# Patient Record
Sex: FEMALE | Race: WHITE | HISPANIC OR LATINO | Employment: OTHER | URBAN - METROPOLITAN AREA
[De-identification: names, ages, dates, MRNs, and addresses within clinical notes are randomized per-mention and may not be internally consistent; named-entity substitution may affect disease eponyms.]

---

## 2017-01-05 ENCOUNTER — GENERIC CONVERSION - ENCOUNTER (OUTPATIENT)
Dept: CARDIOLOGY CLINIC | Facility: CLINIC | Age: 77
End: 2017-01-05

## 2017-01-08 ENCOUNTER — GENERIC CONVERSION - ENCOUNTER (OUTPATIENT)
Dept: CARDIOLOGY CLINIC | Facility: CLINIC | Age: 77
End: 2017-01-08

## 2017-04-02 ENCOUNTER — GENERIC CONVERSION - ENCOUNTER (OUTPATIENT)
Dept: CARDIOLOGY CLINIC | Facility: CLINIC | Age: 77
End: 2017-04-02

## 2017-04-02 ENCOUNTER — INPATIENT (INPATIENT)
Facility: HOSPITAL | Age: 77
LOS: 11 days | Discharge: HOME HEALTH | End: 2017-04-14
Attending: INTERNAL MEDICINE | Admitting: INTERNAL MEDICINE

## 2017-04-06 ENCOUNTER — GENERIC CONVERSION - ENCOUNTER (OUTPATIENT)
Dept: OTHER | Facility: OTHER | Age: 77
End: 2017-04-06

## 2017-04-06 ENCOUNTER — GENERIC CONVERSION - ENCOUNTER (OUTPATIENT)
Dept: CARDIOLOGY CLINIC | Facility: CLINIC | Age: 77
End: 2017-04-06

## 2017-04-13 ENCOUNTER — GENERIC CONVERSION - ENCOUNTER (OUTPATIENT)
Dept: CARDIOLOGY CLINIC | Facility: CLINIC | Age: 77
End: 2017-04-13

## 2017-05-22 ENCOUNTER — GENERIC CONVERSION - ENCOUNTER (OUTPATIENT)
Dept: CARDIOLOGY CLINIC | Facility: CLINIC | Age: 77
End: 2017-05-22

## 2017-06-27 DIAGNOSIS — I48.91 UNSPECIFIED ATRIAL FIBRILLATION: ICD-10-CM

## 2017-06-27 DIAGNOSIS — Z99.81 DEPENDENCE ON SUPPLEMENTAL OXYGEN: ICD-10-CM

## 2017-06-27 DIAGNOSIS — R00.2 PALPITATIONS: ICD-10-CM

## 2017-06-27 DIAGNOSIS — Z86.711 PERSONAL HISTORY OF PULMONARY EMBOLISM: ICD-10-CM

## 2017-06-27 DIAGNOSIS — J44.1 CHRONIC OBSTRUCTIVE PULMONARY DISEASE WITH (ACUTE) EXACERBATION: ICD-10-CM

## 2017-06-27 DIAGNOSIS — M81.0 AGE-RELATED OSTEOPOROSIS WITHOUT CURRENT PATHOLOGICAL FRACTURE: ICD-10-CM

## 2017-06-27 DIAGNOSIS — E83.42 HYPOMAGNESEMIA: ICD-10-CM

## 2017-06-27 DIAGNOSIS — Z79.84 LONG TERM (CURRENT) USE OF ORAL HYPOGLYCEMIC DRUGS: ICD-10-CM

## 2017-06-27 DIAGNOSIS — Z86.718 PERSONAL HISTORY OF OTHER VENOUS THROMBOSIS AND EMBOLISM: ICD-10-CM

## 2017-06-27 DIAGNOSIS — J43.9 EMPHYSEMA, UNSPECIFIED: ICD-10-CM

## 2017-06-27 DIAGNOSIS — D64.9 ANEMIA, UNSPECIFIED: ICD-10-CM

## 2017-06-27 DIAGNOSIS — Z88.0 ALLERGY STATUS TO PENICILLIN: ICD-10-CM

## 2017-06-27 DIAGNOSIS — Z79.01 LONG TERM (CURRENT) USE OF ANTICOAGULANTS: ICD-10-CM

## 2017-06-27 DIAGNOSIS — E78.5 HYPERLIPIDEMIA, UNSPECIFIED: ICD-10-CM

## 2017-06-27 DIAGNOSIS — I10 ESSENTIAL (PRIMARY) HYPERTENSION: ICD-10-CM

## 2017-06-27 DIAGNOSIS — J96.21 ACUTE AND CHRONIC RESPIRATORY FAILURE WITH HYPOXIA: ICD-10-CM

## 2017-06-27 DIAGNOSIS — D72.829 ELEVATED WHITE BLOOD CELL COUNT, UNSPECIFIED: ICD-10-CM

## 2017-06-27 DIAGNOSIS — J44.0 CHRONIC OBSTRUCTIVE PULMONARY DISEASE WITH ACUTE LOWER RESPIRATORY INFECTION: ICD-10-CM

## 2017-06-27 DIAGNOSIS — E11.9 TYPE 2 DIABETES MELLITUS WITHOUT COMPLICATIONS: ICD-10-CM

## 2017-06-27 DIAGNOSIS — J40 BRONCHITIS, NOT SPECIFIED AS ACUTE OR CHRONIC: ICD-10-CM

## 2017-06-27 DIAGNOSIS — G47.33 OBSTRUCTIVE SLEEP APNEA (ADULT) (PEDIATRIC): ICD-10-CM

## 2017-06-27 DIAGNOSIS — Z87.891 PERSONAL HISTORY OF NICOTINE DEPENDENCE: ICD-10-CM

## 2017-06-27 DIAGNOSIS — T38.0X5A ADVERSE EFFECT OF GLUCOCORTICOIDS AND SYNTHETIC ANALOGUES, INITIAL ENCOUNTER: ICD-10-CM

## 2017-06-27 DIAGNOSIS — E03.9 HYPOTHYROIDISM, UNSPECIFIED: ICD-10-CM

## 2017-06-27 DIAGNOSIS — I47.1 SUPRAVENTRICULAR TACHYCARDIA: ICD-10-CM

## 2017-11-02 ENCOUNTER — ALLSCRIPTS OFFICE VISIT (OUTPATIENT)
Dept: OTHER | Facility: OTHER | Age: 77
End: 2017-11-02

## 2017-11-03 NOTE — PROGRESS NOTES
Assessment  1  Atrial fibrillation (427 31) (I48 91)   2  Hyperlipidemia (272 4) (E78 5)   3  Type 2 diabetes mellitus (250 00) (E11 9)   4  Pulmonary emphysema, unspecified emphysema type (492 8) (J43 9)   5  Pulmonary embolism (415 19) (I26 99)   6  Palpitations (785 1) (R00 2)    Plan   · EKG/ECG- POC; Status:Complete;   Done: 87PDF3146   Perform: In Office; [facility] [place]:[id number]; Last Updated By:Kristi Leon; 11/2/2017 2:51:30 PM;Ordered; For:Atrial fibrillation; Ordered By:Kye Delgado;   · Follow-up visit in 6 months Evaluation and Treatment  Follow-up  Status: Complete   Done: 91FKC5667   Ordered; For: Atrial fibrillation, Hyperlipidemia, Palpitations; Ordered By: Lynn Carlton Performed:  Due: 01WMQ1917; Last Updated By: Chris Noyola; 11/2/2017 3:38:16 PM    Discussion/Summary    51-year-old female with paroxysmal atrial fibrillation (by report)  She comes to establish care after moving to the area  Other medical comorbidities are listed  like to obtain prior records to confirm that she in fact has atrial fibrillation  She underwent an ablation  She is also on anticoagulation, but she has a history of DVT/PE tells me that she is always tachycardic  Sinus tachycardia with occasional PVCs today  would continue her current medications  She has atrial fibrillation and underwent an ablation for this, then anticoagulation would be long-term  If not, then we will need to determine whether anticoagulation for PE/DVT should be long-term or not  pressure is currently controlled on her current regimen  heart rate is elevated in sinus tachycardia  I suspect likely secondary to underlying obstructive lung disease and hypoxia  She is not currently wearing supplemental oxygen  She has no palpitations, and says this is a longstanding issue  Her diltiazem was previously increased in an effort to lower her heart rate   Given that sinus tachycardia will be driven by underlying causes, I would not titrate up nilda blocking agents further  In addition, given her obstructive lung disease would not put her on a beta-blocker  cardiac testing is indicated currently  She is signing a release of information form so we can obtain records of prior tests  does have an appointment scheduled with a vascular surgeon  I reviewed the diagnosis list with her, and she says she has not seen a vascular surgeon previously  Unclear with this referral with before, asked her to confirm  Chief Complaint  New pt is here for [facility] care visit  no complaints  EKG done today  History of Present Illness  HPI: Very pleasant 59-year-old female who recently relocated to the area  She previously was living in [place], and receives her care at [facility]   a cardiac standpoint, she has a history of palpitations  She says she complained of this for many years, and they would occur about once a month  It was quite debilitating  No etiology was identified  She said she saw cardiologist, EKGs and echocardiograms were done, and there were no abnormalities  However, she did have an episode which she had significant palpitations and she presented to the emergency room  There, she was found to have an arrhythmia  She recalls hearing atrial fibrillation  underwent an ablation earlier this year  She says since then, she has had no recurrence of her palpitations and overall has felt well   has obstructive lung disease  She does have portable oxygen that she uses and wears on an as-needed basis  She has COPD  She was seeing a pulmonologist, and has an appointment scheduled for next month to see a pulmonologist here   had a DVT about 5 or 6 months ago  Also had pulmonary embolism  She was started on anticoagulation with Xarelto  She says she is tolerating this  Length of therapy is somewhat unclear, however if she does have atrial fibrillation, she would need long-term anticoagulation regardless        Review of Systems      Cardiac: has heart murmur present, but-- no chest pain,-- no rhythm problems,-- no fainting/blackouts,-- no signs of swelling-- and-- no palpitations present  Skin: No complaints of nonhealing sores or skin rash  Genitourinary: frequent urination at night-- and-- loss of bladder control, but-- no recurrent urinary tract infections,-- no difficult urination,-- no blood in urine,-- no kidney stones,-- no kidney problems,-- no birth control or hormone replacement therapy,-- not post menopausal-- and-- not pregnant   Psychological: No complaints of feeling depressed, anxiety, panic attacks, or difficulty concentrating  General: trouble sleeping,-- changes in weight lbs-- and-- lack of energy/fatigue, but-- no appetite changes,-- no fever,-- no night sweats-- and-- no frequent infections  Respiratory: shortness of breath  HEENT: No complaints of serious problems, hearing problems, nose problems, throat problems, or snoring  Gastrointestinal: No complaints of liver problems, nausea, vomiting, heartburn, constipation, bloody stools, diarrhea, problems swallowing, adbominal pain, or rectal bleeding  Hematologic: anemia,-- blood clots-- and-- excessive bruising   Neurological: No complaints of numbness, tingling, dizziness, weakness, seizures, headaches, syncope or fainting, AM fatigue, daytime sleepiness, no witnessed apnea episodes  Musculoskeletal: No complaints of arthritis, back pain, or painfull swelling  ROS reviewed  Active Problems  1  Atrial fibrillation (427 31) (I48 91)   2  Cataract (366 9) (H26 9)   3  Deep vein thrombosis (453 40) (I82 409)   4  Hyperlipidemia (272 4) (E78 5)   5  Hypothyroidism (244 9) (E03 9)   6  Obstructive sleep apnea (327 23) (G47 33)   7  Palpitations (785 1) (R00 2)   8  Pulmonary embolism (415 19) (I26 99)   9  Pulmonary emphysema, unspecified emphysema type (492 8) (J43 9)   10   Type 2 diabetes mellitus (250 00) (E11 9)    Past Medical History   · History of chronic obstructive lung disease (V12 69) (Z87 09)   · History of Paroxysmal atrial fibrillation (427 31) (I48 0)    Family History  Mother    · No pertinent family history  Father    · No pertinent family history    Social History   · Former smoker (V15 82) (A89 828)   · 1 ppd   ·  (V61 07) (Z63 4)  The social history was reviewed and updated today  Current Meds   1  Atorvastatin Calcium 20 MG Oral Tablet; Take 1 tablet daily; Therapy: 82VOE4274 to Recorded   2  DilTIAZem HCl ER Coated Beads 300 MG Oral Capsule Extended Release 24 Hour;   TAKE 1 CAPSULE EVERY DAY; Therapy: 67APL7218 to (Evaluate:24Kyy6253) Recorded   3  Enalapril Maleate 20 MG Oral Tablet; Take 1 tablet daily; Therapy: 75ITV5910 to ([phone]) Recorded   4  Glimepiride 1 MG Oral Tablet; TAKE 1 TABLET DAILY; Therapy: 02MIH8680 to ([phone]) Recorded   5  MetFORMIN HCl - 1000 MG Oral Tablet; take 1 tablet by mouth twice daily; Therapy: 37SFX8071 to Recorded   6  Onglyza 5 MG Oral Tablet; TAKE 1 TABLET BY MOUTH ONCE DAILY; Therapy: 69GHZ6502 to Recorded   7  ProAir  (90 Base) MCG/ACT Inhalation Aerosol Solution; INHALE 1 TO 2 PUFFS   EVERY 4 TO 6 HOURS AS NEEDED; Therapy: 39YQL8392 to Recorded   8  Spiriva HandiHaler 18 MCG Inhalation Capsule; INHALE CONTENTS OF 1 CAPSULE   ONCE DAILY; Therapy: 25RXX1041 to ([phone]) Recorded   9  Symbicort 160-4 5 MCG/ACT Inhalation Aerosol; TAKE 2 PUFFS TWICE DAILY; Therapy: 77ORZ9069 to (Evaluate:30Mar2018) Recorded   10  Synthroid 25 MCG Oral Tablet; TAKE 1 TABLET DAILY; Therapy: 60SQR9681 to Recorded   11  Vitamin B-12 1000 MCG Oral Tablet; TAKING 1 TABLET OF MECOBALAMIN DAILY; Therapy: 88TXO7799 to (Evaluate:30Nov2017) Recorded   12  Xarelto 20 MG Oral Tablet; Take 1 tablet daily; Therapy: 38ITZ6751 to (Evaluate:43Cwr9621) Recorded    The medication list was reviewed and updated today  Allergies  1   Penicillins    Vitals  Signs   Systolic: 219, LUE, Sitting  Diastolic: 56, LUE, Sitting  BP Comments: pt felt lighheaded when getting up from EKG  Heart Rate: 108, R Radial  Systolic: 240, LUE, Sitting  Diastolic: 54, LUE, Sitting  Weight: 176 lb 4 oz  Height Unobtainable: Yes  O2 Saturation: 96    Physical Exam    Constitutional - General appearance: No acute distress, well appearing and well nourished  Eyes - Conjunctiva and Sclera examination: Conjunctiva pink, sclera anicteric  Neck - Normal, no JVD   Pulmonary - Respiratory effort: No signs of respiratory distress  -- Auscultation of lungs: Clear to auscultation  Cardiovascular - Auscultation of heart: Normal rate and rhythm, normal S1 and S2, no murmurs  -- Pedal pulses: Normal, 2+ bilaterally  -- Examination of extremities for edema and/or varicosities: Normal     Abdomen - Soft  Musculoskeletal - Gait and station: Normal gait  Skin - Skin: Normal without rashes  Skin is warm and well perfused  Neurologic - Speech normal  No focal deficits  Psychiatric - Orientation to person, place, and time: Normal -- Mood and affect: Normal       Results/Data  Sinus tachycardia with occasional PVCs        Future Appointments    Date/Time Provider Specialty Site   11/08/2017 01:15 PM Al Delgado MD Vascular Surgery [facility] [place]   12/19/2017 02:00 PM Gene Cancer, DO Pulmonary Medicine [facility], [address] [place] [address]     Signatures   Electronically signed by : RITESH Araiza ; Nov 2 2017  4:07PM EST                       (Author)

## 2017-12-19 ENCOUNTER — TRANSCRIBE ORDERS (OUTPATIENT)
Dept: ADMINISTRATIVE | Facility: HOSPITAL | Age: 77
End: 2017-12-19

## 2017-12-19 ENCOUNTER — ALLSCRIPTS OFFICE VISIT (OUTPATIENT)
Dept: OTHER | Facility: OTHER | Age: 77
End: 2017-12-19

## 2017-12-19 DIAGNOSIS — J44.9 OBSTRUCTIVE CHRONIC BRONCHITIS WITHOUT EXACERBATION (HCC): Primary | ICD-10-CM

## 2017-12-20 NOTE — CONSULTS
Assessment  1  Chronic obstructive pulmonary disease, unspecified COPD type (496) (J44 9)   2  Chronic respiratory failure with hypoxia and hypercapnia (518 83,799 02,786 09) (J96 11,J96 12)   3  Obstructive sleep apnea (327 23) (G47 33)   4  Pulmonary embolism (415 19) (I26 99)    Plan  Chronic obstructive pulmonary disease, unspecified COPD type    · Complete PFT; Status:Hold For - Scheduling,Retrospective By Protocol Authorization; Requested for:76Zaj5287 03:00PM;    Perform:[facility]; Order Comments:[facility]; [facility]:[id number]; Last Updated By:Latrice Kumar; 12/19/2017 2:32:30 PM;Ordered; For:Chronic obstructive pulmonary disease, unspecified COPD type; Ordered By:Kirsten Casanova;  Chronic respiratory failure with hypoxia and hypercapnia, FamHx: Family history ofasthma    · Six Minute Walk Test - POC; Status:Complete;   Done: 37PCT2530   Perform: In Office; [facility]:21HEJ0431;KDEUCUB; For:Chronic respiratory failure with hypoxia and hypercapnia, FamHx: Family history of asthma; Ordered By:Namrata Casanova;    Results/Data  PFT Results v2:     Spirometry:   Post Bronchodilator Spirometry:   Lung Volumes:   DLCO:   PFT Interpretation:  March 2015 shows severe airflow obstruction with decreased diffusion capacity  Data not available for interpretation         Discussion/Summary  Discussion Summary:   I reviewed documentation from the patient's prior pulmonologist  According to the records, she does have documented severe COPD with associated chronic hypoxic and hypercapnic respiratory failure  I would like to update PFTs  She will continue with Symbicort and Spiriva  She will also continue with Trilogy noninvasive ventilator during hours of sleep  Patient has completed a full year of anticoagulation for diagnosis of DVT/PE  This was her only event and I do not see any records that she has a documented hypercoagulable state  She is up-to-date on cancer screening   From a pulmonary perspective, anticoagulation could be discontinued  However, I will also discuss with Cardiology to ensure they would not have a reason for ongoing anticoagulation therapy, given her history of atrial fibrillation status post ablation  I will call her with results of testing and after my discussion with Cardiology  Otherwise, she will follow up with me in 4 months or sooner if needed  Goals and Barriers: The patient has the current Goals: Maintain health status  The patent has the current Barriers: None  Patient's Capacity to Self-Care: Patient is able to Self-Care  Active Problems   · Atrial fibrillation (427 31) (I48 91)   · Cataract (366 9) (H26 9)   · Deep vein thrombosis (453 40) (I82 409)   · Hyperlipidemia (272 4) (E78 5)   · Hypothyroidism (244 9) (E03 9)   · Obstructive sleep apnea (327 23) (G47 33)   · Palpitations (785 1) (R00 2)   · Pulmonary embolism (415 19) (I26 99)   · Pulmonary emphysema, unspecified emphysema type (492 8) (J43 9)   · Type 2 diabetes mellitus (250 00) (E11 9)    History of Present Illness  HPI: Ava Pham is here to establish care regarding COPD  She recently moved to the area from [facility]  She is a 79-year-old female who was hospitalized in December 2016 with respiratory failure requiring brief intubation  During that hospitalization, she was diagnosed with DVT and PE  She has been on Xarelto since that time  In January 2017, she developed worsening shortness of breath and was diagnosed with COPD exacerbation  She was treated with a prednisone taper  Since that time, she has been maintained on Symbicort and Spiriva  In April 2017, she developed tachycardia/rapid atrial fibrillation and underwent ablation therapy  While she does still have episodic palpitations, they have significantly improved  She does not have cough or sputum production on a regular basis she denies significant wheezing  She does have dyspnea most notable on humid days and with exertion   She has supplemental oxygen which she uses at 2 liters/minute with exertion and also with trilogy noninvasive ventilator during hours of sleep  She wears trilogy at least 8 hours per night  She is maintained on Symbicort Spiriva  She rarely uses albuterol  She was recently diagnosed with anemia and is following with her primary care for further evaluation  Patient has a 40 pack-year smoking history and quit 12 years ago  Her father had asthma  Review of Systems  Complete-Female - Pulm:  Constitutional: No fever, no chills, feels well, no tiredness, no recent weight gain or weight loss  Eyes: no complaints of vision problems  ENT: no rhinitis, no PND, no epistaxis  Cardiovascular: as noted in HPI  Respiratory: as noted in HPI  Gastrointestinal: no complaints of esophageal reflux, no abdominal pain  Genitourinary: no dysuria  Musculoskeletal: no arthralgias, no joint swelling, no myalgias  Integumentary: no rash, no lesions  Neurological: no headache, no fainting, no weakness  Endocrine: Negative  Hematologic/Lymphatic: â no complaints of swollen glands  Past Medical History  1  History of Paroxysmal atrial fibrillation (427 31) (I48 0)    Family History  Mother    1  No pertinent family history  Family History Reviewed: The family history was reviewed and updated today  Social History   · Former smoker (V15 82) (A88 852)   · 1 ppd x 40 years, quit 2005   ·  (V61 07) (Z63 4)  Social History Reviewed: The social history was reviewed and updated today  Current Meds   1  Atorvastatin Calcium 20 MG Oral Tablet; Take 1 tablet daily; Therapy: 28USS8421 to Recorded   2  DilTIAZem HCl ER Coated Beads 300 MG Oral Capsule Extended Release 24 Hour; TAKE 1 CAPSULE EVERY DAY; Therapy: 87SWU4523 to (Evaluate:43Omg2221) Recorded   3  Enalapril Maleate 20 MG Oral Tablet; Take 1 tablet daily; Therapy: 48OID5914 to [phone]) Recorded   4  Glimepiride 1 MG Oral Tablet; TAKE 1 TABLET DAILY;  Therapy: 77NBN9848 to ([phone]) Recorded   5  MetFORMIN HCl - 1000 MG Oral Tablet; take 1 tablet by mouth twice daily; Therapy: 50XMU7963 to Recorded   6  Onglyza 5 MG Oral Tablet; TAKE 1 TABLET BY MOUTH ONCE DAILY; Therapy: 72TJR3796 to Recorded   7  ProAir  (90 Base) MCG/ACT Inhalation Aerosol Solution; INHALE 1 TO 2 PUFFS EVERY 4 TO 6 HOURS AS NEEDED; Therapy: 10DCT5180 to Recorded   8  Spiriva HandiHaler 18 MCG Inhalation Capsule; INHALE CONTENTS OF 1 CAPSULE ONCE DAILY; Therapy: 22VUH5492 to [phone]) Recorded   9  Symbicort 160-4 5 MCG/ACT Inhalation Aerosol; TAKE 2 PUFFS TWICE DAILY; Therapy: 67HLR0729 to (Evaluate:30Mar2018) Recorded   10  Synthroid 25 MCG Oral Tablet; TAKE 1 TABLET DAILY; Therapy: 34JBY4126 to Recorded   11  Vitamin B-12 1000 MCG Oral Tablet; TAKING 1 TABLET OF MECOBALAMIN DAILY; Therapy: 05BXM2825 to (Evaluate:30Nov2017) Recorded   12  Xarelto 20 MG Oral Tablet; Take 1 tablet daily; Therapy: 47CKJ6373 to (Adrianroxanna Ritika) Recorded  Medication List Reviewed: The medication list was reviewed and updated today  Allergies  1  Penicillins    Vitals  Vital Signs    Recorded: 82RPG5935 01:26PM   Temperature 97 9 F   Heart Rate 100   Respiration 18   Systolic 076   Diastolic 60   Height 5 ft 1 in   Weight 172 lb    BMI Calculated 32 5   BSA Calculated 1 77   O2 Saturation 95, RA     Physical Exam   Constitutional  General appearance: No acute distress, well appearing and well nourished  Ears, Nose, Mouth, and Throat  Oropharynx: Normal with no erythema, edema, exudate or lesions  Neck  Neck: Supple, symmetric, trachea midline, no masses  Jugular veins: Normal    Pulmonary  Respiratory effort: No increased work of breathing or signs of respiratory distress  Auscultation of lungs: Clear to auscultation, no rales, no crackles, no wheezing  Cardiovascular  Auscultation of heart: Normal rate and rhythm, normal S1 and S2, no murmurs     Examination of extremities for edema and/or varicosities: Normal    Abdomen  Abdomen: Soft, non-tender  Lymphatic  Palpation of lymph nodes in neck: No lymphadenopathy  Musculoskeletal  Gait and station: Normal    Neurologic  Mental Status: Normal  Not confused, no evidence of dementia, good comprehension, good concentration  Skin  Skin and subcutaneous tissue: Limited exam shows no rash  Psychiatric  Orientation to person, place and time: Normal    Mood and affect: Normal        Procedure  6 minutes walk test performed in the office today  Baseline saturations are 94% on room air  After walking only 84 meters/2 minutes, saturations dropped to 87% on room air  She was then placed on supplemental oxygen at 2 liters/minute and walks for an additional 4 minutes  Saturations remained greater than equal to 92% on 2 L via nasal cannula        Signatures   Electronically signed by : Juan Carlos Mendoza DO; Dec 19 2017  2:36PM EST                       (Author)

## 2017-12-26 ENCOUNTER — GENERIC CONVERSION - ENCOUNTER (OUTPATIENT)
Dept: OTHER | Facility: OTHER | Age: 77
End: 2017-12-26

## 2017-12-26 DIAGNOSIS — I26.99 OTHER PULMONARY EMBOLISM WITHOUT ACUTE COR PULMONALE (HCC): ICD-10-CM

## 2018-01-02 ENCOUNTER — HOSPITAL ENCOUNTER (OUTPATIENT)
Dept: PULMONOLOGY | Facility: HOSPITAL | Age: 78
Discharge: HOME | End: 2018-01-05
Attending: INTERNAL MEDICINE
Payer: MEDICARE

## 2018-01-02 ENCOUNTER — GENERIC CONVERSION - ENCOUNTER (OUTPATIENT)
Dept: PULMONOLOGY | Facility: HOSPITAL | Age: 78
End: 2018-01-02

## 2018-01-02 DIAGNOSIS — J44.9 OBSTRUCTIVE CHRONIC BRONCHITIS WITHOUT EXACERBATION (HCC): ICD-10-CM

## 2018-01-02 PROCEDURE — 94726 PLETHYSMOGRAPHY LUNG VOLUMES: CPT

## 2018-01-02 PROCEDURE — 94760 N-INVAS EAR/PLS OXIMETRY 1: CPT

## 2018-01-02 PROCEDURE — 94729 DIFFUSING CAPACITY: CPT

## 2018-01-02 PROCEDURE — 94060 EVALUATION OF WHEEZING: CPT

## 2018-01-02 RX ORDER — ALBUTEROL SULFATE 2.5 MG/3ML
2.5 SOLUTION RESPIRATORY (INHALATION) ONCE
Status: COMPLETED | OUTPATIENT
Start: 2018-01-02 | End: 2018-01-02

## 2018-01-02 RX ADMIN — ALBUTEROL SULFATE 2.5 MG: 2.5 SOLUTION RESPIRATORY (INHALATION) at 15:03

## 2018-01-08 ENCOUNTER — APPOINTMENT (OUTPATIENT)
Dept: LAB | Facility: CLINIC | Age: 78
End: 2018-01-08
Payer: MEDICARE

## 2018-01-08 DIAGNOSIS — I26.99 OTHER PULMONARY EMBOLISM WITHOUT ACUTE COR PULMONALE (HCC): ICD-10-CM

## 2018-01-08 LAB — DEPRECATED D DIMER PPP: 511 NG/ML (FEU) (ref 0–424)

## 2018-01-08 PROCEDURE — 86147 CARDIOLIPIN ANTIBODY EA IG: CPT

## 2018-01-08 PROCEDURE — 85303 CLOT INHIBIT PROT C ACTIVITY: CPT

## 2018-01-08 PROCEDURE — 85379 FIBRIN DEGRADATION QUANT: CPT

## 2018-01-08 PROCEDURE — 81240 F2 GENE: CPT

## 2018-01-08 PROCEDURE — 36415 COLL VENOUS BLD VENIPUNCTURE: CPT

## 2018-01-08 PROCEDURE — 85306 CLOT INHIBIT PROT S FREE: CPT

## 2018-01-08 PROCEDURE — 81241 F5 GENE: CPT

## 2018-01-09 LAB
CARDIOLIPIN IGA SER IA-ACNC: 40 APL U/ML (ref 0–11)
CARDIOLIPIN IGG SER IA-ACNC: <9 GPL U/ML (ref 0–14)
CARDIOLIPIN IGM SER IA-ACNC: 18 MPL U/ML (ref 0–12)
PROT S ACT/NOR PPP: 96 % (ref 63–140)

## 2018-01-11 LAB — PROT C AG ACT/NOR PPP IA: >150 % OF NORMAL (ref 60–150)

## 2018-01-12 LAB
COMMENT: NORMAL
F2 GENE MUT ANL BLD/T: NORMAL
F5 GENE MUT ANL BLD/T: NORMAL
Lab: NORMAL

## 2018-01-14 VITALS
WEIGHT: 176.25 LBS | HEART RATE: 108 BPM | SYSTOLIC BLOOD PRESSURE: 132 MMHG | OXYGEN SATURATION: 96 % | DIASTOLIC BLOOD PRESSURE: 56 MMHG

## 2018-01-23 VITALS
OXYGEN SATURATION: 95 % | TEMPERATURE: 97.9 F | DIASTOLIC BLOOD PRESSURE: 60 MMHG | HEIGHT: 61 IN | WEIGHT: 172 LBS | BODY MASS INDEX: 32.47 KG/M2 | RESPIRATION RATE: 18 BRPM | HEART RATE: 100 BPM | SYSTOLIC BLOOD PRESSURE: 138 MMHG

## 2018-01-23 NOTE — MISCELLANEOUS
Message   Date: 26 Dec 2017 1:54 PM EST, Recorded By: Felice Miller For: Leon Newby Sheila, Self   Phone: [phone] (Home), [phone] (Home)   Reason: General Medical Question   PT calling asking if Dr Leo Clark had an oppourtunity to speak w/ Dr Sherif Rodriguez of cards in regards to her stopping her Puruntie 12   Electronically signed by : Eric Ramirez, ; Dec 26 2017  1:56PM EST                       (Author)

## 2018-01-23 NOTE — MISCELLANEOUS
Message  Spoke with Dr Samy Marks  No indication for ongoing anticoagulation from his perspective  I instructed the patient to stop her Xarelto  Plan to check labs to include D-dimer and hypercoagulable panel in 2 weeks  Assuming this is all unremarkable, she may remain off Xarelto  I will call her with results  Plan  Pulmonary embolism    · (1) ANTICARDIOLIPIN ANTIBODY; Status:Active; Requested for:65Vzl0202;    · (1) D-DIMER; Status:Active; Requested for:16Fwg3610;    · (1) FACTOR V LEIDEN MUTATION DNA ANALYSIS; Status:Active; Requested  for:00Fyz3319;    · (1) PROTEIN C ACTIVITY; Status:Active; Requested for:85Rdx4064;    · (1) PROTEIN S ACTIVITY; Status:Active; Requested for:62Vnx4275;    · (1) PROTHROMBIN 97952WE MUTATION ANALYSIS; Status:Active;  Requested  for:39Xcg5148;     Signatures   Electronically signed by : Deysi Timmons DO; Dec 26 2017  2:50PM EST                       (Author)

## 2018-02-26 ENCOUNTER — OFFICE VISIT (OUTPATIENT)
Dept: HEMATOLOGY ONCOLOGY | Facility: CLINIC | Age: 78
End: 2018-02-26
Payer: MEDICARE

## 2018-02-26 VITALS
BODY MASS INDEX: 34.47 KG/M2 | HEART RATE: 109 BPM | DIASTOLIC BLOOD PRESSURE: 72 MMHG | HEIGHT: 59 IN | OXYGEN SATURATION: 91 % | RESPIRATION RATE: 16 BRPM | WEIGHT: 171 LBS | SYSTOLIC BLOOD PRESSURE: 140 MMHG | TEMPERATURE: 98.4 F

## 2018-02-26 DIAGNOSIS — Z86.711 HISTORY OF PULMONARY EMBOLISM: ICD-10-CM

## 2018-02-26 DIAGNOSIS — R76.0 ANTIPHOSPHOLIPID ANTIBODY POSITIVE: Primary | ICD-10-CM

## 2018-02-26 PROCEDURE — 99205 OFFICE O/P NEW HI 60 MIN: CPT | Performed by: INTERNAL MEDICINE

## 2018-02-26 RX ORDER — BUDESONIDE AND FORMOTEROL FUMARATE DIHYDRATE 160; 4.5 UG/1; UG/1
2 AEROSOL RESPIRATORY (INHALATION) 2 TIMES DAILY
COMMUNITY
Start: 2018-01-30 | End: 2020-11-06

## 2018-02-26 RX ORDER — RIVAROXABAN 20 MG/1
20 TABLET, FILM COATED ORAL
COMMUNITY
Start: 2017-12-06 | End: 2018-08-21 | Stop reason: ALTCHOICE

## 2018-02-26 RX ORDER — ALBUTEROL SULFATE 90 UG/1
2 AEROSOL, METERED RESPIRATORY (INHALATION)
Status: ON HOLD | COMMUNITY
Start: 2017-10-31 | End: 2018-08-30 | Stop reason: ALTCHOICE

## 2018-02-26 RX ORDER — LANOLIN ALCOHOL/MO/W.PET/CERES
CREAM (GRAM) TOPICAL
COMMUNITY
Start: 2017-10-31 | End: 2018-06-21 | Stop reason: ALTCHOICE

## 2018-02-26 RX ORDER — ATORVASTATIN CALCIUM 20 MG/1
20 TABLET, FILM COATED ORAL DAILY
COMMUNITY
Start: 2018-02-16 | End: 2020-04-27

## 2018-02-26 RX ORDER — DILTIAZEM HYDROCHLORIDE 300 MG/1
300 CAPSULE, COATED, EXTENDED RELEASE ORAL DAILY
COMMUNITY
Start: 2017-12-22 | End: 2019-03-20 | Stop reason: SDUPTHER

## 2018-02-26 RX ORDER — GLIMEPIRIDE 1 MG/1
1 TABLET ORAL
COMMUNITY
Start: 2018-01-04 | End: 2020-01-09 | Stop reason: SDUPTHER

## 2018-02-26 RX ORDER — ENALAPRIL MALEATE 20 MG/1
20 TABLET ORAL DAILY
COMMUNITY
Start: 2018-01-04 | End: 2020-09-03

## 2018-02-26 RX ORDER — LEVOTHYROXINE SODIUM 25 MCG
25 TABLET ORAL DAILY
COMMUNITY
Start: 2018-01-30 | End: 2020-09-15

## 2018-02-26 NOTE — LETTER
February 26, 2018     Arch Phalen, DO  [address] [address]    Patient: Immanuel Espinoza   YOB: 1940   Date of Visit: 2/26/2018       Dear Dr Cantu Moment: Thank you for referring Immanuel Espinoza to me for evaluation  Below are my notes for this consultation  If you have questions, please do not hesitate to call me  I look forward to following your patient along with you  Sincerely,        Ulysses Melbourne, MD        CC: Merleen Just, MD Galvin Knoll, MD Ulysses Melbourne, MD  2/26/2018 10:17 AM  Sign at close encounter  Oncology Consult Note  Immanuel Espinoza 68 y o  female MRN: [id number]  Unit/Bed#:  Encounter: [id number]      Presenting Complaint: history of PE, atrial fibrillation, currently on Xarelto    History of Presenting Illness: she is 80-year-old female from [place], moved from [place] to [facility]  The patient had a history of COPD, atrial fibrillation status post ablation, had been on Xarelto because of history of DVT/ PE in February 2017 in [place], I do not have the full report, she underwent ablation successfully in November 2017, she was evaluated by Pulmonary service for COPD, and there was question about continuation or discontinuation of Xarelto 20 mg p o   Daily  As workup she had limited thrombophilia profile showed mildly elevated D-dimer at 511 ( 0-424), no evidence of factor 5 Leiden mutation or prothrombin gene mutation, anticardiolipin IgA is elevated at 41 ( less than 12), anticardiolipin IgG less than 9, anticardiolipin IgM 18 ( less than 13), protein C activity more than 150%, protein S activity of 96%   She had a history of 1 miscarriage in the past   No family history of thrombosis  Ex-smoker quit many years ago, she does not drink alcohol  Denies any headache blurred vision nausea vomiting diarrhea constipation dysuria hematuria melena hematochezia, she is up-to-date with her screening tests    Review of Systems - As stated in the HPI otherwise the fourteen point review of systems was negative  History reviewed  No pertinent past medical history  Social History     Social History    Marital status:      Spouse name: N/A    Number of children: N/A    Years of education: N/A     Social History Main Topics    Smoking status: Former Smoker    Smokeless tobacco: Never Used    Alcohol use None    Drug use: Unknown    Sexual activity: Not Asked     Other Topics Concern    None     Social History Narrative    None       History reviewed  No pertinent family history  Allergies   Allergen Reactions    Penicillins          Current Outpatient Prescriptions:     albuterol (PROAIR HFA) 90 mcg/act inhaler, Inhale 2 puffs, Disp: , Rfl:     cyanocobalamin (VITAMIN B-12) 1,000 mcg tablet, Take by mouth, Disp: , Rfl:     saxagliptin (ONGLYZA) 5 MG tablet, Take 1 tablet by mouth daily, Disp: , Rfl:     atorvastatin (LIPITOR) 20 mg tablet, , Disp: , Rfl:     diltiazem (CARDIZEM CD) 300 mg 24 hr capsule, , Disp: , Rfl:     enalapril (VASOTEC) 20 mg tablet, , Disp: , Rfl:     glimepiride (AMARYL) 1 mg tablet, , Disp: , Rfl:     metFORMIN (GLUCOPHAGE) 1000 MG tablet, , Disp: , Rfl:     SYMBICORT 160-4 5 MCG/ACT inhaler, , Disp: , Rfl:     SYNTHROID 25 MCG tablet, , Disp: , Rfl:     XARELTO 20 MG tablet, , Disp: , Rfl:       /72   Pulse (!) 109   Temp 98 4 °F (36 9 °C) (Tympanic)   Resp 16   Ht 4' 11" (1 499 m)   Wt 77 6 kg (171 lb)   SpO2 91%   BMI 34 54 kg/m²        General Appearance:    Alert, oriented        Eyes:    PERRL   Ears:    Normal external ear canals, both ears   Nose:   Nares normal, septum midline   Throat:   Mucosa moist  Pharynx without injection      Neck:   Supple       Lungs:      Distant breath sounds bilaterally   Chest Wall:    No tenderness or deformity    Heart:    Regular rate and rhythm       Abdomen:     Soft, non-tender, bowel sounds +, no organomegaly Extremities:   Extremities no cyanosis or edema       Skin:   no rash or icterus  Lymph nodes:   Cervical, supraclavicular, and axillary nodes normal   Neurologic:   CNII-XII intact, normal strength, sensation and reflexes     Throughout               No results found for this or any previous visit (from the past 48 hour(s))  No results found  Assessment and plan:  1  History of DVT and pulmonary embolism, the patient does not know what if there was any initiating factors, she does not know what  Was affected extremity with DVT, she was initiated on rivaroxaban 20 mg p o  Daily since February 2017, she also had a history of atrial fibrillation, the patient underwent successful ablation in November 2017, evaluated by Pulmonary service, limited thrombophilia profile showed abnormal cardiolipin antibodies IgA and IgM, mildly persistent elevation of D-dimer at 511, she is up-to-date with appropriate screening test for her age  3  At this time I will continue rivaroxaban 20 mg p o  Daily and repeat cardiolipin antibodies, lupus anticoagulant antibodies, D-dimer, in 4 months   3  Microcytic anemia with elevation of RBC, rule out iron deficiency anemia versus hemoglobinopathy, patient to have CBC, iron studies, hemoglobin electrophoresis  4   I will keep you updated follow-up in 4 months    Plan:

## 2018-02-26 NOTE — PROGRESS NOTES
Oncology Consult Note  Porter Alejandro 68 y o  female MRN: [id number]  Unit/Bed#:  Encounter: [id number]      Presenting Complaint: history of PE, atrial fibrillation, currently on Xarelto    History of Presenting Illness: she is 59-year-old female from [place], moved from [place] to [facility]  The patient had a history of COPD, atrial fibrillation status post ablation, had been on Xarelto because of history of DVT/ PE in February 2017 in [place], I do not have the full report, she underwent ablation successfully in November 2017, she was evaluated by Pulmonary service for COPD, and there was question about continuation or discontinuation of Xarelto 20 mg p o  Daily  As workup she had limited thrombophilia profile showed mildly elevated D-dimer at 511 ( 0-424), no evidence of factor 5 Leiden mutation or prothrombin gene mutation, anticardiolipin IgA is elevated at 41 ( less than 12), anticardiolipin IgG less than 9, anticardiolipin IgM 18 ( less than 13), protein C activity more than 150%, protein S activity of 96%   She had a history of 1 miscarriage in the past   No family history of thrombosis  Ex-smoker quit many years ago, she does not drink alcohol  Denies any headache blurred vision nausea vomiting diarrhea constipation dysuria hematuria melena hematochezia, she is up-to-date with her screening tests    Review of Systems - As stated in the HPI otherwise the fourteen point review of systems was negative  History reviewed  No pertinent past medical history  Social History     Social History    Marital status:      Spouse name: N/A    Number of children: N/A    Years of education: N/A     Social History Main Topics    Smoking status: Former Smoker    Smokeless tobacco: Never Used    Alcohol use None    Drug use: Unknown    Sexual activity: Not Asked     Other Topics Concern    None     Social History Narrative    None       History reviewed   No pertinent family history  Allergies   Allergen Reactions    Penicillins          Current Outpatient Prescriptions:     albuterol (PROAIR HFA) 90 mcg/act inhaler, Inhale 2 puffs, Disp: , Rfl:     cyanocobalamin (VITAMIN B-12) 1,000 mcg tablet, Take by mouth, Disp: , Rfl:     saxagliptin (ONGLYZA) 5 MG tablet, Take 1 tablet by mouth daily, Disp: , Rfl:     atorvastatin (LIPITOR) 20 mg tablet, , Disp: , Rfl:     diltiazem (CARDIZEM CD) 300 mg 24 hr capsule, , Disp: , Rfl:     enalapril (VASOTEC) 20 mg tablet, , Disp: , Rfl:     glimepiride (AMARYL) 1 mg tablet, , Disp: , Rfl:     metFORMIN (GLUCOPHAGE) 1000 MG tablet, , Disp: , Rfl:     SYMBICORT 160-4 5 MCG/ACT inhaler, , Disp: , Rfl:     SYNTHROID 25 MCG tablet, , Disp: , Rfl:     XARELTO 20 MG tablet, , Disp: , Rfl:       /72   Pulse (!) 109   Temp 98 4 °F (36 9 °C) (Tympanic)   Resp 16   Ht 4' 11" (1 499 m)   Wt 77 6 kg (171 lb)   SpO2 91%   BMI 34 54 kg/m²       General Appearance:    Alert, oriented        Eyes:    PERRL   Ears:    Normal external ear canals, both ears   Nose:   Nares normal, septum midline   Throat:   Mucosa moist  Pharynx without injection  Neck:   Supple       Lungs:      Distant breath sounds bilaterally   Chest Wall:    No tenderness or deformity    Heart:    Regular rate and rhythm       Abdomen:     Soft, non-tender, bowel sounds +, no organomegaly           Extremities:   Extremities no cyanosis or edema       Skin:   no rash or icterus  Lymph nodes:   Cervical, supraclavicular, and axillary nodes normal   Neurologic:   CNII-XII intact, normal strength, sensation and reflexes     Throughout               No results found for this or any previous visit (from the past 48 hour(s))  No results found  Assessment and plan:  1   History of DVT and pulmonary embolism, the patient does not know what if there was any initiating factors, she does not know what  Was affected extremity with DVT, she was initiated on rivaroxaban 20 mg p o  Daily since February 2017, she also had a history of atrial fibrillation, the patient underwent successful ablation in November 2017, evaluated by Pulmonary service, limited thrombophilia profile showed abnormal cardiolipin antibodies IgA and IgM, mildly persistent elevation of D-dimer at 511, she is up-to-date with appropriate screening test for her age  3  At this time I will continue rivaroxaban 20 mg p o  Daily and repeat cardiolipin antibodies, lupus anticoagulant antibodies, D-dimer, in 4 months   3  Microcytic anemia with elevation of RBC, rule out iron deficiency anemia versus hemoglobinopathy, patient to have CBC, iron studies, hemoglobin electrophoresis  4   I will keep you updated follow-up in 4 months    Plan:

## 2018-02-27 ENCOUNTER — TELEPHONE (OUTPATIENT)
Dept: PULMONOLOGY | Facility: CLINIC | Age: 78
End: 2018-02-27

## 2018-02-27 NOTE — TELEPHONE ENCOUNTER
Patient of Dr Blank called, stating R Nbail Badillo will need an order for her oxygen, faxed to [phone]   Thank you

## 2018-03-01 DIAGNOSIS — R09.02 HYPOXIA: Primary | ICD-10-CM

## 2018-03-06 ENCOUNTER — DOCUMENTATION (OUTPATIENT)
Dept: PULMONOLOGY | Facility: CLINIC | Age: 78
End: 2018-03-06

## 2018-03-19 ENCOUNTER — TELEPHONE (OUTPATIENT)
Dept: PULMONOLOGY | Facility: CLINIC | Age: 78
End: 2018-03-19

## 2018-03-19 NOTE — TELEPHONE ENCOUNTER
Moni Phelps from [facility] called regarding patient's Clinical notes   Please fax to [phone]

## 2018-04-12 ENCOUNTER — OFFICE VISIT (OUTPATIENT)
Dept: PULMONOLOGY | Facility: CLINIC | Age: 78
End: 2018-04-12
Payer: MEDICARE

## 2018-04-12 VITALS
WEIGHT: 175 LBS | HEART RATE: 111 BPM | DIASTOLIC BLOOD PRESSURE: 74 MMHG | OXYGEN SATURATION: 94 % | SYSTOLIC BLOOD PRESSURE: 130 MMHG | BODY MASS INDEX: 33.04 KG/M2 | TEMPERATURE: 97.9 F | HEIGHT: 61 IN

## 2018-04-12 DIAGNOSIS — J96.11 CHRONIC RESPIRATORY FAILURE WITH HYPOXIA AND HYPERCAPNIA (HCC): ICD-10-CM

## 2018-04-12 DIAGNOSIS — J96.12 CHRONIC RESPIRATORY FAILURE WITH HYPOXIA AND HYPERCAPNIA (HCC): ICD-10-CM

## 2018-04-12 DIAGNOSIS — J44.9 CHRONIC OBSTRUCTIVE PULMONARY DISEASE, UNSPECIFIED COPD TYPE (HCC): Primary | ICD-10-CM

## 2018-04-12 DIAGNOSIS — I26.99 OTHER PULMONARY EMBOLISM WITHOUT ACUTE COR PULMONALE, UNSPECIFIED CHRONICITY (HCC): ICD-10-CM

## 2018-04-12 DIAGNOSIS — J44.9 COPD, SEVERE (HCC): ICD-10-CM

## 2018-04-12 PROBLEM — E78.5 HYPERLIPIDEMIA: Status: ACTIVE | Noted: 2017-10-31

## 2018-04-12 PROBLEM — E11.9 TYPE 2 DIABETES MELLITUS (HCC): Status: ACTIVE | Noted: 2017-10-31

## 2018-04-12 PROBLEM — G47.33 OBSTRUCTIVE SLEEP APNEA: Status: ACTIVE | Noted: 2017-10-31

## 2018-04-12 PROBLEM — I82.409 DEEP VEIN THROMBOSIS (HCC): Status: ACTIVE | Noted: 2017-10-31

## 2018-04-12 PROBLEM — E03.9 HYPOTHYROIDISM: Status: ACTIVE | Noted: 2017-10-31

## 2018-04-12 PROCEDURE — 99214 OFFICE O/P EST MOD 30 MIN: CPT | Performed by: INTERNAL MEDICINE

## 2018-04-12 NOTE — ASSESSMENT & PLAN NOTE
She was evaluated by Hematology and was instructed to continue with Xarelto  I would defer duration of treatment to them

## 2018-04-12 NOTE — PROGRESS NOTES
Pulmonary Follow Up Note   Fausto Patino 68 y o  female MRN: [id number]  4/12/2018      Assessment/Plan:    COPD, severe [facility])  She will continue with Symbicort twice daily  I gave her Spiriva Respimat 2 5 mcg to use 2 puffs once daily  I asked her to take around noon time to see if that helps with some of the afternoon symptoms that she is experiencing  We also discussed the possibility of pulmonary rehabilitation  Unfortunately, she does not have reliable transportation that would be able to get her to rehab twice weekly  She will let me know if this changes in the future  Chronic respiratory failure with hypoxia and hypercapnia (HCC)  She will continue with supplemental oxygen during the day and trilogy NIV during hours of sleep  Pulmonary embolism ([facility] )  She was evaluated by Hematology and was instructed to continue with Xarelto  I would defer duration of treatment to them  Visit orders:    Diagnoses and all orders for this visit:    Chronic obstructive pulmonary disease, unspecified COPD type ([facility] )  -     Tiotropium Bromide Monohydrate (SPIRIVA RESPIMAT) 2 5 MCG/ACT AERS; Inhale 2 Act (5 mcg total) daily    COPD, severe (HCC)    Chronic respiratory failure with hypoxia and hypercapnia ([facility] )    Other pulmonary embolism without acute cor pulmonale, unspecified chronicity (HCC)    Other orders  -     Discontinue: tiotropium (SPIRIVA HANDIHALER) 18 mcg inhalation capsule; Place 1 capsule into inhaler and inhale daily        Return in about 4 months (around 8/12/2018)  History of Present Illness   HPI:  Fausto Patino is a 68 y o  female who is here today for follow-up regarding severe COPD and chronic hypercapnic and hypoxic respiratory failure  Her pulmonary status has been stable  She has no significant cough, wheeze or sputum production  She does have dyspnea with even minimal exertion which has been stable    She also finds that her shortness of breath is worse around 4:00 p m  on a daily basis   She does not find the rescue inhaler helpful  She is compliant with Symbicort twice daily and Spiriva once daily in the morning  She is using oxygen at 3 liters/minute continuously and trilogy noninvasive ventilator during hours of sleep  She has not been hospitalized or needed antibiotics or steroids since last visit  Review of Systems   Constitutional: Negative for chills, fever and unexpected weight change  HENT: Negative for postnasal drip and sore throat  Eyes: Negative for visual disturbance  Respiratory:        As noted in HPI   Cardiovascular: Negative for chest pain  Gastrointestinal: Negative for abdominal pain, diarrhea and vomiting  Genitourinary: Negative for difficulty urinating  Skin: Negative for rash  Neurological: Negative for headaches  Hematological: Negative for adenopathy  Psychiatric/Behavioral: Negative  All other systems reviewed and are negative  Historical Information   Past Medical History:   Diagnosis Date    Chronic respiratory failure with hypoxia and hypercapnia (HCC)     Deep vein thrombosis (HCC)     Diabetes mellitus (HCC)     [facility] )     Hypertension     Hypothyroid     Pulmonary embolism (HCC)     Sleep apnea, obstructive      History reviewed  No pertinent surgical history    Family History   Problem Relation Age of Onset    No Known Problems Mother     Asthma Father        History   Smoking Status    Former Smoker    Packs/day: 1 00    Years: 40 00    Quit date: 2005   Smokeless Tobacco    Never Used         Meds/Allergies     Current Outpatient Prescriptions:     albuterol (PROAIR HFA) 90 mcg/act inhaler, Inhale 2 puffs, Disp: , Rfl:     atorvastatin (LIPITOR) 20 mg tablet, , Disp: , Rfl:     cyanocobalamin (VITAMIN B-12) 1,000 mcg tablet, Take by mouth, Disp: , Rfl:     diltiazem (CARDIZEM CD) 300 mg 24 hr capsule, , Disp: , Rfl:     enalapril (VASOTEC) 20 mg tablet, , Disp: , Rfl:     glimepiride (AMARYL) 1 mg tablet, , Disp: , Rfl:     metFORMIN (GLUCOPHAGE) 1000 MG tablet, , Disp: , Rfl:     saxagliptin (ONGLYZA) 5 MG tablet, Take 1 tablet by mouth daily, Disp: , Rfl:     SYMBICORT 160-4 5 MCG/ACT inhaler, , Disp: , Rfl:     SYNTHROID 25 MCG tablet, , Disp: , Rfl:     XARELTO 20 MG tablet, , Disp: , Rfl:     Tiotropium Bromide Monohydrate (SPIRIVA RESPIMAT) 2 5 MCG/ACT AERS, Inhale 2 Act (5 mcg total) daily, Disp: 60 Act, Rfl: 11  Allergies   Allergen Reactions    Penicillins        Vitals: Blood pressure 130/74, pulse (!) 111, temperature 97 9 °F (36 6 °C), temperature source Tympanic, height 5' 1" (1 549 m), weight 79 4 kg (175 lb), SpO2 94 %  Body mass index is 33 07 kg/m²  Oxygen Therapy  SpO2: 94 %  Oxygen Therapy: Supplemental oxygen  O2 Delivery Method: Nasal cannula  O2 Flow Rate (L/min): 2 L/min    Physical Exam   Physical Exam   Constitutional: She is oriented to person, place, and time  No distress  HENT:   Head: Normocephalic  Mouth/Throat: No oropharyngeal exudate  Eyes: Pupils are equal, round, and reactive to light  No scleral icterus  Neck: Neck supple  No JVD present  Cardiovascular: Normal rate and regular rhythm  Pulmonary/Chest:   Decreased breath sounds bilaterally, clear   Abdominal: Soft  There is no tenderness  Musculoskeletal: She exhibits no edema  Lymphadenopathy:     She has no cervical adenopathy  Neurological: She is alert and oriented to person, place, and time  Skin: Skin is warm and dry  Psychiatric: She has a normal mood and affect  Pulmonary function testing:  Performed January 2018   FEV1/FVC ratio 65%   FEV1 48% predicted  FVC 55% predicted  No response to bronchodilators   % predicted   % predicted  DLCO corrected for hemoglobin 13 % predicted  This study shows severe obstruction with reduced vital capacity due to air trapping  Diffusion capacity is severely reduced

## 2018-04-12 NOTE — ASSESSMENT & PLAN NOTE
She will continue with Symbicort twice daily  I gave her Spiriva Respimat 2 5 mcg to use 2 puffs once daily  I asked her to take around noon time to see if that helps with some of the afternoon symptoms that she is experiencing  We also discussed the possibility of pulmonary rehabilitation  Unfortunately, she does not have reliable transportation that would be able to get her to rehab twice weekly  She will let me know if this changes in the future

## 2018-04-16 ENCOUNTER — TELEPHONE (OUTPATIENT)
Dept: PULMONOLOGY | Facility: CLINIC | Age: 78
End: 2018-04-16

## 2018-04-16 ENCOUNTER — DOCUMENTATION (OUTPATIENT)
Dept: PULMONOLOGY | Facility: CLINIC | Age: 78
End: 2018-04-16

## 2018-04-16 ENCOUNTER — OFFICE VISIT (OUTPATIENT)
Dept: HEMATOLOGY ONCOLOGY | Facility: CLINIC | Age: 78
End: 2018-04-16
Payer: MEDICARE

## 2018-04-16 VITALS
RESPIRATION RATE: 18 BRPM | HEIGHT: 59 IN | DIASTOLIC BLOOD PRESSURE: 84 MMHG | SYSTOLIC BLOOD PRESSURE: 152 MMHG | BODY MASS INDEX: 35.38 KG/M2 | OXYGEN SATURATION: 89 % | HEART RATE: 114 BPM | WEIGHT: 175.5 LBS | TEMPERATURE: 98.3 F

## 2018-04-16 DIAGNOSIS — I26.99 OTHER PULMONARY EMBOLISM WITHOUT ACUTE COR PULMONALE (HCC): ICD-10-CM

## 2018-04-16 DIAGNOSIS — D51.9 VITAMIN B12 DEFICIENCY ANEMIA: ICD-10-CM

## 2018-04-16 DIAGNOSIS — I82.409 DEEP VEIN THROMBOSIS (HCC): ICD-10-CM

## 2018-04-16 DIAGNOSIS — D50.9 IRON DEFICIENCY ANEMIA, UNSPECIFIED: Primary | ICD-10-CM

## 2018-04-16 PROCEDURE — 99214 OFFICE O/P EST MOD 30 MIN: CPT | Performed by: PHYSICIAN ASSISTANT

## 2018-04-16 NOTE — PROGRESS NOTES
Oncology Consult Note  Desi Shell 68 y o  female MRN: [id number]  Unit/Bed#:  Encounter: [id number]    Assessment and plan:  1  History of DVT and pulmonary embolism diagnosed in [place] and has been on rivaroxaban 20 mg p o  daily since  Limited thrombophilia profile showed abnormal cardiolipin antibodies IgA and IgM, mildly persistent elevation of D-dimer at 511, she is up-to-date with appropriate screening test for her age  Hemoglobin electrophoresis, D-dimer, lupus anticoagulant, cardiolipin antibody, homocystine were requested but not drawn  Re-requested  Continue with anticoagulation at this time  - Iron Saturation %; Future  - Ferritin; Future  - Homocysteine, serum; Future  - Hemoglobin Electrophoresis; Future  - D-dimer, quantitative; Future  - Lupus Anticoagulant Panel; Future  - Cardiolipin antibody; Future  - CBC and differential; Future  - Comprehensive metabolic panel; Future  - Homocysteine, serum; Future      2  History of atrial fibrillation, the patient underwent successful ablation in November 2017, evaluated by pulmonary service  3  Microcytic anemia with elevation of RBC, iron deficiency anemia identified  3/19/2018 [facility]: ferritin of 10, iron saturation 6%  Hemoglobin 11 2 with MCV of 74 8, RDW 18 1, platelet count 901, white blood cell count 10 6 with 77% neutrophils, 16% lymphocytes, 6% monocytes  We discussed IV iron replacement  Potential side effects could include but may not be limited to:   change in taste, diarrhea, muscle cramps, nausea or vomiting, pain in the arms or legs, pain or burning sensation in the injection site, allergic reaction  The patient verbalized understanding and wishes to proceed  Venofer 300mg x 3 doses will be arranged  4   COPD on chronic oxygen therapy            Presenting Complaint: history of PE, atrial fibrillation, currently on Xarelto    History of Presenting Illness: Desi Shell is a71-year-old female from [place], moved from [place] to [facility], seen for initial consultation 2/26/2018  The patient had a history of COPD, atrial fibrillation status post ablation, had been on Xarelto because of history of DVT/ PE in February 2017 in [place]  Full report unavailable  The patient does not remember if there was any initiating factors, she does not recall which extremity was affected with DVT  She underwent ablation successfully in November 2017  She was evaluated by Pulmonary service for COPD, and there was question about continuation or discontinuation of Xarelto 20 mg p o  Daily  As workup she had limited thrombophilia profile showed mildly elevated D-dimer at 511 (0-424), no evidence of factor 5 Leiden mutation or prothrombin gene mutation, anticardiolipin IgA is elevated at 41 ( less than 12), anticardiolipin IgG less than 9, anticardiolipin IgM 18 ( less than 13), protein C activity more than 150%, protein S activity of 96%   She had a history of 1 miscarriage in the past   No family history of thrombosis  Ex-smoker quit many years ago, she does not drink alcohol    Denies any headache blurred vision nausea vomiting diarrhea constipation dysuria hematuria melena hematochezia, she is up-to-date with her screening tests  Interval History:  3/19/2018 [facility]: ferritin of 10, iron saturation 6%  Hemoglobin 11 2 with MCV of 74 8, RDW 18 1, platelet count 690, white blood cell count 10 6 with 77% neutrophils, 16% lymphocytes, 6% monocytes  She reports she had a normal colonoscopy in [place] 5 years ago  She denies any melena or hematochezia  She does not take aspirin or NSAIDs  She denies any other known source of bleeding  Over the past few years, she has not noticed any changes in her breathing  She is on chronic oxygen for the past year  Denies any dizziness or lightheadedness       She has been taking oral iron for the past few months  Review of Systems - As stated in the HPI otherwise the fourteen point review of systems was negative  Past Medical History:   Diagnosis Date    Chronic respiratory failure with hypoxia and hypercapnia (HCC)     Deep vein thrombosis (HCC)     Diabetes mellitus (HCC)     [facility]     Hypertension     Hypothyroid     Pulmonary embolism (HCC)     Sleep apnea, obstructive        Social History     Social History    Marital status:       Spouse name: N/A    Number of children: N/A    Years of education: N/A     Social History Main Topics    Smoking status: Former Smoker     Packs/day: 1 00     Years: 40 00     Quit date: 2005    Smokeless tobacco: Never Used    Alcohol use Not on file    Drug use: Unknown    Sexual activity: Not on file     Other Topics Concern    Not on file     Social History Narrative    No narrative on file       Family History   Problem Relation Age of Onset    No Known Problems Mother     Asthma Father        Allergies   Allergen Reactions    Penicillins          Current Outpatient Prescriptions:     albuterol (PROAIR HFA) 90 mcg/act inhaler, Inhale 2 puffs, Disp: , Rfl:     atorvastatin (LIPITOR) 20 mg tablet, , Disp: , Rfl:     cyanocobalamin (VITAMIN B-12) 1,000 mcg tablet, Take by mouth, Disp: , Rfl:     diltiazem (CARDIZEM CD) 300 mg 24 hr capsule, , Disp: , Rfl:     enalapril (VASOTEC) 20 mg tablet, , Disp: , Rfl:     glimepiride (AMARYL) 1 mg tablet, , Disp: , Rfl:     metFORMIN (GLUCOPHAGE) 1000 MG tablet, , Disp: , Rfl:     saxagliptin (ONGLYZA) 5 MG tablet, Take 1 tablet by mouth daily, Disp: , Rfl:     SYMBICORT 160-4 5 MCG/ACT inhaler, , Disp: , Rfl:     SYNTHROID 25 MCG tablet, , Disp: , Rfl:     Tiotropium Bromide Monohydrate (SPIRIVA RESPIMAT) 2 5 MCG/ACT AERS, Inhale 2 Act (5 mcg total) daily, Disp: 60 Act, Rfl: 11    XARELTO 20 MG tablet, , Disp: , Rfl:       /84   Pulse (!) 114   Temp 98 3 °F (36 8 °C)   Resp 18   Ht 4' 11" (1 499 m)   Wt 79 6 kg (175 lb 8 oz)   SpO2 (!) 89%   BMI 35 45 kg/m²     General Appearance:    Alert, oriented        Eyes:    PERRL   Ears:    Normal external ear canals, both ears   Nose:   Nares normal, septum midline   Throat:   Mucosa moist  Pharynx without injection  Neck:   Supple       Lungs:      Distant breath sounds bilaterally  On 02  Chest Wall:    No tenderness or deformity    Heart:    Regular rate and rhythm       Abdomen:     Soft, non-tender, bowel sounds +, no organomegaly           Extremities:   Extremities no cyanosis or edema       Skin:   no rash or icterus      Lymph nodes:   Cervical, supraclavicular, and axillary nodes normal   Neurologic:   CNII-XII intact, normal strength, sensation and reflexes     Throughout

## 2018-04-16 NOTE — TELEPHONE ENCOUNTER
Patient called stating [facility] does not provide service for oxygen in her area  Please forward script to [facility]ain for patient's oxygen concentrator   Please advise

## 2018-04-16 NOTE — PROGRESS NOTES
Hansel Lu from [facility] stating that she need all of the patient orders and I explained that I need consent from the patient

## 2018-04-17 ENCOUNTER — TELEPHONE (OUTPATIENT)
Dept: PULMONOLOGY | Facility: CLINIC | Age: 78
End: 2018-04-17

## 2018-04-17 NOTE — TELEPHONE ENCOUNTER
Pt is requesting a portable concentrator order fax over to [facility]  She will no longer use [facility]  Fax [phone] and their tel [phone]

## 2018-04-17 NOTE — TELEPHONE ENCOUNTER
Lizzy Mcallister from [facility] Left a VM requesting office notes and testing to be faxed over for approval of patients oxygen please fax to [phone]   Any questions please call Lizzy Mcallister at [phone]

## 2018-04-23 ENCOUNTER — TELEPHONE (OUTPATIENT)
Dept: PULMONOLOGY | Facility: CLINIC | Age: 78
End: 2018-04-23

## 2018-04-23 NOTE — TELEPHONE ENCOUNTER
Pt uses spiriva handihaler capsules but at her apt she was given a prescription for inhaler  She would like capsules instead since she does not know how to use inhaler  Please advise  Please use local pharmacy on file

## 2018-04-24 DIAGNOSIS — J44.9 CHRONIC OBSTRUCTIVE PULMONARY DISEASE, UNSPECIFIED COPD TYPE (HCC): Primary | ICD-10-CM

## 2018-04-26 RX ORDER — SODIUM CHLORIDE 9 MG/ML
20 INJECTION, SOLUTION INTRAVENOUS CONTINUOUS
Status: DISCONTINUED | OUTPATIENT
Start: 2018-04-27 | End: 2018-04-30 | Stop reason: HOSPADM

## 2018-04-27 ENCOUNTER — HOSPITAL ENCOUNTER (OUTPATIENT)
Dept: INFUSION CENTER | Facility: CLINIC | Age: 78
Discharge: HOME | End: 2018-04-27
Payer: MEDICARE

## 2018-04-27 VITALS
SYSTOLIC BLOOD PRESSURE: 145 MMHG | OXYGEN SATURATION: 98 % | HEART RATE: 97 BPM | RESPIRATION RATE: 20 BRPM | DIASTOLIC BLOOD PRESSURE: 75 MMHG | TEMPERATURE: 97.9 F

## 2018-04-27 PROCEDURE — 96365 THER/PROPH/DIAG IV INF INIT: CPT

## 2018-04-27 PROCEDURE — 96366 THER/PROPH/DIAG IV INF ADDON: CPT

## 2018-04-27 RX ORDER — SODIUM CHLORIDE 9 MG/ML
20 INJECTION, SOLUTION INTRAVENOUS CONTINUOUS
Status: DISCONTINUED | OUTPATIENT
Start: 2018-04-30 | End: 2018-05-03 | Stop reason: HOSPADM

## 2018-04-27 RX ADMIN — IRON SUCROSE 300 MG: 20 INJECTION, SOLUTION INTRAVENOUS at 13:26

## 2018-04-27 RX ADMIN — SODIUM CHLORIDE 20 ML/HR: 0.9 INJECTION, SOLUTION INTRAVENOUS at 13:22

## 2018-04-30 ENCOUNTER — HOSPITAL ENCOUNTER (OUTPATIENT)
Dept: INFUSION CENTER | Facility: CLINIC | Age: 78
Discharge: HOME | End: 2018-04-30
Payer: MEDICARE

## 2018-04-30 VITALS
TEMPERATURE: 98 F | DIASTOLIC BLOOD PRESSURE: 62 MMHG | SYSTOLIC BLOOD PRESSURE: 128 MMHG | RESPIRATION RATE: 20 BRPM | HEART RATE: 100 BPM

## 2018-04-30 PROCEDURE — 96365 THER/PROPH/DIAG IV INF INIT: CPT

## 2018-04-30 PROCEDURE — 96366 THER/PROPH/DIAG IV INF ADDON: CPT

## 2018-04-30 RX ADMIN — SODIUM CHLORIDE 20 ML/HR: 0.9 INJECTION, SOLUTION INTRAVENOUS at 13:30

## 2018-04-30 RX ADMIN — IRON SUCROSE 300 MG: 20 INJECTION, SOLUTION INTRAVENOUS at 13:34

## 2018-04-30 NOTE — PROGRESS NOTES
Patient tolerated her venofer without any adverse reactions   Next appointment confirmed and she declined avs

## 2018-05-04 RX ORDER — SODIUM CHLORIDE 9 MG/ML
20 INJECTION, SOLUTION INTRAVENOUS CONTINUOUS
Status: DISCONTINUED | OUTPATIENT
Start: 2018-05-07 | End: 2018-05-10 | Stop reason: HOSPADM

## 2018-05-07 ENCOUNTER — HOSPITAL ENCOUNTER (OUTPATIENT)
Dept: INFUSION CENTER | Facility: CLINIC | Age: 78
Discharge: HOME | End: 2018-05-07
Payer: MEDICARE

## 2018-05-07 PROCEDURE — 96366 THER/PROPH/DIAG IV INF ADDON: CPT

## 2018-05-07 PROCEDURE — 96365 THER/PROPH/DIAG IV INF INIT: CPT

## 2018-05-07 RX ADMIN — SODIUM CHLORIDE 20 ML/HR: 0.9 INJECTION, SOLUTION INTRAVENOUS at 13:20

## 2018-05-07 RX ADMIN — IRON SUCROSE 300 MG: 20 INJECTION, SOLUTION INTRAVENOUS at 13:20

## 2018-06-18 ENCOUNTER — TELEPHONE (OUTPATIENT)
Dept: HEMATOLOGY ONCOLOGY | Facility: CLINIC | Age: 78
End: 2018-06-18

## 2018-06-19 ENCOUNTER — APPOINTMENT (OUTPATIENT)
Dept: LAB | Facility: CLINIC | Age: 78
End: 2018-06-19
Payer: MEDICARE

## 2018-06-19 DIAGNOSIS — D51.9 VITAMIN B12 DEFICIENCY ANEMIA: ICD-10-CM

## 2018-06-19 DIAGNOSIS — D50.9 IRON DEFICIENCY ANEMIA, UNSPECIFIED: ICD-10-CM

## 2018-06-19 DIAGNOSIS — I26.99 OTHER PULMONARY EMBOLISM WITHOUT ACUTE COR PULMONALE (HCC): ICD-10-CM

## 2018-06-19 DIAGNOSIS — Z86.711 HISTORY OF PULMONARY EMBOLISM: ICD-10-CM

## 2018-06-19 DIAGNOSIS — R76.0 ANTIPHOSPHOLIPID ANTIBODY POSITIVE: ICD-10-CM

## 2018-06-19 LAB
ALBUMIN SERPL BCP-MCNC: 3.6 G/DL (ref 3.5–5)
ALP SERPL-CCNC: 67 U/L (ref 46–116)
ALT SERPL W P-5'-P-CCNC: 21 U/L (ref 12–78)
ANION GAP SERPL CALCULATED.3IONS-SCNC: 7 MMOL/L (ref 4–13)
AST SERPL W P-5'-P-CCNC: 8 U/L (ref 5–45)
BASOPHILS # BLD AUTO: 0.05 THOUSANDS/ΜL (ref 0–0.1)
BASOPHILS NFR BLD AUTO: 0 % (ref 0–1)
BILIRUB SERPL-MCNC: 0.52 MG/DL (ref 0.2–1)
BUN SERPL-MCNC: 11 MG/DL (ref 5–25)
CALCIUM SERPL-MCNC: 8.6 MG/DL (ref 8.3–10.1)
CHLORIDE SERPL-SCNC: 104 MMOL/L (ref 100–108)
CO2 SERPL-SCNC: 27 MMOL/L (ref 21–32)
CREAT SERPL-MCNC: 0.45 MG/DL (ref 0.6–1.3)
DEPRECATED D DIMER PPP: 368 NG/ML (FEU) (ref 0–424)
EOSINOPHIL # BLD AUTO: 0.1 THOUSAND/ΜL (ref 0–0.61)
EOSINOPHIL NFR BLD AUTO: 1 % (ref 0–6)
ERYTHROCYTE [DISTWIDTH] IN BLOOD BY AUTOMATED COUNT: 19.4 % (ref 11.6–15.1)
FERRITIN SERPL-MCNC: 96 NG/ML (ref 8–388)
GFR SERPL CREATININE-BSD FRML MDRD: 96 ML/MIN/1.73SQ M
GLUCOSE SERPL-MCNC: 132 MG/DL (ref 65–140)
HCT VFR BLD AUTO: 41.5 % (ref 34.8–46.1)
HCYS SERPL-SCNC: 5.5 UMOL/L (ref 3.7–11.2)
HGB BLD-MCNC: 12.8 G/DL (ref 11.5–15.4)
IMM GRANULOCYTES # BLD AUTO: 0.07 THOUSAND/UL (ref 0–0.2)
IMM GRANULOCYTES NFR BLD AUTO: 1 % (ref 0–2)
IRON SATN MFR SERPL: 15 %
IRON SERPL-MCNC: 44 UG/DL (ref 50–170)
LYMPHOCYTES # BLD AUTO: 2.82 THOUSANDS/ΜL (ref 0.6–4.47)
LYMPHOCYTES NFR BLD AUTO: 24 % (ref 14–44)
MCH RBC QN AUTO: 25.7 PG (ref 26.8–34.3)
MCHC RBC AUTO-ENTMCNC: 30.8 G/DL (ref 31.4–37.4)
MCV RBC AUTO: 83 FL (ref 82–98)
MONOCYTES # BLD AUTO: 0.6 THOUSAND/ΜL (ref 0.17–1.22)
MONOCYTES NFR BLD AUTO: 5 % (ref 4–12)
NEUTROPHILS # BLD AUTO: 8.15 THOUSANDS/ΜL (ref 1.85–7.62)
NEUTS SEG NFR BLD AUTO: 69 % (ref 43–75)
NRBC BLD AUTO-RTO: 0 /100 WBCS
PLATELET # BLD AUTO: 303 THOUSANDS/UL (ref 149–390)
PMV BLD AUTO: 11.6 FL (ref 8.9–12.7)
POTASSIUM SERPL-SCNC: 4.1 MMOL/L (ref 3.5–5.3)
PROT SERPL-MCNC: 7.3 G/DL (ref 6.4–8.2)
RBC # BLD AUTO: 4.98 MILLION/UL (ref 3.81–5.12)
SODIUM SERPL-SCNC: 138 MMOL/L (ref 136–145)
TIBC SERPL-MCNC: 285 UG/DL (ref 250–450)
WBC # BLD AUTO: 11.79 THOUSAND/UL (ref 4.31–10.16)

## 2018-06-19 PROCEDURE — 85705 THROMBOPLASTIN INHIBITION: CPT | Performed by: INTERNAL MEDICINE

## 2018-06-19 PROCEDURE — 85025 COMPLETE CBC W/AUTO DIFF WBC: CPT

## 2018-06-19 PROCEDURE — 83020 HEMOGLOBIN ELECTROPHORESIS: CPT

## 2018-06-19 PROCEDURE — 80053 COMPREHEN METABOLIC PANEL: CPT

## 2018-06-19 PROCEDURE — 82728 ASSAY OF FERRITIN: CPT

## 2018-06-19 PROCEDURE — 85379 FIBRIN DEGRADATION QUANT: CPT

## 2018-06-19 PROCEDURE — 85613 RUSSELL VIPER VENOM DILUTED: CPT | Performed by: INTERNAL MEDICINE

## 2018-06-19 PROCEDURE — 86147 CARDIOLIPIN ANTIBODY EA IG: CPT

## 2018-06-19 PROCEDURE — 85732 THROMBOPLASTIN TIME PARTIAL: CPT | Performed by: INTERNAL MEDICINE

## 2018-06-19 PROCEDURE — 83090 ASSAY OF HOMOCYSTEINE: CPT

## 2018-06-19 PROCEDURE — 83540 ASSAY OF IRON: CPT

## 2018-06-19 PROCEDURE — 85670 THROMBIN TIME PLASMA: CPT | Performed by: INTERNAL MEDICINE

## 2018-06-19 PROCEDURE — 83550 IRON BINDING TEST: CPT

## 2018-06-20 ENCOUNTER — OFFICE VISIT (OUTPATIENT)
Dept: GASTROENTEROLOGY | Facility: AMBULARY SURGERY CENTER | Age: 78
End: 2018-06-20
Payer: MEDICARE

## 2018-06-20 VITALS
TEMPERATURE: 98.3 F | HEART RATE: 98 BPM | WEIGHT: 175.2 LBS | HEIGHT: 59 IN | DIASTOLIC BLOOD PRESSURE: 72 MMHG | SYSTOLIC BLOOD PRESSURE: 130 MMHG | BODY MASS INDEX: 35.32 KG/M2

## 2018-06-20 DIAGNOSIS — D50.9 IRON DEFICIENCY ANEMIA, UNSPECIFIED: ICD-10-CM

## 2018-06-20 DIAGNOSIS — Z79.01 ANTICOAGULANT LONG-TERM USE: Primary | ICD-10-CM

## 2018-06-20 PROBLEM — I47.10 PSVT (PAROXYSMAL SUPRAVENTRICULAR TACHYCARDIA): Status: ACTIVE | Noted: 2018-06-20

## 2018-06-20 PROBLEM — I47.1 PSVT (PAROXYSMAL SUPRAVENTRICULAR TACHYCARDIA) (HCC): Status: ACTIVE | Noted: 2018-06-20

## 2018-06-20 LAB
CARDIOLIPIN IGA SER IA-ACNC: 28 APL U/ML (ref 0–11)
CARDIOLIPIN IGG SER IA-ACNC: <9 GPL U/ML (ref 0–14)
CARDIOLIPIN IGM SER IA-ACNC: 15 MPL U/ML (ref 0–12)
LABORATORY COMMENT REPORT: NORMAL
LABORATORY COMMENT REPORT: NORMAL

## 2018-06-20 PROCEDURE — 99204 OFFICE O/P NEW MOD 45 MIN: CPT | Performed by: INTERNAL MEDICINE

## 2018-06-20 NOTE — ASSESSMENT & PLAN NOTE
Rule out occult blood loss  Rule out colorectal lesions including polyps or malignancy  Rule out peptic ulcer disease or gastric erosions     -Schedule for both EGD and colonoscopy  -High-fiber diet     -Patient was given instructions about the colonoscopy prep     -Patient was explained about  the risks and benefits of the procedure  Risks including but not limited to bleeding, infection, perforation were explained in detail  Also explained about less than 100% sensitivity with the exam and other alternatives

## 2018-06-20 NOTE — PROGRESS NOTES
Consultation - [address] [facility]  Nikki Sharma 1940 female         Chief Complaint:  Iron deficiency anemia    HPI:  77-year-old female with history of DVT, P on Xarelto was noted to be anemic with hemoglobin 11 2 with iron saturations of 6 percent in March  She received IV iron infusions and her hemoglobin came up to 12 8  She reports having regular bowel movements usually 3-4 soft brown colored  Good appetite, no recent weight loss  Denies any abdominal pain, nausea or vomiting  No heartburn acid reflux  She had a colonoscopy about 5 years ago which was normal  She has history of atrial fibrillation status post ablation in November 2017  REVIEW OF SYSTEMS: Review of Systems   Constitutional: Negative for activity change, appetite change, chills, diaphoresis, fatigue, fever and unexpected weight change  HENT: Negative for ear discharge, ear pain, facial swelling, hearing loss, nosebleeds, sore throat, tinnitus and voice change  Eyes: Negative for pain, discharge, redness, itching and visual disturbance  Respiratory: Negative for apnea, cough, chest tightness, shortness of breath and wheezing  Cardiovascular: Negative for chest pain and palpitations  Gastrointestinal:        As noted in HPI   Endocrine: Negative for cold intolerance, heat intolerance and polyuria  Genitourinary: Negative for difficulty urinating, dysuria, flank pain, hematuria and urgency  Musculoskeletal: Negative for arthralgias, back pain, gait problem, joint swelling and myalgias  Skin: Negative for rash and wound  Neurological: Negative for dizziness, tremors, seizures, speech difficulty, light-headedness, numbness and headaches  Hematological: Negative for adenopathy  Does not bruise/bleed easily  Psychiatric/Behavioral: Negative for agitation, behavioral problems and confusion  The patient is not nervous/anxious           Past Medical History:   Diagnosis Date    Chronic respiratory failure with hypoxia and hypercapnia (HCC)     Deep vein thrombosis (HCC)     Diabetes mellitus ([facility] )     Em[facility]sema of lung ([facility] )     Hypertension     Hypothyroid     Pulmonary embolism ([facility] )     Sleep apnea, obstructive       History reviewed  No pertinent surgical history  Social History     Social History    Marital status:      Spouse name: N/A    Number of children: N/A    Years of education: N/A     Occupational History    Not on file  Social History Main Topics    Smoking status: Former Smoker     Packs/day: 1 00     Years: 40 00     Quit date: 2005    Smokeless tobacco: Never Used    Alcohol use No    Drug use: No    Sexual activity: Not on file     Other Topics Concern    Not on file     Social History Narrative    No narrative on file     Family History   Problem Relation Age of Onset    No Known Problems Mother     Asthma Father      Penicillins  Current Outpatient Prescriptions   Medication Sig Dispense Refill    albuterol (PROAIR HFA) 90 mcg/act inhaler Inhale 2 puffs      atorvastatin (LIPITOR) 20 mg tablet       cyanocobalamin (VITAMIN B-12) 1,000 mcg tablet Take by mouth      diltiazem (CARDIZEM CD) 300 mg 24 hr capsule       enalapril (VASOTEC) 20 mg tablet       glimepiride (AMARYL) 1 mg tablet       metFORMIN (GLUCOPHAGE) 1000 MG tablet       saxagliptin (ONGLYZA) 5 MG tablet Take 1 tablet by mouth daily      SYMBICORT 160-4 5 MCG/ACT inhaler       SYNTHROID 25 MCG tablet       tiotropium (SPIRIVA) 18 mcg inhalation capsule Place 1 capsule (18 mcg total) into inhaler and inhale daily 6 each 11    XARELTO 20 MG tablet       Na Sulfate-K Sulfate-Mg Sulf (SUPREP BOWEL PREP KIT) 17 5-3 13-1 6 GM/180ML SOLN Take 2 Bottles by mouth see administration instructions Please follow the instructions from the office 2 Bottle 0     No current facility-administered medications for this visit          Blood pressure 130/72, pulse 98, temperature 98 3 °F (36 8 °C), temperature source Tympanic, height 4' 11" (1 499 m), weight 79 5 kg (175 lb 3 2 oz)  PHYSICAL EXAM: Physical Exam   Constitutional: She is oriented to person, place, and time  She appears well-developed and well-nourished  HENT:   Head: Normocephalic and atraumatic  Nose: Nose normal    Mouth/Throat: Oropharynx is clear and moist    Eyes: Conjunctivae are normal  Right eye exhibits no discharge  Left eye exhibits no discharge  No scleral icterus  Neck: Neck supple  No JVD present  No tracheal deviation present  No thyromegaly present  Cardiovascular: Normal rate, regular rhythm and normal heart sounds  Exam reveals no gallop and no friction rub  No murmur heard  Pulmonary/Chest: Effort normal and breath sounds normal  No respiratory distress  She has no wheezes  She has no rales  She exhibits no tenderness  Abdominal: Soft  Bowel sounds are normal  She exhibits no distension and no mass  There is no tenderness  There is no rebound and no guarding  No hernia  Musculoskeletal: She exhibits no edema, tenderness or deformity  Lymphadenopathy:     She has no cervical adenopathy  Neurological: She is alert and oriented to person, place, and time  Skin: Skin is warm and dry  No rash noted  No erythema  Psychiatric: She has a normal mood and affect  Her behavior is normal  Thought content normal         Lab Results   Component Value Date    WBC 11 79 (H) 06/19/2018    HGB 12 8 06/19/2018    HCT 41 5 06/19/2018    MCV 83 06/19/2018     06/19/2018     Lab Results   Component Value Date    GLUCOSE 132 06/19/2018    CALCIUM 8 6 06/19/2018     06/19/2018    K 4 1 06/19/2018    CO2 27 06/19/2018     06/19/2018    BUN 11 06/19/2018    CREATININE 0 45 (L) 06/19/2018     Lab Results   Component Value Date    ALT 21 06/19/2018    AST 8 06/19/2018    ALKPHOS 67 06/19/2018    BILITOT 0 52 06/19/2018     No results found for: INR, PROTIME    No results found      ASSESSMENT & PLAN:    Iron deficiency anemia, unspecified  Rule out occult blood loss  Rule out colorectal lesions including polyps or malignancy  Rule out peptic ulcer disease or gastric erosions     -Schedule for both EGD and colonoscopy  -High-fiber diet     -Patient was given instructions about the colonoscopy prep     -Patient was explained about  the risks and benefits of the procedure  Risks including but not limited to bleeding, infection, perforation were explained in detail  Also explained about less than 100% sensitivity with the exam and other alternatives  Anticoagulant long-term use  Patient is at increased risk because of anticoagulation therapy on Xarelto     -advised her to check with her cardiologist about holding Xarelto prior to the procedures

## 2018-06-20 NOTE — ASSESSMENT & PLAN NOTE
Patient is at increased risk because of anticoagulation therapy on Xarelto     -advised her to check with her cardiologist about holding Xarelto prior to the procedures

## 2018-06-21 ENCOUNTER — PREP FOR PROCEDURE (OUTPATIENT)
Dept: GASTROENTEROLOGY | Facility: AMBULARY SURGERY CENTER | Age: 78
End: 2018-06-21

## 2018-06-21 ENCOUNTER — OFFICE VISIT (OUTPATIENT)
Dept: CARDIOLOGY CLINIC | Facility: CLINIC | Age: 78
End: 2018-06-21
Payer: MEDICARE

## 2018-06-21 VITALS
HEIGHT: 59 IN | WEIGHT: 175 LBS | HEART RATE: 100 BPM | SYSTOLIC BLOOD PRESSURE: 124 MMHG | DIASTOLIC BLOOD PRESSURE: 68 MMHG | BODY MASS INDEX: 35.28 KG/M2 | OXYGEN SATURATION: 95 %

## 2018-06-21 DIAGNOSIS — I47.1 PSVT (PAROXYSMAL SUPRAVENTRICULAR TACHYCARDIA) (HCC): Primary | ICD-10-CM

## 2018-06-21 DIAGNOSIS — I26.99 OTHER PULMONARY EMBOLISM WITHOUT ACUTE COR PULMONALE, UNSPECIFIED CHRONICITY (HCC): ICD-10-CM

## 2018-06-21 DIAGNOSIS — K64.9 HEMORRHOIDS, UNSPECIFIED HEMORRHOID TYPE: Primary | ICD-10-CM

## 2018-06-21 DIAGNOSIS — J44.9 COPD, SEVERE (HCC): ICD-10-CM

## 2018-06-21 DIAGNOSIS — K63.5 POLYP OF COLON, UNSPECIFIED PART OF COLON, UNSPECIFIED TYPE: ICD-10-CM

## 2018-06-21 LAB
APTT SCREEN TO CONFIRM RATIO: 1.11 RATIO (ref 0–1.4)
CONFIRM APTT/NORMAL: 34 SEC (ref 0–55)
DRVVT IMM 1:2 NP PPP: 46.4 SEC (ref 0–47)
LA PPP-IMP: ABNORMAL
SCREEN APTT: 39.3 SEC (ref 0–51.9)
SCREEN DRVVT: 49.9 SEC (ref 0–47)
THROMBIN TIME: 14.6 SEC (ref 0–23)

## 2018-06-21 PROCEDURE — 99214 OFFICE O/P EST MOD 30 MIN: CPT | Performed by: INTERNAL MEDICINE

## 2018-06-21 NOTE — LETTER
June 21, 2018     Cindy Garcia MD  [address] [facility]    Patient: Patria Linares   YOB: 1940   Date of Visit: 6/21/2018       Dear Dr Demond Cates:    Thank you for referring Patria Linares to me for evaluation  Below are my notes for this consultation  If you have questions, please do not hesitate to call me  I look forward to following your patient along with you           Sincerely,        Eliane Sandra MD        CC: No Recipients

## 2018-06-21 NOTE — PROGRESS NOTES
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  As such, no indication from a cardiac standpoint to be on anticoagulation, however she sees a hematologist who has recommended long term anticoagulation for other reasons with prior PE  Additionally, ablation for SVT usually quite successful so hopeful her symptoms of palpitations will not recur  No changes to BP medications, can continue cardizem  Sinus tachycardia secondary to underlying COPD and other conditions  History of Present Illness:     Pleasant 75-year-old female with a history of palpitations  She moved to [place] in 2017  Previously, was living in [place], receives her care at [facility]   At 1 point, she was told she had atrial fibrillation  However, since her last visit I was able to review records from her prior cardiologist   She had an ablation for AVNRT  There was no atrial fibrillation found  Her symptoms of palpitations have improved  She has always been somewhat tachycardic  She has significant COPD and uses usually 2 L of oxygen  She has been maintained on Cardizem for blood pressure control  Comes for regular follow-up today  No complaints  No palpitations  Breathing is relatively stable      Problem List     PSVT (paroxysmal supraventricular tachycardia) ([facility])    COPD, 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[facility] )    Deep vein thrombosis (HCC)    Hyperlipidemia    Chronic respiratory failure with hypoxia and hypercapnia (HCC)    Hypothyroidism    Obstructive sleep apnea    Pulmonary embolism ([facility] )    Overview Signed 4/12/2018  1:49 PM by Valeria Beavers DO     Description: Dec 2016         Type 2 diabetes mellitus ([facility] )    No results found for: HGBA1C    No results for input(s): POCGLU in the last 72 hours  Blood Sugar Average: Last 72 hrs:          Iron deficiency anemia, unspecified    Anticoagulant long-term use    Hemorrhoids    Overview Signed 6/21/2018 10:40 AM by Patricia Zaragoza MA     Added automatically from request for surgery [id number]         Polyp of colon    Overview Signed 6/21/2018 10:40 AM by Patricia Zaragoza MA     Added automatically from request for surgery [id number]             Past Medical History:   Diagnosis Date    Chronic respiratory failure with hypoxia and hypercapnia (HCC)     Deep vein thrombosis (HCC)     Diabetes mellitus ([place] [address] )     Emphysema of lung ([place] [address] )     Hypertension     Hypothyroid     Pulmonary embolism ([place] [address] )     Sleep apnea, obstructive      Social History     Social History    Marital status:      Spouse name: N/A    Number of children: N/A    Years of education: N/A     Occupational History    Not on file  Social History Main Topics    Smoking status: Former Smoker     Packs/day: 1 00     Years: 40 00     Quit date: 2005    Smokeless tobacco: Never Used    Alcohol use No    Drug use: No    Sexual activity: Not on file     Other Topics Concern    Not on file     Social History Narrative    No narrative on file      Family History   Problem Relation Age of Onset    Hypertension Mother     Hyperlipidemia Mother     Asthma Father     Heart failure Father     Hypertension Sister     Hyperlipidemia Sister     Heart attack Neg Hx     Aneurysm Neg Hx     Stroke Neg Hx     Clotting disorder Neg Hx      History reviewed  No pertinent surgical history      Current Outpatient Prescriptions:     albuterol (PROAIR HFA) 90 mcg/act inhaler, Inhale 2 puffs, Disp: , Rfl:     atorvastatin (LIPITOR) 20 mg tablet, 20 mg daily  , Disp: , Rfl:     diltiazem (CARDIZEM CD) 300 mg 24 hr capsule, 300 mg daily  , Disp: , Rfl:     enalapril (VASOTEC) 20 mg tablet, Take 20 mg by mouth daily  , Disp: , Rfl:     glimepiride (AMARYL) 1 mg tablet, Take 1 mg by mouth daily with breakfast  , Disp: , Rfl:     metFORMIN (GLUCOPHAGE) 1000 MG tablet, Take 1,000 mg by mouth 2 (two) times a day with meals  , Disp: , Rfl:     Na Sulfate-K Sulfate-Mg Sulf (SUPREP BOWEL PREP KIT) 17 5-3 13-1 6 GM/180ML SOLN, Take 2 Bottles by mouth see administration instructions Please follow the instructions from the office, Disp: 2 Bottle, Rfl: 0    saxagliptin (ONGLYZA) 5 MG tablet, Take 1 tablet by mouth daily, Disp: , Rfl:     SYMBICORT 160-4 5 MCG/ACT inhaler, , Disp: , Rfl:     SYNTHROID 25 MCG tablet, Take 25 mcg by mouth daily  , Disp: , Rfl:     tiotropium (SPIRIVA) 18 mcg inhalation capsule, Place 1 capsule (18 mcg total) into inhaler and inhale daily, Disp: 6 each, Rfl: 11    XARELTO 20 MG tablet, Take 20 mg by mouth daily with breakfast  , Disp: , Rfl:   Allergies   Allergen Reactions    Penicillins Rash     After dental procedure        Vitals:    06/21/18 1034   BP: 124/68   BP Location: Left arm   Patient Position: Sitting   Cuff Size: Adult   Pulse: 100   SpO2: 95%   Weight: 79 4 kg (175 lb)   Height: 4' 11" (1 499 m)     Vitals:    06/21/18 1034   Weight: 79 4 kg (175 lb)      Height: 4' 11" (149 9 cm)   Body mass index is 35 35 kg/m²  Physical Exam:  GENERAL: Alert, well appearing, and in no distress  HEENT:  PERRL, EOMI, no scleral icterus, no conjunctival pallor  NECK:  Supple, No elevated JVP, no thyromegaly, no carotid bruits  HEART:  Regular rate and rhythm, normal S1/S2, no S3/S4, no murmur or rub  LUNGS:  Decreased air entry bilaterally    ABDOMEN:  Soft, non-tender, positive bowel sounds, no rebound or guarding  EXTREMITIES:  No edema  VASCULAR:  Normal pedal pulses   NEURO: No focal deficits,  SKIN: Normal without suspicious lesions on exposed skin    ROS:  Except as noted in HPI, is otherwise reviewed in detail and a 12 point review of systems is negative      Labs:  Lab Results   Component Value Date     06/19/2018    K 4 1 06/19/2018     06/19/2018    CREATININE 0 45 (L) 06/19/2018    BUN 11 06/19/2018    CO2 27 06/19/2018    ALT 21 06/19/2018    AST 8 06/19/2018    WBC 11 79 (H) 06/19/2018    HGB 12 8 06/19/2018    HCT 41 5 06/19/2018     06/19/2018     No results found for: CHOL  No results found for: LDLCALC  No results found for: HDL  No results found for: TRIG

## 2018-06-22 LAB
DEPRECATED HGB OTHER BLD-IMP: 0 %
HGB A MFR BLD: 2.1 % (ref 1.8–3.2)
HGB A MFR BLD: 97.9 % (ref 96.4–98.8)
HGB C MFR BLD: 0 %
HGB F MFR BLD: 0 % (ref 0–2)
HGB FRACT BLD-IMP: NORMAL
HGB S BLD QL SOLY: NEGATIVE
HGB S MFR BLD: 0 %
LABORATORY COMMENT REPORT: NORMAL

## 2018-06-25 ENCOUNTER — OFFICE VISIT (OUTPATIENT)
Dept: HEMATOLOGY ONCOLOGY | Facility: CLINIC | Age: 78
End: 2018-06-25
Payer: MEDICARE

## 2018-06-25 VITALS
OXYGEN SATURATION: 90 % | HEIGHT: 59 IN | HEART RATE: 102 BPM | WEIGHT: 175 LBS | TEMPERATURE: 98.3 F | BODY MASS INDEX: 35.28 KG/M2 | RESPIRATION RATE: 14 BRPM

## 2018-06-25 DIAGNOSIS — D50.0 IRON DEFICIENCY ANEMIA DUE TO CHRONIC BLOOD LOSS: Primary | ICD-10-CM

## 2018-06-25 DIAGNOSIS — I26.99 OTHER PULMONARY EMBOLISM WITHOUT ACUTE COR PULMONALE, UNSPECIFIED CHRONICITY (HCC): ICD-10-CM

## 2018-06-25 PROCEDURE — 99214 OFFICE O/P EST MOD 30 MIN: CPT | Performed by: INTERNAL MEDICINE

## 2018-06-25 NOTE — PROGRESS NOTES
Hematology Outpatient Follow - Up Note  César Macdonald 66 y o  female MRN: @ Encounter: [id number]        Date:  6/25/2018        Assessment/ Plan:   1  History of DVT and pulmonary embolism diagnosed in [place] in January 2017 had been on rivaroxaban 20 mg p o  Daily since, limited thrombophilia profile showed no evidence of factor 5 Leiden mutation, prothrombin gene mutation, mildly elevated cardiolipin antibodies of IgA and IgM, D-dimer was initially persistently elevated however now it is normal, she has normal homocystine, hemoglobin electrophoresis was reported normal   The patient was not on aspirin when she was diagnosed with DVT/PE   I feel strongly about discontinuation of rivaroxaban because of persistent hemorrhoid bleeding and the need of IV iron and I believe give the patient a chance to be on aspirin 81 mg p o  Daily indefinitely however in the future if she developed DVT or/ PE she will be on rivaroxaban indefinitely  2  The patient agreed on the plan she is paying $300 every month for rivaroxaban           HPI:   César Macdonald is a 80-year-old female from [place], moved from [place] to [facility], seen for initial consultation 2/26/2018      The patient had a history of COPD, atrial fibrillation status post ablation, had been on Xarelto because of history of DVT/ PE in February 2017 in [place]  Full report unavailable        The patient does not remember if there was any initiating factors, she does not recall which extremity was affected with DVT      She underwent ablation successfully in November 2017  She was evaluated by Pulmonary service for COPD, and there was question about continuation or discontinuation of Xarelto 20 mg p o   Daily         As workup she had limited thrombophilia profile showed mildly elevated D-dimer at 511 (0-424), no evidence of factor 5 Leiden mutation or prothrombin gene mutation, anticardiolipin IgA is elevated at 41 ( less than 12), anticardiolipin IgG less than 9, anticardiolipin IgM 18 ( less than 13), protein C activity more than 150%, protein S activity of 96%       She had a history of 1 miscarriage in the past     Interval History:        Previous Treatment:         Test Results:    Imaging: No results found  Labs:   Lab Results   Component Value Date    WBC 11 79 (H) 06/19/2018    HGB 12 8 06/19/2018    HCT 41 5 06/19/2018    MCV 83 06/19/2018     06/19/2018     Lab Results   Component Value Date     06/19/2018    K 4 1 06/19/2018     06/19/2018    CO2 27 06/19/2018    ANIONGAP 7 06/19/2018    BUN 11 06/19/2018    CREATININE 0 45 (L) 06/19/2018    GLUCOSE 132 06/19/2018    CALCIUM 8 6 06/19/2018    AST 8 06/19/2018    ALT 21 06/19/2018    ALKPHOS 67 06/19/2018    PROT 7 3 06/19/2018    BILITOT 0 52 06/19/2018    EGFR 96 06/19/2018       Lab Results   Component Value Date    IRON 44 (L) 06/19/2018    TIBC 285 06/19/2018    FERRITIN 96 06/19/2018       Hemoglobin electrophoresis showed no evidence of abnormal hemoglobinopathy  D-dimer 368 ( 0-424) on 06/19/2018  Cardiolipin antibodies are pending at the time of the dictation   Homocystine level is normal    ROS:   Review of Systems   Constitutional: Positive for fatigue  Negative for activity change, appetite change, diaphoresis, fever and unexpected weight change  HENT: Negative for facial swelling, hearing loss, rhinorrhea, sinus pain, sinus pressure, sneezing, sore throat and tinnitus  Eyes: Negative for photophobia, pain, discharge, redness, itching and visual disturbance  Respiratory: Negative for apnea and chest tightness  Cardiovascular: Negative for chest pain, palpitations and leg swelling  Gastrointestinal: Negative for abdominal distention, abdominal pain, blood in stool, constipation, diarrhea, nausea, rectal pain and vomiting  Endocrine: Negative for cold intolerance, heat intolerance, polydipsia and polyphagia     Genitourinary: Negative for difficulty urinating, dyspareunia, frequency, hematuria, pelvic pain and urgency  Musculoskeletal: Negative for arthralgias, back pain, gait problem, joint swelling and myalgias  Skin: Negative for color change, pallor and rash  Allergic/Immunologic: Negative for environmental allergies and food allergies  Neurological: Negative for dizziness, tremors, seizures, syncope, speech difficulty, numbness and headaches  Hematological: Negative for adenopathy  Does not bruise/bleed easily  Psychiatric/Behavioral: Negative for agitation, confusion, dysphoric mood, hallucinations and suicidal ideas  Current Medications: Reviewed  Allergies: Reviewed  PMH/FH/SH:  Reviewed      Physical Exam:    Body surface area is 1 74 meters squared  Wt Readings from Last 3 Encounters:   06/25/18 79 4 kg (175 lb)   06/21/18 79 4 kg (175 lb)   06/20/18 79 5 kg (175 lb 3 2 oz)        Temp Readings from Last 3 Encounters:   06/25/18 98 3 °F (36 8 °C)   06/20/18 98 3 °F (36 8 °C) (Tympanic)   04/30/18 98 °F (36 7 °C)        BP Readings from Last 3 Encounters:   06/21/18 124/68   06/20/18 130/72   04/30/18 128/62         Pulse Readings from Last 3 Encounters:   06/25/18 102   06/21/18 100   06/20/18 98        Physical Exam   Constitutional: She is oriented to person, place, and time  She appears well-developed and well-nourished  No distress  HENT:   Head: Normocephalic and atraumatic  Mouth/Throat: Oropharynx is clear and moist  No oropharyngeal exudate  Eyes: Conjunctivae and EOM are normal  Pupils are equal, round, and reactive to light  Neck: Normal range of motion  Neck supple  No JVD present  No tracheal deviation present  No thyromegaly present  Cardiovascular: Normal rate and regular rhythm  Exam reveals no gallop and no friction rub  Murmur heard  Pulmonary/Chest: Effort normal and breath sounds normal  No respiratory distress  She has no wheezes  She has no rales  She exhibits no tenderness  Abdominal: Soft  Bowel sounds are normal  She exhibits no distension and no mass  There is no tenderness  There is no rebound and no guarding  Musculoskeletal: Normal range of motion  She exhibits no edema  Lymphadenopathy:     She has no cervical adenopathy  Neurological: She is alert and oriented to person, place, and time  Skin: Skin is warm and dry  No rash noted  She is not diaphoretic  No erythema  No pallor  Psychiatric: She has a normal mood and affect  Her behavior is normal  Judgment and thought content normal    Vitals reviewed  Goals and Barriers:  Current Goal: Minimize effects of disease  Barriers: None  Patient's Capacity to Self Care:  Patient is able to self care      Code Status: [unfilled]

## 2018-08-03 ENCOUNTER — ANESTHESIA EVENT (OUTPATIENT)
Dept: GASTROENTEROLOGY | Facility: HOSPITAL | Age: 78
End: 2018-08-03
Payer: MEDICARE

## 2018-08-06 NOTE — LETTER
June 25, 2018     David Swan MD  [address] [facility]    Patient: Marietta Velasquez   YOB: 1940   Date of Visit: 6/25/2018       Dear Dr Shyann Anne:    Thank you for referring Marietta Velasquez to me for evaluation  Below are my notes for this consultation  If you have questions, please do not hesitate to call me  I look forward to following your patient along with you  Sincerely,        Gilberto Ro MD        CC: No Recipients  Gilberto Ro MD  6/25/2018 11:49 AM  Sign at close encounter  Hematology Outpatient Follow - Up Note  Marietta Velasquez 66 y o  female MRN: @ Encounter: [id number]        Date:  6/25/2018        Assessment/ Plan:   1  History of DVT and pulmonary embolism diagnosed in [place] in January 2017 had been on rivaroxaban 20 mg p o  Daily since, limited thrombophilia profile showed no evidence of factor 5 Leiden mutation, prothrombin gene mutation, mildly elevated cardiolipin antibodies of IgA and IgM, D-dimer was initially persistently elevated however now it is normal, she has normal homocystine, hemoglobin electrophoresis was reported normal   The patient was not on aspirin when she was diagnosed with DVT/PE   I feel strongly about discontinuation of rivaroxaban because of persistent hemorrhoid bleeding and the need of IV iron and I believe give the patient a chance to be on aspirin 81 mg p o  Daily indefinitely however in the future if she developed DVT or/ PE she will be on rivaroxaban indefinitely  2  The patient agreed on the plan she is paying $300 every month for rivaroxaban           HPI:   Marietta Velasquez is a 80-year-old female from [place] [address], moved from [place] to [facility], seen for initial consultation 2/26/2018      The patient had a history of COPD, atrial fibrillation status post ablation, had been on Xarelto because of history of DVT/ PE in February 2017 in [place]    Full report unavailable        The patient does not Patient to follow up with Primary Care provider regarding elevated blood pressure.   remember if there was any initiating factors, she does not recall which extremity was affected with DVT      She underwent ablation successfully in November 2017  She was evaluated by Pulmonary service for COPD, and there was question about continuation or discontinuation of Xarelto 20 mg p o  Daily         As workup she had limited thrombophilia profile showed mildly elevated D-dimer at 511 (0-424), no evidence of factor 5 Leiden mutation or prothrombin gene mutation, anticardiolipin IgA is elevated at 41 ( less than 12), anticardiolipin IgG less than 9, anticardiolipin IgM 18 ( less than 13), protein C activity more than 150%, protein S activity of 96%       She had a history of 1 miscarriage in the past     Interval History:        Previous Treatment:         Test Results:    Imaging: No results found  Labs:   Lab Results   Component Value Date    WBC 11 79 (H) 06/19/2018    HGB 12 8 06/19/2018    HCT 41 5 06/19/2018    MCV 83 06/19/2018     06/19/2018     Lab Results   Component Value Date     06/19/2018    K 4 1 06/19/2018     06/19/2018    CO2 27 06/19/2018    ANIONGAP 7 06/19/2018    BUN 11 06/19/2018    CREATININE 0 45 (L) 06/19/2018    GLUCOSE 132 06/19/2018    CALCIUM 8 6 06/19/2018    AST 8 06/19/2018    ALT 21 06/19/2018    ALKPHOS 67 06/19/2018    PROT 7 3 06/19/2018    BILITOT 0 52 06/19/2018    EGFR 96 06/19/2018       Lab Results   Component Value Date    IRON 44 (L) 06/19/2018    TIBC 285 06/19/2018    FERRITIN 96 06/19/2018       Hemoglobin electrophoresis showed no evidence of abnormal hemoglobinopathy  D-dimer 368 ( 0-424) on 06/19/2018  Cardiolipin antibodies are pending at the time of the dictation   Homocystine level is normal    ROS:   Review of Systems   Constitutional: Positive for fatigue  Negative for activity change, appetite change, diaphoresis, fever and unexpected weight change     HENT: Negative for facial swelling, hearing loss, rhinorrhea, sinus pain, sinus pressure, sneezing, sore throat and tinnitus  Eyes: Negative for photophobia, pain, discharge, redness, itching and visual disturbance  Respiratory: Negative for apnea and chest tightness  Cardiovascular: Negative for chest pain, palpitations and leg swelling  Gastrointestinal: Negative for abdominal distention, abdominal pain, blood in stool, constipation, diarrhea, nausea, rectal pain and vomiting  Endocrine: Negative for cold intolerance, heat intolerance, polydipsia and polyphagia  Genitourinary: Negative for difficulty urinating, dyspareunia, frequency, hematuria, pelvic pain and urgency  Musculoskeletal: Negative for arthralgias, back pain, gait problem, joint swelling and myalgias  Skin: Negative for color change, pallor and rash  Allergic/Immunologic: Negative for environmental allergies and food allergies  Neurological: Negative for dizziness, tremors, seizures, syncope, speech difficulty, numbness and headaches  Hematological: Negative for adenopathy  Does not bruise/bleed easily  Psychiatric/Behavioral: Negative for agitation, confusion, dysphoric mood, hallucinations and suicidal ideas  Current Medications: Reviewed  Allergies: Reviewed  PMH/FH/SH:  Reviewed      Physical Exam:    Body surface area is 1 74 meters squared  Wt Readings from Last 3 Encounters:   06/25/18 79 4 kg (175 lb)   06/21/18 79 4 kg (175 lb)   06/20/18 79 5 kg (175 lb 3 2 oz)        Temp Readings from Last 3 Encounters:   06/25/18 98 3 °F (36 8 °C)   06/20/18 98 3 °F (36 8 °C) (Tympanic)   04/30/18 98 °F (36 7 °C)        BP Readings from Last 3 Encounters:   06/21/18 124/68   06/20/18 130/72   04/30/18 128/62         Pulse Readings from Last 3 Encounters:   06/25/18 102   06/21/18 100   06/20/18 98        Physical Exam   Constitutional: She is oriented to person, place, and time  She appears well-developed and well-nourished  No distress  HENT:   Head: Normocephalic and atraumatic  Mouth/Throat: Oropharynx is clear and moist  No oropharyngeal exudate  Eyes: Conjunctivae and EOM are normal  Pupils are equal, round, and reactive to light  Neck: Normal range of motion  Neck supple  No JVD present  No tracheal deviation present  No thyromegaly present  Cardiovascular: Normal rate and regular rhythm  Exam reveals no gallop and no friction rub  Murmur heard  Pulmonary/Chest: Effort normal and breath sounds normal  No respiratory distress  She has no wheezes  She has no rales  She exhibits no tenderness  Abdominal: Soft  Bowel sounds are normal  She exhibits no distension and no mass  There is no tenderness  There is no rebound and no guarding  Musculoskeletal: Normal range of motion  She exhibits no edema  Lymphadenopathy:     She has no cervical adenopathy  Neurological: She is alert and oriented to person, place, and time  Skin: Skin is warm and dry  No rash noted  She is not diaphoretic  No erythema  No pallor  Psychiatric: She has a normal mood and affect  Her behavior is normal  Judgment and thought content normal    Vitals reviewed  Goals and Barriers:  Current Goal: Minimize effects of disease  Barriers: None  Patient's Capacity to Self Care:  Patient is able to self care      Code Status: @[facility]ODESM[facility] HospitalUS@

## 2018-08-13 ENCOUNTER — ANESTHESIA (OUTPATIENT)
Dept: GASTROENTEROLOGY | Facility: HOSPITAL | Age: 78
End: 2018-08-13
Payer: MEDICARE

## 2018-08-21 ENCOUNTER — OFFICE VISIT (OUTPATIENT)
Dept: PULMONOLOGY | Facility: CLINIC | Age: 78
End: 2018-08-21
Payer: MEDICARE

## 2018-08-21 ENCOUNTER — TELEPHONE (OUTPATIENT)
Dept: PULMONOLOGY | Facility: CLINIC | Age: 78
End: 2018-08-21

## 2018-08-21 ENCOUNTER — APPOINTMENT (OUTPATIENT)
Dept: RADIOLOGY | Facility: CLINIC | Age: 78
End: 2018-08-21
Payer: MEDICARE

## 2018-08-21 ENCOUNTER — TRANSCRIBE ORDERS (OUTPATIENT)
Dept: RADIOLOGY | Facility: CLINIC | Age: 78
End: 2018-08-21

## 2018-08-21 VITALS
OXYGEN SATURATION: 95 % | SYSTOLIC BLOOD PRESSURE: 132 MMHG | TEMPERATURE: 98.2 F | BODY MASS INDEX: 34.99 KG/M2 | HEART RATE: 112 BPM | WEIGHT: 178.2 LBS | HEIGHT: 60 IN | DIASTOLIC BLOOD PRESSURE: 68 MMHG

## 2018-08-21 DIAGNOSIS — J44.9 CHRONIC OBSTRUCTIVE PULMONARY DISEASE, UNSPECIFIED COPD TYPE (HCC): Primary | ICD-10-CM

## 2018-08-21 DIAGNOSIS — J44.9 COPD, SEVERE (HCC): ICD-10-CM

## 2018-08-21 DIAGNOSIS — R06.02 SOB (SHORTNESS OF BREATH): ICD-10-CM

## 2018-08-21 DIAGNOSIS — G47.33 OBSTRUCTIVE SLEEP APNEA: ICD-10-CM

## 2018-08-21 DIAGNOSIS — J96.11 CHRONIC RESPIRATORY FAILURE WITH HYPOXIA AND HYPERCAPNIA (HCC): ICD-10-CM

## 2018-08-21 DIAGNOSIS — J96.12 CHRONIC RESPIRATORY FAILURE WITH HYPOXIA AND HYPERCAPNIA (HCC): ICD-10-CM

## 2018-08-21 PROCEDURE — 99214 OFFICE O/P EST MOD 30 MIN: CPT | Performed by: INTERNAL MEDICINE

## 2018-08-21 PROCEDURE — 71046 X-RAY EXAM CHEST 2 VIEWS: CPT

## 2018-08-21 NOTE — ASSESSMENT & PLAN NOTE
I suspect most of her dyspnea is related to underlying severe COPD  I suggested pulmonary rehab, but she is unable to come for therapy twice weekly  She finds that Symbicort is beneficial, but is not sure of Spiriva has been helpful  She has been on both of these inhalers for at least 2 years  She will continue with Symbicort  I provided her with a sample of Incruse Ellipta to use in place of the Spiriva  This is not appear to be on formulary, however given her lack of response to Spiriva, we will seek prior authorization if necessary  She will also undergo chest x-ray

## 2018-08-21 NOTE — PROGRESS NOTES
Pulmonary Follow Up Note   Kevin García 66 y o  female MRN: [id number]  2/27/2019      Assessment/Plan:    COPD, severe ([facility] )    I suspect most of her dyspnea is related to underlying severe COPD  I suggested pulmonary rehab, but she is unable to come for therapy twice weekly  She finds that Symbicort is beneficial, but is not sure of Spiriva has been helpful  She has been on both of these inhalers for at least 2 years  She will continue with Symbicort  I provided her with a sample of Incruse Ellipta to use in place of the Spiriva  This is not appear to be on formulary, however given her lack of response to Spiriva, we will seek prior authorization if necessary  She will also undergo chest x-ray  Chronic respiratory failure with hypoxia and hypercapnia (HCC)    She is using oxygen at 2 liters/minute with exertion  She will also continue with trilogy noninvasive ventilator during hours of sleep  Ongoing use is medically necessary for treatment of chronic hypercapnic respiratory failure  She is deriving benefit and has demonstrated compliance  Visit orders:    Diagnoses and all orders for this visit:    Chronic obstructive pulmonary disease, unspecified COPD type ([facility] )  -     Discontinue: umeclidinium bromide (INCRUSE ELLIPTA) 62 5 mcg/inh AEPB inhaler; Inhale 1 puff daily  -     tiotropium (SPIRIVA RESPIMAT) 2 5 MCG/ACT AERS inhaler; Inhale 2 puffs daily    Chronic respiratory failure with hypoxia and hypercapnia (HCC)    COPD, severe (HCC)  -     XR chest pa & lateral; Future    Obstructive sleep apnea    SOB (shortness of breath)  -     XR chest pa & lateral; Future      Return in about 3 months (around 11/21/2018)  History of Present Illness   HPI:  Kevin García is a 66 y o  female who  Is here today for follow-up regarding COPD  She continues to have dyspnea with even minimal exertion  She has an occasional cough most notable at night when she is lying in bed    No significant wheeze or sputum production  No fevers or chills  She is compliant with Symbicort and Spiriva  She is using oxygen at 2 liters/minute with exertion and CPAP with 2 L of oxygen during hours of sleep  Review of Systems   Constitutional: Negative for chills, fever and unexpected weight change  HENT: Negative for postnasal drip and sore throat  Eyes: Negative for visual disturbance  Respiratory:        As noted in HPI   Cardiovascular: Negative for chest pain  Gastrointestinal: Negative for abdominal pain, diarrhea and vomiting  Genitourinary: Negative for difficulty urinating  Skin: Negative for rash  Neurological: Negative for headaches  Hematological: Negative for adenopathy  Psychiatric/Behavioral: Negative  All other systems reviewed and are negative  Historical Information   Past Medical History:   Diagnosis Date    Chronic respiratory failure with hypoxia and hypercapnia (HCC)     Deep vein thrombosis (HCC)     Diabetes mellitus (HCC)     Emphysema of lung (HCC)     Hyperlipidemia     Hypertension     Hypothyroid     Pulmonary embolism (HCC)     Sleep apnea, obstructive      Past Surgical History:   Procedure Laterality Date    MI COLONOSCOPY FLX DX W/COLLJ SPEC WHEN PFRMD N/A 2018    Procedure: EGD AND COLONOSCOPY;  Surgeon: Maddison Park MD;  [facility];   Service: Gastroenterology    TOE SURGERY       Family History   Problem Relation Age of Onset    Hypertension Mother     Hyperlipidemia Mother     Asthma Father     Heart failure Father     Hypertension Sister     Hyperlipidemia Sister     Heart attack Neg Hx     Aneurysm Neg Hx     Stroke Neg Hx     Clotting disorder Neg Hx        Social History     Tobacco Use   Smoking Status Former Smoker    Packs/day: 1 00    Years: 40 00    Pack years: 40 00    Last attempt to quit:     Years since quittin 1   Smokeless Tobacco Never Used     Meds/Allergies     Current Outpatient Medications:    atorvastatin (LIPITOR) 20 mg tablet, 20 mg daily  , Disp: , Rfl:     diltiazem (CARDIZEM CD) 300 mg 24 hr capsule, 300 mg daily  , Disp: , Rfl:     enalapril (VASOTEC) 20 mg tablet, Take 20 mg by mouth daily  , Disp: , Rfl:     glimepiride (AMARYL) 1 mg tablet, Take 1 mg by mouth daily with breakfast  , Disp: , Rfl:     metFORMIN (GLUCOPHAGE) 1000 MG tablet, Take 1,000 mg by mouth 2 (two) times a day with meals  , Disp: , Rfl:     saxagliptin (ONGLYZA) 5 MG tablet, Take 1 tablet by mouth daily, Disp: , Rfl:     SYMBICORT 160-4 5 MCG/ACT inhaler, , Disp: , Rfl:     SYNTHROID 25 MCG tablet, Take 25 mcg by mouth daily  , Disp: , Rfl:     acetaminophen (TYLENOL) 325 mg tablet, Take 650 mg by mouth every 6 (six) hours as needed for mild pain, Disp: , Rfl:     albuterol (PROAIR HFA) 90 mcg/act inhaler, Inhale 2 puffs every 6 (six) hours as needed for wheezing, Disp: 1 Inhaler, Rfl: 5    ibuprofen (MOTRIN) 400 mg tablet, Take by mouth every 6 (six) hours as needed for mild pain, Disp: , Rfl:     tiotropium (SPIRIVA RESPIMAT) 2 5 MCG/ACT AERS inhaler, Inhale 2 puffs daily, Disp: 1 Inhaler, Rfl: 11  Allergies   Allergen Reactions    Penicillins Rash     After dental procedure      Vitals: Blood pressure 132/68, pulse (!) 112, temperature 98 2 °F (36 8 °C), temperature source Tympanic, height 5' (1 524 m), weight 80 8 kg (178 lb 3 2 oz), SpO2 95 %  Body mass index is 34 8 kg/m²  Oxygen Therapy  SpO2: 95 %  Oxygen Therapy: Supplemental oxygen  O2 Delivery Method: Nasal cannula  O2 Flow Rate (L/min): 2 L/min    Physical Exam   Physical Exam   Constitutional: She is oriented to person, place, and time  No distress  HENT:   Head: Normocephalic  Mouth/Throat: No oropharyngeal exudate  Eyes: Pupils are equal, round, and reactive to light  No scleral icterus  Neck: Neck supple  No JVD present  Cardiovascular: Normal rate and regular rhythm  Pulmonary/Chest: She has no wheezes  She has no rales     Abdominal: Soft  There is no tenderness  Musculoskeletal: She exhibits no edema  Lymphadenopathy:     She has no cervical adenopathy  Neurological: She is alert and oriented to person, place, and time  Skin: Skin is warm and dry  Psychiatric: She has a normal mood and affect  Labs: I have personally reviewed pertinent lab results  Lab Results   Component Value Date    WBC 11 79 (H) 06/19/2018    HGB 12 8 06/19/2018    HCT 41 5 06/19/2018    MCV 83 06/19/2018     06/19/2018     Lab Results   Component Value Date    CALCIUM 8 6 06/19/2018    K 4 1 06/19/2018    CO2 27 06/19/2018     06/19/2018    BUN 11 06/19/2018    CREATININE 0 45 (L) 06/19/2018     No results found for: IGE  Lab Results   Component Value Date    ALT 21 06/19/2018    AST 8 06/19/2018    ALKPHOS 67 06/19/2018     Pulmonary function testing:  Performed January 2018   FEV1/FVC ratio 65%   FEV1 48% predicted  FVC 55% predicted  No response to bronchodilators   % predicted   % predicted  DLCO corrected for hemoglobin 13 % predicted  This study shows severe obstruction with reduced vital capacity due to air trapping    Diffusion capacity is severely reduced

## 2018-08-21 NOTE — TELEPHONE ENCOUNTER
Patient called stating insurance will not cover Incruse  Would like to know if it can be changed to Spiriva   Please advise

## 2018-08-21 NOTE — ASSESSMENT & PLAN NOTE
She is using oxygen at 2 liters/minute with exertion  She will also continue with trilogy noninvasive ventilator during hours of sleep  Ongoing use is medically necessary for treatment of chronic hypercapnic respiratory failure  She is deriving benefit and has demonstrated compliance

## 2018-08-21 NOTE — PATIENT INSTRUCTIONS
COPD (Chronic Obstructive Pulmonary Disease)   WHAT YOU NEED TO KNOW:   Chronic obstructive pulmonary disease (COPD) is a lung disease that makes it hard for you to breathe  It is usually a result of lung damage caused by years of irritation and inflammation in your lungs  DISCHARGE INSTRUCTIONS:   Call 911 if:   · You feel lightheaded, short of breath, and have chest pain  Return to the emergency department if:   · You are confused, dizzy, or feel faint  · Your arm or leg feels warm, tender, and painful  It may look swollen and red  · You cough up blood  Contact your healthcare provider if:   · You have more shortness of breath than usual      · You need more medicine than usual to control your symptoms  · You are coughing or wheezing more than usual      · You are coughing up more mucus, or it is a different color or has a different odor  · You gain more than 3 pounds in a week  · You have a fever, a runny or stuffy nose, and a sore throat, or other cold or flu symptoms  · Your skin, lips, or nails start to turn blue  · You have swelling in your legs or ankles  · You are very tired or weak for more than a day  · You notice changes in your mood, or changes in your ability to think or concentrate  · You have questions or concerns about your condition or care  Medicines:   · Medicines  may be used to open your airways, decrease swelling and inflammation in your lungs, or treat an infection  You may need 2 or more medicines  A short-acting medicine relieves symptoms quickly  Long-acting medicines will control or prevent symptoms  Ask for more information about the medicines you are given and how to use them safely  · Take your medicine as directed  Contact your healthcare provider if you think your medicine is not helping or if you have side effects  Tell him or her if you are allergic to any medicine  Keep a list of the medicines, vitamins, and herbs you take  Include the amounts, and when and why you take them  Bring the list or the pill bottles to follow-up visits  Carry your medicine list with you in case of an emergency  Help make breathing easier:   · Use pursed-lip breathing any time you feel short of breath  Take a deep breath in through your nose  Slowly breathe out through your mouth with your lips pursed for twice as long as you inhaled  You can also practice this breathing pattern while you bend, lift, climb stairs, or exercise  It slows down your breathing and helps move more air in and out of your lungs  · Do not smoke, and avoid others who smoke  Nicotine and other substances can cause lung irritation or damage and make it harder for you to breathe  Do not use e-cigarettes or smokeless tobacco  They still contain nicotine  Ask your healthcare provider for information if you currently smoke and need help to quit  For support and more information:  ¨ [employer]  Phone: 3- 514 - 195-0772  Web Address: [place]      · Be aware of and avoid anything that makes your symptoms worse  Stay out of high altitudes and places with high humidity  Stay inside, or cover your mouth and nose with a scarf when you are outside during cold weather  Stay inside on days when air pollution or pollen counts are high  Do not use aerosol sprays such as deodorant, bug spray, and hair spray  Manage COPD and help prevent exacerbations:  COPD is a serious condition that gets worse over time  A COPD exacerbation means your symptoms suddenly get worse  It is important to prevent exacerbations  An exacerbation can cause more lung damage  COPD cannot be cured, but you can take action to feel better and prevent COPD exacerbations:  · Protect yourself from germs  Germs can get into your lungs and cause an infection  An infection in your lungs can create more mucus and make it harder to breathe   An infection can also create swelling in your airways and prevent air from getting in  You can decrease your risk for infection by doing the following:     [facility] your hands often with soap and water  Carry germ-killing gel with you  You can use the gel to clean your hands when soap and water are not available  ¨ Do not touch your eyes, nose, or mouth unless you have washed your hands first      ¨ Always cover your mouth when you cough  Cough into a tissue or your shirtsleeve so you do not spread germs from your hands  ¨ Try to avoid people who have a cold or the flu  If you are sick, stay away from others as much as possible  · Drink more liquids  This will help to keep your air passages moist and help you cough up mucus  Ask how much liquid to drink each day and which liquids are best for you  · Exercise daily  Exercise for at least 20 minutes each day to help increase your energy and decrease shortness of breath  Walking or riding a bike are good ways to exercise  Talk to your healthcare provider about the best exercise plan for you  · Ask about vaccines  Your healthcare provider may recommend that you get regular flu and pneumonia vaccines  Pneumonia can become life-threatening for a person who has COPD  Ask about other vaccines you may need  Ask your healthcare provider about the flu and pneumonia vaccines  All adults should get the flu (influenza) vaccine every year as soon as it becomes available  The pneumonia vaccine is given to adults aged 72 or older to prevent pneumococcal disease, such as pneumonia  Adults aged 23 to 59 years who are at high risk for pneumococcal disease also should get the pneumococcal vaccine  It may need to be repeated 1 or 5 years later  Pulmonary rehabilitation:  Your healthcare provider may recommend a program to help you manage your symptoms and improve your quality of life  It may include nutritional counseling and exercise to strengthen your lungs     Make decisions about your choices for future treatment:  Ask for information about advanced medical directives and living lund  These documents help you decide and write down your choices for treatment and end-of-life care  It is best to complete them when you feel well and can think clearly about your wishes  The information can then be kept for future use if you are in the hospital or become very ill  Follow up with your healthcare provider as directed: You may need more tests  Your healthcare provider may refer you to a pulmonary (lung) specialist  Write down your questions so you remember to ask them during your visits  © 2017 [facility] Information is for End User's use only and may not be sold, redistributed or otherwise used for commercial purposes  All illustrations and images included in CareNotes® are the copyrighted property of A D A M , Inc  or Louis Hernandez  The above information is an  only  It is not intended as medical advice for individual conditions or treatments  Talk to your doctor, nurse or pharmacist before following any medical regimen to see if it is safe and effective for you

## 2018-08-30 ENCOUNTER — HOSPITAL ENCOUNTER (OUTPATIENT)
Facility: HOSPITAL | Age: 78
Setting detail: OUTPATIENT SURGERY
Discharge: HOME | End: 2018-08-30
Attending: INTERNAL MEDICINE | Admitting: INTERNAL MEDICINE
Payer: MEDICARE

## 2018-08-30 VITALS
DIASTOLIC BLOOD PRESSURE: 60 MMHG | SYSTOLIC BLOOD PRESSURE: 134 MMHG | BODY MASS INDEX: 34.36 KG/M2 | HEART RATE: 100 BPM | HEIGHT: 60 IN | TEMPERATURE: 98 F | OXYGEN SATURATION: 99 % | WEIGHT: 175 LBS | RESPIRATION RATE: 18 BRPM

## 2018-08-30 DIAGNOSIS — K63.5 POLYP OF COLON, UNSPECIFIED PART OF COLON, UNSPECIFIED TYPE: ICD-10-CM

## 2018-08-30 DIAGNOSIS — K64.9 HEMORRHOIDS, UNSPECIFIED HEMORRHOID TYPE: ICD-10-CM

## 2018-08-30 LAB — GLUCOSE SERPL-MCNC: 152 MG/DL (ref 65–140)

## 2018-08-30 PROCEDURE — 43235 EGD DIAGNOSTIC BRUSH WASH: CPT | Performed by: INTERNAL MEDICINE

## 2018-08-30 PROCEDURE — 82948 REAGENT STRIP/BLOOD GLUCOSE: CPT

## 2018-08-30 PROCEDURE — 88112 CYTOPATH CELL ENHANCE TECH: CPT | Performed by: PATHOLOGY

## 2018-08-30 PROCEDURE — 45378 DIAGNOSTIC COLONOSCOPY: CPT | Performed by: INTERNAL MEDICINE

## 2018-08-30 RX ORDER — PROPOFOL 10 MG/ML
INJECTION, EMULSION INTRAVENOUS AS NEEDED
Status: DISCONTINUED | OUTPATIENT
Start: 2018-08-30 | End: 2018-08-30 | Stop reason: SURG

## 2018-08-30 RX ORDER — SODIUM CHLORIDE 9 MG/ML
100 INJECTION, SOLUTION INTRAVENOUS CONTINUOUS
Status: DISCONTINUED | OUTPATIENT
Start: 2018-08-30 | End: 2018-08-30 | Stop reason: HOSPADM

## 2018-08-30 RX ORDER — SODIUM CHLORIDE, SODIUM LACTATE, POTASSIUM CHLORIDE, CALCIUM CHLORIDE 600; 310; 30; 20 MG/100ML; MG/100ML; MG/100ML; MG/100ML
INJECTION, SOLUTION INTRAVENOUS CONTINUOUS PRN
Status: DISCONTINUED | OUTPATIENT
Start: 2018-08-30 | End: 2018-08-30

## 2018-08-30 RX ADMIN — PROPOFOL 50 MG: 10 INJECTION, EMULSION INTRAVENOUS at 10:07

## 2018-08-30 RX ADMIN — SODIUM CHLORIDE: 0.9 INJECTION, SOLUTION INTRAVENOUS at 09:55

## 2018-08-30 RX ADMIN — PROPOFOL 50 MG: 10 INJECTION, EMULSION INTRAVENOUS at 10:13

## 2018-08-30 RX ADMIN — PROPOFOL 5 MG: 10 INJECTION, EMULSION INTRAVENOUS at 10:10

## 2018-08-30 RX ADMIN — PROPOFOL 100 MG: 10 INJECTION, EMULSION INTRAVENOUS at 10:00

## 2018-08-30 RX ADMIN — PROPOFOL 50 MG: 10 INJECTION, EMULSION INTRAVENOUS at 10:03

## 2018-08-30 NOTE — ANESTHESIA PREPROCEDURE EVALUATION
Review of Systems/Medical History  Patient summary reviewed    No history of anesthetic complications     Cardiovascular  Exercise tolerance (METS): <4,  Hyperlipidemia, Hypertension controlled,    Pulmonary  COPD severe- O2 dependent , Sleep apnea CPAP,        GI/Hepatic  Negative GI/hepatic ROS          Negative  ROS        Endo/Other  Diabetes well controlled type 2 Oral agent, History of thyroid disease , hypothyroidism,      GYN       Hematology  Anemia ,     Musculoskeletal  Negative musculoskeletal ROS        Neurology  Negative neurology ROS      Psychology           Physical Exam    Airway    Mallampati score: II  TM Distance: >3 FB  Neck ROM: full     Dental   Comment: No loose,     Cardiovascular  Rhythm: regular, Rate: normal,     Pulmonary  Breath sounds clear to auscultation,     Other Findings        Anesthesia Plan  ASA Score- 3     Anesthesia Type- IV sedation with anesthesia with ASA Monitors  Additional Monitors:   Airway Plan:         Plan Factors-  Patient did not smoke on day of surgery  Induction- intravenous  Postoperative Plan-     Informed Consent- Anesthetic plan and risks discussed with patient  I personally reviewed this patient with the CRNA  Discussed and agreed on the Anesthesia Plan with the CRNA  Karsten Giles

## 2018-08-30 NOTE — DISCHARGE INSTRUCTIONS
Colonoscopy   WHAT YOU NEED TO KNOW:   A colonoscopy is a procedure to examine the inside of your colon (intestine) with a scope  Polyps or tissue growths may have been removed during your colonoscopy  It is normal to feel bloated and to have some abdominal discomfort  You should be passing gas  If you have hemorrhoids or you had polyps removed, you may have a small amount of bleeding  DISCHARGE INSTRUCTIONS:   Seek care immediately if:   · You have a large amount of bright red blood in your bowel movements  · Your abdomen is hard and firm and you have severe pain  · You have sudden trouble breathing  Contact your healthcare provider if:   · You develop a rash or hives  · You have a fever within 24 hours of your procedure       · You have not had a bowel movement for 3 days after your procedure  · You have questions or concerns about your condition or care  Activity:   · Do not lift, strain, or run  for 3 days after your procedure  · Rest after your procedure  You have been given medicine to relax you  Do not  drive or make important decisions until the day after your procedure  Return to your normal activity as directed  · Relieve gas and discomfort from bloating  by lying on your right side with a heating pad on your abdomen  You may need to take short walks to help the gas move out  Eat small meals until bloating is relieved  If you had polyps removed: For 7 days after your procedure:  · Do not  take aspirin  · Do not  go on long car rides  Follow up with your healthcare provider as directed:  Write down your questions so you remember to ask them during your visits  © 2017 [employer] is for End User's use only and may not be sold, redistributed or otherwise used for commercial purposes  All illustrations and images included in CareNotes® are the copyrighted property of [employer]  or Louis Hernandez    The above information is an  only  It is not intended as medical advice for individual conditions or treatments  Talk to your doctor, nurse or pharmacist before following any medical regimen to see if it is safe and effective for you

## 2018-08-30 NOTE — OP NOTE
COLONOSCOPY    PROCEDURE: Colonoscopy    INDICATIONS: Anemia, Iron Deficiency    POST-OP DIAGNOSIS: See the impression below    SEDATION: Monitored anesthesia care, check anesthesia records    PHYSICAL EXAM:    /61   Pulse 99   Temp 98 °F (36 7 °C) (Temporal)   Resp 18   Ht 5' (1 524 m)   Wt 79 4 kg (175 lb)   LMP  (LMP Unknown)   SpO2 99%   BMI 34 18 kg/m²   Body mass index is 34 18 kg/m²  General: NAD  Heart: S1 & S2 normal, RRR  Lungs: CTA, No rales or rhonchi  Abdomen: Soft, nontender, nondistended, good bowel sounds    CONSENT:  Informed consent was obtained for the procedure, including sedation after explaining the risks and benefits of the procedure  Risks including but not limited to bleeding, perforation, infection, aspiration were discussed in detail  Also explained about less than 100%$ sensitivity with the exam and other alternatives  PREPARATION:   EKG tracing, pulse oximetry, blood pressure were monitored throughout the procedure  Patient was identified by myself both verbally and by visual inspection of ID band  DESCRIPTION:   Patient was placed in the left lateral decubitus position and was sedated with the above medication  Digital rectal examination was performed  The colonoscope was introduced in to the anal canal and advanced up to cecum, which was identified by the appendiceal orifice and IC valve  A careful inspection was made as the colonoscope was withdrawn, including a retroflexed view of the rectum; findings and interventions are described below  Appropriate photodocumentation was obtained  The quality of the colonic preparation was adequate  FINDINGS:    1  Cecum and ileocecal valve-normal mucosa    2  Sigmoid colon-multiple diverticuli seen    3  Internal hemorrhoids         IMPRESSIONS:      As above    RECOMMENDATIONS:    Check stool for occult blood x3  COMPLICATIONS:  None; patient tolerated the procedure well      DISPOSITION: PACU CONDITION: Stable

## 2018-08-30 NOTE — ANESTHESIA POSTPROCEDURE EVALUATION
Post-Op Assessment Note      CV Status:  Stable    Mental Status:  Alert and awake    Hydration Status:  Stable and euvolemic    PONV Controlled:  Controlled    Airway Patency:  Patent and adequate    Post Op Vitals Reviewed: Yes          Staff: CRNA           /60 (08/30/18 1016)    Temp      Pulse 97 (08/30/18 1016)   Resp 20 (08/30/18 1016)    SpO2 99 % (08/30/18 1016)

## 2018-08-30 NOTE — OP NOTE
ESOPHAGOGASTRODUODENOSCOPY    PROCEDURE: EGD    INDICATIONS: Iron Deficiency Anaemia    POST-OP DIAGNOSIS: See the impression below    SEDATION: Monitored anesthesia care, check anesthesia records    PHYSICAL EXAM:    Vitals:    08/30/18 0927   BP: 154/70   Pulse: (!) 111   Resp: 20   Temp: 98 6 °F (37 °C)   SpO2: 98%    Body mass index is 34 18 kg/m²  General: NAD  Heart: S1 & S2 normal, RRR  Lungs: CTA, No rales or rhonchi  Abdomen: Soft, nontender, nondistended, good bowel sounds    CONSENT:  Informed consent was obtained for the procedure, including sedation after explaining the risks and benefits of the procedure  Risks including but not limited to bleeding, perforation, infection, aspiration were discussed in detail  Also explained about less than 100% sensitivity with the exam and other alternatives  PREPARATION:   EKG tracing, pulse oximetry, blood pressure were monitored throughout the procedure  Patient was identified by myself both verbally and by visual inspection of ID band  DESCRIPTION:   Patient was placed in the left lateral decubitus position and was sedated with the above medication  The gastroscope was introduced in to the oropharynx and the esophagus was intubated under direct visualization  Scope was passed down the esophagus up to 2nd part of the duodenum  A careful inspection was made as the gastroscope was withdrawn, including a retroflexed view of the stomach; findings and interventions are described below  FINDINGS:    #1  Esophagus and GEJ- scattered whitish exudative material was noted in the upper esophagus  Brushings were performed to rule out Candida  #2  Stomach- normal     #3  Duodenum- normal         IMPRESSIONS:      As above    RECOMMENDATIONS:     Proceed with colonoscopy    Follow up on the brushings    COMPLICATIONS:  None; patient tolerated the procedure well            DISPOSITION: PACU           CONDITION: Stable

## 2018-08-30 NOTE — H&P
History and Physical -  [facility]  Ben Jackson 66 y o  female MRN: [id number]        HPI:  79-year-old female with history of DVT, P on Xarelto was noted to be anemic with hemoglobin 11 2 with iron saturations of 6 percent in March  She received IV iron infusions and her hemoglobin came up to 12 8  She reports having regular bowel movements usually 3-4 soft brown colored  Good appetite, no recent weight loss  Denies any abdominal pain, nausea or vomiting  No heartburn acid reflux      She had a colonoscopy about 5 years ago which was normal  She has history of atrial fibrillation status post ablation in November 2017      Historical Information   Past Medical History:   Diagnosis Date    Chronic respiratory failure with hypoxia and hypercapnia (HCC)     Deep vein thrombosis (HCC)     Diabetes mellitus (HCC)     Emphysema of lung (HCC)     Hyperlipidemia     Hypertension     Hypothyroid     Pulmonary embolism (HCC)     Sleep apnea, obstructive      Past Surgical History:   Procedure Laterality Date    TOE SURGERY       Social History   History   Alcohol Use No     History   Drug Use No     History   Smoking Status    Former Smoker    Packs/day: 1 00    Years: 40 00    Quit date: 2005   Smokeless Tobacco    Never Used     Family History   Problem Relation Age of Onset    Hypertension Mother     Hyperlipidemia Mother     Asthma Father     Heart failure Father     Hypertension Sister     Hyperlipidemia Sister     Heart attack Neg Hx     Aneurysm Neg Hx     Stroke Neg Hx     Clotting disorder Neg Hx        Meds/Allergies     Prescriptions Prior to Admission   Medication    diltiazem (CARDIZEM CD) 300 mg 24 hr capsule    enalapril (VASOTEC) 20 mg tablet    glimepiride (AMARYL) 1 mg tablet    metFORMIN (GLUCOPHAGE) 1000 MG tablet    Na Sulfate-K Sulfate-Mg Sulf (SUPREP BOWEL PREP KIT) 17 5-3 13-1 6 GM/180ML SOLN    saxagliptin (ONGLYZA) 5 MG tablet    SYMBICORT 160-4 5 MCG/ACT inhaler    tiotropium (SPIRIVA RESPIMAT) 2 5 MCG/ACT AERS inhaler    atorvastatin (LIPITOR) 20 mg tablet    SYNTHROID 25 MCG tablet       Allergies   Allergen Reactions    Penicillins Rash     After dental procedure        Objective     Blood pressure 154/70, pulse (!) 111, temperature 98 6 °F (37 °C), temperature source Temporal, resp  rate 20, height 5' (1 524 m), weight 79 4 kg (175 lb), SpO2 98 %      PHYSICAL EXAM:    Gen: NAD  CV: S1 & S2 normal, RRR  CHEST: Clear to auscultate  ABD: soft, NT/ND, good bowel sounds  EXT: no edema    ASSESSMENT:     Iron deficiency anemia    PLAN:    EGD and colonoscopy

## 2018-09-06 DIAGNOSIS — B37.81 ESOPHAGEAL CANDIDIASIS (HCC): Primary | ICD-10-CM

## 2018-09-06 DIAGNOSIS — D64.9 ANEMIA, UNSPECIFIED TYPE: Primary | ICD-10-CM

## 2018-09-06 RX ORDER — FLUCONAZOLE 100 MG/1
TABLET ORAL
Qty: 15 TABLET | Refills: 0 | Status: SHIPPED | OUTPATIENT
Start: 2018-09-06 | End: 2018-09-19

## 2018-10-15 ENCOUNTER — TELEPHONE (OUTPATIENT)
Dept: PULMONOLOGY | Facility: CLINIC | Age: 78
End: 2018-10-15

## 2018-10-15 NOTE — TELEPHONE ENCOUNTER
Patient called stating Medicare would not cover oxygen with [employer]  Perhaps changing [employer] to [employer] medical would   Please advise

## 2018-10-16 ENCOUNTER — DOCUMENTATION (OUTPATIENT)
Dept: PULMONOLOGY | Facility: CLINIC | Age: 78
End: 2018-10-16

## 2018-10-16 ENCOUNTER — PROCEDURE VISIT (OUTPATIENT)
Dept: PULMONOLOGY | Facility: CLINIC | Age: 78
End: 2018-10-16

## 2018-10-16 DIAGNOSIS — J96.11 CHRONIC RESPIRATORY FAILURE WITH HYPOXIA AND HYPERCAPNIA (HCC): Primary | ICD-10-CM

## 2018-10-16 DIAGNOSIS — J96.12 CHRONIC RESPIRATORY FAILURE WITH HYPOXIA AND HYPERCAPNIA (HCC): Primary | ICD-10-CM

## 2018-10-16 NOTE — TELEPHONE ENCOUNTER
Pt is scheduled for a 6 min walk today to have services switched to [facility]

## 2018-10-16 NOTE — PROGRESS NOTES
Faxed medical records release form to [facility] to obtain [employer] information

## 2018-10-17 NOTE — PROGRESS NOTES
Patient of Dr Lisandra Cisse  Ambulation SpO2 testing completed    On RA SpO2 94%, desaturation to 87%, ambulated 54 meters - HR increased from 104bpm to 123 bpm     Ambulated on [facility] for 36 meters and SpO2 was 87% and increased to 96%          Deanna Esparza DO

## 2018-11-02 LAB
CREAT ?TM UR-SCNC: 36 UMOL/L
HBA1C MFR BLD HPLC: 6.5 %
MICROALBUMIN UR-MCNC: 10.8 MG/L (ref 0–20)
MICROALBUMIN/CREAT UR: 30 MG/G{CREAT}

## 2018-11-05 ENCOUNTER — OFFICE VISIT (OUTPATIENT)
Dept: ENDOCRINOLOGY | Facility: CLINIC | Age: 78
End: 2018-11-05
Payer: MEDICARE

## 2018-11-05 VITALS
HEART RATE: 82 BPM | BODY MASS INDEX: 35.02 KG/M2 | SYSTOLIC BLOOD PRESSURE: 158 MMHG | WEIGHT: 178.4 LBS | HEIGHT: 60 IN | DIASTOLIC BLOOD PRESSURE: 76 MMHG

## 2018-11-05 DIAGNOSIS — E04.2 MULTINODULAR GOITER: ICD-10-CM

## 2018-11-05 DIAGNOSIS — E03.9 HYPOTHYROIDISM, UNSPECIFIED TYPE: ICD-10-CM

## 2018-11-05 DIAGNOSIS — I10 ESSENTIAL HYPERTENSION: ICD-10-CM

## 2018-11-05 DIAGNOSIS — E11.9 TYPE 2 DIABETES MELLITUS WITHOUT COMPLICATION, UNSPECIFIED WHETHER LONG TERM INSULIN USE (HCC): Primary | ICD-10-CM

## 2018-11-05 DIAGNOSIS — E78.5 HYPERLIPIDEMIA, UNSPECIFIED HYPERLIPIDEMIA TYPE: ICD-10-CM

## 2018-11-05 PROCEDURE — 99204 OFFICE O/P NEW MOD 45 MIN: CPT | Performed by: INTERNAL MEDICINE

## 2018-11-05 RX ORDER — ALBUTEROL SULFATE 90 UG/1
2 AEROSOL, METERED RESPIRATORY (INHALATION) EVERY 6 HOURS PRN
COMMUNITY
End: 2018-12-26 | Stop reason: SDUPTHER

## 2018-11-05 NOTE — PATIENT INSTRUCTIONS
Hypoglycemia instructions   Dewayne Magaña  11/5/2018  [id number]    Low Blood Sugar    Steps to treat low blood sugar  1  Test blood sugar if you have symptoms of low blood sugar:   Low Blood Sugar Symptoms:  o Sweaty  o Dizzy  o Rapid heartbeat  o Shaky    o Bad mood  o Hungry      2  Treat blood sugar less than 70 with 15 grams of fast-acting carbohydrate:   Examples of 15 grams Fast-Acting Carbohydrate:  o 4 oz juice  o 4 oz regular soda  o 3-4 glucose tablets (chew)  o 3-4 hard candies (chew)              3    Wait 15 minutes and test your blood sugar again           4   If blood sugar is less than 100, repeat steps 2-3       5  When your blood sugar is 100 or more, eat a snack if it will be longer than one hour until your next meal  The snack should be 15 grams of carbohydrate and a protein:   Examples of snacks:  o ½ sandwich  o 6 crackers with cheese  o Piece of fruit with cheese or peanut butter  o 6 crackers with peanut butter

## 2018-11-05 NOTE — PROGRESS NOTES
New Patient Progress Note      Referring Provider  Armani Pressley Md  [address] [facility]  [address] [facility], [address] [place] [address],[address]     History of Present Illness:     History was obtained from patient as well as review of medical records  Patient was followed by Dr Rubio at [facility]    66 yr old woman with MNG, Hypothyroidism, type 2 diabetes, HTN and hyperlipidemia is here for evaluation and management of same   She is taking Onglyza 5 mg po daily, metformin 1000 mg twice a day   Admits compliance , denies side effects  She checks blood sugar once daily, fasting blood sugar usually in the range of   Her most recent A1c is 6 5% from November 2, 2018  She denies any complications of diabetes  She denies history of heart disease or stroke  She has history of COPD and shortness of breath secondary to COPD  PT has multinodular goiter-had ultrasound done before  She is due for a thyroid ultrasound now, will try to get thyroid ultrasound report from previous endocrinology  She is also taking levothyroxine 25 mcg daily for hypothyroidism  Most recent TSH is normal    No History of external radiation to head/neck/chest   No family history of thyroid cancer  No recent Iodine loading in form of medication, erbs or kelp supplements or radiological diagnostic studies      She has history of hyperlipidemia for which she is taking Lipitor 20 mg daily  For hypertension she is taking Vasotec 20 mg daily and Cardizem 300 mg daily      Reviewed blood work results, microalbumin creatinine ratio is 30 0, hemoglobin 13 0, hematocrit 40 0, alkaline phosphatase 53, B12 832, TSH 2 7, vitamin-D 32 1, HDL 46, A1c 6 5%, sodium 139, potassium 4 0, chloride 102, bicarb 29, BUN 14, creatinine 0 47 GFR 95 AST 12 ALT 14 albumin 4 0 triglyceride 110 cholesterol 135 LDL 67 TSH 2 78    Patient Active Problem List   Diagnosis    COPD, severe ([address] )    Deep vein thrombosis ([facility] [address] )    Hyperlipidemia    Chronic respiratory failure with hypoxia and hypercapnia (HCC)    Hypothyroidism    Obstructive sleep apnea    Pulmonary embolism (HCC)    Type 2 diabetes mellitus (HCC)    Iron deficiency anemia, unspecified    PSVT (paroxysmal supraventricular tachycardia) (HCC)    Anticoagulant long-term use    Hemorrhoids    Polyp of colon     Past Medical History:   Diagnosis Date    Chronic respiratory failure with hypoxia and hypercapnia (HCC)     Deep vein thrombosis (HCC)     Diabetes mellitus (HCC)     Emphysema of lung (HCC)     Hyperlipidemia     Hypertension     Hypothyroid     Pulmonary embolism (HCC)     Sleep apnea, obstructive       Past Surgical History:   Procedure Laterality Date    VT COLONOSCOPY FLX DX W/COLLJ SPEC WHEN PFRMD N/A 8/30/2018    Procedure: EGD AND COLONOSCOPY;  Surgeon: Tri Breaux MD;  [facility]; Service: Gastroenterology    TOE SURGERY        Family History   Problem Relation Age of Onset    Hypertension Mother     Hyperlipidemia Mother     Asthma Father     Heart failure Father     Hypertension Sister     Hyperlipidemia Sister     Heart attack Neg Hx     Aneurysm Neg Hx     Stroke Neg Hx     Clotting disorder Neg Hx      Social History   Substance Use Topics    Smoking status: Former Smoker     Packs/day: 1 00     Years: 40 00     Quit date: 2005    Smokeless tobacco: Never Used    Alcohol use No     Allergies   Allergen Reactions    Penicillins Rash     After dental procedure      [unfilled]    Review of Systems   Constitutional: Negative for activity change, diaphoresis, fatigue, fever and unexpected weight change  HENT: Negative  Eyes: Negative for visual disturbance  Respiratory: Positive for shortness of breath  Negative for cough and chest tightness  Cardiovascular: Negative for chest pain, palpitations and leg swelling  Gastrointestinal: Negative for abdominal pain, blood in stool, constipation, diarrhea, nausea and vomiting     Endocrine: Negative for cold intolerance, heat intolerance, polydipsia, polyphagia and polyuria  Genitourinary: Negative for dysuria, enuresis, frequency and urgency  Musculoskeletal: Negative for arthralgias and myalgias  Skin: Negative for pallor, rash and wound  Allergic/Immunologic: Negative  Neurological: Negative for dizziness, tremors, weakness and numbness  Hematological: Negative  Psychiatric/Behavioral: Negative  Physical Exam:  Body mass index is 34 84 kg/m²  /76   Pulse 82   Ht 5' (1 524 m)   Wt 80 9 kg (178 lb 6 4 oz)   LMP  (LMP Unknown)   BMI 34 84 kg/m²    [unfilled]     Physical Exam   Constitutional: She is oriented to person, place, and time  She appears well-developed and well-nourished  No distress  HENT:   Head: Normocephalic and atraumatic  Eyes: Conjunctivae and EOM are normal  Right eye exhibits no discharge  Left eye exhibits no discharge  Neck: Normal range of motion  Neck supple  No thyromegaly present  Cardiovascular: Normal rate, regular rhythm and normal heart sounds  No murmur heard  Pulmonary/Chest: Effort normal and breath sounds normal  No respiratory distress  She has no wheezes  Abdominal: Soft  Bowel sounds are normal  She exhibits no distension  There is no tenderness  No abnormal stretch marks   Musculoskeletal: Normal range of motion  She exhibits no edema, tenderness or deformity  Neurological: She is alert and oriented to person, place, and time  She has normal reflexes  No cranial nerve deficit  Skin: Skin is warm and dry  She is not diaphoretic  No erythema  Psychiatric: She has a normal mood and affect  Her behavior is normal    Vitals reviewed      Diabetic Foot Exam    Labs:   No components found for: HA1C  No components found for: GLU    Lab Results   Component Value Date    CREATININE 0 45 (L) 06/19/2018    BUN 11 06/19/2018    K 4 1 06/19/2018     06/19/2018    CO2 27 06/19/2018     eGFR   Date Value Ref Range Status   06/19/2018 80 ml/min/1 73sq m Final     No components found for: MALBCRER    No results found for: CHOL, HDL, TRIG, CHOLHDL    Lab Results   Component Value Date    ALT 21 06/19/2018    AST 8 06/19/2018    ALKPHOS 67 06/19/2018       No results found for: TSH, FREET4, TSI    Impression:  1  Type 2 diabetes mellitus without complication, unspecified whether long term insulin use ([facility] [address] )    2  Hypothyroidism, unspecified type    3  Essential hypertension    4  Hyperlipidemia, unspecified hyperlipidemia type    5  Multinodular goiter         Plan:    Diagnoses and all orders for this visit:    Type 2 diabetes mellitus without complication, unspecified whether long term insulin use ([facility])  A1c is at goal 6 5%  Discussed to continue checking blood sugar once daily, discussed goal for blood sugar is 80 to 160 mg/dL  Continue Onglyza 5 mg daily, metformin 1000 mg twice a day and Amaryl 1 mg daily  Gave referral to see Podiatry and Ophthalmology  Will follow-up in 4 months  Reinforce importance of diet and exercise, on blood sugars as well as on blood pressure and lipid profile  -     Hemoglobin A1C; Future  -     T4, free; Future  -     Basic metabolic panel Lab Collect; Future  -     Ambulatory referral to Podiatry; Future  -     Ambulatory Referral to Ophthalmology; Future    Hypothyroidism, unspecified type  Last TSH is normal  Continue levothyroxine 25  mcg daily 1 hour before breakfast  -     TSH, 3rd generation; Future  -     T4, free;  Future    Essential hypertension  BP Readings from Last 3 Encounters:   11/05/18 158/76   08/30/18 134/60   08/21/18 132/68    Blood pressure is slightly elevated  Continue current management  Stay away from salty food  Follow up with primary care physician for hypertension  Discussed goal for blood pressure is less than 130/80    Hyperlipidemia, unspecified hyperlipidemia type  Continue statin  She has low HDL, educated about exercise as well as weight loss to improve HDL  Continue statins · Increase healthy fats such as Un-saturated fat which is found in food  such as soybean, canola, olive, corn, and safflower oils  It is also found in soft tub margarine that is made with liquid vegetable oil  · Increase Omega-3 fat  is found in certain fish, such as salmon, tuna, and trout, and in walnuts and flaxseed  Multinodular goiter  Will order thyroid ultrasound now to follow up, based on the results she will need follow-up ultrasound in future  -     US thyroid; Future      Discussed with the patient and all questioned fully answered  She will call me if any problems arise      Counseled patient on diagnostic results, prognosis, risk and benefit of treatment options, instruction for management, importance of treatment compliance, Risk  factor reduction and impressions      Alva Andre MD

## 2018-11-08 ENCOUNTER — TELEPHONE (OUTPATIENT)
Dept: ENDOCRINOLOGY | Facility: CLINIC | Age: 78
End: 2018-11-08

## 2018-11-08 NOTE — TELEPHONE ENCOUNTER
----- Message from Marin Shin PA-C sent at 11/8/2018 11:35 AM EST -----  Microalbumin/creatinine urine test normal

## 2018-12-10 ENCOUNTER — TELEPHONE (OUTPATIENT)
Dept: ENDOCRINOLOGY | Facility: CLINIC | Age: 78
End: 2018-12-10

## 2018-12-10 NOTE — TELEPHONE ENCOUNTER
Reviewed thyroid ultrasound from August 2016  Right lobe is 2 cm x 1 6 cm by 2 8 cm  Left lobe is 1 0 8 cm x 2 3 cm x 4 cm  Nodule on right lobe is 7 x 5 x 5 mm, 50% cystic no vascularity no microcalcifications intact margin, isoechoic  Nodule in isthmus  is 7 by 4 x 4 mm, 50% cystic no vascularity no microcalcification, Ivan Blackmon MD

## 2018-12-26 DIAGNOSIS — J44.9 CHRONIC OBSTRUCTIVE PULMONARY DISEASE, UNSPECIFIED COPD TYPE (HCC): Primary | ICD-10-CM

## 2018-12-26 RX ORDER — ALBUTEROL SULFATE 90 UG/1
2 AEROSOL, METERED RESPIRATORY (INHALATION) EVERY 6 HOURS PRN
Qty: 1 INHALER | Refills: 5 | Status: SHIPPED | OUTPATIENT
Start: 2018-12-26 | End: 2021-11-03

## 2019-01-21 ENCOUNTER — NURSE TRIAGE (OUTPATIENT)
Dept: PHYSICAL THERAPY | Facility: OTHER | Age: 79
End: 2019-01-21

## 2019-01-21 DIAGNOSIS — M54.50 ACUTE LEFT-SIDED LOW BACK PAIN WITHOUT SCIATICA: ICD-10-CM

## 2019-01-21 DIAGNOSIS — M54.50 ACUTE RIGHT-SIDED LOW BACK PAIN WITHOUT SCIATICA: Primary | ICD-10-CM

## 2019-01-21 DIAGNOSIS — G89.29 CHRONIC LOW BACK PAIN, UNSPECIFIED BACK PAIN LATERALITY, WITH SCIATICA PRESENCE UNSPECIFIED: ICD-10-CM

## 2019-01-21 DIAGNOSIS — M54.5 CHRONIC LOW BACK PAIN, UNSPECIFIED BACK PAIN LATERALITY, WITH SCIATICA PRESENCE UNSPECIFIED: ICD-10-CM

## 2019-01-21 NOTE — TELEPHONE ENCOUNTER
Reason for Disposition   Is this a chronic condition? Background - Initial Assessment  Clinical complaint: Low back pain, L sided pain in December, R sided pain started 3 days ago  Date of onset: 2 years ago with acute flare ups of low back pain  Mechanism of injury: Patient reports vertebral fracture 2 years of unknown onset of injury; patient reports this flare up three days ago was caused by cleaning  Previous Treatment - Previous Treatment  Previous evaluation: PCP ruddy in December  Current provider: Dr Joseline aHre: Imaging 2 years ago in [place] where patient used to live  Previous treatment: Rest, OTC tylenol and Ibuprofen as necessary  Protocols used: [facility] COMPREHENSIVE SPINE PROGRAM PROTOCOL    RN provided patient an overview of the Comprehensive Spine Program including: triage assessment, physical therapy, and additional specialist referral options based on symptoms and chronicity  RN explained that Comprehensive Spine program is physical therapy based with the goal of getting the patient scheduled in 24 - 48 hrs  Further explained that we schedule patients for a consultation with one of our advanced spine physical therapists for evaluation which may include treatment  These therapists have their doctorate in PTx  If needed, a telemed visit could occur at the same time of this consultation if muscle relaxers or a higher dose NSAID is needed  The goal is to get patients treated as quickly as possible so they can return to their normal activities  Research has shown great results when starting physical therapy sooner than later which helps reduce imaging and costs to patients  Patient reports she has COPD  Patient reports she had a vertebral fracture 2 years ago with unknown onset of injury  Patient is unsure of what level she fractured  Patient reports urinary frequency, denies retention  Bending and sitting bothers patient       RN discussed options for both physical therapy and physiatry with patient  Discussed physical therapy for relief of immediate symptoms and rehabilitation, as well as for strengthening back and prevention of further episodes of pain  RN explained that physiatry involves treating a wide variety of medical conditions affecting the brain, spinal cord, nerves, bones, joints, ligaments, muscles, and tendons  The PA-c would conduct a full exam and if needed, order additional tests / place referrals  Patient would like to start with physical therapy only first, then be referred additionally by PT if necessary

## 2019-01-22 ENCOUNTER — EVALUATION (OUTPATIENT)
Dept: PHYSICAL THERAPY | Facility: CLINIC | Age: 79
End: 2019-01-22
Payer: MEDICARE

## 2019-01-22 VITALS — SYSTOLIC BLOOD PRESSURE: 138 MMHG | TEMPERATURE: 98.2 F | DIASTOLIC BLOOD PRESSURE: 80 MMHG | HEART RATE: 67 BPM

## 2019-01-22 DIAGNOSIS — M54.50 ACUTE LEFT-SIDED LOW BACK PAIN WITHOUT SCIATICA: ICD-10-CM

## 2019-01-22 DIAGNOSIS — M54.6 THORACIC SPINE PAIN: Primary | ICD-10-CM

## 2019-01-22 DIAGNOSIS — M54.50 ACUTE RIGHT-SIDED LOW BACK PAIN WITHOUT SCIATICA: ICD-10-CM

## 2019-01-22 PROCEDURE — 97162 PT EVAL MOD COMPLEX 30 MIN: CPT | Performed by: PHYSICAL THERAPIST

## 2019-01-22 NOTE — LETTER
2019    Ajay Fabian MD  [address]  [address] [place]     Patient: Teja Aly   YOB: 1940   Date of Visit: 2019     Encounter Diagnosis     ICD-10-CM    1  Thoracic spine pain M54 6 Ambulatory referral to Pediatric Psychiatry   2  Acute right-sided low back pain without sciatica M54 5 Ambulatory referral to PT spine     Ambulatory referral to Pediatric Psychiatry     CANCELED: Ambulatory referral to Psychiatry   3  Acute left-sided low back pain without sciatica M54 5 Ambulatory referral to PT spine   4  Chronic low back pain, unspecified back pain laterality, with sciatica presence unspecified M54 5 Ambulatory referral to PT spine    G89 29        Dear Dr Nino Amaya:    Please review the attached Plan of Care from [facility] recent visit  Please verify that you agree therapy should continue by signing the attached document and sending it back to our office  If you have any questions or concerns, please don't hesitate to call  Sincerely,    Loan Hagen, PT      Referring Provider:      I certify that I have read the below Plan of Care and certify the need for these services furnished under this plan of treatment while under my care  Ajay Fabian MD  [address]  [address] [facility] [address]  [place]          PT Evaluation     Today's date: 2019  Patient name: Teja Aly  : 1940  MRN: [id number]  Referring provider: Cathie Morales MD  Dx:   Encounter Diagnosis     ICD-10-CM    1  Acute right-sided low back pain without sciatica M54 5 Ambulatory referral to PT spine   2  Acute left-sided low back pain without sciatica M54 5 Ambulatory referral to PT spine   3   Chronic low back pain, unspecified back pain laterality, with sciatica presence unspecified M54 5 Ambulatory referral to PT spine    G89 29                   Assessment  Assessment details: Patient is a 66 y o  female who presents to [facility] with the above listed impairments  Patients would benefit from from further consultation, including imaging to r/o a possible fx  A referral has been place to  Physiatry for this  Pt was informed and instructed to call me if she did not hear anything within 24 hours  I was unable to complete a full evaluation today secondary to the pt's pain levels  Impairments: abnormal or restricted ROM, activity intolerance, impaired physical strength, pain with function, poor posture  and poor body mechanics  Understanding of Dx/Px/POC: good   Prognosis: good    Goals  No goals will be set at this time until pt is further evaluated  Plan  Plan details: If imaging is negative we will see this pt 2x a week for 4 weeks  Patient would benefit from: skilled physical therapy  Planned modality interventions: cryotherapy, thermotherapy: hydrocollator packs and TENS  Planned therapy interventions: home exercise program, graded exercise, functional ROM exercises, flexibility, body mechanics training, postural training, patient education, therapeutic activities, therapeutic exercise, manual therapy, joint mobilization and neuromuscular re-education  Frequency: 2x week  Duration in weeks: 4  Treatment plan discussed with: patient and PCP        Subjective Evaluation    History of Present Illness  Mechanism of injury: Patient reports an onset of LBP ~2 years ago  She states at that time she had a fx of her vertebrae  She states in early November 2018 she started to have pain again at the same location at the L/S  She states her pain flaired up more ~3 days ago  She has not had an imaging to date  She notes her pain is most pronounced when bending forward and with transferring from standing to sitting  She is reporting that she did have a compression fx 2 years ago around the same location and that she also has a hx of osteoporosis  She denies a TAISHA    Occupation: Retired  PLOF: Independent   Pain  Current pain ratin  At best pain ratin  At worst pain rating: 10  Location: lower t/s    Patient Goals  Patient goals for therapy: decreased pain, increased motion, independence with ADLs/IADLs and return to sport/leisure activities          Objective     Special Questions  Negative for bladder dysfunction, bowel dysfunction and saddle (S4) numbness    Additional Special Questions  Unexplained Weight Loss: NO  Fever: NO  Urine Retention: NO  Acute Drop Foot or Paralysis: NO  Major Trauma (fall from height, high speed collision, direct blow to spine): NO              If yes; does patient have nausea, lightheadedness, or loss of  Consciousness: NA        Tenderness     Additional Tenderness Details  Pt is significantly tender upon palpation at the paraspinals form T9 to L2  Neurological Testing     Sensation     Lumbar   Left   Intact: light touch    Right   Intact: light touch    Reflexes   Left   Patellar (L4): normal (2+)  Achilles (S1): normal (2+)    Right   Patellar (L4): normal (2+)  Achilles (S1): normal (2+)    Additional Neurological Details        Active Range of Motion     Lumbar   Flexion: 50 degrees with pain  Extension: 100 degrees   Left lateral flexion: 100 degrees   Right lateral flexion: 75 degrees with pain  Left rotation: 75 degrees with pain  Right rotation: 100 degrees     Additional Active Range of Motion Details  -    Strength/Myotome Testing     Left Knee   Flexion: 5  Extension: 5    Right Knee   Flexion: 5  Extension: 5    Left Ankle/Foot   Dorsiflexion: 5  Plantar flexion: 5  Great toe flexion: 5  Great toe extension: 5    Right Ankle/Foot   Dorsiflexion: 5  Plantar flexion: 5  Great toe flexion: 5  Great toe extension: 5    Tests     Lumbar   Positive repeated flexion  Negative repeated extension       Additional Tests Details  Supine to sit test: NT    Spring test: NT  Hip IR ROM: NT    Gowers Sign: negative  Claudia's Sign: NT    Pt's hamstring length is 50 degrees from 0 when placed in 90/90 position  Piriformis muscle length is decreased  Daily Treatment Diary   Not yet established until further evaluated

## 2019-01-22 NOTE — PROGRESS NOTES
PT Evaluation     Today's date: 2019  Patient name: Janelle Sharma  : 1940  MRN: [id number]  Referring provider: Madelin Barrios MD  Dx:   Encounter Diagnosis     ICD-10-CM    1  Thoracic spine pain M54 6 Ambulatory referral to Physical Medicine Rehab     CANCELED: Ambulatory referral to Pediatric Psychiatry     CANCELED: Ambulatory referral to Psychiatry   2  Acute right-sided low back pain without sciatica M54 5 Ambulatory referral to PT spine     CANCELED: Ambulatory referral to Psychiatry     CANCELED: Ambulatory referral to Pediatric Psychiatry   3  Acute left-sided low back pain without sciatica M54 5 Ambulatory referral to PT spine       Start Time: 1355  Stop Time: 1450  Total time in clinic (min): 55 minutes    Assessment  Assessment details: Patient is a 66 y o  female who presents to [place] [address] program with the above listed impairments  Patients would benefit from from further consultation, including imaging to r/o a possible fx  A referral has been place to  Physiatry for this  Pt was informed and instructed to call me if she did not hear anything within 24 hours  I was unable to complete a full evaluation today secondary to the pt's pain levels  Impairments: abnormal or restricted ROM, activity intolerance, impaired physical strength, pain with function, poor posture  and poor body mechanics  Understanding of Dx/Px/POC: good   Prognosis: good    Goals  No goals will be set at this time until pt is further evaluated  Plan  Plan details: If imaging is negative we will see this pt 2x a week for 4 weeks    Patient would benefit from: skilled physical therapy  Planned modality interventions: cryotherapy, thermotherapy: hydrocollator packs and TENS  Planned therapy interventions: home exercise program, graded exercise, functional ROM exercises, flexibility, body mechanics training, postural training, patient education, therapeutic activities, therapeutic exercise, manual therapy, joint mobilization and neuromuscular re-education  Frequency: 2x week  Duration in weeks: 4  Treatment plan discussed with: patient and PCP        Subjective Evaluation    History of Present Illness  Mechanism of injury: Patient reports an onset of LBP ~2 years ago  She states at that time she had a fx of her vertebrae  She states in early 2018 she started to have pain again at the same location at the L/S  She states her pain flaired up more ~3 days ago  She has not had an imaging to date  She notes her pain is most pronounced when bending forward and with transferring from standing to sitting  She is reporting that she did have a compression fx 2 years ago around the same location and that she also has a hx of osteoporosis  She denies a TAISHA  Occupation: Retired  PLOF: Independent   Pain  Current pain ratin  At best pain ratin  At worst pain rating: 10  Location: lower t/s    Patient Goals  Patient goals for therapy: decreased pain, increased motion, independence with ADLs/IADLs and return to sport/leisure activities          Objective     Special Questions  Negative for bladder dysfunction, bowel dysfunction and saddle (S4) numbness    Additional Special Questions  Unexplained Weight Loss: NO  Fever: NO  Urine Retention: NO  Acute Drop Foot or Paralysis: NO  Major Trauma (fall from height, high speed collision, direct blow to spine): NO              If yes; does patient have nausea, lightheadedness, or loss of  Consciousness: NA        Tenderness     Additional Tenderness Details  Pt is significantly tender upon palpation at the paraspinals form T9 to L2      Neurological Testing     Sensation     Lumbar   Left   Intact: light touch    Right   Intact: light touch    Reflexes   Left   Patellar (L4): normal (2+)  Achilles (S1): normal (2+)    Right   Patellar (L4): normal (2+)  Achilles (S1): normal (2+)    Additional Neurological Details        Active Range of Motion     Lumbar Flexion: 50 degrees with pain  Extension: 100 degrees   Left lateral flexion: 100 degrees   Right lateral flexion: 75 degrees with pain  Left rotation: 75 degrees with pain  Right rotation: 100 degrees     Additional Active Range of Motion Details  -    Strength/Myotome Testing     Left Knee   Flexion: 5  Extension: 5    Right Knee   Flexion: 5  Extension: 5    Left Ankle/Foot   Dorsiflexion: 5  Plantar flexion: 5  Great toe flexion: 5  Great toe extension: 5    Right Ankle/Foot   Dorsiflexion: 5  Plantar flexion: 5  Great toe flexion: 5  Great toe extension: 5    Tests     Lumbar   Positive repeated flexion  Negative repeated extension  Additional Tests Details  Supine to sit test: NT    Spring test: NT  Hip IR ROM: NT    Gowers Sign: negative  Claudia's Sign: NT    Pt's hamstring length is 50 degrees from 0 when placed in 90/90 position  Piriformis muscle length is decreased  Flowsheet Rows      Most Recent Value   PT/OT G-Codes   Current Score  29   Projected Score  59   FOTO information reviewed  Yes              Daily Treatment Diary   Not yet established until further evaluated  PT Discharge  Pt was referred to ortho due to compression fx  D/C from PT at this time

## 2019-01-22 NOTE — LETTER
2019    Max Mendes MD  [address] [address]    Patient: Porter Alejandro  YOB: 1940   Date of Visit: 2019      Dear Dr Megha Zamora:    One of your patients, Porter Alejandro, was referred to me by the [facility]   Please review the attached evaluation summary from [facility] recent visit  Please verify that you agree with the plan of care by signing the attached document and sending it back to our office  If you have any questions or concerns, please don't hesitate to call  Sincerely,    Carey Casarez, PT      Primary Care Provider:      I certify that I have read the below Plan of Care and certify the need for these services furnished under this plan of treatment while under my care  Max Mendes MD  [address] [address]  [address] [place] [address] Avenue: [phone]           PT Evaluation     Today's date: 2019  Patient name: Porter Alejandro  : 1940  MRN: [id number]  Referring provider: Lorena Boudreaux MD  Dx:   Encounter Diagnosis     ICD-10-CM    1  Acute right-sided low back pain without sciatica M54 5 Ambulatory referral to PT spine   2  Acute left-sided low back pain without sciatica M54 5 Ambulatory referral to PT spine   3  Chronic low back pain, unspecified back pain laterality, with sciatica presence unspecified M54 5 Ambulatory referral to PT spine    G89 29                   Assessment  Assessment details: Patient is a 66 y o  female who presents to [place] [address] program with the above listed impairments  Patients would benefit from from further consultation, including imaging to r/o a possible fx  A referral has been place to  Physiatry for this  Pt was informed and instructed to call me if she did not hear anything within 24 hours  I was unable to complete a full evaluation today secondary to the pt's pain levels  Impairments: abnormal or restricted ROM, activity intolerance, impaired physical strength, pain with function, poor posture  and poor body mechanics  Understanding of Dx/Px/POC: good   Prognosis: good    Goals  No goals will be set at this time until pt is further evaluated  Plan  Plan details: If imaging is negative we will see this pt 2x a week for 4 weeks  Patient would benefit from: skilled physical therapy  Planned modality interventions: cryotherapy, thermotherapy: hydrocollator packs and TENS  Planned therapy interventions: home exercise program, graded exercise, functional ROM exercises, flexibility, body mechanics training, postural training, patient education, therapeutic activities, therapeutic exercise, manual therapy, joint mobilization and neuromuscular re-education  Frequency: 2x week  Duration in weeks: 4  Treatment plan discussed with: patient and PCP        Subjective Evaluation    History of Present Illness  Mechanism of injury: Patient reports an onset of LBP ~2 years ago  She states at that time she had a fx of her vertebrae  She states in early 2018 she started to have pain again at the same location at the L/S  She states her pain flaired up more ~3 days ago  She has not had an imaging to date  She notes her pain is most pronounced when bending forward and with transferring from standing to sitting  She is reporting that she did have a compression fx 2 years ago around the same location and that she also has a hx of osteoporosis  She denies a TAISHA    Occupation: Retired  PLOF: Independent   Pain  Current pain ratin  At best pain ratin  At worst pain rating: 10  Location: lower t/s    Patient Goals  Patient goals for therapy: decreased pain, increased motion, independence with ADLs/IADLs and return to sport/leisure activities          Objective     Special Questions  Negative for bladder dysfunction, bowel dysfunction and saddle (S4) numbness    Additional Special Questions  Unexplained Weight Loss: NO  Fever: NO  Urine Retention: NO  Acute Drop Foot or Paralysis: NO  Major Trauma (fall from height, high speed collision, direct blow to spine): NO              If yes; does patient have nausea, lightheadedness, or loss of  Consciousness: NA        Tenderness     Additional Tenderness Details  Pt is significantly tender upon palpation at the paraspinals form T9 to L2  Neurological Testing     Sensation     Lumbar   Left   Intact: light touch    Right   Intact: light touch    Reflexes   Left   Patellar (L4): normal (2+)  Achilles (S1): normal (2+)    Right   Patellar (L4): normal (2+)  Achilles (S1): normal (2+)    Additional Neurological Details        Active Range of Motion     Lumbar   Flexion: 50 degrees with pain  Extension: 100 degrees   Left lateral flexion: 100 degrees   Right lateral flexion: 75 degrees with pain  Left rotation: 75 degrees with pain  Right rotation: 100 degrees     Additional Active Range of Motion Details  -    Strength/Myotome Testing     Left Knee   Flexion: 5  Extension: 5    Right Knee   Flexion: 5  Extension: 5    Left Ankle/Foot   Dorsiflexion: 5  Plantar flexion: 5  Great toe flexion: 5  Great toe extension: 5    Right Ankle/Foot   Dorsiflexion: 5  Plantar flexion: 5  Great toe flexion: 5  Great toe extension: 5    Tests     Lumbar   Positive repeated flexion  Negative repeated extension  Additional Tests Details  Supine to sit test: NT    Spring test: NT  Hip IR ROM: NT    Gowers Sign: negative  Claudia's Sign: NT    Pt's hamstring length is 50 degrees from 0 when placed in 90/90 position  Piriformis muscle length is decreased  Daily Treatment Diary   Not yet established until further evaluated

## 2019-01-23 NOTE — PROGRESS NOTES
Assessment/Plan:      Diagnoses and all orders for this visit:    Thoracolumbar back pain  -     CT spine thoracic & lumbar w wo contrast; Future  -     CT spine thoracic & lumbar wo contrast; Future    Thoracic spine pain  -     Ambulatory referral to Physical Medicine Rehab    Osteoporosis with current pathological fracture, unspecified osteoporosis type, initial encounter    Osteoporosis, unspecified osteoporosis type, unspecified pathological fracture presence  -     CT spine thoracic & lumbar w wo contrast; Future  -     CT spine thoracic & lumbar wo contrast; Future    Other orders  -     acetaminophen (TYLENOL) 325 mg tablet; Take 650 mg by mouth every 6 (six) hours as needed for mild pain  -     ibuprofen (MOTRIN) 400 mg tablet; Take by mouth every 6 (six) hours as needed for mild pain      Discussion: 67 yo right hand dominant female presenting for consultation for suspected fracture  She has known osteoporosis for which she is not on any supplementation or treatment  Additionally notes previous hx of unspecified pathologic lumbar fracture  Will order CT of thoracolumbar spine wo contrast for evaluation  She was advised to hold off on physical therapy pending imaging results  I will call patient with results of imaging once completed as she does have transportation issues  Discussed rest, heat/ice, and continued Tylenol and NSAIDs with food as tolerated for pain relief  She declines additional pain relievers at this time  To additionally consider referral to orthopedic surgery in the future  Was advised to follow up with PCP regarding osteoporosis as risks of additional pathologic fracture increase if left untreated  She states she will follow up with PCP to discuss  Vital signs reviewed, patient states has not taken BP medication yet today  Subjective:     Patient ID: Tom Sears is a 66 y o  female  HPI Referral from [facility]\"" - [facility] Physical therapy for suspected compression fracture   Was unable to complete PTx evaluation secondary to pain  Chronic LBP with intermittent flare ups typically left sided  Hx of lumbar compression fracture 2 years ago without trauma  Known hx of osteoporosis  No treatments for previous fracture  Rest x 4 weeks with resolution of pain  Most recent exacerbation of pain 4 days ago after cleaning  No recent imaging  Previous imaging after fracture in [facility]  Slight left sided LBP in December x 1 month without workup  She states this pain resolved a few weeks later with rest     4 days ago pain started in right side after cleaning melanieer  Was reaching up to clean light on step stool and states no immediate pain, but two days later gradually progressed  Describes as a constant aching pain R>L  Pain is a 9/10 on pain scale  States pain at times is worse than childbirth  Denies any N/T, weakness, B/B incontinence or retention  Denies saddle anesthesia  Pain exacerbated     No hx of cancer  Known osteoporosis without current supplementation  Tylenol and ibuprofen with relief  Does not want to take any 'prescription medications'  PAST MEDICAL HISTORY  Past Medical History:   Diagnosis Date    Chronic respiratory failure with hypoxia and hypercapnia (HCC)     Deep vein thrombosis (HCC)     Diabetes mellitus (HCC)     Emphysema of lung (HCC)     Hyperlipidemia     Hypertension     Hypothyroid     Pulmonary embolism (HCC)     Sleep apnea, obstructive      PAST SURGICAL HISTORY  Past Surgical History:   Procedure Laterality Date    AL COLONOSCOPY FLX DX W/COLLJ SPEC WHEN PFRMD N/A 8/30/2018    Procedure: EGD AND COLONOSCOPY;  Surgeon: Ambrocio Xiao MD;  [facility];   Service: Gastroenterology    TOE SURGERY         FAMILY HISTORY  Family History   Problem Relation Age of Onset    Hypertension Mother     Hyperlipidemia Mother     Asthma Father     Heart failure Father     Hypertension Sister     Hyperlipidemia Sister     Heart attack Neg Hx  Aneurysm Neg Hx     Stroke Neg Hx     Clotting disorder Neg Hx      HOME MEDICATIONS  Current Outpatient Prescriptions   Medication Sig Dispense Refill    acetaminophen (TYLENOL) 325 mg tablet Take 650 mg by mouth every 6 (six) hours as needed for mild pain      albuterol (PROAIR HFA) 90 mcg/act inhaler Inhale 2 puffs every 6 (six) hours as needed for wheezing 1 Inhaler 5    atorvastatin (LIPITOR) 20 mg tablet 20 mg daily        diltiazem (CARDIZEM CD) 300 mg 24 hr capsule 300 mg daily        enalapril (VASOTEC) 20 mg tablet Take 20 mg by mouth daily        glimepiride (AMARYL) 1 mg tablet Take 1 mg by mouth daily with breakfast        ibuprofen (MOTRIN) 400 mg tablet Take by mouth every 6 (six) hours as needed for mild pain      metFORMIN (GLUCOPHAGE) 1000 MG tablet Take 1,000 mg by mouth 2 (two) times a day with meals        saxagliptin (ONGLYZA) 5 MG tablet Take 1 tablet by mouth daily      SYMBICORT 160-4 5 MCG/ACT inhaler       SYNTHROID 25 MCG tablet Take 25 mcg by mouth daily        tiotropium (SPIRIVA RESPIMAT) 2 5 MCG/ACT AERS inhaler Inhale 2 puffs daily 1 Inhaler 11     No current facility-administered medications for this visit  ALLERGIES  Penicillins      SOCIAL HISTORY  History   Alcohol Use No     History   Drug Use No     History   Smoking Status    Former Smoker    Packs/day: 1 00    Years: 40 00    Quit date: 2005   Smokeless Tobacco    Never Used     Review of Systems   Constitutional: Negative for chills, diaphoresis, fatigue, fever and unexpected weight change  HENT: Negative for trouble swallowing  Eyes: Negative for visual disturbance  Respiratory: Positive for shortness of breath (home O2 2L)  Negative for wheezing  Cardiovascular: Negative for chest pain and palpitations  Gastrointestinal: Negative for blood in stool, constipation, diarrhea and nausea  Genitourinary: Negative for decreased urine volume and difficulty urinating     Musculoskeletal: Positive for back pain  Skin: Negative for rash  Neurological: Negative for weakness, numbness and headaches  Psychiatric/Behavioral: Positive for sleep disturbance  Objective:  Vitals:    01/24/19 0925   BP: 164/74   Pulse: 96        Physical Exam   Constitutional: She is oriented to person, place, and time  She appears well-developed and well-nourished  No distress  HENT:   Head: Normocephalic and atraumatic  Pulmonary/Chest: No accessory muscle usage  No tachypnea and no bradypnea  No respiratory distress  She has no wheezes  Noted shortness of breath  Patient without portable O2  Musculoskeletal:        Lumbar back: She exhibits decreased range of motion, tenderness and bony tenderness  Back:    TTP spinous process T10-L2    Visible kyphosis     Limited flexion and extension  Neurological: She is alert and oriented to person, place, and time  She displays no atrophy  No sensory deficit  She exhibits normal muscle tone  Coordination and gait normal    Reflex Scores:       Tricep reflexes are 2+ on the right side and 2+ on the left side  Bicep reflexes are 2+ on the right side and 2+ on the left side  Brachioradialis reflexes are 2+ on the right side and 2+ on the left side  Patellar reflexes are 2+ on the right side and 2+ on the left side  Achilles reflexes are 2+ on the right side and 2+ on the left side   - SLR bilaterally   - Verma's bilaterally     Motor intact   No root tension signs    Skin: Skin is warm and dry  No rash noted  She is not diaphoretic  No cyanosis  No pallor  Nails show no clubbing  Psychiatric: She has a normal mood and affect  Her behavior is normal  Judgment and thought content normal    Vitals reviewed

## 2019-01-24 ENCOUNTER — OFFICE VISIT (OUTPATIENT)
Dept: PAIN MEDICINE | Facility: CLINIC | Age: 79
End: 2019-01-24
Payer: MEDICARE

## 2019-01-24 VITALS
WEIGHT: 176 LBS | HEIGHT: 60 IN | DIASTOLIC BLOOD PRESSURE: 74 MMHG | BODY MASS INDEX: 34.55 KG/M2 | HEART RATE: 96 BPM | SYSTOLIC BLOOD PRESSURE: 164 MMHG

## 2019-01-24 DIAGNOSIS — M54.6 THORACOLUMBAR BACK PAIN: Primary | ICD-10-CM

## 2019-01-24 DIAGNOSIS — M54.6 THORACIC SPINE PAIN: ICD-10-CM

## 2019-01-24 DIAGNOSIS — M54.50 THORACOLUMBAR BACK PAIN: Primary | ICD-10-CM

## 2019-01-24 DIAGNOSIS — M80.00XA OSTEOPOROSIS WITH CURRENT PATHOLOGICAL FRACTURE, UNSPECIFIED OSTEOPOROSIS TYPE, INITIAL ENCOUNTER: ICD-10-CM

## 2019-01-24 DIAGNOSIS — M81.0 OSTEOPOROSIS, UNSPECIFIED OSTEOPOROSIS TYPE, UNSPECIFIED PATHOLOGICAL FRACTURE PRESENCE: ICD-10-CM

## 2019-01-24 PROCEDURE — 99204 OFFICE O/P NEW MOD 45 MIN: CPT | Performed by: PHYSICIAN ASSISTANT

## 2019-01-24 RX ORDER — IBUPROFEN 400 MG/1
TABLET ORAL EVERY 6 HOURS PRN
COMMUNITY
End: 2019-07-30 | Stop reason: ALTCHOICE

## 2019-01-24 RX ORDER — ACETAMINOPHEN 325 MG/1
650 TABLET ORAL EVERY 6 HOURS PRN
COMMUNITY
End: 2019-07-30 | Stop reason: ALTCHOICE

## 2019-01-24 NOTE — PATIENT INSTRUCTIONS
1  CT thoracolumbar spine, will call with results  2  Stop physical therapy pending imaging results  Vertebral Compression Fracture   WHAT YOU NEED TO KNOW:   A vertebral compression fracture (VCF) is a break in a part of the vertebra  Vertebrae are the round, strong bones that form your spine  VCFs most often occur in the thoracic (middle) and lumbar (lower) areas of your spine  Fractures may be mild to severe  DISCHARGE INSTRUCTIONS:   Medicines: You may need any of the following:  · NSAIDs , such as ibuprofen, help decrease swelling, pain, and fever  This medicine is available with or without a doctor's order  NSAIDs can cause stomach bleeding or kidney problems in certain people  If you take blood thinner medicine, always ask if NSAIDs are safe for you  Always read the medicine label and follow directions  Do not give these medicines to children under 10months of age without direction from your child's healthcare provider  · Acetaminophen  decreases pain and fever  It is available without a doctor's order  Ask how much to take and how often to take it  Follow directions  Acetaminophen can cause liver damage if not taken correctly  · Prescription pain medicine  may be given  Ask your healthcare provider how to take this medicine safely  · Bisphosphonates and calcitonin  may be recommended to help your bones get stronger  They can decrease the pain of a VCF caused by osteoporosis, and decrease your risk for another fracture  · Take your medicine as directed  Contact your healthcare provider if you think your medicine is not helping or if you have side effects  Tell him or her if you are allergic to any medicine  Keep a list of the medicines, vitamins, and herbs you take  Include the amounts, and when and why you take them  Bring the list or the pill bottles to follow-up visits  Carry your medicine list with you in case of an emergency  Follow up with your healthcare provider as directed:   You may need to return for x-rays or other tests  Write down your questions so you remember to ask them during your visits  Heat and ice:   · Apply ice  on your back for 15 to 20 minutes every hour or as directed  Use an ice pack, or put crushed ice in a plastic bag  Cover it with a towel  Ice helps prevent tissue damage and decreases swelling and pain  · Apply heat  on your back for 20 to 30 minutes every 2 hours for as many days as directed  Heat helps decrease pain and muscle spasms  Activity:   · Avoid activities that may make the pain worse, such as picking up heavy objects  When the pain decreases, begin normal, slow movements as directed by your healthcare provider  Your healthcare provider may have you do weight-bearing exercises such as walking  You may also do non-weight-bearing exercises such as swimming and bicycling  · You may need to use a walker or cane  Ask your healthcare provider for more information about how to use a cane or a walker  · When you  objects, bend at the hips and knees  Never bend from the waist only  Use bent knees and your leg muscles as you lift the object  While you lift the object, keep it close to your chest  Try not to twist or lift anything above your waist   Physical and occupational therapy:  Your healthcare provider may recommend physical and occupational therapy  A physical therapist teaches you exercises to help improve movement and strength, and to decrease pain  An occupational therapist teaches you skills to help with your daily activities  Manage pain during sleep:   · Do not sleep on a waterbed  Waterbeds do not provide good back support  · Sleep on a firm mattress  You may also put a ½ to 1-inch piece of plywood between the mattress and box spring  · Sleep on your back with a pillow under your knees  This will decrease pressure on your back  You may also sleep on your side with 1 or both of your knees bent and a pillow between them   It may also be helpful to sleep on your stomach with a pillow under you at waist level  Contact your healthcare provider if:   · You are not hungry, and you are losing weight  · You cannot sleep or rest because of back pain  · You have pain or swelling in your back that is getting worse, or does not go away  · You have questions or concerns about your condition or care  Seek care immediately or call 911 if:   · You feel lightheaded, short of breath, and have chest pain  · You cough up blood  · Your arm or leg feels warm, tender, and painful  It may look swollen and red  · You have new problems urinating or having bowel movements  · You have severe pain in your back after falling, bending forward, sneezing, or coughing strongly  · You suddenly cannot feel your legs  · You suddenly have trouble moving your arms or legs  © 2017 [employer] Information is for End User's use only and may not be sold, redistributed or otherwise used for commercial purposes  All illustrations and images included in CareNotes® are the copyrighted property of [employer]  or Louis Hernandez  The above information is an  only  It is not intended as medical advice for individual conditions or treatments  Talk to your doctor, nurse or pharmacist before following any medical regimen to see if it is safe and effective for you

## 2019-01-28 ENCOUNTER — TRANSCRIBE ORDERS (OUTPATIENT)
Dept: LAB | Facility: HOSPITAL | Age: 79
End: 2019-01-28

## 2019-01-28 ENCOUNTER — TELEPHONE (OUTPATIENT)
Dept: PAIN MEDICINE | Facility: CLINIC | Age: 79
End: 2019-01-28

## 2019-01-28 NOTE — TELEPHONE ENCOUNTER
Caller: [facility]  Callback# [phone]  Muriel Stephens is requesting lab and MRI order for patient per patient she was told to have them done  Please advise thanks

## 2019-01-28 NOTE — TELEPHONE ENCOUNTER
I did not order any lab or or MRI for patient  From this office all that was ordered was CT L/s w/o contrast to r/o new fracture

## 2019-02-01 ENCOUNTER — HOSPITAL ENCOUNTER (OUTPATIENT)
Dept: CT IMAGING | Facility: HOSPITAL | Age: 79
Discharge: HOME | End: 2019-02-01
Payer: MEDICARE

## 2019-02-01 DIAGNOSIS — M54.6 THORACOLUMBAR BACK PAIN: ICD-10-CM

## 2019-02-01 DIAGNOSIS — M81.0 OSTEOPOROSIS, UNSPECIFIED OSTEOPOROSIS TYPE, UNSPECIFIED PATHOLOGICAL FRACTURE PRESENCE: ICD-10-CM

## 2019-02-01 DIAGNOSIS — M54.50 THORACOLUMBAR BACK PAIN: ICD-10-CM

## 2019-02-01 PROCEDURE — 72128 CT CHEST SPINE W/O DYE: CPT

## 2019-02-01 PROCEDURE — 72131 CT LUMBAR SPINE W/O DYE: CPT

## 2019-02-04 ENCOUNTER — TELEPHONE (OUTPATIENT)
Dept: PAIN MEDICINE | Facility: CLINIC | Age: 79
End: 2019-02-04

## 2019-02-04 DIAGNOSIS — S22.000D CLOSED COMPRESSION FRACTURE OF THORACIC VERTEBRA WITH ROUTINE HEALING, SUBSEQUENT ENCOUNTER: Primary | ICD-10-CM

## 2019-02-04 DIAGNOSIS — M80.00XD AGE-RELATED OSTEOPOROSIS WITH CURRENT PATHOLOGICAL FRACTURE WITH ROUTINE HEALING, SUBSEQUENT ENCOUNTER: ICD-10-CM

## 2019-02-04 NOTE — TELEPHONE ENCOUNTER
Patient is calling stating that she received a call from the office regarding her CT results  I did not see an open task/message  I Advised her there was a referral for ortho however she wants a call back first before she schedules anything

## 2019-02-04 NOTE — TELEPHONE ENCOUNTER
[facility] sent me a message in the CT note  I gave the pt the info in that note and I gave her the number for ortho

## 2019-03-06 ENCOUNTER — OFFICE VISIT (OUTPATIENT)
Dept: OBGYN CLINIC | Facility: CLINIC | Age: 79
End: 2019-03-06
Payer: MEDICARE

## 2019-03-06 VITALS
SYSTOLIC BLOOD PRESSURE: 155 MMHG | HEART RATE: 125 BPM | DIASTOLIC BLOOD PRESSURE: 80 MMHG | HEIGHT: 61 IN | BODY MASS INDEX: 33.23 KG/M2 | WEIGHT: 176 LBS

## 2019-03-06 DIAGNOSIS — M54.42 CHRONIC BILATERAL LOW BACK PAIN WITH BILATERAL SCIATICA: ICD-10-CM

## 2019-03-06 DIAGNOSIS — M54.41 CHRONIC BILATERAL LOW BACK PAIN WITH BILATERAL SCIATICA: ICD-10-CM

## 2019-03-06 DIAGNOSIS — G89.29 CHRONIC BILATERAL LOW BACK PAIN WITH BILATERAL SCIATICA: ICD-10-CM

## 2019-03-06 DIAGNOSIS — M54.6 PAIN IN THORACIC SPINE: Primary | ICD-10-CM

## 2019-03-06 PROCEDURE — 99203 OFFICE O/P NEW LOW 30 MIN: CPT | Performed by: ORTHOPAEDIC SURGERY

## 2019-03-06 NOTE — ASSESSMENT & PLAN NOTE
Patient presents for evaluation of chronic lower back pain which is intermittent in nature  Fortunately she reports minimal to no pain in the last several days and weeks  She does report remote history of thoracolumbar compression fractures treated conservatively  She denies any significant distal leg pain involvement currently  Does not report incontinence of bowel or bladder  On physical exam she appears stated age in well-developed in no acute distress  Her gait is normal   She is nontender to palpation over the thoracolumbar spine  Negative modified straight leg raise bilaterally  5/5 strength L2-S1 bilaterally  Sensation is grossly intact to light touch L2-S1 bilaterally  Reflexes are hypoactive at L4 and S1 symmetrically  Imaging demonstrates chronic compression deformities at T6 and L1 as well as multilevel lumbar DDD    Assessment and plan patient is currently asymptomatic  Reports intermittent back pain only  Compression deformities appear chronic in nature  I do recommend physical therapy  Follow-up p r n

## 2019-03-07 ENCOUNTER — APPOINTMENT (OUTPATIENT)
Dept: PHYSICAL THERAPY | Facility: CLINIC | Age: 79
End: 2019-03-07
Payer: MEDICARE

## 2019-03-13 ENCOUNTER — EVALUATION (OUTPATIENT)
Dept: PHYSICAL THERAPY | Facility: CLINIC | Age: 79
End: 2019-03-13
Payer: MEDICARE

## 2019-03-13 DIAGNOSIS — M54.6 PAIN IN THORACIC SPINE: ICD-10-CM

## 2019-03-13 PROCEDURE — 97110 THERAPEUTIC EXERCISES: CPT | Performed by: PHYSICAL THERAPIST

## 2019-03-13 PROCEDURE — 97162 PT EVAL MOD COMPLEX 30 MIN: CPT | Performed by: PHYSICAL THERAPIST

## 2019-03-13 NOTE — PROGRESS NOTES
PT Evaluation     Today's date: 3/13/2019  Patient name: Hema Car  : 1940  MRN: [id number]  Referring provider: Bernard Clayton MD  Dx:   Encounter Diagnosis     ICD-10-CM    1  Pain in thoracic spine M54 6 Ambulatory referral to Physical Therapy                  Assessment  Assessment details: Patient is a 66 y o  female who presents to physical therapy with the above listed impairments  Patients fits into a stabilization treatment category due to probable facet dysfunction and would benefit from skilled PT services to address these impairments and to maximize function  Thank you for the referral    Impairments: abnormal muscle firing, abnormal or restricted ROM, impaired physical strength, lacks appropriate home exercise program, poor posture  and poor body mechanics  Understanding of Dx/Px/POC: good   Prognosis: good    Goals  Impairment Goals  - Decrease pain by 50% in 3 weeks  - Increase bilateral flexibility by 50% in 6 weeks  - Will be able to activate TrA and multifidus B in 6 weeks  Functional Goals  - Return to Prior Level of Function in 6 weeks  - Patient will be independent with HEP in 6 weeks      Plan  Patient would benefit from: skilled physical therapy  Planned modality interventions: cryotherapy, thermotherapy: hydrocollator packs and TENS  Planned therapy interventions: home exercise program, graded exercise, functional ROM exercises, flexibility, body mechanics training, postural training, patient education, therapeutic activities, therapeutic exercise, manual therapy, joint mobilization and neuromuscular re-education  Frequency: 2x week  Duration in weeks: 6  Treatment plan discussed with: patient, referring physician and PTA        Subjective Evaluation    History of Present Illness  Mechanism of injury: Patient reports back to PT after being evaluated on 19 for t/s pain  At that time I was highly suspicous of a compression fx    She was sent for imaging and referred to ortho  A CT did reveal a chronic compression fxs at T6 and L1  Fortuntly she no longer has c/o compression fxs  She does note she is fearful of causing another compression fxs  She has been referred back to PT for core stabilization and to address her impairments  Occupation: Retired  PLOF: Independent     Pain  Current pain ratin  At best pain ratin  At worst pain ratin  Location: t/s and LBP    Patient Goals  Patient goal: Avoid another compression fx        Objective     Concurrent Complaints  Negative for bladder dysfunction, bowel dysfunction and saddle (S4) numbness    Additional Special Questions  Unexplained Weight Loss: NO  Fever: NO  Urine Retention: NO  Acute Drop Foot or Paralysis: NO  Major Trauma (fall from height, high speed collision, direct blow to spine): NO              If yes; does patient have nausea, lightheadedness, or loss of                            Consciousness: NA        Tenderness     Additional Tenderness Details  No tenderness noted upon palpation  Neurological Testing     Sensation     Lumbar   Left   Intact: light touch    Right   Intact: light touch    Reflexes   Left   Patellar (L4): normal (2+)  Achilles (S1): normal (2+)    Right   Patellar (L4): normal (2+)  Achilles (S1): normal (2+)    Additional Neurological Details  LQS was negative  Active Range of Motion     Lumbar   Flexion:  WFL  Extension:  WFL  Left lateral flexion:  WFL  Right lateral flexion:  WFL  Left rotation:  WFL and with pain  Right rotation:  WFL and with pain    Additional Active Range of Motion Details  -    Strength/Myotome Testing     Additional Strength Details  4+/5 strength noted grossly BLE (hips and ER), 5/5 noted throughout the rest of the BLE  Muscle Activation   Patient able to activate left transverse abdominals, left multifidus, right transverse abdominals and right multifidus  Tests     Lumbar   Negative prone instability        Left   Negative crossed SLR and passive SLR  Right   Negative crossed SLR and passive SLR  Additional Tests Details  Supine to sit test: negative    Spring test: NT  Hip IR ROM: NT    Gowers Sign: negative  Claudia's Sign: NT    Pt's hamstring length is 40 degrees from 0 when placed in 90/90 position  Piriformis muscle length is decreased            Daily Treatment Diary   Diagnosis: Hx of T6 and L1 compression fx - poor activation of TrA and multifidus   Precautions: Hx of compression fx, DM 2   Manuals 3/13                                       Exercise Diary        TM        Multifidus NMR with amplifiers x10       Multifidus NMR        Multifidus press        SL ER        SL abd B        Rows        lats        Multifidus NMR withmarch        Multifidus NMR add hooklying        HS S :20x5                                                                                       Modalities             CP PRN

## 2019-03-19 ENCOUNTER — OFFICE VISIT (OUTPATIENT)
Dept: PHYSICAL THERAPY | Facility: CLINIC | Age: 79
End: 2019-03-19
Payer: MEDICARE

## 2019-03-19 DIAGNOSIS — M54.6 PAIN IN THORACIC SPINE: Primary | ICD-10-CM

## 2019-03-19 PROCEDURE — 97110 THERAPEUTIC EXERCISES: CPT

## 2019-03-19 PROCEDURE — 97112 NEUROMUSCULAR REEDUCATION: CPT

## 2019-03-19 NOTE — PROGRESS NOTES
Daily Note     Today's date: 3/19/2019  Patient name: Tejinder Leroy  : 1940  MRN: [id number]  Referring provider: Roc Barajas MD  Dx:   Encounter Diagnosis     ICD-10-CM    1  Pain in thoracic spine M54 6                   Subjective: Patient states she has no recent complaints  She states she has been getting in and out of her chair with improved technique to prevent further injury  Objective: See treatment diary below  Diagnosis: Hx of T6 and L1 compression fx - poor activation of TrA and multifidus   Precautions: Hx of compression fx, DM 2, COPD, HOB elevated    Manuals 3/13 3/19                                                                 Exercise Diary                          Multifidus NMR with amplifiers x10 :05x10          Multifidus NMR             Multifidus press             SL ER   2x10 each          SL abd B             Rows   YTB 2x10          lats   YTB 2x10         Multifidus NMR with march   10x each          Multifidus NMR add hooklying   :03x20          HS S :20x5 :20x5          Multifidus NMR hooklying abd   YTB 20x                                                                                Pt education   5 min         Modalities             CP PRN                                              Assessment: Initated outlined program  Patient unable to perform cardio based exercise due to COPD limitations  She requires cueing for proper breathing while performing noted exercises  Moderate muscle fatigue with multifidi strengthening  No exacerbating pain symptoms  Updated HEP  Plan: Progress treatment as tolerated

## 2019-03-20 DIAGNOSIS — I10 ESSENTIAL HYPERTENSION: Primary | ICD-10-CM

## 2019-03-20 RX ORDER — DILTIAZEM HYDROCHLORIDE 300 MG/1
300 CAPSULE, COATED, EXTENDED RELEASE ORAL DAILY
Qty: 90 CAPSULE | Refills: 1 | Status: SHIPPED | OUTPATIENT
Start: 2019-03-20 | End: 2020-09-10

## 2019-03-21 ENCOUNTER — APPOINTMENT (OUTPATIENT)
Dept: PHYSICAL THERAPY | Facility: CLINIC | Age: 79
End: 2019-03-21
Payer: MEDICARE

## 2019-03-22 ENCOUNTER — TRANSCRIBE ORDERS (OUTPATIENT)
Dept: LAB | Facility: CLINIC | Age: 79
End: 2019-03-22

## 2019-03-22 ENCOUNTER — LAB (OUTPATIENT)
Dept: LAB | Facility: CLINIC | Age: 79
End: 2019-03-22
Payer: MEDICARE

## 2019-03-22 DIAGNOSIS — E03.9 HYPOTHYROIDISM, UNSPECIFIED TYPE: ICD-10-CM

## 2019-03-22 DIAGNOSIS — E11.9 TYPE 2 DIABETES MELLITUS WITHOUT COMPLICATION, UNSPECIFIED WHETHER LONG TERM INSULIN USE (HCC): ICD-10-CM

## 2019-03-22 LAB
ANION GAP SERPL CALCULATED.3IONS-SCNC: 5 MMOL/L (ref 4–13)
BUN SERPL-MCNC: 16 MG/DL (ref 5–25)
CALCIUM SERPL-MCNC: 9.5 MG/DL (ref 8.3–10.1)
CHLORIDE SERPL-SCNC: 103 MMOL/L (ref 100–108)
CO2 SERPL-SCNC: 29 MMOL/L (ref 21–32)
CREAT SERPL-MCNC: 0.61 MG/DL (ref 0.6–1.3)
EST. AVERAGE GLUCOSE BLD GHB EST-MCNC: 148 MG/DL
GFR SERPL CREATININE-BSD FRML MDRD: 87 ML/MIN/1.73SQ M
GLUCOSE P FAST SERPL-MCNC: 160 MG/DL (ref 65–99)
HBA1C MFR BLD: 6.8 % (ref 4.2–6.3)
POTASSIUM SERPL-SCNC: 4.4 MMOL/L (ref 3.5–5.3)
SODIUM SERPL-SCNC: 137 MMOL/L (ref 136–145)
T4 FREE SERPL-MCNC: 1.27 NG/DL (ref 0.76–1.46)
TSH SERPL DL<=0.05 MIU/L-ACNC: 2.35 UIU/ML (ref 0.36–3.74)

## 2019-03-22 PROCEDURE — 80048 BASIC METABOLIC PNL TOTAL CA: CPT

## 2019-03-22 PROCEDURE — 84443 ASSAY THYROID STIM HORMONE: CPT

## 2019-03-22 PROCEDURE — 83036 HEMOGLOBIN GLYCOSYLATED A1C: CPT

## 2019-03-22 PROCEDURE — 36415 COLL VENOUS BLD VENIPUNCTURE: CPT

## 2019-03-22 PROCEDURE — 84439 ASSAY OF FREE THYROXINE: CPT

## 2019-03-25 ENCOUNTER — OFFICE VISIT (OUTPATIENT)
Dept: ENDOCRINOLOGY | Facility: CLINIC | Age: 79
End: 2019-03-25
Payer: MEDICARE

## 2019-03-25 VITALS
DIASTOLIC BLOOD PRESSURE: 72 MMHG | SYSTOLIC BLOOD PRESSURE: 138 MMHG | HEIGHT: 61 IN | BODY MASS INDEX: 32.47 KG/M2 | WEIGHT: 172 LBS

## 2019-03-25 DIAGNOSIS — E03.9 ACQUIRED HYPOTHYROIDISM: ICD-10-CM

## 2019-03-25 DIAGNOSIS — M81.0 OSTEOPOROSIS, UNSPECIFIED OSTEOPOROSIS TYPE, UNSPECIFIED PATHOLOGICAL FRACTURE PRESENCE: ICD-10-CM

## 2019-03-25 DIAGNOSIS — E11.65 TYPE 2 DIABETES MELLITUS WITH HYPERGLYCEMIA, WITHOUT LONG-TERM CURRENT USE OF INSULIN (HCC): Primary | ICD-10-CM

## 2019-03-25 DIAGNOSIS — E78.2 MIXED HYPERLIPIDEMIA: ICD-10-CM

## 2019-03-25 DIAGNOSIS — E55.9 VITAMIN D DEFICIENCY: ICD-10-CM

## 2019-03-25 PROCEDURE — 99214 OFFICE O/P EST MOD 30 MIN: CPT | Performed by: PHYSICIAN ASSISTANT

## 2019-03-25 RX ORDER — MULTIVIT-MIN/IRON/FOLIC ACID/K 18-600-40
1 CAPSULE ORAL DAILY
Start: 2019-03-25 | End: 2019-09-04

## 2019-03-25 NOTE — PROGRESS NOTES
Patient Progress Note      CC: DM2, hypothyroidism, thyroid nodule   Referring Provider  Severiano Ortiz Md  [address] [facility] [address] [facility],[address]     History of Present Illness:   Basilia Davis is a 66 y o  female with a history of type 2 diabetes without long term use of insulin  Diabetes course has been stable  Complications of DM: none reported  Denies recent illness or hospitalizations  Denies recent severe hypoglycemic or severe hyperglycemic episodes  Denies any issues with her current regimen  Home glucose monitoring: are performed regularly, every morning    Patient does not have blood glucose log or glucometer today  This morning she was 130 mg/dl  She reports average range between 120 mg/dl and 130 mg/dl  Current regimen: metformin 1000 mg BID, glimepiride 1 mg daily, Onglyza 5 mg daily  compliant most of the time, denies any side effects from medications  Hypoglycemic episodes: No, never  H/o of hypoglycemia causing hospitalization or Intervention such as glucagon injection  or ambulance call :  No  Hypoglycemia symptoms: never experienced  Treatment of hypoglycemia: discussed treatment    Medic alert tag: recommended: Yes    Diabetes education: No   Diet: 3 meals per day, 1 snack per day  Timing of meals is predictable  Diabetic diet compliance:  noncompliant some of the time  Activity: Daily activity is predictable: Yes  The patient engages in little, if any physical activity  Further diabetic ROS: no polyuria or polydipsia, no chest pain, dyspnea or TIAs, no numbness, tingling or pain in extremities        Ophthamology: sees routinely; no retinopathy per patient  Podiatry: does not see podiatry    Has hypertension: on diltiazem, enalapril, compliant most of the time  Has hyperlipidemia: on atorvastatin- tolerating well, no myalgias  compliant most of the time, denies any side effects from medications  Thyroid disorders:  Hypothyroidism and thyroid nodules    Patient is taking Synthroid 25 mcg daily on an empty stomach 1 hour before breakfast and at least 4 hours apart from supplements  Patient is tolerating medication well  Most recent thyroid function tests within normal limits, TSH 2 350 and free T4 1 27  History of pancreatitis: No    Osteoporosis: has a history of osteoporosis, but patient reports she has never been treatment  Has history of compression fractures in the back for which she is seeing orthopedics  She is taking a calcium supplement daily  Previous vitamin D normal at 32 2  Her mother had osteoporosis  Patient Active Problem List   Diagnosis    COPD, severe ([address] )    Deep vein thrombosis (HCC)    Hyperlipidemia    Chronic respiratory failure with hypoxia and hypercapnia (HCC)    Hypothyroidism    Obstructive sleep apnea    Pulmonary embolism (HCC)    Type 2 diabetes mellitus (HCC)    Iron deficiency anemia, unspecified    PSVT (paroxysmal supraventricular tachycardia) (HCC)    Anticoagulant long-term use    Hemorrhoids    Polyp of colon    Chronic bilateral low back pain with bilateral sciatica      Past Medical History:   Diagnosis Date    Chronic respiratory failure with hypoxia and hypercapnia (HCC)     Deep vein thrombosis (HCC)     Diabetes mellitus ([facility] )     Emphysema of lung ([facility] )     Hyperlipidemia     Hypertension     Hypothyroid     Pulmonary embolism (HCC)     Sleep apnea, obstructive       Past Surgical History:   Procedure Laterality Date    CA COLONOSCOPY FLX DX W/COLLJ SPEC WHEN PFRMD N/A 8/30/2018    Procedure: EGD AND COLONOSCOPY;  Surgeon: Ashley Vidal MD;  [facility];   Service: Gastroenterology    TOE SURGERY        Family History   Problem Relation Age of Onset    Hypertension Mother     Hyperlipidemia Mother     Asthma Father     Heart failure Father     Hypertension Sister     Hyperlipidemia Sister     Heart attack Neg Hx     Aneurysm Neg Hx     Stroke Neg Hx     Clotting disorder Neg Hx      Social History     Tobacco Use    Smoking status: Former Smoker     Packs/day: 1 00     Years: 40 00     Pack years: 40 00     Last attempt to quit: 2005     Years since quittin 2    Smokeless tobacco: Never Used   Substance Use Topics    Alcohol use: No     Allergies   Allergen Reactions    Penicillins Rash     After dental procedure          Current Outpatient Medications:     acetaminophen (TYLENOL) 325 mg tablet, Take 650 mg by mouth every 6 (six) hours as needed for mild pain, Disp: , Rfl:     albuterol (PROAIR HFA) 90 mcg/act inhaler, Inhale 2 puffs every 6 (six) hours as needed for wheezing, Disp: 1 Inhaler, Rfl: 5    atorvastatin (LIPITOR) 20 mg tablet, 20 mg daily  , Disp: , Rfl:     diltiazem (CARDIZEM CD) 300 mg 24 hr capsule, Take 1 capsule (300 mg total) by mouth daily, Disp: 90 capsule, Rfl: 1    enalapril (VASOTEC) 20 mg tablet, Take 20 mg by mouth daily  , Disp: , Rfl:     glimepiride (AMARYL) 1 mg tablet, Take 1 mg by mouth daily with breakfast  , Disp: , Rfl:     ibuprofen (MOTRIN) 400 mg tablet, Take by mouth every 6 (six) hours as needed for mild pain, Disp: , Rfl:     metFORMIN (GLUCOPHAGE) 1000 MG tablet, Take 1,000 mg by mouth 2 (two) times a day with meals  , Disp: , Rfl:     saxagliptin (ONGLYZA) 5 MG tablet, Take 1 tablet by mouth daily, Disp: , Rfl:     SYMBICORT 160-4 5 MCG/ACT inhaler, , Disp: , Rfl:     SYNTHROID 25 MCG tablet, Take 25 mcg by mouth daily  , Disp: , Rfl:     tiotropium (SPIRIVA RESPIMAT) 2 5 MCG/ACT AERS inhaler, Inhale 2 puffs daily, Disp: 1 Inhaler, Rfl: 11    Cholecalciferol (VITAMIN D) 2000 units CAPS, Take 1 capsule (2,000 Units total) by mouth daily, Disp: , Rfl:   Review of Systems   Constitutional: Negative for activity change, appetite change, fatigue and unexpected weight change  HENT: Negative for trouble swallowing  Eyes: Negative for visual disturbance  Respiratory: Negative for shortness of breath      Cardiovascular: Negative for chest pain and palpitations  Gastrointestinal: Negative for constipation and diarrhea  Endocrine: Negative for polydipsia and polyuria  Musculoskeletal: Positive for back pain  Skin: Negative for wound  Neurological: Negative for numbness  Psychiatric/Behavioral: Negative  Physical Exam:  Body mass index is 32 5 kg/m²  /72   Ht 5' 1" (1 549 m)   Wt 78 kg (172 lb)   LMP  (LMP Unknown)   BMI 32 50 kg/m²    Wt Readings from Last 3 Encounters:   03/25/19 78 kg (172 lb)   03/06/19 79 8 kg (176 lb)   01/24/19 79 8 kg (176 lb)       Physical Exam   Constitutional: She appears well-developed and well-nourished  HENT:   Head: Normocephalic  Eyes: Pupils are equal, round, and reactive to light  EOM are normal  No scleral icterus  Neck: Neck supple  No thyromegaly present  Cardiovascular: Normal rate and regular rhythm  Pulses are no weak pulses  No murmur heard  Pulses:       Radial pulses are 2+ on the right side, and 2+ on the left side  Dorsalis pedis pulses are 2+ on the right side, and 2+ on the left side  Pulmonary/Chest: Effort normal and breath sounds normal  No respiratory distress  She has no wheezes  Feet:   Right Foot:   Skin Integrity: Negative for ulcer, skin breakdown, erythema, warmth, callus or dry skin  Left Foot:   Skin Integrity: Negative for ulcer, skin breakdown, erythema, warmth, callus or dry skin  Neurological: She is alert  She has normal reflexes  Skin: Skin is warm and dry  Psychiatric: She has a normal mood and affect  Nursing note and vitals reviewed  Patient's shoes and socks removed  Right Foot/Ankle   Right Foot Inspection  Skin Exam: skin normal and skin intact no dry skin, no warmth, no callus, no erythema, no maceration, no abnormal color, no pre-ulcer, no ulcer and no callus                            Sensory   Vibration: intact    Monofilament testing: intact  Vascular    The right DP pulse is 2+       Left Foot/Ankle  Left Foot Inspection  Skin Exam: skin normal and skin intactno dry skin, no warmth, no erythema, no maceration, normal color, no pre-ulcer, no ulcer and no callus                                         Sensory   Vibration: intact    Monofilament: intact  Vascular    The left DP pulse is 2+  Assign Risk Category:  No deformity present; No loss of protective sensation; No weak pulses       Risk: 0      Labs:    Ref  Range 3/22/2019 08:35   Sodium Latest Ref Range: 136 - 145 mmol/L 137   Potassium Latest Ref Range: 3 5 - 5 3 mmol/L 4 4   Chloride Latest Ref Range: 100 - 108 mmol/L 103   CO2 Latest Ref Range: 21 - 32 mmol/L 29   Anion Gap Latest Ref Range: 4 - 13 mmol/L 5   BUN Latest Ref Range: 5 - 25 mg/dL 16   Creatinine Latest Ref Range: 0 60 - 1 30 mg/dL 0 61   GLUCOSE FASTING Latest Ref Range: 65 - 99 mg/dL 160 (H)   Calcium Latest Ref Range: 8 3 - 10 1 mg/dL 9 5   eGFR Latest Units: ml/min/1 73sq m 87      Ref  Range 3/22/2019 08:35   Hemoglobin A1C Latest Ref Range: 4 2 - 6 3 % 6 8 (H)   EAG Latest Units: mg/dl 148   TSH 3RD GENERATON Latest Ref Range: 0 358 - 3 740 uIU/mL 2 350   Free T4 Latest Ref Range: 0 76 - 1 46 ng/dL 1 27     LDL 67    Plan:    Diagnoses and all orders for this visit:    Type 2 diabetes mellitus with hyperglycemia, without long-term current use of insulin (HCC)  HGA1C 6 8%  Worsened  Treatment regimen:  Continue current treatment  Discussed risks/complications associated with uncontrolled diabetes  Advised to adhere to diabetic diet  Keep carbohydrates consistent to limit blood glucose fluctuations  Advised to call if blood sugars less than 70 mg/dl or over 300 mg/dl  Check blood glucose 1-2 times a day  Discussed symptoms and treatment of hypoglycemia  Recommended routine follow-up with podiatry and ophthalmology  Send log in 1-2 weeks  Ordered blood work to complete prior to next visit  -     Hemoglobin A1C; Future  -     Basic metabolic panel;  Future    Acquired hypothyroidism  Thyroid function tests within normal range, TSH 2 350 and free T4 1 27  Patient clinically and biochemically euthyroid  Continue current dose Synthroid  Repeat blood work prior to next visit  -     TSH, 3rd generation; Future  -     T4, free; Future    Thyroid nodule  There are 2 areas of decreased but not absent echogenicity in the left thyroid lobe that could be a follicular cyst rather than a solid adenoma    Mixed hyperlipidemia  LDL previously 67, at goal  Continue statin therapy    Osteoporosis  Denies prior treatment of osteoporosis  Recently diagnosed with compression fractures of thoracic spine  Continue calcium supplementation and start vitamin D3 supplementation  Check DEXA scan  Discussed treatment options such as Fosamax and Prolia injections  Patient is hesitant to start treatment at this time but will consider it  Take precautions to avoid falls  -     DXA bone density spine hip and pelvis; Future  -     Cholecalciferol (VITAMIN D) 2000 units CAPS; Take 1 capsule (2,000 Units total) by mouth daily  -     Vitamin D 25 hydroxy; Future    Vitamin D deficiency  Take vitamin D3 2000 International Units daily  -     Vitamin D 25 hydroxy; Future       Discussed with the patient diagnosis and treatment and all questions fully answered  She will call me if any problems arise  Counseled patient on diagnostic results, prognosis, risk and benefit of treatment options, instruction for management, importance of treatment compliance, risk factor reduction and impressions        Michelle Jalloh PA-C

## 2019-03-25 NOTE — PATIENT INSTRUCTIONS
Hypoglycemia instructions   Gurmeet Lopez  3/25/2019  [id number]    Low Blood Sugar    Steps to treat low blood sugar  1  Test blood sugar if you have symptoms of low blood sugar:   Low Blood Sugar Symptoms:  o Sweaty  o Dizzy  o Rapid heartbeat  o Shaky  o Bad mood  o Hungry      2  Treat blood sugar less than 70 with 15 grams of fast-acting carbohydrate:   Examples of 15 grams Fast-Acting Carbohydrate:  o 4 oz juice  o 4 oz regular soda  o 3-4 glucose tablets (chew)  o 3-4 hard candies (chew)          3  Wait 15 minutes and test your blood sugar again     4   If blood sugar is less than 100, repeat steps 2-3     5  When your blood sugar is 100 or more, eat a snack if it will be longer than one hour until your next meal  The snack should be 15 grams of carbohydrate and a protein:   Examples of snacks:  o ½ sandwich  o 6 crackers with cheese  o Piece of fruit with cheese or peanut butter  o 6 crackers with peanut butter

## 2019-03-26 ENCOUNTER — OFFICE VISIT (OUTPATIENT)
Dept: PHYSICAL THERAPY | Facility: CLINIC | Age: 79
End: 2019-03-26
Payer: MEDICARE

## 2019-03-26 DIAGNOSIS — M54.6 PAIN IN THORACIC SPINE: Primary | ICD-10-CM

## 2019-03-26 PROCEDURE — 97110 THERAPEUTIC EXERCISES: CPT

## 2019-03-26 PROCEDURE — 97112 NEUROMUSCULAR REEDUCATION: CPT

## 2019-03-26 NOTE — PROGRESS NOTES
Daily Note     Today's date: 3/26/2019  Patient name: Domitila Flowers  : 1940  MRN: [id number]  Referring provider: Nawaf Handy MD  Dx:   Encounter Diagnosis     ICD-10-CM    1  Pain in thoracic spine M54 6                   Subjective: Patient has no new complaints  She reports being compliant with HEP  Objective: See treatment diary below  Diagnosis: Hx of T6 and L1 compression fx - poor activation of TrA and multifidus   Precautions: Hx of compression fx, DM 2, COPD, HOB elevated    Manuals 3/13 3/19 3/26                                                               Exercise Diary                           Multifidus NMR with amplifiers x10 :05x10  :05x10       Multifidus NMR             Multifidus press             SL ER   2x10 each  2x10       SL abd B     2x10       Rows   YTB 2x10  YTB 2x10       lats   YTB 2x10 YTB 2x10       Multifidus NMR with march   10x each  10x each        Multifidus NMR add hooklying   :03x20  :03x20       HS S :20x5 :20x5  :20x5       Multifidus NMR hooklying abd   YTB 20x  YTB 20x       Bridges             Side stepping                                                       Pt education   5 min         Modalities             CP PRN                                             Assessment: Continued with outlined program  Patient requires rest breaks between exercises due to SOB  She was able to recover quickly and resume exercises  Updated HEP with resisted shoulder rows and extension  Plan: Progress treatment as tolerated

## 2019-03-28 ENCOUNTER — OFFICE VISIT (OUTPATIENT)
Dept: PHYSICAL THERAPY | Facility: CLINIC | Age: 79
End: 2019-03-28
Payer: MEDICARE

## 2019-03-28 DIAGNOSIS — M54.6 PAIN IN THORACIC SPINE: Primary | ICD-10-CM

## 2019-03-28 PROCEDURE — 97110 THERAPEUTIC EXERCISES: CPT

## 2019-03-28 PROCEDURE — 97112 NEUROMUSCULAR REEDUCATION: CPT

## 2019-03-28 NOTE — PROGRESS NOTES
Daily Note     Today's date: 3/28/2019  Patient name: Richard Juárez  : 1940  MRN: [id number]  Referring provider: Lauren Moreland MD  Dx:   Encounter Diagnosis     ICD-10-CM    1  Pain in thoracic spine M54 6                   Subjective: Patient has no new complaints of pain or discomfort  She reports being compliant with HEP  She reports her biggest limitation is breathing  Objective: See treatment diary below  Diagnosis: Hx of T6 and L1 compression fx - poor activation of TrA and multifidus   Precautions: Hx of compression fx, DM 2, COPD, HOB elevated    Manuals 3/13 3/19 3/26 3/28                                                             Exercise Diary                           Multifidus NMR with amplifiers x10 :05x10  :05x10 :05x10      Multifidus NMR             Multifidus press             SL abd B zo     2x10 2x10     Rows   YTB 2x10  YTB 2x10 YTB 2x10     lats   YTB 2x10 YTB 2x10 YTB 2x10     Multifidus NMR with march   10x each  10x each  20x each      Multifidus NMR add hooklying   :03x20  :03x20 :03x20     HS S :20x5 :20x5  :20x5 :20x5     Multifidus NMR hooklying abd   YTB 20x  YTB 20x RTB 20x      Bridges       10x     Side stepping                                                        Pt education   5 min         Modalities             CP PRN                                              Assessment: Continued with outlined program  Patient requires rest breaks due to endurance deficits  She is able to perform light strengthening exercises noted  Plan: Progress treatment as tolerated

## 2019-04-02 ENCOUNTER — OFFICE VISIT (OUTPATIENT)
Dept: PHYSICAL THERAPY | Facility: CLINIC | Age: 79
End: 2019-04-02
Payer: MEDICARE

## 2019-04-02 DIAGNOSIS — M54.6 PAIN IN THORACIC SPINE: Primary | ICD-10-CM

## 2019-04-02 PROCEDURE — 97110 THERAPEUTIC EXERCISES: CPT

## 2019-04-02 PROCEDURE — 97112 NEUROMUSCULAR REEDUCATION: CPT

## 2019-04-04 ENCOUNTER — OFFICE VISIT (OUTPATIENT)
Dept: PHYSICAL THERAPY | Facility: CLINIC | Age: 79
End: 2019-04-04
Payer: MEDICARE

## 2019-04-04 DIAGNOSIS — M54.6 PAIN IN THORACIC SPINE: Primary | ICD-10-CM

## 2019-04-04 PROCEDURE — 97110 THERAPEUTIC EXERCISES: CPT

## 2019-04-04 PROCEDURE — 97112 NEUROMUSCULAR REEDUCATION: CPT

## 2019-04-09 ENCOUNTER — OFFICE VISIT (OUTPATIENT)
Dept: PHYSICAL THERAPY | Facility: CLINIC | Age: 79
End: 2019-04-09
Payer: MEDICARE

## 2019-04-09 DIAGNOSIS — M54.6 PAIN IN THORACIC SPINE: Primary | ICD-10-CM

## 2019-04-09 PROCEDURE — 97112 NEUROMUSCULAR REEDUCATION: CPT

## 2019-04-09 PROCEDURE — 97110 THERAPEUTIC EXERCISES: CPT

## 2019-04-11 ENCOUNTER — EVALUATION (OUTPATIENT)
Dept: PHYSICAL THERAPY | Facility: CLINIC | Age: 79
End: 2019-04-11
Payer: MEDICARE

## 2019-04-11 DIAGNOSIS — M54.6 PAIN IN THORACIC SPINE: Primary | ICD-10-CM

## 2019-04-11 PROCEDURE — 97140 MANUAL THERAPY 1/> REGIONS: CPT | Performed by: PHYSICAL THERAPIST

## 2019-05-06 DIAGNOSIS — Z79.4 TYPE 2 DIABETES MELLITUS WITHOUT COMPLICATION, WITH LONG-TERM CURRENT USE OF INSULIN (HCC): Primary | ICD-10-CM

## 2019-05-06 DIAGNOSIS — E11.9 TYPE 2 DIABETES MELLITUS WITHOUT COMPLICATION, WITH LONG-TERM CURRENT USE OF INSULIN (HCC): Primary | ICD-10-CM

## 2019-05-09 ENCOUNTER — OFFICE VISIT (OUTPATIENT)
Dept: PULMONOLOGY | Facility: CLINIC | Age: 79
End: 2019-05-09
Payer: MEDICARE

## 2019-05-09 VITALS
WEIGHT: 176.6 LBS | TEMPERATURE: 97.3 F | BODY MASS INDEX: 34.67 KG/M2 | OXYGEN SATURATION: 97 % | HEIGHT: 60 IN | SYSTOLIC BLOOD PRESSURE: 150 MMHG | DIASTOLIC BLOOD PRESSURE: 76 MMHG | HEART RATE: 105 BPM

## 2019-05-09 DIAGNOSIS — J96.12 CHRONIC RESPIRATORY FAILURE WITH HYPOXIA AND HYPERCAPNIA (HCC): Primary | ICD-10-CM

## 2019-05-09 DIAGNOSIS — J44.9 COPD, SEVERE (HCC): ICD-10-CM

## 2019-05-09 DIAGNOSIS — Z86.711 HISTORY OF PULMONARY EMBOLISM: ICD-10-CM

## 2019-05-09 DIAGNOSIS — J30.1 NON-SEASONAL ALLERGIC RHINITIS DUE TO POLLEN: ICD-10-CM

## 2019-05-09 DIAGNOSIS — J96.11 CHRONIC RESPIRATORY FAILURE WITH HYPOXIA AND HYPERCAPNIA (HCC): Primary | ICD-10-CM

## 2019-05-09 PROCEDURE — 99214 OFFICE O/P EST MOD 30 MIN: CPT | Performed by: INTERNAL MEDICINE

## 2019-05-09 RX ORDER — FLUTICASONE PROPIONATE 50 MCG
2 SPRAY, SUSPENSION (ML) NASAL DAILY
Qty: 1 BOTTLE | Refills: 5 | Status: SHIPPED | OUTPATIENT
Start: 2019-05-09

## 2019-06-14 ENCOUNTER — OFFICE VISIT (OUTPATIENT)
Dept: CARDIOLOGY CLINIC | Facility: CLINIC | Age: 79
End: 2019-06-14
Payer: MEDICARE

## 2019-06-14 VITALS
DIASTOLIC BLOOD PRESSURE: 62 MMHG | HEART RATE: 109 BPM | SYSTOLIC BLOOD PRESSURE: 138 MMHG | WEIGHT: 175.1 LBS | BODY MASS INDEX: 34.38 KG/M2 | HEIGHT: 60 IN

## 2019-06-14 DIAGNOSIS — I47.1 PSVT (PAROXYSMAL SUPRAVENTRICULAR TACHYCARDIA) (HCC): Primary | ICD-10-CM

## 2019-06-14 DIAGNOSIS — E78.2 MIXED HYPERLIPIDEMIA: ICD-10-CM

## 2019-06-14 DIAGNOSIS — Z86.711 HISTORY OF PULMONARY EMBOLISM: ICD-10-CM

## 2019-06-14 PROCEDURE — 99214 OFFICE O/P EST MOD 30 MIN: CPT | Performed by: INTERNAL MEDICINE

## 2019-06-14 PROCEDURE — 93000 ELECTROCARDIOGRAM COMPLETE: CPT | Performed by: INTERNAL MEDICINE

## 2019-07-11 ENCOUNTER — TELEPHONE (OUTPATIENT)
Dept: PULMONOLOGY | Facility: CLINIC | Age: 79
End: 2019-07-11

## 2019-07-11 NOTE — TELEPHONE ENCOUNTER
Patient calling saying [facility] told her she needs to be re-qualified for oxygen in her home  Please advise, thank you

## 2019-07-12 NOTE — TELEPHONE ENCOUNTER
I called [facility] Cabrera, Due to certain changes with medicare, she will need a new 6 min walk and a face to face with provider so that notes and orders can be faxed over to patient  Dr Chetna Tang is there anyway you can squeeze her in your schedule? I do not see any availabilities for anyone at [facility]  Please advise    Thank you

## 2019-07-17 ENCOUNTER — TRANSCRIBE ORDERS (OUTPATIENT)
Dept: RADIOLOGY | Facility: CLINIC | Age: 79
End: 2019-07-17

## 2019-07-23 ENCOUNTER — TELEPHONE (OUTPATIENT)
Dept: PULMONOLOGY | Facility: CLINIC | Age: 79
End: 2019-07-23

## 2019-07-23 NOTE — TELEPHONE ENCOUNTER
Pt called complaining stating that she called [facility] because her POC is making a beeping sound and that they stated the cannot send someone to come service it without an order    I called [facility] and spoke with Cindy Raya and he stated that an order is not needed and he will call pt now and have someone come out and look it at

## 2019-07-25 ENCOUNTER — HOSPITAL ENCOUNTER (OUTPATIENT)
Dept: RADIOLOGY | Age: 79
Discharge: HOME | End: 2019-07-25
Payer: MEDICARE

## 2019-07-25 DIAGNOSIS — M81.0 OSTEOPOROSIS, UNSPECIFIED OSTEOPOROSIS TYPE, UNSPECIFIED PATHOLOGICAL FRACTURE PRESENCE: ICD-10-CM

## 2019-07-25 PROCEDURE — 77080 DXA BONE DENSITY AXIAL: CPT

## 2019-07-29 ENCOUNTER — TELEPHONE (OUTPATIENT)
Dept: ENDOCRINOLOGY | Facility: CLINIC | Age: 79
End: 2019-07-29

## 2019-07-29 DIAGNOSIS — E03.9 ACQUIRED HYPOTHYROIDISM: Primary | ICD-10-CM

## 2019-07-29 DIAGNOSIS — M81.0 OSTEOPOROSIS, UNSPECIFIED OSTEOPOROSIS TYPE, UNSPECIFIED PATHOLOGICAL FRACTURE PRESENCE: ICD-10-CM

## 2019-07-29 DIAGNOSIS — E11.65 TYPE 2 DIABETES MELLITUS WITH HYPERGLYCEMIA, WITHOUT LONG-TERM CURRENT USE OF INSULIN (HCC): ICD-10-CM

## 2019-07-29 NOTE — TELEPHONE ENCOUNTER
----- Message from Kiana Appiah PA-C sent at 7/29/2019  9:21 AM EDT -----  Please call the patient regarding her abnormal result  DEXA scan is consistent with osteoporosis in her forearm  Please order PTH, SPEP, UPEP to r/o other causes of osteoporosis  Advise patient to complete labs with other labs that were ordered at prior visit  Labs should be done prior to next appointment so that treatment options can be discussed

## 2019-07-30 ENCOUNTER — OFFICE VISIT (OUTPATIENT)
Dept: PULMONOLOGY | Facility: CLINIC | Age: 79
End: 2019-07-30
Payer: MEDICARE

## 2019-07-30 ENCOUNTER — DOCUMENTATION (OUTPATIENT)
Dept: PULMONOLOGY | Facility: CLINIC | Age: 79
End: 2019-07-30

## 2019-07-30 VITALS
OXYGEN SATURATION: 96 % | TEMPERATURE: 98 F | WEIGHT: 176 LBS | HEART RATE: 100 BPM | DIASTOLIC BLOOD PRESSURE: 74 MMHG | HEIGHT: 60 IN | SYSTOLIC BLOOD PRESSURE: 156 MMHG | BODY MASS INDEX: 34.55 KG/M2

## 2019-07-30 DIAGNOSIS — J96.11 CHRONIC RESPIRATORY FAILURE WITH HYPOXIA AND HYPERCAPNIA (HCC): ICD-10-CM

## 2019-07-30 DIAGNOSIS — J44.9 COPD, SEVERE (HCC): ICD-10-CM

## 2019-07-30 DIAGNOSIS — J96.12 CHRONIC RESPIRATORY FAILURE WITH HYPOXIA AND HYPERCAPNIA (HCC): ICD-10-CM

## 2019-07-30 DIAGNOSIS — J44.9 CHRONIC OBSTRUCTIVE PULMONARY DISEASE, UNSPECIFIED COPD TYPE (HCC): Primary | ICD-10-CM

## 2019-07-30 PROCEDURE — 94618 PULMONARY STRESS TESTING: CPT | Performed by: INTERNAL MEDICINE

## 2019-07-30 PROCEDURE — 99213 OFFICE O/P EST LOW 20 MIN: CPT | Performed by: INTERNAL MEDICINE

## 2019-07-30 RX ORDER — ASPIRIN 81 MG/1
81 TABLET ORAL DAILY
COMMUNITY

## 2019-07-30 NOTE — ASSESSMENT & PLAN NOTE
Despite having severe disease, she is fairly well controlled on Symbicort and Spiriva  We will continue this regimen for now

## 2019-07-30 NOTE — PROGRESS NOTES
I spoke with Mateo at [facility] and faxed over office note,  6 minute walk and o2 with portability order patient needing a new stationary machine and O2 to requalify for her oxygen

## 2019-07-30 NOTE — PROGRESS NOTES
Pulmonary Follow Up Note   Leighton Mcfadden 78 y o  female MRN: [id number]  7/30/2019      Assessment/Plan:     COPD, severe ([facility] )    Despite having severe disease, she is fairly well controlled on Symbicort and Spiriva  We will continue this regimen for now  Chronic respiratory failure with hypoxia and hypercapnia (HCC)    She will continue with oxygen at 2 liters/minute with exertion and with Trilogy  Noninvasive ventilator during hours of sleep  She is deriving benefit from the equipment and ongoing uses medically necessary  We will ask her home care company to come back after the house to service her home unit  Visit orders:    Diagnoses and all orders for this visit:    Chronic obstructive pulmonary disease, unspecified COPD type ([facility] )  -     Cancel: POCT spirometry    Chronic respiratory failure with hypoxia and hypercapnia (HCC)  -     POCT 6 minute walk    COPD, severe ([facility] )    Other orders  -     aspirin (ECOTRIN LOW STRENGTH) 81 mg EC tablet; Take 81 mg by mouth daily        Return in about 4 months (around 11/30/2019)  History of Present Illness   HPI:  Leighton Mcfadden is a 78 y o  female who  Is here today for follow-up regarding severe COPD, chronic hypoxic and hypercapnic respiratory failure and history of DVT/PE  Her pulmonary status has been stable  She is stable on Symbicort and Spiriva  She is not needing her rescue inhaler or nebulizer on any regular basis  She has oxygen which she uses at 2 liters/minute with exertion and during hours of sleep with her noninvasive ventilator  She is using the ventilator 12 hours per night and wakes up feeling refreshed  Recently, home oxygen tank broke and she is waiting for the home care company to fix it, once and documentation from this office  Review of Systems   Constitutional: Negative for chills, fever and unexpected weight change  HENT: Negative for postnasal drip and sore throat  Eyes: Negative for visual disturbance     Respiratory: As noted in HPI   Cardiovascular: Negative for chest pain  Gastrointestinal: Negative for abdominal pain, diarrhea and vomiting  Genitourinary: Negative for difficulty urinating  Skin: Negative for rash  Neurological: Negative for headaches  Hematological: Negative for adenopathy  Psychiatric/Behavioral: Negative  All other systems reviewed and are negative  Historical Information   Past Medical History:   Diagnosis Date    Chronic respiratory failure with hypoxia and hypercapnia (HCC)     Deep vein thrombosis (HCC)     Diabetes mellitus (HCC)     Emphysema of lung (HCC)     Hyperlipidemia     Hypertension     Hypothyroid     Pulmonary embolism (HCC)     Sleep apnea, obstructive      Past Surgical History:   Procedure Laterality Date    [place] COLONOSCOPY FLX DX W/COLLJ SPEC WHEN PFRMD N/A 2018    Procedure: EGD AND COLONOSCOPY;  Surgeon: Katrina Pressley MD;  [facility];   Service: Gastroenterology    TOE SURGERY       Family History   Problem Relation Age of Onset    Hypertension Mother     Hyperlipidemia Mother     Asthma Father     Heart failure Father     Hypertension Sister     Hyperlipidemia Sister     Heart attack Neg Hx     Aneurysm Neg Hx     Stroke Neg Hx     Clotting disorder Neg Hx        Social History     Tobacco Use   Smoking Status Former Smoker    Packs/day: 1 00    Years: 40 00    Pack years: 40 00    Types: Cigarettes    Last attempt to quit:     Years since quittin 5   Smokeless Tobacco Never Used     Meds/Allergies     Current Outpatient Medications:     albuterol (PROAIR HFA) 90 mcg/act inhaler, Inhale 2 puffs every 6 (six) hours as needed for wheezing, Disp: 1 Inhaler, Rfl: 5    aspirin (ECOTRIN LOW STRENGTH) 81 mg EC tablet, Take 81 mg by mouth daily, Disp: , Rfl:     atorvastatin (LIPITOR) 20 mg tablet, 20 mg daily  , Disp: , Rfl:     Cholecalciferol (VITAMIN D) 2000 units CAPS, Take 1 capsule (2,000 Units total) by mouth daily, Disp: , Rfl:     diltiazem (CARDIZEM CD) 300 mg 24 hr capsule, Take 1 capsule (300 mg total) by mouth daily, Disp: 90 capsule, Rfl: 1    enalapril (VASOTEC) 20 mg tablet, Take 20 mg by mouth daily  , Disp: , Rfl:     fluticasone (FLONASE) 50 mcg/act nasal spray, 2 sprays into each nostril daily, Disp: 1 Bottle, Rfl: 5    glimepiride (AMARYL) 1 mg tablet, Take 1 mg by mouth daily with breakfast  , Disp: , Rfl:     metFORMIN (GLUCOPHAGE) 500 mg tablet, Take 500 mg by mouth 2 (two) times a day with meals, Disp: , Rfl:     saxagliptin (ONGLYZA) 5 MG tablet, Take 1 tablet (5 mg total) by mouth daily, Disp: 90 tablet, Rfl: 1    SYMBICORT 160-4 5 MCG/ACT inhaler, , Disp: , Rfl:     SYNTHROID 25 MCG tablet, Take 25 mcg by mouth daily  , Disp: , Rfl:     tiotropium (SPIRIVA RESPIMAT) 2 5 MCG/ACT AERS inhaler, Inhale 2 puffs daily, Disp: 1 Inhaler, Rfl: 11  Allergies   Allergen Reactions    Penicillins Rash     After dental procedure        Vitals: Blood pressure 156/74, pulse 100, temperature 98 °F (36 7 °C), temperature source Tympanic, height 5' (1 524 m), weight 79 8 kg (176 lb), SpO2 96 %  Body mass index is 34 37 kg/m²  Oxygen Therapy  SpO2: 96 %  Oxygen Therapy: None (Room air)    Physical Exam   Physical Exam   Constitutional: She is oriented to person, place, and time  No distress  HENT:   Head: Normocephalic  Mouth/Throat: No oropharyngeal exudate  Eyes: Pupils are equal, round, and reactive to light  No scleral icterus  Neck: Neck supple  No JVD present  Cardiovascular: Normal rate and regular rhythm  Pulmonary/Chest: She has no wheezes  She has no rales  Abdominal: Soft  There is no tenderness  Musculoskeletal: She exhibits no edema  Lymphadenopathy:     She has no cervical adenopathy  Neurological: She is alert and oriented to person, place, and time  Skin: Skin is warm and dry  Psychiatric: She has a normal mood and affect  Labs:  I have personally reviewed pertinent lab results  Lab Results   Component Value Date    WBC 11 79 (H) 06/19/2018    HGB 12 8 06/19/2018    HCT 41 5 06/19/2018    MCV 83 06/19/2018     06/19/2018     Lab Results   Component Value Date    CALCIUM 9 5 03/22/2019    K 4 4 03/22/2019    CO2 29 03/22/2019     03/22/2019    BUN 16 03/22/2019    CREATININE 0 61 03/22/2019     No results found for: IGE  Lab Results   Component Value Date    ALT 21 06/19/2018    AST 8 06/19/2018    ALKPHOS 67 06/19/2018     Imaging and other studies: I have personally reviewed pertinent reports  and I have personally reviewed pertinent films in PACS CXR 8/21/18 - lungs clear    Pulmonary function testing:  Performed 1/2/18   FEV1/FVC ratio 65%   FEV1 48% predicted  FVC 55% predicted  No response to bronchodilators   % predicted   % predicted  DLCO corrected for hemoglobin 13 % predicted  6MWT   Performed in the office today  Saturations at rest are 95% on room air  Patient ambulated for 6 minutes and saturations reached a low of 88% with a maximal heart rate of 127 beats per minute  Patient exhibited moderate distress at conclusion of the 6 minutes walk  She was then placed on supplemental oxygen at 2 liters/minute and walked an additional minute  Saturations remained 93% or greater

## 2019-07-30 NOTE — ASSESSMENT & PLAN NOTE
She will continue with oxygen at 2 liters/minute with exertion and with Trilogy  Noninvasive ventilator during hours of sleep  She is deriving benefit from the equipment and ongoing uses medically necessary  We will ask her home care company to come back after the house to service her home unit

## 2019-08-15 ENCOUNTER — TELEPHONE (OUTPATIENT)
Dept: PULMONOLOGY | Facility: CLINIC | Age: 79
End: 2019-08-15

## 2019-08-15 NOTE — TELEPHONE ENCOUNTER
Mae from [place] calling to see if we received CMN that she faxed over on August 2nd   If you have any questions please call [phone]

## 2019-08-29 ENCOUNTER — LAB (OUTPATIENT)
Dept: LAB | Facility: CLINIC | Age: 79
End: 2019-08-29
Payer: MEDICARE

## 2019-08-29 DIAGNOSIS — E55.9 VITAMIN D DEFICIENCY: ICD-10-CM

## 2019-08-29 DIAGNOSIS — M81.0 OSTEOPOROSIS, UNSPECIFIED OSTEOPOROSIS TYPE, UNSPECIFIED PATHOLOGICAL FRACTURE PRESENCE: ICD-10-CM

## 2019-08-29 DIAGNOSIS — E11.65 TYPE 2 DIABETES MELLITUS WITH HYPERGLYCEMIA, WITHOUT LONG-TERM CURRENT USE OF INSULIN (HCC): ICD-10-CM

## 2019-08-29 DIAGNOSIS — E03.9 ACQUIRED HYPOTHYROIDISM: ICD-10-CM

## 2019-08-29 LAB
25(OH)D3 SERPL-MCNC: 27 NG/ML (ref 30–100)
ANION GAP SERPL CALCULATED.3IONS-SCNC: 8 MMOL/L (ref 4–13)
BUN SERPL-MCNC: 17 MG/DL (ref 5–25)
CALCIUM SERPL-MCNC: 9.2 MG/DL (ref 8.3–10.1)
CHLORIDE SERPL-SCNC: 104 MMOL/L (ref 100–108)
CO2 SERPL-SCNC: 30 MMOL/L (ref 21–32)
CREAT SERPL-MCNC: 0.51 MG/DL (ref 0.6–1.3)
EST. AVERAGE GLUCOSE BLD GHB EST-MCNC: 146 MG/DL
GFR SERPL CREATININE-BSD FRML MDRD: 92 ML/MIN/1.73SQ M
GLUCOSE SERPL-MCNC: 157 MG/DL (ref 65–140)
HBA1C MFR BLD: 6.7 % (ref 4.2–6.3)
POTASSIUM SERPL-SCNC: 4.3 MMOL/L (ref 3.5–5.3)
PTH-INTACT SERPL-MCNC: 56 PG/ML (ref 18.4–80.1)
SODIUM SERPL-SCNC: 142 MMOL/L (ref 136–145)
T4 FREE SERPL-MCNC: 1.27 NG/DL (ref 0.76–1.46)
TSH SERPL DL<=0.05 MIU/L-ACNC: 2.27 UIU/ML (ref 0.36–3.74)

## 2019-08-29 PROCEDURE — 84166 PROTEIN E-PHORESIS/URINE/CSF: CPT

## 2019-08-29 PROCEDURE — 84443 ASSAY THYROID STIM HORMONE: CPT

## 2019-08-29 PROCEDURE — 84165 PROTEIN E-PHORESIS SERUM: CPT | Performed by: PATHOLOGY

## 2019-08-29 PROCEDURE — 86334 IMMUNOFIX E-PHORESIS SERUM: CPT

## 2019-08-29 PROCEDURE — 80048 BASIC METABOLIC PNL TOTAL CA: CPT

## 2019-08-29 PROCEDURE — 84165 PROTEIN E-PHORESIS SERUM: CPT

## 2019-08-29 PROCEDURE — 83970 ASSAY OF PARATHORMONE: CPT

## 2019-08-29 PROCEDURE — 82306 VITAMIN D 25 HYDROXY: CPT

## 2019-08-29 PROCEDURE — 84166 PROTEIN E-PHORESIS/URINE/CSF: CPT | Performed by: PATHOLOGY

## 2019-08-29 PROCEDURE — 83036 HEMOGLOBIN GLYCOSYLATED A1C: CPT

## 2019-08-29 PROCEDURE — 36415 COLL VENOUS BLD VENIPUNCTURE: CPT

## 2019-08-29 PROCEDURE — 84439 ASSAY OF FREE THYROXINE: CPT

## 2019-08-31 LAB
ALBUMIN UR ELPH-MCNC: 100 %
ALPHA1 GLOB MFR UR ELPH: 0 %
ALPHA2 GLOB MFR UR ELPH: 0 %
B-GLOBULIN MFR UR ELPH: 0 %
GAMMA GLOB MFR UR ELPH: 0 %
PROT PATTERN UR ELPH-IMP: NORMAL
PROT UR-MCNC: 17 MG/DL

## 2019-09-03 LAB — INTERPRETATION UR IFE-IMP: NORMAL

## 2019-09-04 ENCOUNTER — OFFICE VISIT (OUTPATIENT)
Dept: ENDOCRINOLOGY | Facility: CLINIC | Age: 79
End: 2019-09-04
Payer: MEDICARE

## 2019-09-04 VITALS
SYSTOLIC BLOOD PRESSURE: 150 MMHG | WEIGHT: 174 LBS | DIASTOLIC BLOOD PRESSURE: 78 MMHG | HEIGHT: 60 IN | BODY MASS INDEX: 34.16 KG/M2

## 2019-09-04 DIAGNOSIS — E78.2 MIXED HYPERLIPIDEMIA: ICD-10-CM

## 2019-09-04 DIAGNOSIS — E11.65 TYPE 2 DIABETES MELLITUS WITH HYPERGLYCEMIA, WITHOUT LONG-TERM CURRENT USE OF INSULIN (HCC): Primary | ICD-10-CM

## 2019-09-04 DIAGNOSIS — M81.0 OSTEOPOROSIS, UNSPECIFIED OSTEOPOROSIS TYPE, UNSPECIFIED PATHOLOGICAL FRACTURE PRESENCE: ICD-10-CM

## 2019-09-04 DIAGNOSIS — E55.9 VITAMIN D DEFICIENCY: ICD-10-CM

## 2019-09-04 DIAGNOSIS — E03.9 ACQUIRED HYPOTHYROIDISM: ICD-10-CM

## 2019-09-04 LAB
ALBUMIN SERPL ELPH-MCNC: 3.72 G/DL (ref 3.5–5)
ALBUMIN SERPL ELPH-MCNC: 56.4 % (ref 52–65)
ALPHA1 GLOB SERPL ELPH-MCNC: 0.35 G/DL (ref 0.1–0.4)
ALPHA1 GLOB SERPL ELPH-MCNC: 5.3 % (ref 2.5–5)
ALPHA2 GLOB SERPL ELPH-MCNC: 0.94 G/DL (ref 0.4–1.2)
ALPHA2 GLOB SERPL ELPH-MCNC: 14.3 % (ref 7–13)
BETA GLOB ABNORMAL SERPL ELPH-MCNC: 0.47 G/DL (ref 0.4–0.8)
BETA1 GLOB SERPL ELPH-MCNC: 7.1 % (ref 5–13)
BETA2 GLOB SERPL ELPH-MCNC: 5.8 % (ref 2–8)
BETA2+GAMMA GLOB SERPL ELPH-MCNC: 0.38 G/DL (ref 0.2–0.5)
GAMMA GLOB ABNORMAL SERPL ELPH-MCNC: 0.73 G/DL (ref 0.5–1.6)
GAMMA GLOB SERPL ELPH-MCNC: 11.1 % (ref 12–22)
IGG/ALB SER: 1.29 {RATIO} (ref 1.1–1.8)
PROT PATTERN SERPL ELPH-IMP: ABNORMAL
PROT SERPL-MCNC: 6.6 G/DL (ref 6.4–8.2)

## 2019-09-04 PROCEDURE — 99214 OFFICE O/P EST MOD 30 MIN: CPT | Performed by: PHYSICIAN ASSISTANT

## 2019-09-04 RX ORDER — ALENDRONATE SODIUM 70 MG/1
70 TABLET ORAL
Qty: 12 TABLET | Refills: 1 | Status: SHIPPED | OUTPATIENT
Start: 2019-09-04 | End: 2020-01-09 | Stop reason: SDUPTHER

## 2019-09-04 RX ORDER — MULTIVIT-MIN/IRON/FOLIC ACID/K 18-600-40
2 CAPSULE ORAL DAILY
Start: 2019-09-04

## 2019-09-04 NOTE — PROGRESS NOTES
Patient Progress Note      CC: DM2      Referring Provider  Kristin Zacarias Md  [address], [facility]     History of Present Illness:   Ethel Fabian is a 78 y o  female with a history of type 2 diabetes without long term use of insulin  Diabetes course has been stable  Complications of DM: none reported  Denies recent illness or hospitalizations  Denies recent severe hypoglycemic or severe hyperglycemic episodes  Denies any issues with her current regimen  Home glucose monitoring: every other day    Does not have glucometer or log with her today  Current regimen:  Onglyza 5 mg daily, glimepiride 1 mg daily with breakfast, metformin 1000 mg twice a day  compliant most of the time, denies any side effects from medications  Hypoglycemic episodes: No, never  H/o of hypoglycemia causing hospitalization or Intervention such as glucagon injection  or ambulance call :  No  Hypoglycemia symptoms: never experienced hypoglycemia  Treatment of hypoglycemia: juice    Diet: 3 meals per day, 2 snacks per day  Timing of meals is predictable  Diabetic diet compliance:  noncompliant some of the time  Activity: Daily activity is predictable: Yes  The patient engages in little, if any physical activity  Further diabetic ROS: no polyuria or polydipsia, no chest pain, no numbness, tingling or pain in extremities        Ophthamology: last visit about 4 months ago; goes every 6 months  Sees Dr Elizabeth Jean-Baptiste  Podiatry: foot exam up-to-date; sees routinely    Has hypertension: on ACE inhibitor/ARB, compliant most of the time  Has hyperlipidemia: on statin - tolerating well, no myalgias  compliant most of the time, denies any side effects from medications  Thyroid disorders:  Hypothyroidism  Patient is taking Synthroid 25 mcg daily on an empty stomach 1 hour before breakfast and at least 4 hours apart from supplements  Patient is tolerating medication well   Thyroid US in the past showed 2 areas of decreased but not absent echogenicity in the left thyroid lobe that could be a follicular cyst rather than a solid adenoma  History of pancreatitis: No     Osteoporosis/Vitamin-D deficiency:  Patient taking vitamin D3 2000 International Units daily  Has history of compression fractures of thoracic spine  In the past treatment options such as Fosamax vs  Prolia injections were discussed with patient but she was hesitant to start at that time  Patient Active Problem List   Diagnosis    COPD, severe ([address] )    Deep vein thrombosis (HCC)    Hyperlipidemia    Chronic respiratory failure with hypoxia and hypercapnia (HCC)    Hypothyroidism    Obstructive sleep apnea    History of pulmonary embolism    Type 2 diabetes mellitus (HCC)    Iron deficiency anemia, unspecified    PSVT (paroxysmal supraventricular tachycardia) (HCC)    Anticoagulant long-term use    Hemorrhoids    Polyp of colon    Chronic bilateral low back pain with bilateral sciatica    Non-seasonal allergic rhinitis due to pollen      Past Medical History:   Diagnosis Date    Chronic respiratory failure with hypoxia and hypercapnia (HCC)     Deep vein thrombosis (HCC)     Diabetes mellitus ([address] )     Em[facility] of lung ([facility] )     Hyperlipidemia     Hypertension     Hypothyroid     Pulmonary embolism (HCC)     Sleep apnea, obstructive       Past Surgical History:   Procedure Laterality Date    [place] COLONOSCOPY FLX DX W/COLLJ SPEC WHEN PFRMD N/A 8/30/2018    Procedure: EGD AND COLONOSCOPY;  Surgeon: Jay Conner MD;  [facility];   Service: Gastroenterology    TOE SURGERY        Family History   Problem Relation Age of Onset    Hypertension Mother     Hyperlipidemia Mother     Asthma Father     Heart failure Father     Hypertension Sister     Hyperlipidemia Sister     Heart attack Neg Hx     Aneurysm Neg Hx     Stroke Neg Hx     Clotting disorder Neg Hx      Social History     Tobacco Use    Smoking status: Former Smoker     Packs/day: 1 00     Years: 40 00     Pack years: 40 00     Types: Cigarettes     Last attempt to quit:      Years since quittin 6    Smokeless tobacco: Never Used   Substance Use Topics    Alcohol use: No     Allergies   Allergen Reactions    Penicillins Rash     After dental procedure          Current Outpatient Medications:     albuterol (PROAIR HFA) 90 mcg/act inhaler, Inhale 2 puffs every 6 (six) hours as needed for wheezing, Disp: 1 Inhaler, Rfl: 5    aspirin (ECOTRIN LOW STRENGTH) 81 mg EC tablet, Take 81 mg by mouth daily, Disp: , Rfl:     atorvastatin (LIPITOR) 20 mg tablet, 20 mg daily  , Disp: , Rfl:     Cholecalciferol (VITAMIN D) 2000 units CAPS, Take 2 capsules (4,000 Units total) by mouth daily, Disp: , Rfl:     diltiazem (CARDIZEM CD) 300 mg 24 hr capsule, Take 1 capsule (300 mg total) by mouth daily, Disp: 90 capsule, Rfl: 1    enalapril (VASOTEC) 20 mg tablet, Take 20 mg by mouth daily  , Disp: , Rfl:     fluticasone (FLONASE) 50 mcg/act nasal spray, 2 sprays into each nostril daily, Disp: 1 Bottle, Rfl: 5    glimepiride (AMARYL) 1 mg tablet, Take 1 mg by mouth daily with breakfast  , Disp: , Rfl:     metFORMIN (GLUCOPHAGE) 500 mg tablet, Take 1,000 mg by mouth 2 (two) times a day with meals , Disp: , Rfl:     saxagliptin (ONGLYZA) 5 MG tablet, Take 1 tablet (5 mg total) by mouth daily, Disp: 90 tablet, Rfl: 1    SYMBICORT 160-4 5 MCG/ACT inhaler, , Disp: , Rfl:     SYNTHROID 25 MCG tablet, Take 25 mcg by mouth daily  , Disp: , Rfl:     tiotropium (SPIRIVA RESPIMAT) 2 5 MCG/ACT AERS inhaler, Inhale 2 puffs daily, Disp: 1 Inhaler, Rfl: 11    alendronate (FOSAMAX) 70 mg tablet, Take 1 tablet (70 mg total) by mouth every 7 days for 90 days, Disp: 12 tablet, Rfl: 1  Review of Systems   Constitutional: Negative for activity change, appetite change, fatigue and unexpected weight change  HENT: Negative for trouble swallowing  Eyes: Negative for visual disturbance  Respiratory: Negative for shortness of breath (stable on O2)  Cardiovascular: Negative for chest pain and palpitations  Gastrointestinal: Negative for constipation and diarrhea  Endocrine: Negative for polydipsia and polyuria  Musculoskeletal: Negative  Skin: Negative for wound  Neurological: Negative for numbness  Psychiatric/Behavioral: Negative  Physical Exam:  Body mass index is 33 98 kg/m²  /78   Ht 5' (1 524 m)   Wt 78 9 kg (174 lb)   LMP  (LMP Unknown)   BMI 33 98 kg/m²    Wt Readings from Last 3 Encounters:   09/04/19 78 9 kg (174 lb)   07/30/19 79 8 kg (176 lb)   06/14/19 79 4 kg (175 lb 1 6 oz)       Physical Exam   Constitutional: She appears well-developed and well-nourished  HENT:   Head: Normocephalic  Eyes: Pupils are equal, round, and reactive to light  EOM are normal  No scleral icterus  Neck: Neck supple  No thyromegaly present  Cardiovascular: Normal rate and regular rhythm  No murmur heard  Pulses:       Radial pulses are 2+ on the right side, and 2+ on the left side  Pulmonary/Chest: Effort normal and breath sounds normal  No respiratory distress  She has no wheezes  Neurological: She is alert  Skin: Skin is warm and dry  Psychiatric: She has a normal mood and affect  Nursing note and vitals reviewed  Patient's shoes and socks were not removed        Labs:   Component      Latest Ref Rng & Units 3/22/2019 8/29/2019   Sodium      136 - 145 mmol/L 137 142   Potassium      3 5 - 5 3 mmol/L 4 4 4 3   Chloride      100 - 108 mmol/L 103 104   CO2      21 - 32 mmol/L 29 30   Anion Gap      4 - 13 mmol/L 5 8   BUN      5 - 25 mg/dL 16 17   Creatinine      0 60 - 1 30 mg/dL 0 61 0 51 (L)   GLUCOSE FASTING      65 - 99 mg/dL 160 (H)    Calcium      8 3 - 10 1 mg/dL 9 5 9 2   eGFR      ml/min/1 73sq m 87 92   Glucose, Random      65 - 140 mg/dL  157 (H)   Hemoglobin A1C      4 2 - 6 3 % 6 8 (H) 6 7 (H)   EAG      mg/dl 148 146   Free T4      0 76 - 1 46 ng/dL 1 27 1 27   TSH 3RD GENERATON      0 358 - 3 740 uIU/mL 2 350 2 270   Vit D, 25-Hydroxy      30 0 - 100 0 ng/mL  27 0 (L)   PARATHYROID HORMONE      18 4 - 80 1 pg/mL  56 0       Plan:    Diagnoses and all orders for this visit:    Type 2 diabetes mellitus with hyperglycemia, without long-term current use of insulin ([facility])  HGA1C 6 7%  Improved  Treatment regimen: continue current treatment  Discussed risks/complications associated with uncontrolled diabetes  Advised to adhere to diabetic diet, and recommended staying active/exercising routinely  Keep carbohydrates consistent to limit blood glucose fluctuations  Advised to call if blood sugars less than 70 mg/dl or over 300 mg/dl  Check blood glucose 1 time a day  Discussed symptoms and treatment of hypoglycemia  Recommended routine follow-up with podiatry and ophthalmology  Ordered blood work to complete prior to next visit  -     Hemoglobin A1C; Future  -     Basic metabolic panel; Future    Acquired hypothyroidism  Thyroid function tests within normal range, TSH 2 270 and free T4 1 27  Continue current dose of levothyroxine  Repeat blood work prior to next visit  -     T4, free; Future  -     TSH, 3rd generation; Future      Osteoporosis  Discussed treatment options for osteoporosis (Prolia versus Fosamax)  Will start Fosamax 70 mg weekly  GFR is normal   No ongoing dental problems  No GI problems  Discussed side effects of Fosamax such as GI upset, jaw necrosis, atypical femur fractures  Advised to notify dentist that she is on Fosamax  Continue to monitor bone density every 2 years  Take precautions to avoid falls  -     Vitamin D 25 hydroxy; Future  -     alendronate (FOSAMAX) 70 mg tablet; Take 1 tablet (70 mg total) by mouth every 7 days for 90 days  -     Cholecalciferol (VITAMIN D) 2000 units CAPS;  Take 2 capsules (4,000 Units total) by mouth daily    Vitamin D deficiency  Vitamin-D remains low at 27  Increase vitamin D3 to 4000 International Units daily  -     Vitamin D 25 hydroxy; Future         Discussed with the patient diagnosis and treatment and all questions fully answered  She will call me if any problems arise  Counseled patient on diagnostic results, prognosis, risk and benefit of treatment options, instruction for management, importance of treatment compliance, risk factor reduction and impressions        Michelle Jalloh PA-C

## 2019-09-04 NOTE — PATIENT INSTRUCTIONS
Hypoglycemia instructions   Abraham Michael  9/4/2019  [id number]    Low Blood Sugar    Steps to treat low blood sugar  1  Test blood sugar if you have symptoms of low blood sugar:   Low Blood Sugar Symptoms:  o Sweaty  o Dizzy  o Rapid heartbeat  o Shaky  o Bad mood  o Hungry      2  Treat blood sugar less than 70 with 15 grams of fast-acting carbohydrate:   Examples of 15 grams Fast-Acting Carbohydrate:  o 4 oz juice  o 4 oz regular soda  o 3-4 glucose tablets (chew)  o 3-4 hard candies (chew)          3  Wait 15 minutes and test your blood sugar again     4   If blood sugar is less than 100, repeat steps 2-3     5  When your blood sugar is 100 or more, eat a snack if it will be longer than one hour until your next meal  The snack should be 15 grams of carbohydrate and a protein:   Examples of snacks:  o ½ sandwich  o 6 crackers with cheese  o Piece of fruit with cheese or peanut butter  o 6 crackers with peanut butter

## 2019-10-07 DIAGNOSIS — J44.9 CHRONIC OBSTRUCTIVE PULMONARY DISEASE, UNSPECIFIED COPD TYPE (HCC): Primary | ICD-10-CM

## 2019-10-07 RX ORDER — ALBUTEROL SULFATE 2.5 MG/3ML
2.5 SOLUTION RESPIRATORY (INHALATION) EVERY 6 HOURS PRN
Qty: 120 VIAL | Refills: 3 | Status: SHIPPED | OUTPATIENT
Start: 2019-10-07 | End: 2022-01-20

## 2019-10-14 ENCOUNTER — TELEPHONE (OUTPATIENT)
Dept: ENDOCRINOLOGY | Facility: CLINIC | Age: 79
End: 2019-10-14

## 2019-11-04 ENCOUNTER — OFFICE VISIT (OUTPATIENT)
Dept: PULMONOLOGY | Facility: CLINIC | Age: 79
End: 2019-11-04
Payer: MEDICARE

## 2019-11-04 VITALS
SYSTOLIC BLOOD PRESSURE: 142 MMHG | RESPIRATION RATE: 16 BRPM | HEIGHT: 60 IN | HEART RATE: 106 BPM | WEIGHT: 175 LBS | OXYGEN SATURATION: 99 % | TEMPERATURE: 98.6 F | DIASTOLIC BLOOD PRESSURE: 84 MMHG | BODY MASS INDEX: 34.36 KG/M2

## 2019-11-04 DIAGNOSIS — J96.12 CHRONIC RESPIRATORY FAILURE WITH HYPOXIA AND HYPERCAPNIA (HCC): ICD-10-CM

## 2019-11-04 DIAGNOSIS — J44.9 COPD, SEVERE (HCC): Primary | ICD-10-CM

## 2019-11-04 DIAGNOSIS — J96.11 CHRONIC RESPIRATORY FAILURE WITH HYPOXIA AND HYPERCAPNIA (HCC): ICD-10-CM

## 2019-11-04 PROCEDURE — 99213 OFFICE O/P EST LOW 20 MIN: CPT | Performed by: INTERNAL MEDICINE

## 2019-11-04 NOTE — ASSESSMENT & PLAN NOTE
She will continue with Spiriva and Symbicort  She is up-to-date on immunizations  Plan to repeat spirometry at next visit

## 2019-11-04 NOTE — PROGRESS NOTES
Pulmonary Follow Up Note   Eloise Moore 78 y o  female MRN: [id number]  11/4/2019      Assessment/Plan:     Chronic respiratory failure with hypoxia and hypercapnia (HCC)    Oxygenation is stable on 2 liters/minute  She will also continue trilogy NIV  During hours of sleep  She is deriving benefit and ongoing uses medically necessary  She is demonstrating compliance as well  COPD, severe ([facility] [address] )    She will continue with Spiriva and Symbicort  She is up-to-date on immunizations  Visit orders:    Diagnoses and all orders for this visit:    COPD, severe ([address] )    Chronic respiratory failure with hypoxia and hypercapnia ([facility] [address] )      Return in about 4 months (around 3/4/2020)  History of Present Illness   HPI:  Eloise Moore is a 78 y o  female who  Is here today for follow-up regarding severe COPD and chronic hypoxemic and hypercapnic respiratory failure  She is fairly stable from pulmonary perspective  She has days where she is more breathless, overall stable  She is frustrated that she cannot perform more activity  She denies significant cough, wheeze or sputum production  She is compliant with Spiriva and Symbicort  She needs her rescue inhaler once or twice per day  She has been using oxygen at 2 liters/minute during the day and Trilogy NIV  during hours of sleep  Review of Systems   Constitutional: Negative for chills, fever and unexpected weight change  HENT: Negative for postnasal drip and sore throat  Eyes: Negative for visual disturbance  Respiratory:        As noted in HPI   Cardiovascular: Negative for chest pain and leg swelling  Gastrointestinal: Negative for abdominal pain, diarrhea and vomiting  Genitourinary: Negative for difficulty urinating  Skin: Negative for rash  Neurological: Negative for headaches  Hematological: Negative for adenopathy  Psychiatric/Behavioral: Negative  All other systems reviewed and are negative          Historical Information   Past Medical History:   Diagnosis Date    Chronic respiratory failure with hypoxia and hypercapnia (HCC)     Deep vein thrombosis (HCC)     Diabetes mellitus (HCC)     Emphysema of lung (HCC)     Hyperlipidemia     Hypertension     Hypothyroid     Pulmonary embolism (HCC)     Sleep apnea, obstructive      Past Surgical History:   Procedure Laterality Date    NM COLONOSCOPY FLX DX W/COLLJ SPEC WHEN PFRMD N/A 2018    Procedure: EGD AND COLONOSCOPY;  Surgeon: Bart Glez MD;  [facility];   Service: Gastroenterology    TOE SURGERY       Family History   Problem Relation Age of Onset    Hypertension Mother     Hyperlipidemia Mother     Asthma Father     Heart failure Father     Hypertension Sister     Hyperlipidemia Sister     Heart attack Neg Hx     Aneurysm Neg Hx     Stroke Neg Hx     Clotting disorder Neg Hx        Social History     Tobacco Use   Smoking Status Former Smoker    Packs/day: 1 00    Years: 40 00    Pack years: 40 00    Types: Cigarettes    Last attempt to quit:     Years since quittin 8   Smokeless Tobacco Never Used         Meds/Allergies     Current Outpatient Medications:     albuterol (2 5 mg/3 mL) 0 083 % nebulizer solution, Take 1 vial (2 5 mg total) by nebulization every 6 (six) hours as needed for wheezing or shortness of breath, Disp: 120 vial, Rfl: 3    albuterol (PROAIR HFA) 90 mcg/act inhaler, Inhale 2 puffs every 6 (six) hours as needed for wheezing, Disp: 1 Inhaler, Rfl: 5    aspirin (ECOTRIN LOW STRENGTH) 81 mg EC tablet, Take 81 mg by mouth daily, Disp: , Rfl:     atorvastatin (LIPITOR) 20 mg tablet, 20 mg daily  , Disp: , Rfl:     Cholecalciferol (VITAMIN D) 2000 units CAPS, Take 2 capsules (4,000 Units total) by mouth daily, Disp: , Rfl:     diltiazem (CARDIZEM CD) 300 mg 24 hr capsule, Take 1 capsule (300 mg total) by mouth daily, Disp: 90 capsule, Rfl: 1    enalapril (VASOTEC) 20 mg tablet, Take 20 mg by mouth daily  , Disp: , Rfl:     glimepiride (AMARYL) 1 mg tablet, Take 1 mg by mouth daily with breakfast  , Disp: , Rfl:     metFORMIN (GLUCOPHAGE) 500 mg tablet, Take 1,000 mg by mouth 2 (two) times a day with meals , Disp: , Rfl:     saxagliptin (ONGLYZA) 5 MG tablet, Take 1 tablet (5 mg total) by mouth daily, Disp: 90 tablet, Rfl: 1    SYMBICORT 160-4 5 MCG/ACT inhaler, , Disp: , Rfl:     SYNTHROID 25 MCG tablet, Take 25 mcg by mouth daily  , Disp: , Rfl:     alendronate (FOSAMAX) 70 mg tablet, Take 1 tablet (70 mg total) by mouth every 7 days for 90 days (Patient not taking: Reported on 11/4/2019), Disp: 12 tablet, Rfl: 1    fluticasone (FLONASE) 50 mcg/act nasal spray, 2 sprays into each nostril daily (Patient not taking: Reported on 11/4/2019), Disp: 1 Bottle, Rfl: 5    tiotropium (SPIRIVA RESPIMAT) 2 5 MCG/ACT AERS inhaler, Inhale 2 puffs daily, Disp: 1 Inhaler, Rfl: 11  Allergies   Allergen Reactions    Penicillins Rash     After dental procedure        Vitals: Blood pressure 142/84, pulse (!) 106, temperature 98 6 °F (37 °C), temperature source Tympanic, resp  rate 16, height 5' (1 524 m), weight 79 4 kg (175 lb), SpO2 99 %  Body mass index is 34 18 kg/m²  Oxygen Therapy  SpO2: 99 %  Oxygen Therapy: Supplemental oxygen  O2 Delivery Method: Nasal cannula  O2 Flow Rate (L/min): 2 L/min    Physical Exam   Constitutional: She is oriented to person, place, and time  No distress  HENT:   Head: Normocephalic  Mouth/Throat: No oropharyngeal exudate  Eyes: Pupils are equal, round, and reactive to light  No scleral icterus  Neck: Neck supple  No JVD present  Cardiovascular: Normal rate and regular rhythm  Pulmonary/Chest: She has no wheezes  She has no rales  Abdominal: Soft  There is no tenderness  Musculoskeletal: She exhibits no edema  Lymphadenopathy:     She has no cervical adenopathy  Neurological: She is alert and oriented to person, place, and time  Skin: Skin is warm and dry     Psychiatric: She has a normal mood and affect  Labs: I have personally reviewed pertinent lab results  Lab Results   Component Value Date    WBC 11 79 (H) 06/19/2018    HGB 12 8 06/19/2018    HCT 41 5 06/19/2018    MCV 83 06/19/2018     06/19/2018     Lab Results   Component Value Date    CALCIUM 9 2 08/29/2019    K 4 3 08/29/2019    CO2 30 08/29/2019     08/29/2019    BUN 17 08/29/2019    CREATININE 0 51 (L) 08/29/2019     No results found for: IGE  Lab Results   Component Value Date    ALT 21 06/19/2018    AST 8 06/19/2018    ALKPHOS 67 06/19/2018     Pulmonary function testing:  Performed 1/2/18   FEV1/FVC ratio 65%   FEV1 48% predicted  FVC 55% predicted  No response to bronchodilators   % predicted   % predicted  DLCO corrected for hemoglobin 13 % predicted

## 2019-11-04 NOTE — ASSESSMENT & PLAN NOTE
Oxygenation is stable on 2 liters/minute  She will also continue trilogy NIV  During hours of sleep  She is deriving benefit and ongoing uses medically necessary  She is demonstrating compliance as well

## 2019-11-14 ENCOUNTER — TELEPHONE (OUTPATIENT)
Dept: ENDOCRINOLOGY | Facility: CLINIC | Age: 79
End: 2019-11-14

## 2019-11-14 NOTE — TELEPHONE ENCOUNTER
Per patient PCP (Stacie Sep) changed glimepiride to jardiance 10mg daily  Wants to know if this is ok  Would like labs faxed to PCP/faxed    [phone] Fax: [phone]

## 2019-11-25 DIAGNOSIS — E11.65 TYPE 2 DIABETES MELLITUS WITH HYPERGLYCEMIA, WITHOUT LONG-TERM CURRENT USE OF INSULIN (HCC): Primary | ICD-10-CM

## 2019-11-25 NOTE — TELEPHONE ENCOUNTER
Pt requested return call    Pt states since taking Jardiance and stopping Glimeperide her BS are going up in am    Meds-  Metformin 1000mg BID  Jardiance 10mg QAM  Onglyza 5mg QAM    BS  11/25 161  11/24 145  11/23 159  11/22 155  11/21 149  11/20 145

## 2019-11-25 NOTE — TELEPHONE ENCOUNTER
Jennifer Alvarez is not listed in her med list for some reason and the glimepiride is still int here even though I believe it was discontinued by PCP per patients last telephone note  If she is taking Jardiance 10 mg daily, we can increase dose to 25 mg daily       Michelle Jalloh PA-C

## 2019-11-26 NOTE — TELEPHONE ENCOUNTER
Patient informed to increase Jardiance to 25mg daily  Medication list updated & new script generated for approval

## 2019-12-12 ENCOUNTER — TELEPHONE (OUTPATIENT)
Dept: ENDOCRINOLOGY | Facility: CLINIC | Age: 79
End: 2019-12-12

## 2019-12-12 NOTE — TELEPHONE ENCOUNTER
Please inform pt they look desirable, she should also check blood sugars at dinner time and if they are elevated > 200 mg/dl , call to inform   Otherwise continue current management  Also inform to avoid snack at bedtime,    Linda Rivera MD

## 2019-12-12 NOTE — TELEPHONE ENCOUNTER
Pt called re: bs    Said BS is running high         Metformin 1000mg bid  onglyza 5mg daily  jardiance 25mg daily

## 2019-12-12 NOTE — TELEPHONE ENCOUNTER
Spoke to pt who understood no changes   Pt understood to check bs at dinner and to call if BS is >200

## 2019-12-30 ENCOUNTER — TELEPHONE (OUTPATIENT)
Dept: ENDOCRINOLOGY | Facility: CLINIC | Age: 79
End: 2019-12-30

## 2019-12-30 ENCOUNTER — OFFICE VISIT (OUTPATIENT)
Dept: URGENT CARE | Facility: CLINIC | Age: 79
End: 2019-12-30
Payer: MEDICARE

## 2019-12-30 VITALS
HEIGHT: 60 IN | TEMPERATURE: 97.8 F | BODY MASS INDEX: 32.59 KG/M2 | DIASTOLIC BLOOD PRESSURE: 75 MMHG | SYSTOLIC BLOOD PRESSURE: 168 MMHG | OXYGEN SATURATION: 90 % | RESPIRATION RATE: 24 BRPM | HEART RATE: 125 BPM | WEIGHT: 166 LBS

## 2019-12-30 DIAGNOSIS — B37.3 VAGINAL YEAST INFECTION: Primary | ICD-10-CM

## 2019-12-30 LAB
SL AMB  POCT GLUCOSE, UA: 2000
SL AMB LEUKOCYTE ESTERASE,UA: NEGATIVE
SL AMB POCT BILIRUBIN,UA: NEGATIVE
SL AMB POCT BLOOD,UA: NEGATIVE
SL AMB POCT CLARITY,UA: CLEAR
SL AMB POCT COLOR,UA: YELLOW
SL AMB POCT GLUCOSE BLD: 166
SL AMB POCT KETONES,UA: ABNORMAL
SL AMB POCT NITRITE,UA: NEGATIVE
SL AMB POCT PH,UA: 6
SL AMB POCT SPECIFIC GRAVITY,UA: 1.01
SL AMB POCT URINE PROTEIN: NEGATIVE
SL AMB POCT UROBILINOGEN: 0.2

## 2019-12-30 PROCEDURE — G0463 HOSPITAL OUTPT CLINIC VISIT: HCPCS | Performed by: NURSE PRACTITIONER

## 2019-12-30 PROCEDURE — 99213 OFFICE O/P EST LOW 20 MIN: CPT | Performed by: NURSE PRACTITIONER

## 2019-12-30 PROCEDURE — 81002 URINALYSIS NONAUTO W/O SCOPE: CPT | Performed by: NURSE PRACTITIONER

## 2019-12-30 PROCEDURE — 82948 REAGENT STRIP/BLOOD GLUCOSE: CPT | Performed by: NURSE PRACTITIONER

## 2019-12-30 RX ORDER — FLUCONAZOLE 150 MG/1
150 TABLET ORAL ONCE
Qty: 1 TABLET | Refills: 0 | Status: SHIPPED | OUTPATIENT
Start: 2019-12-30 | End: 2019-12-30

## 2019-12-30 NOTE — PATIENT INSTRUCTIONS
Diflucan 1 tablet today   You can apply monistat externally for itching  Keep area clean and dry   Follow up with your PCP for worsening or concerning symptoms     Vulvovaginal Candidiasis   WHAT YOU NEED TO KNOW:   Vulvovaginal candidiasis, or yeast infection, is a common vaginal infection  Vulvovaginal candidiasis is caused by a fungus, or yeast-like germ  Fungi are normally found in your vagina  When there are too many fungi, it can cause an infection  DISCHARGE INSTRUCTIONS:   Return to the emergency department if:   · You have fever and chills  · You are bleeding from your vagina and it is not your monthly period  · You develop abdominal or pelvic pain  Contact your healthcare provider if:   · Your signs and symptoms get worse, even after treatment  · You have questions or concerns about your condition or care  Medicines:   · Medicines  help treat the fungal infection and decrease inflammation  The medicine may be a pill, topical cream, or vaginal suppository  · Take your medicine as directed  Contact your healthcare provider if you think your medicine is not helping or if you have side effects  Tell him of her if you are allergic to any medicine  Keep a list of the medicines, vitamins, and herbs you take  Include the amounts, and when and why you take them  Bring the list or the pill bottles to follow-up visits  Carry your medicine list with you in case of an emergency  Manage your symptoms:   · Wear cotton underwear  · Keep the vaginal area clean and dry  · Do not have sex until your symptoms are gone  · Do not douche  · Do not use feminine hygiene sprays, powders, or bubble bath  Prevent another infection:   · Take showers instead of baths  · Eat yogurt  · Limit the amount of alcohol you drink'    · Control your blood sugar if you are diabetic  · Limit your time in hot tubs    Follow up with your healthcare provider as directed:  Write down your questions so you remember to ask them during your visits  © 2017 [employer] Information is for End User's use only and may not be sold, redistributed or otherwise used for commercial purposes  All illustrations and images included in CareNotes® are the copyrighted property of [employer]  or Louis Hernandez  The above information is an  only  It is not intended as medical advice for individual conditions or treatments  Talk to your doctor, nurse or pharmacist before following any medical regimen to see if it is safe and effective for you

## 2019-12-30 NOTE — PROGRESS NOTES
[facility]        NAME: Magui Jones is a 78 y o  female  : 1940    MRN: [id number]  DATE: 2019  TIME: 12:50 PM    Assessment and Plan   Vaginal yeast infection [B37 3]  1  Vaginal yeast infection  fluconazole (DIFLUCAN) 150 mg tablet    POCT urine dip    POCT blood glucose         Patient Instructions   Diflucan 1 tablet today   You can apply monistat externally for itching  Keep area clean and dry   Follow up with your PCP for worsening or concerning symptoms       Follow up with PCP in 3-5 days  Proceed to  ER if symptoms worsen  Chief Complaint     Chief Complaint   Patient presents with    Vaginitis     Pt is c/o vaginal itching d/t recent diabetic med changes  History of Present Illness       Patient is a 59-year-old female with significant past medical history of diabetes presents with vaginal itching and discomfort for the past 1 week  Associated with vaginal discharge  She does have dysuria at the end of urination  Denies fever or chills  She recently changed new diabetic medication, she is now on charting aunt's  She called her endocrinologist this morning and said that she is high risk for yeast infection  She has been applying Aquaphor to the external vaginal area with some relief  Review of Systems   Review of Systems   Constitutional: Negative for activity change, chills and fever  Respiratory: Negative for cough  Gastrointestinal: Negative for abdominal pain, diarrhea, nausea and vomiting  Genitourinary: Positive for dysuria, vaginal discharge and vaginal pain  Negative for decreased urine volume and urgency  Neurological: Negative for headaches           Current Medications       Current Outpatient Medications:     albuterol (2 5 mg/3 mL) 0 083 % nebulizer solution, Take 1 vial (2 5 mg total) by nebulization every 6 (six) hours as needed for wheezing or shortness of breath, Disp: 120 vial, Rfl: 3    albuterol (PROAIR HFA) 90 mcg/act inhaler, Inhale 2 puffs every 6 (six) hours as needed for wheezing, Disp: 1 Inhaler, Rfl: 5    aspirin (ECOTRIN LOW STRENGTH) 81 mg EC tablet, Take 81 mg by mouth daily, Disp: , Rfl:     atorvastatin (LIPITOR) 20 mg tablet, 20 mg daily  , Disp: , Rfl:     Cholecalciferol (VITAMIN D) 2000 units CAPS, Take 2 capsules (4,000 Units total) by mouth daily, Disp: , Rfl:     diltiazem (CARDIZEM CD) 300 mg 24 hr capsule, Take 1 capsule (300 mg total) by mouth daily, Disp: 90 capsule, Rfl: 1    Empagliflozin (JARDIANCE) 25 MG TABS, Take 1 tablet (25 mg total) by mouth every morning, Disp: 30 tablet, Rfl: 2    enalapril (VASOTEC) 20 mg tablet, Take 20 mg by mouth daily  , Disp: , Rfl:     fluticasone (FLONASE) 50 mcg/act nasal spray, 2 sprays into each nostril daily, Disp: 1 Bottle, Rfl: 5    glimepiride (AMARYL) 1 mg tablet, Take 1 mg by mouth daily with breakfast  , Disp: , Rfl:     metFORMIN (GLUCOPHAGE) 500 mg tablet, Take 1,000 mg by mouth 2 (two) times a day with meals , Disp: , Rfl:     saxagliptin (ONGLYZA) 5 MG tablet, Take 1 tablet (5 mg total) by mouth daily, Disp: 90 tablet, Rfl: 1    SYMBICORT 160-4 5 MCG/ACT inhaler, , Disp: , Rfl:     SYNTHROID 25 MCG tablet, Take 25 mcg by mouth daily  , Disp: , Rfl:     alendronate (FOSAMAX) 70 mg tablet, Take 1 tablet (70 mg total) by mouth every 7 days for 90 days (Patient not taking: Reported on 11/4/2019), Disp: 12 tablet, Rfl: 1    fluconazole (DIFLUCAN) 150 mg tablet, Take 1 tablet (150 mg total) by mouth once for 1 dose, Disp: 1 tablet, Rfl: 0    tiotropium (SPIRIVA RESPIMAT) 2 5 MCG/ACT AERS inhaler, Inhale 2 puffs daily, Disp: 1 Inhaler, Rfl: 11    Current Allergies     Allergies as of 12/30/2019 - Reviewed 12/30/2019   Allergen Reaction Noted    Penicillins Rash 10/31/2017            The following portions of the patient's history were reviewed and updated as appropriate: allergies, current medications, past family history, past medical history, past social history, past surgical history and problem list      Past Medical History:   Diagnosis Date    Chronic respiratory failure with hypoxia and hypercapnia (HCC)     Deep vein thrombosis (HCC)     Diabetes mellitus (HCC)     Emphysema of lung ([address] )     Hyperlipidemia     Hypertension     Hypothyroid     Pulmonary embolism (HCC)     Sleep apnea, obstructive        Past Surgical History:   Procedure Laterality Date    MO COLONOSCOPY FLX DX W/COLLJ SPEC WHEN PFRMD N/A 8/30/2018    Procedure: EGD AND COLONOSCOPY;  Surgeon: Segundo Herrera MD;  Location: [facility]; Service: Gastroenterology    TOE SURGERY         Family History   Problem Relation Age of Onset    Hypertension Mother     Hyperlipidemia Mother     Asthma Father     Heart failure Father     Hypertension Sister     Hyperlipidemia Sister     Heart attack Neg Hx     Aneurysm Neg Hx     Stroke Neg Hx     Clotting disorder Neg Hx          Medications have been verified  Objective   /75   Pulse (!) 125   Temp 97 8 °F (36 6 °C)   Resp (!) 24   Ht 5' (1 524 m)   Wt 75 3 kg (166 lb)   LMP  (LMP Unknown)   SpO2 90%   BMI 32 42 kg/m²      Blood glucose: 166 mg/dL  Urine dip: Glucose 2000, trace ketones  Physical Exam     Physical Exam   Constitutional: She is oriented to person, place, and time  Vital signs are normal  She appears well-developed and well-nourished  She is active  No distress  HENT:   Head: Normocephalic  Right Ear: Hearing normal    Left Ear: Hearing normal    Nose: Nose normal    Cardiovascular: Regular rhythm, S1 normal, S2 normal and normal heart sounds  Tachycardia present  Pulmonary/Chest: Effort normal and breath sounds normal  No respiratory distress  She has no decreased breath sounds  Abdominal: Normal appearance and bowel sounds are normal  There is no tenderness  Neurological: She is alert and oriented to person, place, and time  She is not disoriented  Skin: Skin is warm and dry

## 2019-12-30 NOTE — TELEPHONE ENCOUNTER
If this is her first occurrence of a yeast infection, she may still be able to continue medication  If yeast infections persists and/or reoccurs, we could consider stopping Jardiance  I do suggest she hydrate well on Jardiance  I suggest she call her PCP again and see if they can call something in for her to treat her yeast infection as they do not have appointments available and as this does not seem to be an emergent issue that requires evaluation at the ER  She may also call her GYN       Michelle Jalloh PA-C

## 2019-12-30 NOTE — TELEPHONE ENCOUNTER
Spoke with patient who will call PCP again to see if they will call her something in for her, also patient said if they wont she will get something OTC  Pt does not have a gynecologist  Patient agreed to hydrate well  Pt will call back if she has any other questions or concerns

## 2019-12-30 NOTE — TELEPHONE ENCOUNTER
Patient called said PCP changed her medication  Took her off of glimepiride and put her on jardiance  She now has a possible yeast infection  Her PCP said they don't have any appts for her and she should go to ER  Can we call something in for her or should she go to ER  Also what should she do about jardiance

## 2019-12-31 LAB — GLUCOSE SERPL-MCNC: 166 MG/DL (ref 65–140)

## 2020-01-07 ENCOUNTER — LAB (OUTPATIENT)
Dept: LAB | Facility: CLINIC | Age: 80
End: 2020-01-07
Payer: MEDICARE

## 2020-01-07 DIAGNOSIS — E11.65 TYPE 2 DIABETES MELLITUS WITH HYPERGLYCEMIA, WITHOUT LONG-TERM CURRENT USE OF INSULIN (HCC): ICD-10-CM

## 2020-01-07 DIAGNOSIS — M81.0 OSTEOPOROSIS, UNSPECIFIED OSTEOPOROSIS TYPE, UNSPECIFIED PATHOLOGICAL FRACTURE PRESENCE: ICD-10-CM

## 2020-01-07 DIAGNOSIS — E55.9 VITAMIN D DEFICIENCY: ICD-10-CM

## 2020-01-07 DIAGNOSIS — E03.9 ACQUIRED HYPOTHYROIDISM: ICD-10-CM

## 2020-01-07 LAB
25(OH)D3 SERPL-MCNC: 32.4 NG/ML (ref 30–100)
ANION GAP SERPL CALCULATED.3IONS-SCNC: 4 MMOL/L (ref 4–13)
BUN SERPL-MCNC: 19 MG/DL (ref 5–25)
CALCIUM SERPL-MCNC: 9.4 MG/DL (ref 8.3–10.1)
CHLORIDE SERPL-SCNC: 103 MMOL/L (ref 100–108)
CO2 SERPL-SCNC: 32 MMOL/L (ref 21–32)
CREAT SERPL-MCNC: 0.6 MG/DL (ref 0.6–1.3)
EST. AVERAGE GLUCOSE BLD GHB EST-MCNC: 169 MG/DL
GFR SERPL CREATININE-BSD FRML MDRD: 87 ML/MIN/1.73SQ M
GLUCOSE P FAST SERPL-MCNC: 191 MG/DL (ref 65–99)
HBA1C MFR BLD: 7.5 % (ref 4.2–6.3)
POTASSIUM SERPL-SCNC: 4.3 MMOL/L (ref 3.5–5.3)
SODIUM SERPL-SCNC: 139 MMOL/L (ref 136–145)
T4 FREE SERPL-MCNC: 1.3 NG/DL (ref 0.76–1.46)
TSH SERPL DL<=0.05 MIU/L-ACNC: 2.7 UIU/ML (ref 0.36–3.74)

## 2020-01-07 PROCEDURE — 84443 ASSAY THYROID STIM HORMONE: CPT

## 2020-01-07 PROCEDURE — 83036 HEMOGLOBIN GLYCOSYLATED A1C: CPT

## 2020-01-07 PROCEDURE — 82306 VITAMIN D 25 HYDROXY: CPT

## 2020-01-07 PROCEDURE — 80048 BASIC METABOLIC PNL TOTAL CA: CPT

## 2020-01-07 PROCEDURE — 36415 COLL VENOUS BLD VENIPUNCTURE: CPT

## 2020-01-07 PROCEDURE — 84439 ASSAY OF FREE THYROXINE: CPT

## 2020-01-09 ENCOUNTER — OFFICE VISIT (OUTPATIENT)
Dept: ENDOCRINOLOGY | Facility: CLINIC | Age: 80
End: 2020-01-09
Payer: MEDICARE

## 2020-01-09 VITALS
SYSTOLIC BLOOD PRESSURE: 140 MMHG | WEIGHT: 166 LBS | DIASTOLIC BLOOD PRESSURE: 78 MMHG | BODY MASS INDEX: 32.59 KG/M2 | HEIGHT: 60 IN

## 2020-01-09 DIAGNOSIS — E55.9 VITAMIN D DEFICIENCY: ICD-10-CM

## 2020-01-09 DIAGNOSIS — E11.65 TYPE 2 DIABETES MELLITUS WITH HYPERGLYCEMIA, WITHOUT LONG-TERM CURRENT USE OF INSULIN (HCC): Primary | ICD-10-CM

## 2020-01-09 DIAGNOSIS — E03.9 ACQUIRED HYPOTHYROIDISM: ICD-10-CM

## 2020-01-09 DIAGNOSIS — I10 ESSENTIAL HYPERTENSION: ICD-10-CM

## 2020-01-09 DIAGNOSIS — E78.2 MIXED HYPERLIPIDEMIA: ICD-10-CM

## 2020-01-09 DIAGNOSIS — M81.0 AGE-RELATED OSTEOPOROSIS WITHOUT CURRENT PATHOLOGICAL FRACTURE: ICD-10-CM

## 2020-01-09 DIAGNOSIS — E04.1 THYROID NODULE: ICD-10-CM

## 2020-01-09 PROCEDURE — 99214 OFFICE O/P EST MOD 30 MIN: CPT | Performed by: PHYSICIAN ASSISTANT

## 2020-01-09 RX ORDER — GLIMEPIRIDE 1 MG/1
1 TABLET ORAL
Qty: 90 TABLET | Refills: 1 | Status: SHIPPED | OUTPATIENT
Start: 2020-01-09 | End: 2020-06-29

## 2020-01-09 RX ORDER — UBIDECARENONE 75 MG
CAPSULE ORAL DAILY
COMMUNITY

## 2020-01-09 RX ORDER — ALENDRONATE SODIUM 70 MG/1
70 TABLET ORAL
Qty: 12 TABLET | Refills: 1 | Status: SHIPPED | OUTPATIENT
Start: 2020-01-09 | End: 2020-05-29

## 2020-01-09 RX ORDER — DIPHENOXYLATE HYDROCHLORIDE AND ATROPINE SULFATE 2.5; .025 MG/1; MG/1
1 TABLET ORAL DAILY
COMMUNITY

## 2020-01-09 NOTE — PROGRESS NOTES
Patient Progress Note      CC: DM2, osteoporosis      Referring Provider  Anabelle Jackson Md  [address]  [address] [facility], [address] [facility]     History of Present Illness:   Natalie Foerman is a 78 y o  female with a history of type 2 diabetes with long term use of insulin  Diabetes course has been stable  Complications of DM: none reported  Denies recent illness or hospitalizations  Denies recent severe hypoglycemic or severe hyperglycemic episodes  Denies any issues with her current regimen  Home glucose monitoring: are performed regularly    Patient reports yeast infections after her PCP switched her to [place]  She also reports increased  BG levels after stopping glimepiride and starting Jardiance  Home blood glucose readings:   Before breakfast: 156-190 mg/dl  Bedtime: 135-226 mg/dl    Current regimen: Jardiance 25 mg daily, Onglyza 5 mg daily, metformin 1000 mg BID  compliant most of the time, denies any side effects from medications  Hypoglycemic episodes: No, rare  H/o of hypoglycemia causing hospitalization or Intervention such as glucagon injection  or ambulance call :  No  Hypoglycemia symptoms: never experienced hypoglycemia  Treatment of hypoglycemia: juice    Diet: 3 meals per day, 1-1 snacks per day  Timing of meals is predictable  Diabetic diet compliance:  noncompliant some of the time  Activity: Daily activity is predictable: Yes  No routine exercise due to emphysema  Further diabetic ROS: no polyuria or polydipsia, no chest pain, no numbness, tingling or pain in extremities        Ophthamology: sees Dr Sneha Thomas  Podiatry: Foot exam up-to-date, March 2019  Sees podiatry every 3 months    Has hypertension: on ACE inhibitor/ARB, compliant most of the time  Has hyperlipidemia: on statin - tolerating well, no myalgias  compliant most of the time, denies any side effects from medications  Thyroid disorders:  Hypothyroidism    Patient is taking Synthroid 25 mcg daily on an empty stomach 1 hour before breakfast   Patient is tolerating medication well  Thyroid US in the past showed 2 areas of decreased but not absent echogenicity in the left thyroid lobe that could be a follicular cyst rather than a solid adenoma  She had a repeat ultrasound ordered which she has not yet completed  History of pancreatitis: No    Osteoporosis/vitamin-D deficiency:  Patient is taking vitamin D3 4000 International Units daily  Last DEXA scan done in July 2019 showed osteoporosis of the left forearm  She does have a history compression fractures of thoracic spine  At the last visit she was started on Fosamax 70 mg weekly, but she has not been taking it because she prefers not to take it  Denies recent fractures or falls  Does not do weight bearing exercises       Patient Active Problem List   Diagnosis    COPD, severe ([address] )    Deep vein thrombosis (HCC)    Hyperlipidemia    Chronic respiratory failure with hypoxia and hypercapnia (HCC)    Hypothyroidism    Obstructive sleep apnea    History of pulmonary embolism    Type 2 diabetes mellitus (HCC)    Iron deficiency anemia, unspecified    PSVT (paroxysmal supraventricular tachycardia) (HCC)    Anticoagulant long-term use    Hemorrhoids    Polyp of colon    Chronic bilateral low back pain with bilateral sciatica    Non-seasonal allergic rhinitis due to pollen    Age-related osteoporosis without current pathological fracture    Essential hypertension    Vitamin D deficiency    Thyroid nodule      Past Medical History:   Diagnosis Date    Chronic respiratory failure with hypoxia and hypercapnia (HCC)     Deep vein thrombosis (HCC)     Diabetes mellitus ([address] )     Emphysema of lung ([address] )     Hyperlipidemia     Hypertension     Hypothyroid     Pulmonary embolism (HCC)     Sleep apnea, obstructive       Past Surgical History:   Procedure Laterality Date    MN COLONOSCOPY FLX DX W/COLLJ SPEC WHEN PFRMD N/A 8/30/2018    Procedure: EGD AND COLONOSCOPY;  Surgeon: Mat Jason MD;  [facility];   Service: Gastroenterology    TOE SURGERY        Family History   Problem Relation Age of Onset    Hypertension Mother     Hyperlipidemia Mother     Asthma Father     Heart failure Father     Hypertension Sister     Hyperlipidemia Sister     Heart attack Neg Hx     Aneurysm Neg Hx     Stroke Neg Hx     Clotting disorder Neg Hx      Social History     Tobacco Use    Smoking status: Former Smoker     Packs/day: 1 00     Years: 40 00     Pack years: 40 00     Types: Cigarettes     Last attempt to quit: 2005     Years since quitting: 15 0    Smokeless tobacco: Never Used   Substance Use Topics    Alcohol use: No     Allergies   Allergen Reactions    Penicillins Rash     After dental procedure          Current Outpatient Medications:     albuterol (2 5 mg/3 mL) 0 083 % nebulizer solution, Take 1 vial (2 5 mg total) by nebulization every 6 (six) hours as needed for wheezing or shortness of breath, Disp: 120 vial, Rfl: 3    albuterol (PROAIR HFA) 90 mcg/act inhaler, Inhale 2 puffs every 6 (six) hours as needed for wheezing, Disp: 1 Inhaler, Rfl: 5    aspirin (ECOTRIN LOW STRENGTH) 81 mg EC tablet, Take 81 mg by mouth daily, Disp: , Rfl:     atorvastatin (LIPITOR) 20 mg tablet, 20 mg daily  , Disp: , Rfl:     Calcium-Magnesium-Vitamin D (CALCIUM 500 PO), Take by mouth daily, Disp: , Rfl:     Cholecalciferol (VITAMIN D) 2000 units CAPS, Take 2 capsules (4,000 Units total) by mouth daily, Disp: , Rfl:     cyanocobalamin (VITAMIN B-12) 100 mcg tablet, Take by mouth daily, Disp: , Rfl:     diltiazem (CARDIZEM CD) 300 mg 24 hr capsule, Take 1 capsule (300 mg total) by mouth daily, Disp: 90 capsule, Rfl: 1    enalapril (VASOTEC) 20 mg tablet, Take 20 mg by mouth daily  , Disp: , Rfl:     fluticasone (FLONASE) 50 mcg/act nasal spray, 2 sprays into each nostril daily, Disp: 1 Bottle, Rfl: 5    metFORMIN (GLUCOPHAGE) 500 mg tablet, Take 2 tablets (1,000 mg total) by mouth 2 (two) times a day with meals, Disp: 360 tablet, Rfl: 1    multivitamin (THERAGRAN) TABS, Take 1 tablet by mouth daily, Disp: , Rfl:     saxagliptin (ONGLYZA) 5 MG tablet, Take 1 tablet (5 mg total) by mouth daily, Disp: 90 tablet, Rfl: 1    SYMBICORT 160-4 5 MCG/ACT inhaler, , Disp: , Rfl:     SYNTHROID 25 MCG tablet, Take 25 mcg by mouth daily  , Disp: , Rfl:     tiotropium (SPIRIVA RESPIMAT) 2 5 MCG/ACT AERS inhaler, Inhale 2 puffs daily, Disp: 1 Inhaler, Rfl: 11    alendronate (FOSAMAX) 70 mg tablet, Take 1 tablet (70 mg total) by mouth every 7 days, Disp: 12 tablet, Rfl: 1    glimepiride (AMARYL) 1 mg tablet, Take 1 tablet (1 mg total) by mouth daily with breakfast, Disp: 90 tablet, Rfl: 1  Review of Systems   Constitutional: Negative for activity change, appetite change, fatigue and unexpected weight change  HENT: Negative for trouble swallowing  Eyes: Negative for visual disturbance  Respiratory: Positive for shortness of breath (with exertion; stable)  Cardiovascular: Negative for chest pain and palpitations  Gastrointestinal: Negative for constipation and diarrhea  Endocrine: Negative for polydipsia and polyuria  Musculoskeletal: Negative  Skin: Negative for wound  Neurological: Negative for numbness  Psychiatric/Behavioral: Negative  Physical Exam:  Body mass index is 32 42 kg/m²  /78   Ht 5' (1 524 m)   Wt 75 3 kg (166 lb)   LMP  (LMP Unknown)   BMI 32 42 kg/m²    Wt Readings from Last 3 Encounters:   01/09/20 75 3 kg (166 lb)   12/30/19 75 3 kg (166 lb)   11/04/19 79 4 kg (175 lb)       Physical Exam   Constitutional: She appears well-developed and well-nourished  HENT:   Head: Normocephalic  Eyes: Pupils are equal, round, and reactive to light  EOM are normal  No scleral icterus  Neck: Neck supple  No thyromegaly present  Cardiovascular: Normal rate and regular rhythm  No murmur heard    Pulses:       Radial pulses are 2+ on the right side, and 2+ on the left side  Pulmonary/Chest: Effort normal and breath sounds normal  No respiratory distress  She has no wheezes  Neurological: She is alert  She has normal reflexes  Skin: Skin is warm and dry  Psychiatric: She has a normal mood and affect  Nursing note and vitals reviewed  Patient's shoes and socks were not removed  Labs:   Component      Latest Ref Rng & Units 3/22/2019 8/29/2019 1/7/2020   Sodium      136 - 145 mmol/L 137 142 139   Potassium      3 5 - 5 3 mmol/L 4 4 4 3 4 3   Chloride      100 - 108 mmol/L 103 104 103   CO2      21 - 32 mmol/L 29 30 32   Anion Gap      4 - 13 mmol/L 5 8 4   BUN      5 - 25 mg/dL 16 17 19   Creatinine      0 60 - 1 30 mg/dL 0 61 0 51 (L) 0 60   GLUCOSE FASTING      65 - 99 mg/dL 160 (H)  191 (H)   Calcium      8 3 - 10 1 mg/dL 9 5 9 2 9 4   eGFR      ml/min/1 73sq m 87 92 87   Glucose, Random      65 - 140 mg/dL  157 (H)    Hemoglobin A1C      4 2 - 6 3 % 6 8 (H) 6 7 (H) 7 5 (H)   EAG      mg/dl 148 146 169   Free T4      0 76 - 1 46 ng/dL 1 27 1 27 1 30   TSH 3RD GENERATON      0 358 - 3 740 uIU/mL 2 350 2 270 2 700   Vit D, 25-Hydroxy      30 0 - 100 0 ng/mL  27 0 (L) 32 4   PARATHYROID HORMONE      18 4 - 80 1 pg/mL  56 0      Plan:    Diagnoses and all orders for this visit:    Type 2 diabetes mellitus with hyperglycemia, without long-term current use of insulin ([facility])  HGA1C 7 5%  Worsened  Treatment regimen: stop Jardiance as patient does not tolerate it  Restart glimepiride 1 mg with breakfast; must take with food  Discussed SE  Continue metformin and Onglyza  Discussed risks/complications associated with uncontrolled diabetes  Advised to adhere to diabetic diet, and recommended staying active/exercising routinely  Keep carbohydrates consistent to limit blood glucose fluctuations  Advised to call if blood sugars less than 70 mg/dl or over 300 mg/dl     Check blood glucose 2 times a day  Discussed symptoms and treatment of hypoglycemia  Recommended routine follow-up with podiatry and ophthalmology  Send log in 2 weeks  Ordered blood work to complete prior to next visit  -     Hemoglobin A1C; Future  -     Basic metabolic panel; Future  -     Microalbumin / creatinine urine ratio; Future  -     metFORMIN (GLUCOPHAGE) 500 mg tablet; Take 2 tablets (1,000 mg total) by mouth 2 (two) times a day with meals  -     glimepiride (AMARYL) 1 mg tablet; Take 1 tablet (1 mg total) by mouth daily with breakfast    Acquired hypothyroidism  Thyroid function tests within normal range, TSH 2 700 and free T4 1 30  Continue current dose of levothyroxine  Continue to monitor thyroid function tests  -     US thyroid; Future    Thyroid nodule  Needs to repeat thyroid US  Prior US showed showed 2 areas of decreased but not absent echogenicity in the left thyroid lobe that could be a follicular cyst rather than a solid adenoma    Essential hypertension  Blood pressure adequately controlled, continue current treatment  -     Basic metabolic panel; Future    Mixed hyperlipidemia  Continue statin therapy  Managed by PCP    Age-related osteoporosis without current pathological fracture  Is willing to start Fosamax  Also discussed Prolia, but not preferred as there is no drug holiday  Call for SE  Discussed SE, safety/efficacy  Continue vitamin D3 supplementation  Take precautions to avoid falls  Recommended weight-bearing activities/exercises as tolerated  Next DEXA scan due in July 2021  -     alendronate (FOSAMAX) 70 mg tablet; Take 1 tablet (70 mg total) by mouth every 7 days    Vitamin D deficiency  Vitamin-D normal at 32 4  Continue current supplementation vitamin D3      Discussed with the patient diagnosis and treatment and all questions fully answered  She will call me if any problems arise      Counseled patient on diagnostic results, prognosis, risk and benefit of treatment options, instruction for management, importance of treatment compliance, risk factor reduction and impressions        Michelle Jalloh PA-C

## 2020-01-09 NOTE — PATIENT INSTRUCTIONS
Hypoglycemia instructions   Pepper Barry  1/9/2020  [id number]    Low Blood Sugar    Steps to treat low blood sugar  1  Test blood sugar if you have symptoms of low blood sugar:   Low Blood Sugar Symptoms:  o Sweaty  o Dizzy  o Rapid heartbeat  o Shaky  o Bad mood  o Hungry      2  Treat blood sugar less than 70 with 15 grams of fast-acting carbohydrate:   Examples of 15 grams Fast-Acting Carbohydrate:  o 4 oz juice  o 4 oz regular soda  o 3-4 glucose tablets (chew)  o 3-4 hard candies (chew)          3  Wait 15 minutes and test your blood sugar again     4   If blood sugar is less than 100, repeat steps 2-3     5  When your blood sugar is 100 or more, eat a snack if it will be longer than one hour until your next meal  The snack should be 15 grams of carbohydrate and a protein:   Examples of snacks:  o ½ sandwich  o 6 crackers with cheese  o Piece of fruit with cheese or peanut butter  o 6 crackers with peanut butter

## 2020-02-28 DIAGNOSIS — E11.65 TYPE 2 DIABETES MELLITUS WITH HYPERGLYCEMIA, WITHOUT LONG-TERM CURRENT USE OF INSULIN (HCC): Primary | ICD-10-CM

## 2020-03-19 ENCOUNTER — TELEPHONE (OUTPATIENT)
Dept: PULMONOLOGY | Facility: CLINIC | Age: 80
End: 2020-03-19

## 2020-03-19 NOTE — TELEPHONE ENCOUNTER
Due to the current pandemic of COVID-19 Patient was given the option to par take in a video/ telephone call which was accepted by the patient  Patient was informed via telephone the following: There is a possibility of a $27 fee to participate in this Virtual Visit  This is depending of your insurance company and if they will cover that cost     Patients preferred phone number to contact: [phone]    Video option -Patients preferred e-mail: NONE      Patient informed that they will receive an e-mail 15 minutes before their scheduled time as a reminder             Any further questions patients may have they are informed to call [phone]

## 2020-03-21 ENCOUNTER — HOSPITAL ENCOUNTER (EMERGENCY)
Facility: HOSPITAL | Age: 80
Discharge: HOME | End: 2020-03-21
Attending: EMERGENCY MEDICINE | Admitting: EMERGENCY MEDICINE
Payer: MEDICARE

## 2020-03-21 ENCOUNTER — APPOINTMENT (EMERGENCY)
Dept: RADIOLOGY | Facility: HOSPITAL | Age: 80
End: 2020-03-21
Payer: MEDICARE

## 2020-03-21 VITALS
HEART RATE: 119 BPM | OXYGEN SATURATION: 98 % | SYSTOLIC BLOOD PRESSURE: 134 MMHG | RESPIRATION RATE: 18 BRPM | WEIGHT: 175.71 LBS | BODY MASS INDEX: 34.32 KG/M2 | DIASTOLIC BLOOD PRESSURE: 64 MMHG | TEMPERATURE: 98.3 F

## 2020-03-21 DIAGNOSIS — R00.0 TACHYCARDIA: ICD-10-CM

## 2020-03-21 DIAGNOSIS — J40 BRONCHITIS: Primary | ICD-10-CM

## 2020-03-21 DIAGNOSIS — J06.9 UPPER RESPIRATORY INFECTION: ICD-10-CM

## 2020-03-21 LAB
FLUAV RNA NPH QL NAA+PROBE: NORMAL
FLUBV RNA NPH QL NAA+PROBE: NORMAL
RSV RNA NPH QL NAA+PROBE: NORMAL
S PYO DNA THROAT QL NAA+PROBE: NORMAL

## 2020-03-21 PROCEDURE — 99284 EMERGENCY DEPT VISIT MOD MDM: CPT

## 2020-03-21 PROCEDURE — 71045 X-RAY EXAM CHEST 1 VIEW: CPT

## 2020-03-21 PROCEDURE — 87651 STREP A DNA AMP PROBE: CPT | Performed by: EMERGENCY MEDICINE

## 2020-03-21 PROCEDURE — 87631 RESP VIRUS 3-5 TARGETS: CPT | Performed by: EMERGENCY MEDICINE

## 2020-03-21 PROCEDURE — 87635 SARS-COV-2 COVID-19 AMP PRB: CPT | Performed by: EMERGENCY MEDICINE

## 2020-03-21 PROCEDURE — 99283 EMERGENCY DEPT VISIT LOW MDM: CPT | Performed by: EMERGENCY MEDICINE

## 2020-03-21 RX ORDER — LEVOFLOXACIN 500 MG/1
500 TABLET, FILM COATED ORAL DAILY
Qty: 10 TABLET | Refills: 0 | Status: SHIPPED | OUTPATIENT
Start: 2020-03-21 | End: 2020-03-31

## 2020-03-21 RX ORDER — SODIUM CHLORIDE FOR INHALATION 0.9 %
12 VIAL, NEBULIZER (ML) INHALATION ONCE
Status: DISCONTINUED | OUTPATIENT
Start: 2020-03-21 | End: 2020-03-21

## 2020-03-21 RX ADMIN — LEVOFLOXACIN 750 MG: 250 TABLET, FILM COATED ORAL at 13:02

## 2020-03-23 ENCOUNTER — TELEPHONE (OUTPATIENT)
Dept: PULMONOLOGY | Facility: CLINIC | Age: 80
End: 2020-03-23

## 2020-03-23 ENCOUNTER — TELEMEDICINE (OUTPATIENT)
Dept: PULMONOLOGY | Facility: CLINIC | Age: 80
End: 2020-03-23
Payer: MEDICARE

## 2020-03-23 DIAGNOSIS — J96.12 CHRONIC RESPIRATORY FAILURE WITH HYPOXIA AND HYPERCAPNIA (HCC): ICD-10-CM

## 2020-03-23 DIAGNOSIS — J44.9 COPD, SEVERE (HCC): Primary | ICD-10-CM

## 2020-03-23 DIAGNOSIS — J44.1 COPD EXACERBATION (HCC): ICD-10-CM

## 2020-03-23 DIAGNOSIS — J96.11 CHRONIC RESPIRATORY FAILURE WITH HYPOXIA AND HYPERCAPNIA (HCC): ICD-10-CM

## 2020-03-23 PROCEDURE — G2012 BRIEF CHECK IN BY MD/QHP: HCPCS | Performed by: INTERNAL MEDICINE

## 2020-03-23 RX ORDER — PREDNISONE 10 MG/1
TABLET ORAL
Qty: 30 TABLET | Refills: 0 | Status: SHIPPED | OUTPATIENT
Start: 2020-03-23 | End: 2020-05-29

## 2020-03-23 NOTE — ASSESSMENT & PLAN NOTE
Patient is having a mild COPD exacerbation, likely related to change in weather or acute viral process  I have low suspicion for COVID-19  Given lack of fever and overall improvement in her symptoms  I have elected to initiate a prednisone taper  She will continue with Symbicort and Spiriva as well as nebulizer regimen 3 times daily  She was instructed to go back to the emergency department if her symptoms get worse or oxygenation declines

## 2020-03-23 NOTE — ASSESSMENT & PLAN NOTE
Oxygenation is adequate on 2 liters/minute  She has a pulse ox and is monitoring closely at home  She will also continue with Trilogy NIV  During hours of sleep

## 2020-03-23 NOTE — PROGRESS NOTES
Virtual Regular Visit    Reason for visit is   Follow-up COPD and respiratory failure    This virtual check-in was done via telephone  Encounter provider Timothy Henley DO    Provider located at [address] [facility]      Recent Visits  No visits were found meeting these conditions  Showing recent visits within past 7 days and meeting all other requirements     Today's Visits  Date Type Provider Dept   03/23/20 Telephone Lina Santos RN [facility]   Showing today's visits and meeting all other requirements     Future Appointments  No visits were found meeting these conditions  Showing future appointments within next 150 days and meeting all other requirements      Patient agrees to participate in a virtual check in via telephone or video visit instead of presenting to the office to address urgent/immediate medical needs  Patient is aware this is a billable service  After connecting through telephone, the patient was identified by name and date of birth  Pretty Caban was informed that this was a telemedicine visit and that the exam was being conducted confidentially over secure lines  My office door was closed  No one else was in the room  She acknowledged consent and understanding of privacy and security of the virtual check-in visit  I informed the patient that I have reviewed her record in Epic and presented the opportunity for her to ask any questions regarding the visit today  The patient initiated communication and agreed to participate  Progress note - Pulmonary Medicine   Pretty Caban 78 y o  female MRN: [id number]       Impression & Plan:     COPD exacerbation [facility])    Patient is having a mild COPD exacerbation, likely related to change in weather or acute viral process    I have low suspicion for COVID-19  Given lack of fever and overall improvement in her symptoms  I have elected to initiate a prednisone taper  She will continue with Symbicort and Spiriva as well as nebulizer regimen 3 times daily  She was instructed to go back to the emergency department if her symptoms get worse or oxygenation declines  Chronic respiratory failure with hypoxia and hypercapnia (HCC)    Oxygenation is adequate on 2 liters/minute  She has a pulse ox and is monitoring closely at home  She will also continue with Trilogy NIV  During hours of sleep  Diagnoses and all orders for this visit:    COPD, severe ([address] )    Chronic respiratory failure with hypoxia and hypercapnia (HCC)    COPD exacerbation (HCC)  -     predniSONE 10 mg tablet; 4 tabs daily x 3 D then 3 tabs daily x 3 D then 2 tabs daily x 3 D then 1 tab daily x 3 D        Virtual visit time component:  Total visit time for chart and diagnostic data review, telephonic or video conference communication with the patient, and documentation: 23 minutes    I have personally spent 8 minutes reviewing the chart and imaging prior to the visit  I have personally spent 15 minutes on the phone with the patient  I have personally spent 15 minutes reviewing imaging, laboratory results and other testing results with the patient     ______________________________________________________________________    HPI:    Pepper Barry is being evaluated by virtual visit for follow-up of  Severe COPD and chronic hypoxemic and hypercapnic respiratory failure  She was seen in the emergency department over the weekend with complaints of sore throat and cough productive of green sputum  She was given a course of Levaquin and is feeling better from a coughing standpoint  She has not had any fevers  She has no significant wheezing but has significant chest tightness  She is compliant with Spiriva and Symbicort and has been using her nebulizer 3 times daily  Oxygen saturations are 95% on 2 L nasal cannula    She denies any sick contacts and has been home bound for the past 2 weeks  When she was in the emergency department, she had rapid strep, influenza and COVID  Testing  Flu and strep were negative, corona virus is pending      Current Medications:    Current Outpatient Medications:     albuterol (2 5 mg/3 mL) 0 083 % nebulizer solution, Take 1 vial (2 5 mg total) by nebulization every 6 (six) hours as needed for wheezing or shortness of breath, Disp: 120 vial, Rfl: 3    albuterol (PROAIR HFA) 90 mcg/act inhaler, Inhale 2 puffs every 6 (six) hours as needed for wheezing, Disp: 1 Inhaler, Rfl: 5    alendronate (FOSAMAX) 70 mg tablet, Take 1 tablet (70 mg total) by mouth every 7 days, Disp: 12 tablet, Rfl: 1    aspirin (ECOTRIN LOW STRENGTH) 81 mg EC tablet, Take 81 mg by mouth daily, Disp: , Rfl:     atorvastatin (LIPITOR) 20 mg tablet, 20 mg daily  , Disp: , Rfl:     Calcium-Magnesium-Vitamin D (CALCIUM 500 PO), Take by mouth daily, Disp: , Rfl:     Cholecalciferol (VITAMIN D) 2000 units CAPS, Take 2 capsules (4,000 Units total) by mouth daily, Disp: , Rfl:     cyanocobalamin (VITAMIN B-12) 100 mcg tablet, Take by mouth daily, Disp: , Rfl:     diltiazem (CARDIZEM CD) 300 mg 24 hr capsule, Take 1 capsule (300 mg total) by mouth daily, Disp: 90 capsule, Rfl: 1    enalapril (VASOTEC) 20 mg tablet, Take 20 mg by mouth daily  , Disp: , Rfl:     fluticasone (FLONASE) 50 mcg/act nasal spray, 2 sprays into each nostril daily, Disp: 1 Bottle, Rfl: 5    glimepiride (AMARYL) 1 mg tablet, Take 1 tablet (1 mg total) by mouth daily with breakfast, Disp: 90 tablet, Rfl: 1    levofloxacin (LEVAQUIN) 500 mg tablet, Take 1 tablet (500 mg total) by mouth daily for 10 days, Disp: 10 tablet, Rfl: 0    linaGLIPtin (Tradjenta) 5 MG TABS, Take 5 mg by mouth daily, Disp: 90 tablet, Rfl: 2    metFORMIN (GLUCOPHAGE) 500 mg tablet, Take 2 tablets (1,000 mg total) by mouth 2 (two) times a day with meals, Disp: 360 tablet, Rfl: 1    multivitamin (THERAGRAN) TABS, Take 1 tablet by mouth daily, Disp: , Rfl:     predniSONE 10 mg tablet, 4 tabs daily x 3 D then 3 tabs daily x 3 D then 2 tabs daily x 3 D then 1 tab daily x 3 D, Disp: 30 tablet, Rfl: 0    saxagliptin (ONGLYZA) 5 MG tablet, Take 1 tablet (5 mg total) by mouth daily, Disp: 90 tablet, Rfl: 1    SYMBICORT 160-4 5 MCG/ACT inhaler, , Disp: , Rfl:     SYNTHROID 25 MCG tablet, Take 25 mcg by mouth daily  , Disp: , Rfl:     tiotropium (SPIRIVA RESPIMAT) 2 5 MCG/ACT AERS inhaler, Inhale 2 puffs daily, Disp: 1 Inhaler, Rfl: 11    Review of Systems:  Review of Systems   Constitutional: Negative for fatigue and fever  HENT: Positive for sore throat  Gastrointestinal: Negative for diarrhea and nausea  Musculoskeletal: Negative  Neurological: Negative for dizziness and headaches  Past medical history, surgical history, and family history were reviewed and updated as appropriate    Social history updates:  Social History     Tobacco Use   Smoking Status Former Smoker    Packs/day: 1 00    Years: 40 00    Pack years: 40 00    Types: Cigarettes    Last attempt to quit: 2005    Years since quitting: 15 2   Smokeless Tobacco Never Used     PhysicalExamination:  Patient was evaluated telephonically, no physical examination could be performed    Diagnostic Data:  Labs: I personally reviewed the most recent laboratory data pertinent to today's visit    Lab Results   Component Value Date    WBC 11 79 (H) 06/19/2018    HGB 12 8 06/19/2018    HCT 41 5 06/19/2018    MCV 83 06/19/2018     06/19/2018     Lab Results   Component Value Date    SODIUM 139 01/07/2020    K 4 3 01/07/2020    CO2 32 01/07/2020     01/07/2020    BUN 19 01/07/2020    CREATININE 0 60 01/07/2020    CALCIUM 9 4 01/07/2020     Strep (-)  COVID-19 pending    PFT results: The most recent pulmonary function tests were reviewed    1/2/18  FEV1/FVC Ratio: 65 %  Forced Vital Capacity: 1 17 L    55 % predicted  FEV1: 0 77 L     48 % predicted  After administration of bronchodilator FEV1: reduced by 2%     Lung volumes by body plethysmography: Total Lung Capacity 105 % predicted Residual volume 148 % predicted     DLCO corrected for patients hemoglobin level: 13 %    Imaging:  I personally reviewed the images on the [facility] system pertinent to today's visit  CXR 3/21/20 lungs ryan [place] DO

## 2020-03-23 NOTE — TELEPHONE ENCOUNTER
Fernando Chavez calling saying she went to the ER on Saturday  They tested her for strep, covid and the flu  Everything negative so far  They diagnosed her with bronchitis and sent her home with Meghna  She said she is still having issues with her breathing and they never gave her prednisone  She is SOB all the time and has been wearing her oxygen at 2L  Pulse ox is 96%  She is still coughing up green mucous  She has chest tightness  She denies wheezing, but I can audibly hear some over the phone  Denies fevers, chills and night sweats  She has not been out of the country or around anyone who has  She has been at home  She does have a telephone visit with us on Wednesday

## 2020-03-27 LAB — SARS-COV-2 RNA SPEC QL NAA+PROBE: NOT DETECTED

## 2020-04-01 ENCOUNTER — TELEPHONE (OUTPATIENT)
Dept: PULMONOLOGY | Facility: CLINIC | Age: 80
End: 2020-04-01

## 2020-04-27 DIAGNOSIS — E11.65 TYPE 2 DIABETES MELLITUS WITH HYPERGLYCEMIA, WITHOUT LONG-TERM CURRENT USE OF INSULIN (HCC): Primary | ICD-10-CM

## 2020-04-27 RX ORDER — ATORVASTATIN CALCIUM 20 MG/1
TABLET, FILM COATED ORAL
Qty: 90 TABLET | Refills: 1 | Status: SHIPPED | OUTPATIENT
Start: 2020-04-27 | End: 2020-10-20

## 2020-05-11 DIAGNOSIS — J44.9 COPD, SEVERE (HCC): Primary | ICD-10-CM

## 2020-05-11 RX ORDER — TIOTROPIUM BROMIDE 18 UG/1
CAPSULE ORAL; RESPIRATORY (INHALATION)
Qty: 90 CAPSULE | Refills: 1 | Status: SHIPPED | OUTPATIENT
Start: 2020-05-11 | End: 2020-12-14

## 2020-05-28 ENCOUNTER — TELEMEDICINE (OUTPATIENT)
Dept: ENDOCRINOLOGY | Facility: CLINIC | Age: 80
End: 2020-05-28
Payer: MEDICARE

## 2020-05-28 DIAGNOSIS — I10 ESSENTIAL HYPERTENSION: ICD-10-CM

## 2020-05-28 DIAGNOSIS — E11.65 TYPE 2 DIABETES MELLITUS WITH HYPERGLYCEMIA, WITHOUT LONG-TERM CURRENT USE OF INSULIN (HCC): Primary | ICD-10-CM

## 2020-05-28 DIAGNOSIS — E78.2 MIXED HYPERLIPIDEMIA: ICD-10-CM

## 2020-05-28 DIAGNOSIS — E04.1 THYROID NODULE: ICD-10-CM

## 2020-05-28 DIAGNOSIS — E03.9 ACQUIRED HYPOTHYROIDISM: ICD-10-CM

## 2020-05-28 PROCEDURE — 99443 PR PHYS/QHP TELEPHONE EVALUATION 21-30 MIN: CPT | Performed by: PHYSICIAN ASSISTANT

## 2020-05-29 ENCOUNTER — APPOINTMENT (EMERGENCY)
Dept: RADIOLOGY | Facility: HOSPITAL | Age: 80
End: 2020-05-29
Payer: MEDICARE

## 2020-05-29 ENCOUNTER — HOSPITAL ENCOUNTER (OUTPATIENT)
Facility: HOSPITAL | Age: 80
Setting detail: OBSERVATION
Discharge: HOME | End: 2020-05-30
Attending: EMERGENCY MEDICINE | Admitting: INTERNAL MEDICINE
Payer: MEDICARE

## 2020-05-29 DIAGNOSIS — J44.9 COPD, SEVERE (HCC): ICD-10-CM

## 2020-05-29 DIAGNOSIS — J44.1 COPD EXACERBATION (HCC): Primary | ICD-10-CM

## 2020-05-29 PROBLEM — R65.10 SIRS (SYSTEMIC INFLAMMATORY RESPONSE SYNDROME) (HCC): Status: ACTIVE | Noted: 2020-05-29

## 2020-05-29 PROBLEM — R79.89 ELEVATED D-DIMER: Status: ACTIVE | Noted: 2020-05-29

## 2020-05-29 LAB
ALBUMIN SERPL BCP-MCNC: 3.8 G/DL (ref 3.5–5)
ALP SERPL-CCNC: 67 U/L (ref 46–116)
ALT SERPL W P-5'-P-CCNC: 19 U/L (ref 12–78)
ANION GAP SERPL CALCULATED.3IONS-SCNC: 8 MMOL/L (ref 4–13)
APTT PPP: 28 SECONDS (ref 23–37)
AST SERPL W P-5'-P-CCNC: 13 U/L (ref 5–45)
ATRIAL RATE: 96 BPM
BASOPHILS # BLD AUTO: 0.03 THOUSANDS/ΜL (ref 0–0.1)
BASOPHILS NFR BLD AUTO: 0 % (ref 0–1)
BILIRUB SERPL-MCNC: 0.56 MG/DL (ref 0.2–1)
BUN SERPL-MCNC: 14 MG/DL (ref 5–25)
CALCIUM SERPL-MCNC: 9.3 MG/DL (ref 8.3–10.1)
CHLORIDE SERPL-SCNC: 97 MMOL/L (ref 100–108)
CO2 SERPL-SCNC: 31 MMOL/L (ref 21–32)
CREAT SERPL-MCNC: 0.64 MG/DL (ref 0.6–1.3)
D DIMER PPP FEU-MCNC: 0.6 UG/ML FEU
EOSINOPHIL # BLD AUTO: 0.08 THOUSAND/ΜL (ref 0–0.61)
EOSINOPHIL NFR BLD AUTO: 1 % (ref 0–6)
ERYTHROCYTE [DISTWIDTH] IN BLOOD BY AUTOMATED COUNT: 15.8 % (ref 11.6–15.1)
EST. AVERAGE GLUCOSE BLD GHB EST-MCNC: 148 MG/DL
GFR SERPL CREATININE-BSD FRML MDRD: 85 ML/MIN/1.73SQ M
GLUCOSE SERPL-MCNC: 195 MG/DL (ref 65–140)
GLUCOSE SERPL-MCNC: 235 MG/DL (ref 65–140)
GLUCOSE SERPL-MCNC: 268 MG/DL (ref 65–140)
GLUCOSE SERPL-MCNC: 304 MG/DL (ref 65–140)
GLUCOSE SERPL-MCNC: 305 MG/DL (ref 65–140)
HBA1C MFR BLD: 6.8 %
HCT VFR BLD AUTO: 40.6 % (ref 34.8–46.1)
HGB BLD-MCNC: 12.9 G/DL (ref 11.5–15.4)
IMM GRANULOCYTES # BLD AUTO: 0.05 THOUSAND/UL (ref 0–0.2)
IMM GRANULOCYTES NFR BLD AUTO: 1 % (ref 0–2)
INR PPP: 0.99 (ref 0.84–1.19)
LYMPHOCYTES # BLD AUTO: 2 THOUSANDS/ΜL (ref 0.6–4.47)
LYMPHOCYTES NFR BLD AUTO: 18 % (ref 14–44)
MCH RBC QN AUTO: 26.4 PG (ref 26.8–34.3)
MCHC RBC AUTO-ENTMCNC: 31.8 G/DL (ref 31.4–37.4)
MCV RBC AUTO: 83 FL (ref 82–98)
MONOCYTES # BLD AUTO: 0.57 THOUSAND/ΜL (ref 0.17–1.22)
MONOCYTES NFR BLD AUTO: 5 % (ref 4–12)
NEUTROPHILS # BLD AUTO: 8.14 THOUSANDS/ΜL (ref 1.85–7.62)
NEUTS SEG NFR BLD AUTO: 75 % (ref 43–75)
NRBC BLD AUTO-RTO: 0 /100 WBCS
P AXIS: 75 DEGREES
PLATELET # BLD AUTO: 275 THOUSANDS/UL (ref 149–390)
PLATELET # BLD AUTO: 315 THOUSANDS/UL (ref 149–390)
PMV BLD AUTO: 10.1 FL (ref 8.9–12.7)
PMV BLD AUTO: 9.9 FL (ref 8.9–12.7)
POTASSIUM SERPL-SCNC: 3.6 MMOL/L (ref 3.5–5.3)
PR INTERVAL: 160 MS
PROCALCITONIN SERPL-MCNC: <0.05 NG/ML
PROT SERPL-MCNC: 7.8 G/DL (ref 6.4–8.2)
PROTHROMBIN TIME: 12.5 SECONDS (ref 11.6–14.5)
QRS AXIS: -71 DEGREES
QRSD INTERVAL: 122 MS
QT INTERVAL: 386 MS
QTC INTERVAL: 487 MS
RBC # BLD AUTO: 4.89 MILLION/UL (ref 3.81–5.12)
SARS-COV-2 RNA RESP QL NAA+PROBE: NEGATIVE
SODIUM SERPL-SCNC: 136 MMOL/L (ref 136–145)
T WAVE AXIS: 25 DEGREES
TROPONIN I SERPL-MCNC: <0.02 NG/ML
VENTRICULAR RATE: 96 BPM
WBC # BLD AUTO: 10.87 THOUSAND/UL (ref 4.31–10.16)

## 2020-05-29 PROCEDURE — 96375 TX/PRO/DX INJ NEW DRUG ADDON: CPT

## 2020-05-29 PROCEDURE — 83036 HEMOGLOBIN GLYCOSYLATED A1C: CPT | Performed by: PHYSICIAN ASSISTANT

## 2020-05-29 PROCEDURE — 94640 AIRWAY INHALATION TREATMENT: CPT

## 2020-05-29 PROCEDURE — 99215 OFFICE O/P EST HI 40 MIN: CPT | Performed by: INTERNAL MEDICINE

## 2020-05-29 PROCEDURE — 36415 COLL VENOUS BLD VENIPUNCTURE: CPT | Performed by: EMERGENCY MEDICINE

## 2020-05-29 PROCEDURE — 94640 AIRWAY INHALATION TREATMENT: CPT | Performed by: EMERGENCY MEDICINE

## 2020-05-29 PROCEDURE — 85379 FIBRIN DEGRADATION QUANT: CPT | Performed by: EMERGENCY MEDICINE

## 2020-05-29 PROCEDURE — 85730 THROMBOPLASTIN TIME PARTIAL: CPT | Performed by: EMERGENCY MEDICINE

## 2020-05-29 PROCEDURE — 93010 ELECTROCARDIOGRAM REPORT: CPT | Performed by: INTERNAL MEDICINE

## 2020-05-29 PROCEDURE — 99220 PR INITIAL OBSERVATION CARE/DAY 70 MINUTES: CPT | Performed by: INTERNAL MEDICINE

## 2020-05-29 PROCEDURE — 99285 EMERGENCY DEPT VISIT HI MDM: CPT

## 2020-05-29 PROCEDURE — 82948 REAGENT STRIP/BLOOD GLUCOSE: CPT

## 2020-05-29 PROCEDURE — 84484 ASSAY OF TROPONIN QUANT: CPT | Performed by: EMERGENCY MEDICINE

## 2020-05-29 PROCEDURE — 96365 THER/PROPH/DIAG IV INF INIT: CPT

## 2020-05-29 PROCEDURE — 94664 DEMO&/EVAL PT USE INHALER: CPT

## 2020-05-29 PROCEDURE — 84145 PROCALCITONIN (PCT): CPT | Performed by: PHYSICIAN ASSISTANT

## 2020-05-29 PROCEDURE — 85610 PROTHROMBIN TIME: CPT | Performed by: EMERGENCY MEDICINE

## 2020-05-29 PROCEDURE — 87040 BLOOD CULTURE FOR BACTERIA: CPT | Performed by: EMERGENCY MEDICINE

## 2020-05-29 PROCEDURE — 80053 COMPREHEN METABOLIC PANEL: CPT | Performed by: EMERGENCY MEDICINE

## 2020-05-29 PROCEDURE — 94760 N-INVAS EAR/PLS OXIMETRY 1: CPT

## 2020-05-29 PROCEDURE — 94644 CONT INHLJ TX 1ST HOUR: CPT

## 2020-05-29 PROCEDURE — 87635 SARS-COV-2 COVID-19 AMP PRB: CPT | Performed by: EMERGENCY MEDICINE

## 2020-05-29 PROCEDURE — 85025 COMPLETE CBC W/AUTO DIFF WBC: CPT | Performed by: EMERGENCY MEDICINE

## 2020-05-29 PROCEDURE — 71045 X-RAY EXAM CHEST 1 VIEW: CPT

## 2020-05-29 PROCEDURE — 93005 ELECTROCARDIOGRAM TRACING: CPT

## 2020-05-29 PROCEDURE — 85049 AUTOMATED PLATELET COUNT: CPT | Performed by: PHYSICIAN ASSISTANT

## 2020-05-29 PROCEDURE — 99285 EMERGENCY DEPT VISIT HI MDM: CPT | Performed by: EMERGENCY MEDICINE

## 2020-05-29 RX ORDER — SODIUM CHLORIDE FOR INHALATION 0.9 %
3 VIAL, NEBULIZER (ML) INHALATION ONCE
Status: COMPLETED | OUTPATIENT
Start: 2020-05-29 | End: 2020-05-29

## 2020-05-29 RX ORDER — LEVALBUTEROL 1.25 MG/.5ML
1.25 SOLUTION, CONCENTRATE RESPIRATORY (INHALATION)
Status: DISCONTINUED | OUTPATIENT
Start: 2020-05-29 | End: 2020-05-30 | Stop reason: HOSPADM

## 2020-05-29 RX ORDER — LEVOTHYROXINE SODIUM 0.03 MG/1
25 TABLET ORAL
Status: DISCONTINUED | OUTPATIENT
Start: 2020-05-29 | End: 2020-05-30 | Stop reason: HOSPADM

## 2020-05-29 RX ORDER — DILTIAZEM HYDROCHLORIDE 300 MG/1
300 CAPSULE, COATED, EXTENDED RELEASE ORAL DAILY
Status: DISCONTINUED | OUTPATIENT
Start: 2020-05-29 | End: 2020-05-30 | Stop reason: HOSPADM

## 2020-05-29 RX ORDER — CALCIUM CARBONATE 200(500)MG
1000 TABLET,CHEWABLE ORAL DAILY PRN
Status: DISCONTINUED | OUTPATIENT
Start: 2020-05-29 | End: 2020-05-30 | Stop reason: HOSPADM

## 2020-05-29 RX ORDER — ALBUTEROL SULFATE 2.5 MG/3ML
10 SOLUTION RESPIRATORY (INHALATION) ONCE
Status: COMPLETED | OUTPATIENT
Start: 2020-05-29 | End: 2020-05-29

## 2020-05-29 RX ORDER — SODIUM CHLORIDE 30 MG/ML INHALATION SOLUTION 30 MG/ML
4 SOLUTION INHALANT ONCE
Status: DISCONTINUED | OUTPATIENT
Start: 2020-05-29 | End: 2020-05-29

## 2020-05-29 RX ORDER — ACETAMINOPHEN 325 MG/1
650 TABLET ORAL EVERY 6 HOURS PRN
Status: DISCONTINUED | OUTPATIENT
Start: 2020-05-29 | End: 2020-05-30 | Stop reason: HOSPADM

## 2020-05-29 RX ORDER — LISINOPRIL 10 MG/1
40 TABLET ORAL DAILY
Status: DISCONTINUED | OUTPATIENT
Start: 2020-05-29 | End: 2020-05-30 | Stop reason: HOSPADM

## 2020-05-29 RX ORDER — SODIUM CHLORIDE FOR INHALATION 0.9 %
3 VIAL, NEBULIZER (ML) INHALATION
Status: DISCONTINUED | OUTPATIENT
Start: 2020-05-29 | End: 2020-05-30 | Stop reason: HOSPADM

## 2020-05-29 RX ORDER — ATORVASTATIN CALCIUM 40 MG/1
20 TABLET, FILM COATED ORAL DAILY
Status: DISCONTINUED | OUTPATIENT
Start: 2020-05-29 | End: 2020-05-30 | Stop reason: HOSPADM

## 2020-05-29 RX ORDER — ASPIRIN 81 MG/1
81 TABLET ORAL DAILY
Status: DISCONTINUED | OUTPATIENT
Start: 2020-05-29 | End: 2020-05-30 | Stop reason: HOSPADM

## 2020-05-29 RX ORDER — METHYLPREDNISOLONE SODIUM SUCCINATE 40 MG/ML
40 INJECTION, POWDER, LYOPHILIZED, FOR SOLUTION INTRAMUSCULAR; INTRAVENOUS EVERY 12 HOURS SCHEDULED
Status: DISCONTINUED | OUTPATIENT
Start: 2020-05-29 | End: 2020-05-30 | Stop reason: HOSPADM

## 2020-05-29 RX ORDER — ALBUTEROL SULFATE 90 UG/1
2 AEROSOL, METERED RESPIRATORY (INHALATION) EVERY 6 HOURS PRN
Status: DISCONTINUED | OUTPATIENT
Start: 2020-05-29 | End: 2020-05-30 | Stop reason: HOSPADM

## 2020-05-29 RX ORDER — PREDNISONE 20 MG/1
60 TABLET ORAL ONCE
Status: COMPLETED | OUTPATIENT
Start: 2020-05-29 | End: 2020-05-29

## 2020-05-29 RX ORDER — METHYLPREDNISOLONE SODIUM SUCCINATE 40 MG/ML
40 INJECTION, POWDER, LYOPHILIZED, FOR SOLUTION INTRAMUSCULAR; INTRAVENOUS EVERY 8 HOURS
Status: DISCONTINUED | OUTPATIENT
Start: 2020-05-29 | End: 2020-05-29

## 2020-05-29 RX ORDER — BUDESONIDE AND FORMOTEROL FUMARATE DIHYDRATE 160; 4.5 UG/1; UG/1
2 AEROSOL RESPIRATORY (INHALATION) 2 TIMES DAILY
Status: DISCONTINUED | OUTPATIENT
Start: 2020-05-29 | End: 2020-05-30 | Stop reason: HOSPADM

## 2020-05-29 RX ORDER — FLUTICASONE PROPIONATE 50 MCG
2 SPRAY, SUSPENSION (ML) NASAL DAILY
Status: DISCONTINUED | OUTPATIENT
Start: 2020-05-29 | End: 2020-05-30 | Stop reason: HOSPADM

## 2020-05-29 RX ORDER — AZITHROMYCIN 250 MG/1
500 TABLET, FILM COATED ORAL 3 TIMES WEEKLY
Status: DISCONTINUED | OUTPATIENT
Start: 2020-05-29 | End: 2020-05-30 | Stop reason: HOSPADM

## 2020-05-29 RX ADMIN — AZITHROMYCIN MONOHYDRATE 500 MG: 500 INJECTION, POWDER, LYOPHILIZED, FOR SOLUTION INTRAVENOUS at 06:16

## 2020-05-29 RX ADMIN — ASPIRIN 81 MG: 81 TABLET, COATED ORAL at 09:10

## 2020-05-29 RX ADMIN — ISODIUM CHLORIDE 3 ML: 0.03 SOLUTION RESPIRATORY (INHALATION) at 04:24

## 2020-05-29 RX ADMIN — ALBUTEROL SULFATE 10 MG: 2.5 SOLUTION RESPIRATORY (INHALATION) at 04:45

## 2020-05-29 RX ADMIN — CEFTRIAXONE SODIUM 1000 MG: 10 INJECTION, POWDER, FOR SOLUTION INTRAVENOUS at 05:43

## 2020-05-29 RX ADMIN — ALBUTEROL SULFATE 10 MG: 2.5 SOLUTION RESPIRATORY (INHALATION) at 04:24

## 2020-05-29 RX ADMIN — PREDNISONE 60 MG: 20 TABLET ORAL at 04:06

## 2020-05-29 RX ADMIN — BUDESONIDE AND FORMOTEROL FUMARATE DIHYDRATE 2 PUFF: 160; 4.5 AEROSOL RESPIRATORY (INHALATION) at 10:00

## 2020-05-29 RX ADMIN — LISINOPRIL 40 MG: 10 TABLET ORAL at 09:10

## 2020-05-29 RX ADMIN — IPRATROPIUM BROMIDE 1 MG: 0.5 SOLUTION RESPIRATORY (INHALATION) at 04:23

## 2020-05-29 RX ADMIN — IPRATROPIUM BROMIDE 1 MG: 0.5 SOLUTION RESPIRATORY (INHALATION) at 04:46

## 2020-05-29 RX ADMIN — TIOTROPIUM BROMIDE 18 MCG: 18 CAPSULE ORAL; RESPIRATORY (INHALATION) at 10:00

## 2020-05-29 RX ADMIN — ATORVASTATIN CALCIUM 20 MG: 40 TABLET, FILM COATED ORAL at 09:10

## 2020-05-29 RX ADMIN — INSULIN LISPRO 3 UNITS: 100 INJECTION, SOLUTION INTRAVENOUS; SUBCUTANEOUS at 17:03

## 2020-05-29 RX ADMIN — AZITHROMYCIN 500 MG: 250 TABLET, FILM COATED ORAL at 14:04

## 2020-05-29 RX ADMIN — BUDESONIDE AND FORMOTEROL FUMARATE DIHYDRATE 2 PUFF: 160; 4.5 AEROSOL RESPIRATORY (INHALATION) at 20:18

## 2020-05-29 RX ADMIN — ISODIUM CHLORIDE 3 ML: 0.03 SOLUTION RESPIRATORY (INHALATION) at 04:46

## 2020-05-29 RX ADMIN — METHYLPREDNISOLONE SODIUM SUCCINATE 40 MG: 40 INJECTION, POWDER, FOR SOLUTION INTRAMUSCULAR; INTRAVENOUS at 20:16

## 2020-05-29 RX ADMIN — DILTIAZEM HYDROCHLORIDE 300 MG: 300 CAPSULE, EXTENDED RELEASE ORAL at 10:00

## 2020-05-29 RX ADMIN — ENOXAPARIN SODIUM 40 MG: 40 INJECTION SUBCUTANEOUS at 09:10

## 2020-05-29 RX ADMIN — INSULIN LISPRO 3 UNITS: 100 INJECTION, SOLUTION INTRAVENOUS; SUBCUTANEOUS at 21:34

## 2020-05-29 RX ADMIN — LEVALBUTEROL 1.25 MG: 1.25 SOLUTION, CONCENTRATE RESPIRATORY (INHALATION) at 13:24

## 2020-05-29 RX ADMIN — ISODIUM CHLORIDE 3 ML: 0.03 SOLUTION RESPIRATORY (INHALATION) at 13:24

## 2020-05-29 RX ADMIN — INSULIN LISPRO 2 UNITS: 100 INJECTION, SOLUTION INTRAVENOUS; SUBCUTANEOUS at 13:17

## 2020-05-29 RX ADMIN — LEVALBUTEROL 1.25 MG: 1.25 SOLUTION, CONCENTRATE RESPIRATORY (INHALATION) at 19:59

## 2020-05-29 RX ADMIN — INSULIN LISPRO 2 UNITS: 100 INJECTION, SOLUTION INTRAVENOUS; SUBCUTANEOUS at 10:01

## 2020-05-29 RX ADMIN — METHYLPREDNISOLONE SODIUM SUCCINATE 40 MG: 40 INJECTION, POWDER, FOR SOLUTION INTRAMUSCULAR; INTRAVENOUS at 09:10

## 2020-05-29 RX ADMIN — ISODIUM CHLORIDE 3 ML: 0.03 SOLUTION RESPIRATORY (INHALATION) at 19:59

## 2020-05-30 VITALS
WEIGHT: 164.24 LBS | HEART RATE: 105 BPM | SYSTOLIC BLOOD PRESSURE: 124 MMHG | HEIGHT: 60 IN | DIASTOLIC BLOOD PRESSURE: 63 MMHG | BODY MASS INDEX: 32.25 KG/M2 | RESPIRATION RATE: 20 BRPM | OXYGEN SATURATION: 94 % | TEMPERATURE: 97.6 F

## 2020-05-30 LAB
ANION GAP SERPL CALCULATED.3IONS-SCNC: 6 MMOL/L (ref 4–13)
BASOPHILS # BLD MANUAL: 0 THOUSAND/UL (ref 0–0.1)
BASOPHILS NFR MAR MANUAL: 0 % (ref 0–1)
BUN SERPL-MCNC: 16 MG/DL (ref 5–25)
CALCIUM SERPL-MCNC: 9 MG/DL (ref 8.3–10.1)
CHLORIDE SERPL-SCNC: 102 MMOL/L (ref 100–108)
CO2 SERPL-SCNC: 31 MMOL/L (ref 21–32)
CREAT SERPL-MCNC: 0.56 MG/DL (ref 0.6–1.3)
EOSINOPHIL # BLD MANUAL: 0 THOUSAND/UL (ref 0–0.4)
EOSINOPHIL NFR BLD MANUAL: 0 % (ref 0–6)
ERYTHROCYTE [DISTWIDTH] IN BLOOD BY AUTOMATED COUNT: 15.7 % (ref 11.6–15.1)
GFR SERPL CREATININE-BSD FRML MDRD: 88 ML/MIN/1.73SQ M
GLUCOSE SERPL-MCNC: 252 MG/DL (ref 65–140)
GLUCOSE SERPL-MCNC: 290 MG/DL (ref 65–140)
GLUCOSE SERPL-MCNC: 291 MG/DL (ref 65–140)
HCT VFR BLD AUTO: 37.8 % (ref 34.8–46.1)
HGB BLD-MCNC: 12 G/DL (ref 11.5–15.4)
LYMPHOCYTES # BLD AUTO: 0.6 THOUSAND/UL (ref 0.6–4.47)
LYMPHOCYTES # BLD AUTO: 5 % (ref 14–44)
MCH RBC QN AUTO: 26.4 PG (ref 26.8–34.3)
MCHC RBC AUTO-ENTMCNC: 31.7 G/DL (ref 31.4–37.4)
MCV RBC AUTO: 83 FL (ref 82–98)
METAMYELOCYTES NFR BLD MANUAL: 1 % (ref 0–1)
MONOCYTES # BLD AUTO: 0.36 THOUSAND/UL (ref 0–1.22)
MONOCYTES NFR BLD: 3 % (ref 4–12)
NEUTROPHILS # BLD MANUAL: 10.99 THOUSAND/UL (ref 1.85–7.62)
NEUTS SEG NFR BLD AUTO: 91 % (ref 43–75)
NRBC BLD AUTO-RTO: 0 /100 WBCS
PLATELET # BLD AUTO: 299 THOUSANDS/UL (ref 149–390)
PLATELET BLD QL SMEAR: ADEQUATE
PMV BLD AUTO: 10.3 FL (ref 8.9–12.7)
POTASSIUM SERPL-SCNC: 3.9 MMOL/L (ref 3.5–5.3)
PROCALCITONIN SERPL-MCNC: <0.05 NG/ML
RBC # BLD AUTO: 4.55 MILLION/UL (ref 3.81–5.12)
RBC MORPH BLD: NORMAL
SODIUM SERPL-SCNC: 139 MMOL/L (ref 136–145)
TOTAL CELLS COUNTED SPEC: 100
WBC # BLD AUTO: 12.08 THOUSAND/UL (ref 4.31–10.16)

## 2020-05-30 PROCEDURE — 82948 REAGENT STRIP/BLOOD GLUCOSE: CPT

## 2020-05-30 PROCEDURE — 85007 BL SMEAR W/DIFF WBC COUNT: CPT | Performed by: PHYSICIAN ASSISTANT

## 2020-05-30 PROCEDURE — 85027 COMPLETE CBC AUTOMATED: CPT | Performed by: PHYSICIAN ASSISTANT

## 2020-05-30 PROCEDURE — 80048 BASIC METABOLIC PNL TOTAL CA: CPT | Performed by: PHYSICIAN ASSISTANT

## 2020-05-30 PROCEDURE — 99217 PR OBSERVATION CARE DISCHARGE MANAGEMENT: CPT | Performed by: INTERNAL MEDICINE

## 2020-05-30 PROCEDURE — 94640 AIRWAY INHALATION TREATMENT: CPT

## 2020-05-30 PROCEDURE — 94760 N-INVAS EAR/PLS OXIMETRY 1: CPT

## 2020-05-30 PROCEDURE — 99214 OFFICE O/P EST MOD 30 MIN: CPT | Performed by: NURSE PRACTITIONER

## 2020-05-30 PROCEDURE — 84145 PROCALCITONIN (PCT): CPT | Performed by: PHYSICIAN ASSISTANT

## 2020-05-30 RX ORDER — PREDNISONE 10 MG/1
10 TABLET ORAL DAILY
Qty: 30 TABLET | Refills: 0 | Status: SHIPPED | OUTPATIENT
Start: 2020-05-31 | End: 2020-09-03 | Stop reason: ALTCHOICE

## 2020-05-30 RX ORDER — AZITHROMYCIN 500 MG/1
500 TABLET, FILM COATED ORAL 3 TIMES WEEKLY
Qty: 12 TABLET | Refills: 3 | Status: SHIPPED | OUTPATIENT
Start: 2020-06-01 | End: 2020-07-01

## 2020-05-30 RX ADMIN — METHYLPREDNISOLONE SODIUM SUCCINATE 40 MG: 40 INJECTION, POWDER, FOR SOLUTION INTRAMUSCULAR; INTRAVENOUS at 08:43

## 2020-05-30 RX ADMIN — LISINOPRIL 40 MG: 10 TABLET ORAL at 08:44

## 2020-05-30 RX ADMIN — INSULIN LISPRO 3 UNITS: 100 INJECTION, SOLUTION INTRAVENOUS; SUBCUTANEOUS at 08:45

## 2020-05-30 RX ADMIN — ENOXAPARIN SODIUM 40 MG: 40 INJECTION SUBCUTANEOUS at 08:44

## 2020-05-30 RX ADMIN — ATORVASTATIN CALCIUM 20 MG: 40 TABLET, FILM COATED ORAL at 08:42

## 2020-05-30 RX ADMIN — LEVOTHYROXINE SODIUM 25 MCG: 25 TABLET ORAL at 05:19

## 2020-05-30 RX ADMIN — ASPIRIN 81 MG: 81 TABLET, COATED ORAL at 08:43

## 2020-05-30 RX ADMIN — ISODIUM CHLORIDE 3 ML: 0.03 SOLUTION RESPIRATORY (INHALATION) at 07:16

## 2020-05-30 RX ADMIN — TIOTROPIUM BROMIDE 18 MCG: 18 CAPSULE ORAL; RESPIRATORY (INHALATION) at 08:44

## 2020-05-30 RX ADMIN — DILTIAZEM HYDROCHLORIDE 300 MG: 300 CAPSULE, EXTENDED RELEASE ORAL at 08:43

## 2020-05-30 RX ADMIN — LEVALBUTEROL 1.25 MG: 1.25 SOLUTION, CONCENTRATE RESPIRATORY (INHALATION) at 07:16

## 2020-05-30 RX ADMIN — FLUTICASONE PROPIONATE 2 SPRAY: 50 SPRAY, METERED NASAL at 08:44

## 2020-05-30 RX ADMIN — BUDESONIDE AND FORMOTEROL FUMARATE DIHYDRATE 2 PUFF: 160; 4.5 AEROSOL RESPIRATORY (INHALATION) at 08:44

## 2020-05-30 RX ADMIN — INSULIN LISPRO 2 UNITS: 100 INJECTION, SOLUTION INTRAVENOUS; SUBCUTANEOUS at 12:31

## 2020-06-03 ENCOUNTER — TELEPHONE (OUTPATIENT)
Dept: ENDOCRINOLOGY | Facility: CLINIC | Age: 80
End: 2020-06-03

## 2020-06-03 LAB
BACTERIA BLD CULT: NORMAL
BACTERIA BLD CULT: NORMAL

## 2020-06-08 ENCOUNTER — TELEPHONE (OUTPATIENT)
Dept: ENDOCRINOLOGY | Facility: CLINIC | Age: 80
End: 2020-06-08

## 2020-06-15 ENCOUNTER — TELEPHONE (OUTPATIENT)
Dept: FAMILY MEDICINE CLINIC | Facility: CLINIC | Age: 80
End: 2020-06-15

## 2020-06-15 DIAGNOSIS — B37.3 VAGINAL CANDIDIASIS: Primary | ICD-10-CM

## 2020-06-15 RX ORDER — FLUCONAZOLE 150 MG/1
150 TABLET ORAL ONCE
Qty: 1 TABLET | Refills: 0 | Status: SHIPPED | OUTPATIENT
Start: 2020-06-15 | End: 2020-06-15

## 2020-06-17 DIAGNOSIS — Z79.4 TYPE 2 DIABETES MELLITUS WITH CHRONIC KIDNEY DISEASE, WITH LONG-TERM CURRENT USE OF INSULIN, UNSPECIFIED CKD STAGE (HCC): Primary | ICD-10-CM

## 2020-06-17 DIAGNOSIS — E11.22 TYPE 2 DIABETES MELLITUS WITH CHRONIC KIDNEY DISEASE, WITH LONG-TERM CURRENT USE OF INSULIN, UNSPECIFIED CKD STAGE (HCC): Primary | ICD-10-CM

## 2020-06-29 DIAGNOSIS — E11.65 TYPE 2 DIABETES MELLITUS WITH HYPERGLYCEMIA, WITHOUT LONG-TERM CURRENT USE OF INSULIN (HCC): ICD-10-CM

## 2020-06-29 RX ORDER — GLIMEPIRIDE 1 MG/1
TABLET ORAL
Qty: 90 TABLET | Refills: 1 | Status: SHIPPED | OUTPATIENT
Start: 2020-06-29 | End: 2020-12-23

## 2020-09-03 ENCOUNTER — OFFICE VISIT (OUTPATIENT)
Dept: FAMILY MEDICINE CLINIC | Facility: CLINIC | Age: 80
End: 2020-09-03
Payer: MEDICARE

## 2020-09-03 VITALS
SYSTOLIC BLOOD PRESSURE: 144 MMHG | OXYGEN SATURATION: 90 % | DIASTOLIC BLOOD PRESSURE: 66 MMHG | RESPIRATION RATE: 20 BRPM | HEART RATE: 97 BPM | WEIGHT: 161 LBS | BODY MASS INDEX: 31.61 KG/M2 | HEIGHT: 60 IN | TEMPERATURE: 98 F

## 2020-09-03 DIAGNOSIS — G47.33 OBSTRUCTIVE SLEEP APNEA: ICD-10-CM

## 2020-09-03 DIAGNOSIS — H25.9 AGE-RELATED CATARACT OF BOTH EYES, UNSPECIFIED AGE-RELATED CATARACT TYPE: ICD-10-CM

## 2020-09-03 DIAGNOSIS — J44.9 COPD, SEVERE (HCC): ICD-10-CM

## 2020-09-03 DIAGNOSIS — E11.22 TYPE 2 DIABETES MELLITUS WITH CHRONIC KIDNEY DISEASE, WITHOUT LONG-TERM CURRENT USE OF INSULIN, UNSPECIFIED CKD STAGE (HCC): ICD-10-CM

## 2020-09-03 DIAGNOSIS — Z01.818 PREOP EXAMINATION: Primary | ICD-10-CM

## 2020-09-03 DIAGNOSIS — E03.9 ACQUIRED HYPOTHYROIDISM: ICD-10-CM

## 2020-09-03 PROCEDURE — 99204 OFFICE O/P NEW MOD 45 MIN: CPT | Performed by: FAMILY MEDICINE

## 2020-09-03 RX ORDER — LISINOPRIL AND HYDROCHLOROTHIAZIDE 25; 20 MG/1; MG/1
TABLET ORAL
COMMUNITY
Start: 2020-08-08 | End: 2020-11-06

## 2020-09-03 RX ORDER — LINAGLIPTIN 5 MG/1
5 TABLET, FILM COATED ORAL DAILY
COMMUNITY
End: 2020-11-17

## 2020-09-03 NOTE — ASSESSMENT & PLAN NOTE
Lab Results   Component Value Date    HGBA1C 6 8 (H) 05/29/2020   Follows endocrinologist and stable

## 2020-09-03 NOTE — PROGRESS NOTES
Assessment/Plan:    Problem List Items Addressed This Visit        Endocrine    Hypothyroidism     Follows endocrinologist, on levothyroxine and stable         Type 2 diabetes mellitus ([facility] [address] )       Lab Results   Component Value Date    HGBA1C 6 8 (H) 05/29/2020   Follows endocrinologist and stable         Relevant Medications    linaGLIPtin (Tradjenta) 5 MG TABS       Respiratory    COPD, severe ([address] )     On Spiriva  And Symbicort, 2 samples of Symbicort given on she use 2 L home oxygen           Obstructive sleep apnea     On CPAP machine and she is also on home oxygen            Other    Preop examination - Primary  Record from the hospital and previous notes from her speciality doctors reviewed  Her EKG was done in May as she was in the hospital and labs were done  , she will be cleared for the cataract surgery, advised not to take oral hypoglycemic agents in the morning of surgery as she will be NPO, she will continue rest of her medication    Age-related cataract of both eyes          Chief Complaint   Patient presents with    Pre-op Exam       Subjective:   Patient ID: Guido Barreto is a [de-identified] y o  female    She is here for preop clearance, she is type 2 diabetic, she says her blood sugar this morning was 117, she takes all medication and follows endocrinologist, she see ophthalmologist regularly and she see the podiatrist  She has hyperlipidemia on statins, tolerates well, she has hypothyroid and levothyroxine and follows endocrinologist  She has severe emphysema and she is on Spiriva and Symbicort and she wants to have samples of Symbicort as they are very expensive and she use albuterol as needed, she use home oxygen 2 L most of the time when needed and she use CPAP machine at nighttime for sleep apnea  She is ex-smoker  She says she had ablation for the tachycardia in the past she has no coronary artery disease and no CHF and she follows cardiologist regularly at [facility]/  She says she get easily short of breath when she walks due to her COPD, and she follows pulmonologist  She has history of DVT and pulmonary embolism and she used to be on blood thinners, but due to the bleeding hemorrhoids she was switched to aspirin and now she takes a baby aspirin daily  She was in the ER in May and she had EKG labs  She says she always have fast breathing and fast heart rate due to her COPD ,  She denies any chest pain or dizziness and she says up-to-date on pneumonia vaccine and she gets flu vaccine in October    Procedure: rt eye cataract surgery   Date of surgery: 9/23/20  Surgeon: Eve Coon  Indication of surgery: cataract   Surgical Risk Assessment :  Prior anesthesia: yes   Coronary artery disease:non  CHF:non  Wear dentures:non  Sleep apnea:yes  TMJ:non  Diabetes mellitus:yes controlled   Hypertension:yes  Coagulation disorder:non  Seizure disorder:non   exercise capacity:  Able to walk 2 flights of stairs without symptoms:gets sob due to emphysema   DVT:yes  In the past          Review of Systems   Constitutional: Negative for activity change, appetite change, chills, diaphoresis, fatigue, fever and unexpected weight change  HENT: Negative for congestion, dental problem, ear discharge, ear pain, facial swelling, hearing loss, mouth sores, nosebleeds, postnasal drip, rhinorrhea, sinus pressure, sinus pain, sneezing, sore throat, trouble swallowing and voice change  Eyes: Negative for photophobia, pain, discharge, redness and itching  Respiratory: Negative for cough, chest tightness, shortness of breath and wheezing  Cardiovascular: Negative for chest pain, palpitations and leg swelling  Gastrointestinal: Negative for abdominal distention, abdominal pain, blood in stool, constipation, diarrhea and nausea  Endocrine: Negative for cold intolerance, heat intolerance, polydipsia, polyphagia and polyuria  Genitourinary: Negative for dysuria, flank pain, frequency, hematuria and urgency     Musculoskeletal: Negative for arthralgias, back pain, myalgias and neck pain  Skin: Negative for color change and pallor  Allergic/Immunologic: Negative for environmental allergies and food allergies  Neurological: Negative for dizziness, weakness, light-headedness, numbness and headaches  Hematological: Negative for adenopathy  Does not bruise/bleed easily  Psychiatric/Behavioral: Negative for behavioral problems, sleep disturbance and suicidal ideas  The patient is not nervous/anxious  Objective:  Physical Exam  Vitals signs and nursing note reviewed  Constitutional:       Appearance: She is well-developed  HENT:      Head: Normocephalic and atraumatic  Right Ear: External ear normal       Left Ear: External ear normal       Nose: Nose normal       Mouth/Throat:      Pharynx: No oropharyngeal exudate  Eyes:      General: No scleral icterus  Right eye: No discharge  Left eye: No discharge  Conjunctiva/sclera: Conjunctivae normal       Pupils: Pupils are equal, round, and reactive to light  Comments: Bilateral cataract   Neck:      Musculoskeletal: Normal range of motion and neck supple  Thyroid: No thyromegaly  Trachea: No tracheal deviation  Cardiovascular:      Rate and Rhythm: Normal rate and regular rhythm  Heart sounds: Normal heart sounds  No murmur  Pulmonary:      Effort: Pulmonary effort is normal  No respiratory distress  Breath sounds: Normal breath sounds  No wheezing or rales  Abdominal:      General: Bowel sounds are normal  There is no distension  Palpations: Abdomen is soft  There is no mass  Tenderness: There is no abdominal tenderness  There is no rebound  Musculoskeletal: Normal range of motion  Lymphadenopathy:      Cervical: No cervical adenopathy  Skin:     General: Skin is warm  Coloration: Skin is not pale  Findings: No erythema or rash     Neurological:      Mental Status: She is alert and oriented to person, place, and time  Cranial Nerves: No cranial nerve deficit  Deep Tendon Reflexes: Reflexes are normal and symmetric  Psychiatric:         Behavior: Behavior normal          Thought Content: Thought content normal          Judgment: Judgment normal             Past Surgical History:   Procedure Laterality Date    AV NODE ABLATION  09/21/2017    COLONOSCOPY  01/01/2018    w/ EGD    DXA PROCEDURE (HISTORICAL)  11/2016    ME COLONOSCOPY FLX DX W/COLLJ SPEC WHEN PFRMD N/A 8/30/2018    Procedure: EGD AND COLONOSCOPY;  Surgeon: Sarah Islas MD;  [facility];   Service: Gastroenterology    TOE SURGERY         Family History   Problem Relation Age of Onset    Hypertension Mother     Hyperlipidemia Mother     Asthma Father     Heart failure Father     Hypertension Sister     Hyperlipidemia Sister     Heart attack Neg Hx     Aneurysm Neg Hx     Stroke Neg Hx     Clotting disorder Neg Hx          Current Outpatient Medications:     albuterol (2 5 mg/3 mL) 0 083 % nebulizer solution, Take 1 vial (2 5 mg total) by nebulization every 6 (six) hours as needed for wheezing or shortness of breath, Disp: 120 vial, Rfl: 3    albuterol (PROAIR HFA) 90 mcg/act inhaler, Inhale 2 puffs every 6 (six) hours as needed for wheezing, Disp: 1 Inhaler, Rfl: 5    aspirin (ECOTRIN LOW STRENGTH) 81 mg EC tablet, Take 81 mg by mouth daily, Disp: , Rfl:     atorvastatin (LIPITOR) 20 mg tablet, TAKE ONE TABLET BY MOUTH EVERY DAY, Disp: 90 tablet, Rfl: 1    Cholecalciferol (VITAMIN D) 2000 units CAPS, Take 2 capsules (4,000 Units total) by mouth daily, Disp: , Rfl:     cyanocobalamin (VITAMIN B-12) 100 mcg tablet, Take by mouth daily, Disp: , Rfl:     diltiazem (CARDIZEM CD) 300 mg 24 hr capsule, Take 1 capsule (300 mg total) by mouth daily, Disp: 90 capsule, Rfl: 1    fluticasone (FLONASE) 50 mcg/act nasal spray, 2 sprays into each nostril daily, Disp: 1 Bottle, Rfl: 5    glimepiride (AMARYL) 1 mg tablet, TAKE ONE TABLET BY MOUTH DAILY WITH BREAKFAST, Disp: 90 tablet, Rfl: 1    glucose blood (Contour Next Test) test strip, Use twice a day, Disp: 200 each, Rfl: 1    linaGLIPtin (Tradjenta) 5 MG TABS, Take 5 mg by mouth daily, Disp: , Rfl:     lisinopril-hydrochlorothiazide (PRINZIDE,ZESTORETIC) 20-25 MG per tablet, , Disp: , Rfl:     metFORMIN (GLUCOPHAGE) 500 mg tablet, Take 2 tablets (1,000 mg total) by mouth 2 (two) times a day with meals, Disp: 360 tablet, Rfl: 1    multivitamin (THERAGRAN) TABS, Take 1 tablet by mouth daily, Disp: , Rfl:     SPIRIVA HANDIHALER 18 MCG inhalation capsule, INHALE THE CONTENTS OF ONE CAPSULE IN HANDIHALER EVERY DAY, Disp: 90 capsule, Rfl: 1    SYMBICORT 160-4 5 MCG/ACT inhaler, Inhale 2 puffs 2 (two) times a day , Disp: , Rfl:     SYNTHROID 25 MCG tablet, Take 25 mcg by mouth daily  , Disp: , Rfl:     Allergies   Allergen Reactions    Penicillins Rash     After dental procedure        Vitals:    09/03/20 0843   BP: 144/66   Pulse: 97   Resp: 20   Temp: 98 °F (36 7 °C)   SpO2: 90%   Weight: 73 kg (161 lb)   Height: 5' (1 524 m)

## 2020-09-10 DIAGNOSIS — I10 ESSENTIAL HYPERTENSION: ICD-10-CM

## 2020-09-10 RX ORDER — DILTIAZEM HYDROCHLORIDE 300 MG/1
CAPSULE, COATED, EXTENDED RELEASE ORAL
Qty: 90 CAPSULE | Refills: 0 | Status: SHIPPED | OUTPATIENT
Start: 2020-09-10 | End: 2020-12-08

## 2020-09-15 DIAGNOSIS — E03.9 HYPOTHYROIDISM, UNSPECIFIED TYPE: Primary | ICD-10-CM

## 2020-09-15 RX ORDER — LEVOTHYROXINE SODIUM 25 MCG
TABLET ORAL
Qty: 90 TABLET | Refills: 1 | Status: SHIPPED | OUTPATIENT
Start: 2020-09-15 | End: 2021-03-19

## 2020-09-15 NOTE — TELEPHONE ENCOUNTER
She was Dr Pema Fisher (last seen in Nov 2019) and only saw Dr Chanel Offer for a pre-op clearance so I'm not sure?

## 2020-09-15 NOTE — TELEPHONE ENCOUNTER
Patients pharmacy called regarding Azithromycin 500 mg patient has been taking for  3 months now  Is she to continue with that?    If so she is in need of a refill   It is  One 3 times weekly so # 12

## 2020-09-16 DIAGNOSIS — J44.9 CHRONIC OBSTRUCTIVE PULMONARY DISEASE, UNSPECIFIED COPD TYPE (HCC): Primary | ICD-10-CM

## 2020-09-16 RX ORDER — AZITHROMYCIN 500 MG/1
500 TABLET, FILM COATED ORAL 3 TIMES WEEKLY
Qty: 15 TABLET | Refills: 5 | Status: SHIPPED | OUTPATIENT
Start: 2020-09-16 | End: 2021-01-20

## 2020-09-29 ENCOUNTER — TELEPHONE (OUTPATIENT)
Dept: ENDOCRINOLOGY | Facility: CLINIC | Age: 80
End: 2020-09-29

## 2020-10-02 ENCOUNTER — LAB (OUTPATIENT)
Dept: LAB | Facility: CLINIC | Age: 80
End: 2020-10-02
Payer: MEDICARE

## 2020-10-02 ENCOUNTER — TRANSCRIBE ORDERS (OUTPATIENT)
Dept: LAB | Facility: CLINIC | Age: 80
End: 2020-10-02

## 2020-10-02 DIAGNOSIS — I10 ESSENTIAL HYPERTENSION: ICD-10-CM

## 2020-10-02 DIAGNOSIS — E03.9 ACQUIRED HYPOTHYROIDISM: ICD-10-CM

## 2020-10-02 DIAGNOSIS — E11.65 TYPE 2 DIABETES MELLITUS WITH HYPERGLYCEMIA, WITHOUT LONG-TERM CURRENT USE OF INSULIN (HCC): ICD-10-CM

## 2020-10-02 LAB
ANION GAP SERPL CALCULATED.3IONS-SCNC: 6 MMOL/L (ref 4–13)
BUN SERPL-MCNC: 20 MG/DL (ref 5–25)
CALCIUM SERPL-MCNC: 9.4 MG/DL (ref 8.3–10.1)
CHLORIDE SERPL-SCNC: 101 MMOL/L (ref 100–108)
CO2 SERPL-SCNC: 30 MMOL/L (ref 21–32)
CREAT SERPL-MCNC: 0.53 MG/DL (ref 0.6–1.3)
CREAT UR-MCNC: 35.3 MG/DL
EST. AVERAGE GLUCOSE BLD GHB EST-MCNC: 146 MG/DL
GFR SERPL CREATININE-BSD FRML MDRD: 90 ML/MIN/1.73SQ M
GLUCOSE P FAST SERPL-MCNC: 167 MG/DL (ref 65–99)
HBA1C MFR BLD: 6.7 %
MICROALBUMIN UR-MCNC: 9.1 MG/L (ref 0–20)
MICROALBUMIN/CREAT 24H UR: 26 MG/G CREATININE (ref 0–30)
POTASSIUM SERPL-SCNC: 4.6 MMOL/L (ref 3.5–5.3)
SODIUM SERPL-SCNC: 137 MMOL/L (ref 136–145)
T4 FREE SERPL-MCNC: 1.35 NG/DL (ref 0.76–1.46)
TSH SERPL DL<=0.05 MIU/L-ACNC: 1.84 UIU/ML (ref 0.36–3.74)

## 2020-10-02 PROCEDURE — 36415 COLL VENOUS BLD VENIPUNCTURE: CPT

## 2020-10-02 PROCEDURE — 80048 BASIC METABOLIC PNL TOTAL CA: CPT

## 2020-10-02 PROCEDURE — 82570 ASSAY OF URINE CREATININE: CPT

## 2020-10-02 PROCEDURE — 83036 HEMOGLOBIN GLYCOSYLATED A1C: CPT

## 2020-10-02 PROCEDURE — 84443 ASSAY THYROID STIM HORMONE: CPT

## 2020-10-02 PROCEDURE — 82043 UR ALBUMIN QUANTITATIVE: CPT

## 2020-10-02 PROCEDURE — 84439 ASSAY OF FREE THYROXINE: CPT

## 2020-10-05 ENCOUNTER — OFFICE VISIT (OUTPATIENT)
Dept: ENDOCRINOLOGY | Facility: CLINIC | Age: 80
End: 2020-10-05
Payer: MEDICARE

## 2020-10-05 VITALS
HEART RATE: 100 BPM | SYSTOLIC BLOOD PRESSURE: 130 MMHG | WEIGHT: 161.6 LBS | DIASTOLIC BLOOD PRESSURE: 72 MMHG | TEMPERATURE: 97.8 F | BODY MASS INDEX: 31.56 KG/M2

## 2020-10-05 DIAGNOSIS — E55.9 VITAMIN D DEFICIENCY: ICD-10-CM

## 2020-10-05 DIAGNOSIS — I10 ESSENTIAL HYPERTENSION: ICD-10-CM

## 2020-10-05 DIAGNOSIS — E03.9 ACQUIRED HYPOTHYROIDISM: ICD-10-CM

## 2020-10-05 DIAGNOSIS — E04.1 THYROID NODULE: ICD-10-CM

## 2020-10-05 DIAGNOSIS — E11.65 TYPE 2 DIABETES MELLITUS WITH HYPERGLYCEMIA, WITHOUT LONG-TERM CURRENT USE OF INSULIN (HCC): Primary | ICD-10-CM

## 2020-10-05 DIAGNOSIS — M81.0 AGE-RELATED OSTEOPOROSIS WITHOUT CURRENT PATHOLOGICAL FRACTURE: ICD-10-CM

## 2020-10-05 DIAGNOSIS — E78.2 MIXED HYPERLIPIDEMIA: ICD-10-CM

## 2020-10-05 PROCEDURE — 99214 OFFICE O/P EST MOD 30 MIN: CPT | Performed by: INTERNAL MEDICINE

## 2020-10-06 ENCOUNTER — IMMUNIZATIONS (OUTPATIENT)
Dept: FAMILY MEDICINE CLINIC | Facility: CLINIC | Age: 80
End: 2020-10-06
Payer: MEDICARE

## 2020-10-06 DIAGNOSIS — E11.22 TYPE 2 DIABETES MELLITUS WITH CHRONIC KIDNEY DISEASE, WITHOUT LONG-TERM CURRENT USE OF INSULIN, UNSPECIFIED CKD STAGE (HCC): Primary | ICD-10-CM

## 2020-10-06 DIAGNOSIS — Z23 ENCOUNTER FOR IMMUNIZATION: ICD-10-CM

## 2020-10-06 PROCEDURE — 90662 IIV NO PRSV INCREASED AG IM: CPT

## 2020-10-06 PROCEDURE — G0008 ADMIN INFLUENZA VIRUS VAC: HCPCS

## 2020-10-06 RX ORDER — PREDNISOLONE ACETATE 10 MG/ML
SUSPENSION/ DROPS OPHTHALMIC
COMMUNITY
Start: 2020-09-28

## 2020-10-06 RX ORDER — OFLOXACIN 3 MG/ML
SOLUTION/ DROPS OPHTHALMIC
COMMUNITY
Start: 2020-09-28

## 2020-10-06 RX ORDER — METFORMIN HYDROCHLORIDE 500 MG/1
TABLET, EXTENDED RELEASE ORAL
Qty: 360 TABLET | Refills: 0 | Status: SHIPPED | OUTPATIENT
Start: 2020-10-06 | End: 2020-12-23

## 2020-10-06 RX ORDER — KETOROLAC TROMETHAMINE 5 MG/ML
SOLUTION OPHTHALMIC
COMMUNITY
Start: 2020-09-10

## 2020-10-07 ENCOUNTER — TELEPHONE (OUTPATIENT)
Dept: ENDOCRINOLOGY | Facility: CLINIC | Age: 80
End: 2020-10-07

## 2020-10-20 DIAGNOSIS — E11.65 TYPE 2 DIABETES MELLITUS WITH HYPERGLYCEMIA, WITHOUT LONG-TERM CURRENT USE OF INSULIN (HCC): ICD-10-CM

## 2020-10-20 RX ORDER — ATORVASTATIN CALCIUM 20 MG/1
TABLET, FILM COATED ORAL
Qty: 90 TABLET | Refills: 0 | Status: SHIPPED | OUTPATIENT
Start: 2020-10-20 | End: 2021-01-18

## 2020-10-26 ENCOUNTER — TELEPHONE (OUTPATIENT)
Dept: FAMILY MEDICINE CLINIC | Facility: CLINIC | Age: 80
End: 2020-10-26

## 2020-10-27 ENCOUNTER — OFFICE VISIT (OUTPATIENT)
Dept: FAMILY MEDICINE CLINIC | Facility: CLINIC | Age: 80
End: 2020-10-27
Payer: MEDICARE

## 2020-10-27 VITALS
RESPIRATION RATE: 16 BRPM | WEIGHT: 161 LBS | HEART RATE: 96 BPM | SYSTOLIC BLOOD PRESSURE: 140 MMHG | HEIGHT: 60 IN | BODY MASS INDEX: 31.61 KG/M2 | OXYGEN SATURATION: 91 % | DIASTOLIC BLOOD PRESSURE: 52 MMHG | TEMPERATURE: 98.2 F

## 2020-10-27 DIAGNOSIS — H25.9 AGE-RELATED CATARACT OF BOTH EYES, UNSPECIFIED AGE-RELATED CATARACT TYPE: ICD-10-CM

## 2020-10-27 DIAGNOSIS — E11.22 TYPE 2 DIABETES MELLITUS WITH CHRONIC KIDNEY DISEASE, WITHOUT LONG-TERM CURRENT USE OF INSULIN, UNSPECIFIED CKD STAGE (HCC): ICD-10-CM

## 2020-10-27 DIAGNOSIS — Z00.00 MEDICARE ANNUAL WELLNESS VISIT, SUBSEQUENT: Primary | ICD-10-CM

## 2020-10-27 DIAGNOSIS — J44.9 COPD, SEVERE (HCC): ICD-10-CM

## 2020-10-27 DIAGNOSIS — Z01.818 PREOP EXAMINATION: ICD-10-CM

## 2020-10-27 DIAGNOSIS — G47.33 OBSTRUCTIVE SLEEP APNEA: ICD-10-CM

## 2020-10-27 DIAGNOSIS — E03.9 ACQUIRED HYPOTHYROIDISM: ICD-10-CM

## 2020-10-27 PROCEDURE — G0438 PPPS, INITIAL VISIT: HCPCS | Performed by: FAMILY MEDICINE

## 2020-10-27 PROCEDURE — 99214 OFFICE O/P EST MOD 30 MIN: CPT | Performed by: FAMILY MEDICINE

## 2020-11-04 ENCOUNTER — TELEPHONE (OUTPATIENT)
Dept: ENDOCRINOLOGY | Facility: CLINIC | Age: 80
End: 2020-11-04

## 2020-11-06 DIAGNOSIS — J44.9 COPD, SEVERE (HCC): Primary | ICD-10-CM

## 2020-11-06 RX ORDER — BUDESONIDE AND FORMOTEROL FUMARATE DIHYDRATE 160; 4.5 UG/1; UG/1
AEROSOL RESPIRATORY (INHALATION)
Qty: 10.2 G | Refills: 0 | Status: SHIPPED | OUTPATIENT
Start: 2020-11-06 | End: 2020-12-14

## 2020-11-06 RX ORDER — LISINOPRIL AND HYDROCHLOROTHIAZIDE 25; 20 MG/1; MG/1
TABLET ORAL
Qty: 90 TABLET | Refills: 0 | Status: SHIPPED | OUTPATIENT
Start: 2020-11-06 | End: 2021-02-01

## 2020-11-17 DIAGNOSIS — E11.65 TYPE 2 DIABETES MELLITUS WITH HYPERGLYCEMIA, WITHOUT LONG-TERM CURRENT USE OF INSULIN (HCC): Primary | ICD-10-CM

## 2020-11-17 RX ORDER — LINAGLIPTIN 5 MG/1
TABLET, FILM COATED ORAL
Qty: 30 TABLET | Refills: 0 | Status: SHIPPED | OUTPATIENT
Start: 2020-11-17 | End: 2020-12-23

## 2020-11-23 ENCOUNTER — HOSPITAL ENCOUNTER (OUTPATIENT)
Dept: ULTRASOUND IMAGING | Facility: HOSPITAL | Age: 80
Discharge: HOME | End: 2020-11-23
Payer: MEDICARE

## 2020-11-23 DIAGNOSIS — E03.9 ACQUIRED HYPOTHYROIDISM: ICD-10-CM

## 2020-11-23 PROCEDURE — 76536 US EXAM OF HEAD AND NECK: CPT

## 2020-11-25 ENCOUNTER — TELEPHONE (OUTPATIENT)
Dept: ENDOCRINOLOGY | Facility: CLINIC | Age: 80
End: 2020-11-25

## 2020-12-08 DIAGNOSIS — I10 ESSENTIAL HYPERTENSION: ICD-10-CM

## 2020-12-08 RX ORDER — DILTIAZEM HYDROCHLORIDE 300 MG/1
CAPSULE, COATED, EXTENDED RELEASE ORAL
Qty: 90 CAPSULE | Refills: 1 | Status: SHIPPED | OUTPATIENT
Start: 2020-12-08 | End: 2021-05-29

## 2020-12-14 DIAGNOSIS — J44.9 COPD, SEVERE (HCC): ICD-10-CM

## 2020-12-14 RX ORDER — BUDESONIDE AND FORMOTEROL FUMARATE DIHYDRATE 160; 4.5 UG/1; UG/1
AEROSOL RESPIRATORY (INHALATION)
Qty: 10.2 G | Refills: 0 | Status: SHIPPED | OUTPATIENT
Start: 2020-12-14 | End: 2021-01-31

## 2020-12-14 RX ORDER — TIOTROPIUM BROMIDE 18 UG/1
CAPSULE ORAL; RESPIRATORY (INHALATION)
Qty: 30 CAPSULE | Refills: 0 | Status: SHIPPED | OUTPATIENT
Start: 2020-12-14 | End: 2021-01-13

## 2020-12-23 DIAGNOSIS — E11.65 TYPE 2 DIABETES MELLITUS WITH HYPERGLYCEMIA, WITHOUT LONG-TERM CURRENT USE OF INSULIN (HCC): ICD-10-CM

## 2020-12-23 DIAGNOSIS — E11.22 TYPE 2 DIABETES MELLITUS WITH CHRONIC KIDNEY DISEASE, WITHOUT LONG-TERM CURRENT USE OF INSULIN, UNSPECIFIED CKD STAGE (HCC): ICD-10-CM

## 2020-12-23 RX ORDER — GLIMEPIRIDE 1 MG/1
TABLET ORAL
Qty: 90 TABLET | Refills: 0 | Status: SHIPPED | OUTPATIENT
Start: 2020-12-23 | End: 2021-03-29

## 2020-12-23 RX ORDER — METFORMIN HYDROCHLORIDE 500 MG/1
TABLET, EXTENDED RELEASE ORAL
Qty: 360 TABLET | Refills: 0 | Status: SHIPPED | OUTPATIENT
Start: 2020-12-23 | End: 2021-04-05

## 2020-12-23 RX ORDER — LINAGLIPTIN 5 MG/1
TABLET, FILM COATED ORAL
Qty: 30 TABLET | Refills: 0 | Status: SHIPPED | OUTPATIENT
Start: 2020-12-23 | End: 2021-01-25

## 2021-01-13 DIAGNOSIS — J44.9 COPD, SEVERE (HCC): ICD-10-CM

## 2021-01-13 RX ORDER — TIOTROPIUM BROMIDE 18 UG/1
CAPSULE ORAL; RESPIRATORY (INHALATION)
Qty: 90 CAPSULE | Refills: 1 | Status: SHIPPED | OUTPATIENT
Start: 2021-01-13 | End: 2021-07-12

## 2021-01-18 DIAGNOSIS — E11.65 TYPE 2 DIABETES MELLITUS WITH HYPERGLYCEMIA, WITHOUT LONG-TERM CURRENT USE OF INSULIN (HCC): ICD-10-CM

## 2021-01-18 RX ORDER — ATORVASTATIN CALCIUM 20 MG/1
TABLET, FILM COATED ORAL
Qty: 90 TABLET | Refills: 0 | Status: SHIPPED | OUTPATIENT
Start: 2021-01-18 | End: 2021-04-17

## 2021-01-20 ENCOUNTER — OFFICE VISIT (OUTPATIENT)
Dept: FAMILY MEDICINE CLINIC | Facility: CLINIC | Age: 81
End: 2021-01-20
Payer: MEDICARE

## 2021-01-20 VITALS
SYSTOLIC BLOOD PRESSURE: 140 MMHG | WEIGHT: 165 LBS | HEART RATE: 100 BPM | BODY MASS INDEX: 32.39 KG/M2 | OXYGEN SATURATION: 92 % | RESPIRATION RATE: 20 BRPM | DIASTOLIC BLOOD PRESSURE: 65 MMHG | HEIGHT: 60 IN

## 2021-01-20 DIAGNOSIS — I47.1 PSVT (PAROXYSMAL SUPRAVENTRICULAR TACHYCARDIA) (HCC): ICD-10-CM

## 2021-01-20 DIAGNOSIS — B35.4 TINEA CORPORIS: Primary | ICD-10-CM

## 2021-01-20 DIAGNOSIS — E11.22 TYPE 2 DIABETES MELLITUS WITH CHRONIC KIDNEY DISEASE, WITHOUT LONG-TERM CURRENT USE OF INSULIN, UNSPECIFIED CKD STAGE (HCC): ICD-10-CM

## 2021-01-20 DIAGNOSIS — J44.9 COPD, SEVERE (HCC): ICD-10-CM

## 2021-01-20 DIAGNOSIS — J96.12 CHRONIC RESPIRATORY FAILURE WITH HYPERCAPNIA (HCC): ICD-10-CM

## 2021-01-20 PROCEDURE — 99214 OFFICE O/P EST MOD 30 MIN: CPT | Performed by: FAMILY MEDICINE

## 2021-01-20 RX ORDER — NYSTATIN 100000 U/G
CREAM TOPICAL 2 TIMES DAILY
Qty: 60 G | Refills: 0 | Status: SHIPPED | OUTPATIENT
Start: 2021-01-20

## 2021-01-20 NOTE — ASSESSMENT & PLAN NOTE
Lab Results   Component Value Date    HGBA1C 6 7 (H) 10/02/2020   Follows endocrinologist and taking all medications

## 2021-01-20 NOTE — PROGRESS NOTES
Assessment/Plan:    Problem List Items Addressed This Visit        Endocrine    Type 2 diabetes mellitus with chronic kidney disease, without long-term current use of insulin ([facility])       Respiratory    COPD, severe ([address] )       Cardiovascular and Mediastinum    PSVT (paroxysmal supraventricular tachycardia) ([facility])       Musculoskeletal and Integument    Tinea corporis - Primary    Relevant Medications    nystatin (MYCOSTATIN) cream  Seems like a tenia corporis rash on the ankle and feet, advised to use the anti fungal cream for 2 weeks and she has appointment with podiatrist and will check with him  There is no edema      Other Visit Diagnoses     Chronic respiratory failure with hypercapnia ([facility] [address] )          Arrangements are made for the COVID vaccine for her    Chief Complaint   Patient presents with    Joint Swelling       Subjective:   Patient ID: Jay Sargent is a [de-identified] y o  female  She has noted some swelling and redness around both ankles and lower leg, she says she has no pain or itching, she has severe COPD she is on home oxygen, she has history of abrasion she is always tachycardic due to COPD,  She denies any fever chills cough, and she is interested in getting COVID vaccine  She says she see podiatrist regularly and her upcoming appointment is coming end of this month,  She is diabetic and follows endocrinologist      Review of Systems   Constitutional: Negative  HENT: Negative  Respiratory:        She has always short of breath due to severe COPD and follows pulmonologist and use inhaler and oxygen   Cardiovascular: Negative  Gastrointestinal: Negative  Endocrine: Negative  Genitourinary: Negative  Musculoskeletal: Negative  Skin: Positive for color change and rash  Some color change on both legs on the lower part around the ankle and foot, there is no pain and tenderness,        Objective:  Physical Exam  Eyes:      Extraocular Movements: Extraocular movements intact     Neck: Musculoskeletal: Normal range of motion  Cardiovascular:      Rate and Rhythm: Tachycardia present  Heart sounds: No murmur  Pulmonary:      Effort: No respiratory distress  Comments: Distant lung sounds  Musculoskeletal:      Right lower leg: No edema  Left lower leg: No edema  Skin:     Findings: Rash present  Comments: She irregular margin rash on the right lower leg and foot, nontender and no edema   Neurological:      General: No focal deficit present  Mental Status: She is alert  Psychiatric:         Mood and Affect: Mood normal             Past Surgical History:   Procedure Laterality Date    AV NODE ABLATION  09/21/2017    COLONOSCOPY  01/01/2018    w/ EGD    DXA PROCEDURE (HISTORICAL)  11/2016    HI COLONOSCOPY FLX DX W/COLLJ SPEC WHEN PFRMD N/A 8/30/2018    Procedure: EGD AND COLONOSCOPY;  Surgeon: Vielka Sanchez MD;  [facility];   Service: Gastroenterology    TOE SURGERY         Family History   Problem Relation Age of Onset    Hypertension Mother     Hyperlipidemia Mother     Asthma Father     Heart failure Father     Hypertension Sister     Hyperlipidemia Sister     Heart attack Neg Hx     Aneurysm Neg Hx     Stroke Neg Hx     Clotting disorder Neg Hx          Current Outpatient Medications:     albuterol (2 5 mg/3 mL) 0 083 % nebulizer solution, Take 1 vial (2 5 mg total) by nebulization every 6 (six) hours as needed for wheezing or shortness of breath, Disp: 120 vial, Rfl: 3    albuterol (PROAIR HFA) 90 mcg/act inhaler, Inhale 2 puffs every 6 (six) hours as needed for wheezing, Disp: 1 Inhaler, Rfl: 5    aspirin (ECOTRIN LOW STRENGTH) 81 mg EC tablet, Take 81 mg by mouth daily, Disp: , Rfl:     atorvastatin (LIPITOR) 20 mg tablet, TAKE ONE TABLET BY MOUTH EVERY DAY, Disp: 90 tablet, Rfl: 0    Cholecalciferol (VITAMIN D) 2000 units CAPS, Take 2 capsules (4,000 Units total) by mouth daily, Disp: , Rfl:     cyanocobalamin (VITAMIN B-12) 100 mcg tablet, Take by mouth daily, Disp: , Rfl:     diltiazem (CARDIZEM CD) 300 mg 24 hr capsule, TAKE ONE CAPSULE BY MOUTH EVERY DAY AS DIRECTED, Disp: 90 capsule, Rfl: 1    fluticasone (FLONASE) 50 mcg/act nasal spray, 2 sprays into each nostril daily, Disp: 1 Bottle, Rfl: 5    glimepiride (AMARYL) 1 mg tablet, TAKE ONE TABLET BY MOUTH DAILY WITH BREAKFAST, Disp: 90 tablet, Rfl: 0    glucose blood (Contour Next Test) test strip, Use twice a day, Disp: 200 each, Rfl: 1    ketorolac (ACULAR) 0 5 % ophthalmic solution, , Disp: , Rfl:     lisinopril-hydrochlorothiazide (PRINZIDE,ZESTORETIC) 20-25 MG per tablet, Take 1 tablet(s) every day by oral route , Disp: 90 tablet, Rfl: 0    metFORMIN (GLUCOPHAGE-XR) 500 mg 24 hr tablet, Take 2 tablet(s) twice a day by oral route , Disp: 360 tablet, Rfl: 0    multivitamin (THERAGRAN) TABS, Take 1 tablet by mouth daily, Disp: , Rfl:     ofloxacin (OCUFLOX) 0 3 % ophthalmic solution, , Disp: , Rfl:     prednisoLONE acetate (PRED FORTE) 1 % ophthalmic suspension, , Disp: , Rfl:     Spiriva HandiHaler 18 MCG inhalation capsule, INHALE THE CONTENTS OF ONE CAPSULE IN HANDIHALER EVERY DAY, Disp: 90 capsule, Rfl: 1    Symbicort 160-4 5 MCG/ACT inhaler, INHALE 2 PUFFS BY MOUTH 2 TIMES A DAY AS DIRECTED, Disp: 10 2 g, Rfl: 0    Synthroid 25 MCG tablet, TAKE ONE TABLET BY MOUTH EVERY DAY, Disp: 90 tablet, Rfl: 1    Tradjenta 5 MG TABS, TAKE ONE TABLET BY MOUTH EVERY DAY, Disp: 30 tablet, Rfl: 0    metFORMIN (GLUCOPHAGE) 500 mg tablet, Take 2 tablets (1,000 mg total) by mouth 2 (two) times a day with meals, Disp: 360 tablet, Rfl: 1    nystatin (MYCOSTATIN) cream, Apply topically 2 (two) times a day, Disp: 60 g, Rfl: 0    Allergies   Allergen Reactions    Penicillins Rash     After dental procedure        Vitals:    01/20/21 1259 01/20/21 1313   BP: 150/62 140/65   Pulse: (!) 124 100   Resp: 20    SpO2: 92%    Weight: 74 8 kg (165 lb)    Height: 5' (1 524 m)

## 2021-01-23 ENCOUNTER — IMMUNIZATIONS (OUTPATIENT)
Dept: FAMILY MEDICINE CLINIC | Facility: HOSPITAL | Age: 81
End: 2021-01-23

## 2021-01-23 DIAGNOSIS — Z23 ENCOUNTER FOR IMMUNIZATION: Primary | ICD-10-CM

## 2021-01-23 PROCEDURE — 0011A SARS-COV-2 / COVID-19 MRNA VACCINE (MODERNA) 100 MCG: CPT

## 2021-01-23 PROCEDURE — 91301 SARS-COV-2 / COVID-19 MRNA VACCINE (MODERNA) 100 MCG: CPT

## 2021-01-25 DIAGNOSIS — E11.65 TYPE 2 DIABETES MELLITUS WITH HYPERGLYCEMIA, WITHOUT LONG-TERM CURRENT USE OF INSULIN (HCC): ICD-10-CM

## 2021-01-25 RX ORDER — LINAGLIPTIN 5 MG/1
TABLET, FILM COATED ORAL
Qty: 30 TABLET | Refills: 0 | Status: SHIPPED | OUTPATIENT
Start: 2021-01-25 | End: 2021-02-23

## 2021-01-30 DIAGNOSIS — J44.9 COPD, SEVERE (HCC): ICD-10-CM

## 2021-01-31 RX ORDER — BUDESONIDE AND FORMOTEROL FUMARATE DIHYDRATE 160; 4.5 UG/1; UG/1
AEROSOL RESPIRATORY (INHALATION)
Qty: 10.2 G | Refills: 1 | Status: SHIPPED | OUTPATIENT
Start: 2021-01-31 | End: 2021-04-13

## 2021-02-01 DIAGNOSIS — J44.9 COPD, SEVERE (HCC): ICD-10-CM

## 2021-02-01 RX ORDER — LISINOPRIL AND HYDROCHLOROTHIAZIDE 25; 20 MG/1; MG/1
TABLET ORAL
Qty: 90 TABLET | Refills: 1 | Status: SHIPPED | OUTPATIENT
Start: 2021-02-01 | End: 2021-08-04

## 2021-02-08 ENCOUNTER — TELEPHONE (OUTPATIENT)
Dept: ENDOCRINOLOGY | Facility: CLINIC | Age: 81
End: 2021-02-08

## 2021-02-20 ENCOUNTER — IMMUNIZATIONS (OUTPATIENT)
Dept: FAMILY MEDICINE CLINIC | Facility: HOSPITAL | Age: 81
End: 2021-02-20

## 2021-02-20 DIAGNOSIS — Z23 ENCOUNTER FOR IMMUNIZATION: Primary | ICD-10-CM

## 2021-02-20 PROCEDURE — 0012A SARS-COV-2 / COVID-19 MRNA VACCINE (MODERNA) 100 MCG: CPT

## 2021-02-20 PROCEDURE — 91301 SARS-COV-2 / COVID-19 MRNA VACCINE (MODERNA) 100 MCG: CPT

## 2021-02-23 DIAGNOSIS — E11.65 TYPE 2 DIABETES MELLITUS WITH HYPERGLYCEMIA, WITHOUT LONG-TERM CURRENT USE OF INSULIN (HCC): ICD-10-CM

## 2021-02-23 RX ORDER — LINAGLIPTIN 5 MG/1
TABLET, FILM COATED ORAL
Qty: 30 TABLET | Refills: 0 | Status: SHIPPED | OUTPATIENT
Start: 2021-02-23 | End: 2021-03-29

## 2021-03-19 DIAGNOSIS — E03.9 HYPOTHYROIDISM, UNSPECIFIED TYPE: ICD-10-CM

## 2021-03-19 RX ORDER — LEVOTHYROXINE SODIUM 25 MCG
TABLET ORAL
Qty: 90 TABLET | Refills: 1 | Status: SHIPPED | OUTPATIENT
Start: 2021-03-19 | End: 2021-09-13

## 2021-03-26 DIAGNOSIS — E11.65 TYPE 2 DIABETES MELLITUS WITH HYPERGLYCEMIA, WITHOUT LONG-TERM CURRENT USE OF INSULIN (HCC): ICD-10-CM

## 2021-03-29 RX ORDER — GLIMEPIRIDE 1 MG/1
TABLET ORAL
Qty: 90 TABLET | Refills: 0 | Status: SHIPPED | OUTPATIENT
Start: 2021-03-29 | End: 2021-06-23

## 2021-03-29 RX ORDER — LINAGLIPTIN 5 MG/1
TABLET, FILM COATED ORAL
Qty: 30 TABLET | Refills: 2 | Status: SHIPPED | OUTPATIENT
Start: 2021-03-29 | End: 2021-06-23

## 2021-04-05 DIAGNOSIS — E11.22 TYPE 2 DIABETES MELLITUS WITH CHRONIC KIDNEY DISEASE, WITHOUT LONG-TERM CURRENT USE OF INSULIN, UNSPECIFIED CKD STAGE (HCC): ICD-10-CM

## 2021-04-05 RX ORDER — METFORMIN HYDROCHLORIDE 500 MG/1
TABLET, EXTENDED RELEASE ORAL
Qty: 360 TABLET | Refills: 0 | Status: SHIPPED | OUTPATIENT
Start: 2021-04-05 | End: 2021-07-06

## 2021-04-07 DIAGNOSIS — J44.9 CHRONIC OBSTRUCTIVE PULMONARY DISEASE, UNSPECIFIED COPD TYPE (HCC): ICD-10-CM

## 2021-04-07 RX ORDER — AZITHROMYCIN 500 MG/1
TABLET, FILM COATED ORAL
Qty: 15 TABLET | Refills: 0 | OUTPATIENT
Start: 2021-04-07

## 2021-04-12 DIAGNOSIS — J44.9 COPD, SEVERE (HCC): ICD-10-CM

## 2021-04-13 DIAGNOSIS — J44.9 CHRONIC OBSTRUCTIVE PULMONARY DISEASE, UNSPECIFIED COPD TYPE (HCC): Primary | ICD-10-CM

## 2021-04-13 RX ORDER — BUDESONIDE AND FORMOTEROL FUMARATE DIHYDRATE 160; 4.5 UG/1; UG/1
AEROSOL RESPIRATORY (INHALATION)
Qty: 10.2 G | Refills: 0 | Status: SHIPPED | OUTPATIENT
Start: 2021-04-13 | End: 2021-04-16 | Stop reason: SDUPTHER

## 2021-04-13 RX ORDER — AZITHROMYCIN 500 MG/1
500 TABLET, FILM COATED ORAL 3 TIMES WEEKLY
Qty: 3 TABLET | Refills: 0 | Status: SHIPPED | OUTPATIENT
Start: 2021-04-14 | End: 2021-04-16 | Stop reason: SDUPTHER

## 2021-04-16 ENCOUNTER — OFFICE VISIT (OUTPATIENT)
Dept: PULMONOLOGY | Facility: CLINIC | Age: 81
End: 2021-04-16
Payer: MEDICARE

## 2021-04-16 VITALS
BODY MASS INDEX: 31.37 KG/M2 | SYSTOLIC BLOOD PRESSURE: 140 MMHG | HEIGHT: 60 IN | WEIGHT: 159.8 LBS | DIASTOLIC BLOOD PRESSURE: 70 MMHG | RESPIRATION RATE: 18 BRPM | TEMPERATURE: 97.2 F | OXYGEN SATURATION: 95 % | HEART RATE: 111 BPM

## 2021-04-16 DIAGNOSIS — J44.9 COPD, SEVERE (HCC): ICD-10-CM

## 2021-04-16 DIAGNOSIS — J96.12 CHRONIC RESPIRATORY FAILURE WITH HYPERCAPNIA (HCC): Primary | ICD-10-CM

## 2021-04-16 DIAGNOSIS — J44.9 CHRONIC OBSTRUCTIVE PULMONARY DISEASE, UNSPECIFIED COPD TYPE (HCC): ICD-10-CM

## 2021-04-16 PROBLEM — J44.1 COPD EXACERBATION (HCC): Status: RESOLVED | Noted: 2020-03-23 | Resolved: 2021-04-16

## 2021-04-16 PROCEDURE — 99214 OFFICE O/P EST MOD 30 MIN: CPT | Performed by: INTERNAL MEDICINE

## 2021-04-16 RX ORDER — AZITHROMYCIN 500 MG/1
500 TABLET, FILM COATED ORAL 3 TIMES WEEKLY
Qty: 12 TABLET | Refills: 11 | Status: SHIPPED | OUTPATIENT
Start: 2021-04-16 | End: 2022-03-18

## 2021-04-16 RX ORDER — BUDESONIDE AND FORMOTEROL FUMARATE DIHYDRATE 160; 4.5 UG/1; UG/1
2 AEROSOL RESPIRATORY (INHALATION) 2 TIMES DAILY
Qty: 1 INHALER | Refills: 11 | Status: SHIPPED | OUTPATIENT
Start: 2021-04-16 | End: 2022-05-08 | Stop reason: SDUPTHER

## 2021-04-16 NOTE — ASSESSMENT & PLAN NOTE
Her current inhaler regimen includes Symbicort and Spiriva  We briefly discussed the option of transitioning to [facility], though she would rather continue with her current regimen  This could be considered in the future, certainly if cost becomes an issue

## 2021-04-16 NOTE — PROGRESS NOTES
Pulmonary Follow Up Note   Krystal English [de-identified] y o  female MRN: [id number]  4/16/2021      Assessment/Plan:     Chronic respiratory failure with hypercapnia (HCC)   Patient has oxygen at 2 liters/minute and uses trilogy noninvasive ventilator during hours of sleep  She has been well controlled with this regimen and ongoing use is medically necessary  COPD, severe ([address] )    Her current inhaler regimen includes Symbicort and Spiriva  We briefly discussed the option of transitioning to [facility], though she would rather continue with her current regimen  This could be considered in the future, certainly if cost becomes an issue  Visit orders:    Diagnoses and all orders for this visit:    Chronic respiratory failure with hypercapnia (HCC)    COPD, severe ([facility])  -     budesonide-formoterol (Symbicort) 160-4 5 mcg/act inhaler; Inhale 2 puffs 2 (two) times a day As directed    Chronic obstructive pulmonary disease, unspecified COPD type (HCC)  -     azithromycin (ZITHROMAX) 500 MG tablet; Take 1 tablet (500 mg total) by mouth 3 (three) times a week for 144 doses        History of Present Illness   HPI:  Krystal English is a [de-identified] y o  female who is here today for follow-up regarding chronic hypoxic and hypercapnic respiratory failure and severe COPD  Patient is doing well from a pulmonary perspective  Her overall symptoms have been stable since her last visit with me last year  She has no significant cough, wheeze or sputum production  She is compliant with Symbicort and Spiriva and has not needed her rescue inhaler therapy on any regular basis  She has oxygen that she uses at 2 liters/minute as needed and uses the trilogy noninvasive ventilator at night  She feels that the addition of 3 times weekly azithromycin has significantly improved her COPD control  She complains of sneezing and itchy throat related to the change in season      Review of Systems   Constitutional: Negative for chills, fever and unexpected weight change  HENT: Positive for sneezing  Negative for postnasal drip and sore throat  Eyes: Negative for visual disturbance  Respiratory:        As noted in HPI   Cardiovascular: Negative for chest pain  Gastrointestinal: Negative for abdominal pain, diarrhea and vomiting  Musculoskeletal: Negative for arthralgias  Skin: Negative for rash  Neurological: Negative for headaches  Hematological: Negative for adenopathy  Psychiatric/Behavioral: Negative  All other systems reviewed and are negative  Medical, Family and Social history reviewed and updated as appropriate    Historical Information   Past Medical History:   Diagnosis Date    Anemia     Blood clotting tendency ([facility] [address] )     left leg, lung    Chronic respiratory failure with hypoxia and hypercapnia (HCC)     COPD (chronic obstructive pulmonary disease) ([facility] [address] )     Deep vein thrombosis (HCC)     [facility]     [facility]     [facility]     Emphysema of lung ([facility] [address] )     Hyperlipidemia     Hypertension     Hyperthyroidism     Hypothyroid     On warfarin therapy     Osteoporosis     Palpitations     [facility]ism [facility]     Sleep apnea, obstructive     Thyroid disease      Past Surgical History:   Procedure Laterality Date    AV NODE ABLATION  09/21/2017    COLONOSCOPY  01/01/2018    w/ EGD    DXA PROCEDURE (HISTORICAL)  11/2016    IN COLONOSCOPY FLX DX W/COLLJ SPEC WHEN PFRMD N/A 8/30/2018    Procedure: EGD AND COLONOSCOPY;  Surgeon: Patricia Fry MD;  [facility];   Service: Gastroenterology    TOE SURGERY       Family History   Problem Relation Age of Onset    Hypertension Mother     Hyperlipidemia Mother     Asthma Father     Heart failure Father     Hypertension Sister     Hyperlipidemia Sister     Heart attack Neg Hx     Aneurysm Neg Hx     Stroke Neg Hx     Clotting disorder Neg Hx        Social History     Tobacco Use   Smoking Status Former Smoker    Packs/day: 1 00    Years: 46 00    Pack years: 46 00    Types: Cigarettes    Quit date:     Years since quittin 2   Smokeless Tobacco Never Used         Meds/Allergies     Current Outpatient Medications:     albuterol (2 5 mg/3 mL) 0 083 % nebulizer solution, Take 1 vial (2 5 mg total) by nebulization every 6 (six) hours as needed for wheezing or shortness of breath, Disp: 120 vial, Rfl: 3    albuterol (PROAIR HFA) 90 mcg/act inhaler, Inhale 2 puffs every 6 (six) hours as needed for wheezing, Disp: 1 Inhaler, Rfl: 5    aspirin (ECOTRIN LOW STRENGTH) 81 mg EC tablet, Take 81 mg by mouth daily, Disp: , Rfl:     atorvastatin (LIPITOR) 20 mg tablet, TAKE ONE TABLET BY MOUTH EVERY DAY, Disp: 90 tablet, Rfl: 0    azithromycin (ZITHROMAX) 500 MG tablet, Take 1 tablet (500 mg total) by mouth 3 (three) times a week for 144 doses, Disp: 12 tablet, Rfl: 11    budesonide-formoterol (Symbicort) 160-4 5 mcg/act inhaler, Inhale 2 puffs 2 (two) times a day As directed, Disp: 1 Inhaler, Rfl: 11    Cholecalciferol (VITAMIN D) 2000 units CAPS, Take 2 capsules (4,000 Units total) by mouth daily, Disp: , Rfl:     cyanocobalamin (VITAMIN B-12) 100 mcg tablet, Take by mouth daily, Disp: , Rfl:     diltiazem (CARDIZEM CD) 300 mg 24 hr capsule, TAKE ONE CAPSULE BY MOUTH EVERY DAY AS DIRECTED, Disp: 90 capsule, Rfl: 1    fluticasone (FLONASE) 50 mcg/act nasal spray, 2 sprays into each nostril daily, Disp: 1 Bottle, Rfl: 5    glimepiride (AMARYL) 1 mg tablet, TAKE ONE TABLET BY MOUTH DAILY WITH BREAKFAST, Disp: 90 tablet, Rfl: 0    glucose blood (Contour Next Test) test strip, Use twice a day, Disp: 200 each, Rfl: 1    ketorolac (ACULAR) 0 5 % ophthalmic solution, , Disp: , Rfl:     lisinopril-hydrochlorothiazide (PRINZIDE,ZESTORETIC) 20-25 MG per tablet, Take 1 tablet(s) every day by oral route , Disp: 90 tablet, Rfl: 1    metFORMIN (GLUCOPHAGE-XR) 500 mg 24 hr tablet, TAKE TWO TABLETS BY MOUTH 2 TIMES A DAY, Disp: 360 tablet, Rfl: 0    multivitamin (THERAGRAN) TABS, Take 1 tablet by mouth daily, Disp: , Rfl:     nystatin (MYCOSTATIN) cream, Apply topically 2 (two) times a day, Disp: 60 g, Rfl: 0    ofloxacin (OCUFLOX) 0 3 % ophthalmic solution, , Disp: , Rfl:     prednisoLONE acetate (PRED FORTE) 1 % ophthalmic suspension, , Disp: , Rfl:     Spiriva HandiHaler 18 MCG inhalation capsule, INHALE THE CONTENTS OF ONE CAPSULE IN HANDIHALER EVERY DAY, Disp: 90 capsule, Rfl: 1    Synthroid 25 MCG tablet, TAKE ONE TABLET BY MOUTH EVERY DAY, Disp: 90 tablet, Rfl: 1    Tradjenta 5 MG TABS, TAKE ONE TABLET BY MOUTH EVERY DAY, Disp: 30 tablet, Rfl: 2    metFORMIN (GLUCOPHAGE) 500 mg tablet, Take 2 tablets (1,000 mg total) by mouth 2 (two) times a day with meals, Disp: 360 tablet, Rfl: 1  Allergies   Allergen Reactions    Penicillins Rash     After dental procedure        Vitals: Blood pressure 140/70, pulse (!) 111, temperature (!) 97 2 °F (36 2 °C), temperature source Tympanic, resp  rate 18, height 5' (1 524 m), weight 72 5 kg (159 lb 12 8 oz), SpO2 95 %  Body mass index is 31 21 kg/m²  Oxygen Therapy  SpO2: 95 %    Physical Exam   Physical Exam  Constitutional:       General: She is not in acute distress  HENT:      Head: Normocephalic  Mouth/Throat:      Pharynx: No oropharyngeal exudate  Eyes:      General: No scleral icterus  Pupils: Pupils are equal, round, and reactive to light  Neck:      Musculoskeletal: Neck supple  Vascular: No JVD  Cardiovascular:      Rate and Rhythm: Normal rate and regular rhythm  Heart sounds: Murmur present  Pulmonary:      Breath sounds: No wheezing or rhonchi  Abdominal:      Palpations: Abdomen is soft  Tenderness: There is no abdominal tenderness  Lymphadenopathy:      Cervical: No cervical adenopathy  Skin:     General: Skin is warm and dry  Neurological:      Mental Status: She is alert and oriented to person, place, and time  Psychiatric:         Mood and Affect: Mood normal          Behavior: Behavior normal          Labs: I have personally reviewed pertinent lab results  Lab Results   Component Value Date    WBC 12 08 (H) 05/30/2020    HGB 12 0 05/30/2020    HCT 37 8 05/30/2020    MCV 83 05/30/2020     05/30/2020     Lab Results   Component Value Date    CALCIUM 9 4 10/02/2020    K 4 6 10/02/2020    CO2 30 10/02/2020     10/02/2020    BUN 20 10/02/2020    CREATININE 0 53 (L) 10/02/2020     No results found for: IGE  Lab Results   Component Value Date    ALT 19 05/29/2020    AST 13 05/29/2020    ALKPHOS 67 05/29/2020       Pulmonary function testing:  Performed 1/2/18 and personally interpreted  FEV1/FVC ratio 65%   FEV1 48% predicted  FVC 55% predicted  No response to bronchodilators   % predicted   % predicted  DLCO corrected for hemoglobin 13 % predicted      PFTs with severe obstruction, moderate air trapping and severe diffusion impairment

## 2021-04-16 NOTE — ASSESSMENT & PLAN NOTE
Patient has oxygen at 2 liters/minute and uses trilogy noninvasive ventilator during hours of sleep  She has been well controlled with this regimen and ongoing use is medically necessary

## 2021-04-17 DIAGNOSIS — E11.65 TYPE 2 DIABETES MELLITUS WITH HYPERGLYCEMIA, WITHOUT LONG-TERM CURRENT USE OF INSULIN (HCC): ICD-10-CM

## 2021-04-17 RX ORDER — ATORVASTATIN CALCIUM 20 MG/1
TABLET, FILM COATED ORAL
Qty: 90 TABLET | Refills: 1 | Status: SHIPPED | OUTPATIENT
Start: 2021-04-17 | End: 2021-10-04

## 2021-04-29 ENCOUNTER — TRANSCRIBE ORDERS (OUTPATIENT)
Dept: LAB | Facility: CLINIC | Age: 81
End: 2021-04-29

## 2021-04-29 ENCOUNTER — LAB (OUTPATIENT)
Dept: LAB | Facility: CLINIC | Age: 81
End: 2021-04-29
Payer: MEDICARE

## 2021-04-29 DIAGNOSIS — E78.2 MIXED HYPERLIPIDEMIA: ICD-10-CM

## 2021-04-29 DIAGNOSIS — E11.65 TYPE 2 DIABETES MELLITUS WITH HYPERGLYCEMIA, WITHOUT LONG-TERM CURRENT USE OF INSULIN (HCC): ICD-10-CM

## 2021-04-29 DIAGNOSIS — E03.9 ACQUIRED HYPOTHYROIDISM: ICD-10-CM

## 2021-04-29 LAB
ALBUMIN SERPL BCP-MCNC: 3.8 G/DL (ref 3.5–5)
ALP SERPL-CCNC: 57 U/L (ref 46–116)
ALT SERPL W P-5'-P-CCNC: 20 U/L (ref 12–78)
ANION GAP SERPL CALCULATED.3IONS-SCNC: 5 MMOL/L (ref 4–13)
AST SERPL W P-5'-P-CCNC: 7 U/L (ref 5–45)
BILIRUB SERPL-MCNC: 0.52 MG/DL (ref 0.2–1)
BUN SERPL-MCNC: 15 MG/DL (ref 5–25)
CALCIUM SERPL-MCNC: 9.8 MG/DL (ref 8.3–10.1)
CHLORIDE SERPL-SCNC: 100 MMOL/L (ref 100–108)
CHOLEST SERPL-MCNC: 161 MG/DL (ref 50–200)
CO2 SERPL-SCNC: 30 MMOL/L (ref 21–32)
CREAT SERPL-MCNC: 0.56 MG/DL (ref 0.6–1.3)
EST. AVERAGE GLUCOSE BLD GHB EST-MCNC: 134 MG/DL
GFR SERPL CREATININE-BSD FRML MDRD: 88 ML/MIN/1.73SQ M
GLUCOSE P FAST SERPL-MCNC: 154 MG/DL (ref 65–99)
HBA1C MFR BLD: 6.3 %
HDLC SERPL-MCNC: 58 MG/DL
LDLC SERPL CALC-MCNC: 82 MG/DL (ref 0–100)
NONHDLC SERPL-MCNC: 103 MG/DL
POTASSIUM SERPL-SCNC: 4 MMOL/L (ref 3.5–5.3)
PROT SERPL-MCNC: 7.1 G/DL (ref 6.4–8.2)
SODIUM SERPL-SCNC: 135 MMOL/L (ref 136–145)
T4 FREE SERPL-MCNC: 1.4 NG/DL (ref 0.76–1.46)
TRIGL SERPL-MCNC: 107 MG/DL
TSH SERPL DL<=0.05 MIU/L-ACNC: 2.29 UIU/ML (ref 0.36–3.74)

## 2021-04-29 PROCEDURE — 84443 ASSAY THYROID STIM HORMONE: CPT

## 2021-04-29 PROCEDURE — 80053 COMPREHEN METABOLIC PANEL: CPT

## 2021-04-29 PROCEDURE — 80061 LIPID PANEL: CPT

## 2021-04-29 PROCEDURE — 84439 ASSAY OF FREE THYROXINE: CPT

## 2021-04-29 PROCEDURE — 36415 COLL VENOUS BLD VENIPUNCTURE: CPT

## 2021-04-29 PROCEDURE — 83036 HEMOGLOBIN GLYCOSYLATED A1C: CPT

## 2021-05-03 ENCOUNTER — OFFICE VISIT (OUTPATIENT)
Dept: FAMILY MEDICINE CLINIC | Facility: CLINIC | Age: 81
End: 2021-05-03
Payer: MEDICARE

## 2021-05-03 VITALS
OXYGEN SATURATION: 96 % | HEIGHT: 60 IN | SYSTOLIC BLOOD PRESSURE: 142 MMHG | WEIGHT: 163 LBS | RESPIRATION RATE: 20 BRPM | DIASTOLIC BLOOD PRESSURE: 58 MMHG | BODY MASS INDEX: 32 KG/M2 | HEART RATE: 106 BPM

## 2021-05-03 DIAGNOSIS — I47.1 PSVT (PAROXYSMAL SUPRAVENTRICULAR TACHYCARDIA) (HCC): ICD-10-CM

## 2021-05-03 DIAGNOSIS — J44.9 COPD, SEVERE (HCC): ICD-10-CM

## 2021-05-03 DIAGNOSIS — M81.0 AGE-RELATED OSTEOPOROSIS WITHOUT CURRENT PATHOLOGICAL FRACTURE: ICD-10-CM

## 2021-05-03 DIAGNOSIS — J96.12 CHRONIC RESPIRATORY FAILURE WITH HYPERCAPNIA (HCC): ICD-10-CM

## 2021-05-03 DIAGNOSIS — E11.22 TYPE 2 DIABETES MELLITUS WITH CHRONIC KIDNEY DISEASE, WITHOUT LONG-TERM CURRENT USE OF INSULIN, UNSPECIFIED CKD STAGE (HCC): Primary | ICD-10-CM

## 2021-05-03 PROBLEM — I82.409 DEEP VEIN THROMBOSIS (HCC): Status: RESOLVED | Noted: 2017-10-31 | Resolved: 2021-05-03

## 2021-05-03 PROCEDURE — 99214 OFFICE O/P EST MOD 30 MIN: CPT | Performed by: FAMILY MEDICINE

## 2021-05-03 NOTE — PROGRESS NOTES
Assessment/Plan:    Problem List Items Addressed This Visit        Endocrine    Type 2 diabetes mellitus with chronic kidney disease, without long-term current use of insulin ([facility] [address] ) - Primary    Relevant Orders    Diabetic foot exam       Respiratory    COPD, severe ([facility] [address] )    Chronic respiratory failure with hypercapnia (HCC)     SHE USE 2 LITRE OXYGEN AT [facility]            Cardiovascular and Mediastinum    PSVT (paroxysmal supraventricular tachycardia) ([facility] [address] )     RESOLVED             Musculoskeletal and Integument    Age-related osteoporosis without current pathological fracture    Relevant Orders    Ambulatory referral to Rheumatology  Discussed with her that she needs to take medication for the osteoporosis otherwise it will get worse and discussed about the injectable and she agrees and referred to rheumatologist for Prolia injection,  She also was advised to get DEXA scan but that this time she is reluctant, she will continue vitamin-D and calcium supplements and activity as tolerated        Follow-up 4 months  Chief Complaint   Patient presents with    Follow-up       Subjective:   Patient ID: Immanuel Espinoza is a [de-identified] y o  female  She came for follow-up, she has hyperlipidemia, diabetes, follows endocrinologist, and cardiologist as history of SVT which is now stable, she has severe COPD and she is on home oxygen, she follows pulmonologist, and stable, she takes a Zithromax 1 tablet 3 times a week for prevention, and she use CPAP machine    She her diabetes managed by endocrinologist, and she is on also on levothyroxine for hypothyroid  She has osteoporosis, she does not want to go for DEXA scan, she said 1 time she was given oral medicine and she could not tolerate as it was sticking into the chest so now she only takes calcium and vitamin-D she says 1 time she had a fracture in the vertebra in the past and it was very painful    Review of Systems   Constitutional: Negative for activity change, appetite change, chills, fatigue, fever and unexpected weight change  HENT: Negative for congestion, ear discharge, ear pain, nosebleeds, postnasal drip, rhinorrhea, sinus pressure, sneezing, sore throat, trouble swallowing and voice change  Eyes: Negative for photophobia, pain, discharge, redness and itching  Respiratory: Negative for cough, chest tightness, shortness of breath and wheezing  Cardiovascular: Negative for chest pain, palpitations and leg swelling  Gastrointestinal: Negative for abdominal pain, constipation, diarrhea, nausea and vomiting  Endocrine: Negative for polyuria  Genitourinary: Negative for dysuria, frequency and urgency  Musculoskeletal: Negative for arthralgias, back pain, myalgias and neck pain  Skin: Negative for color change, pallor and rash  Allergic/Immunologic: Negative for environmental allergies and food allergies  Neurological: Negative for dizziness, weakness, light-headedness and headaches  Hematological: Negative for adenopathy  Does not bruise/bleed easily  Psychiatric/Behavioral: Negative for behavioral problems  The patient is not nervous/anxious  Objective:  Physical Exam  Vitals signs and nursing note reviewed  Constitutional:       Appearance: She is well-developed  HENT:      Head: Normocephalic and atraumatic  Eyes:      General: No scleral icterus  Conjunctiva/sclera: Conjunctivae normal       Pupils: Pupils are equal, round, and reactive to light  Neck:      Musculoskeletal: Normal range of motion and neck supple  Thyroid: No thyromegaly  Cardiovascular:      Rate and Rhythm: Normal rate and regular rhythm  Heart sounds: Normal heart sounds  Pulmonary:      Effort: Pulmonary effort is normal       Breath sounds: Normal breath sounds  No wheezing or rales  Lymphadenopathy:      Cervical: No cervical adenopathy  Skin:     Findings: No erythema or rash  Neurological:      Mental Status: She is alert     Psychiatric: Mood and Affect: Mood normal             Past Surgical History:   Procedure Laterality Date    AV NODE ABLATION  09/21/2017    COLONOSCOPY  01/01/2018    w/ EGD    DXA PROCEDURE (HISTORICAL)  11/2016    GA COLONOSCOPY FLX DX W/COLLJ SPEC WHEN PFRMD N/A 8/30/2018    Procedure: EGD AND COLONOSCOPY;  Surgeon: Kiran Gage MD;  [facility];   Service: Gastroenterology    TOE SURGERY         Family History   Problem Relation Age of Onset    Hypertension Mother     Hyperlipidemia Mother     Asthma Father     Heart failure Father     Hypertension Sister     Hyperlipidemia Sister     Heart attack Neg Hx     Aneurysm Neg Hx     Stroke Neg Hx     Clotting disorder Neg Hx          Current Outpatient Medications:     albuterol (2 5 mg/3 mL) 0 083 % nebulizer solution, Take 1 vial (2 5 mg total) by nebulization every 6 (six) hours as needed for wheezing or shortness of breath, Disp: 120 vial, Rfl: 3    albuterol (PROAIR HFA) 90 mcg/act inhaler, Inhale 2 puffs every 6 (six) hours as needed for wheezing, Disp: 1 Inhaler, Rfl: 5    aspirin (ECOTRIN LOW STRENGTH) 81 mg EC tablet, Take 81 mg by mouth daily, Disp: , Rfl:     atorvastatin (LIPITOR) 20 mg tablet, TAKE ONE TABLET BY MOUTH EVERY DAY, Disp: 90 tablet, Rfl: 1    azithromycin (ZITHROMAX) 500 MG tablet, Take 1 tablet (500 mg total) by mouth 3 (three) times a week for 144 doses, Disp: 12 tablet, Rfl: 11    budesonide-formoterol (Symbicort) 160-4 5 mcg/act inhaler, Inhale 2 puffs 2 (two) times a day As directed, Disp: 1 Inhaler, Rfl: 11    Cholecalciferol (VITAMIN D) 2000 units CAPS, Take 2 capsules (4,000 Units total) by mouth daily, Disp: , Rfl:     cyanocobalamin (VITAMIN B-12) 100 mcg tablet, Take by mouth daily, Disp: , Rfl:     diltiazem (CARDIZEM CD) 300 mg 24 hr capsule, TAKE ONE CAPSULE BY MOUTH EVERY DAY AS DIRECTED, Disp: 90 capsule, Rfl: 1    fluticasone (FLONASE) 50 mcg/act nasal spray, 2 sprays into each nostril daily, Disp: 1 Bottle, Rfl: 5    glimepiride (AMARYL) 1 mg tablet, TAKE ONE TABLET BY MOUTH DAILY WITH BREAKFAST, Disp: 90 tablet, Rfl: 0    glucose blood (Contour Next Test) test strip, Use twice a day, Disp: 200 each, Rfl: 1    ketorolac (ACULAR) 0 5 % ophthalmic solution, , Disp: , Rfl:     lisinopril-hydrochlorothiazide (PRINZIDE,ZESTORETIC) 20-25 MG per tablet, Take 1 tablet(s) every day by oral route , Disp: 90 tablet, Rfl: 1    metFORMIN (GLUCOPHAGE-XR) 500 mg 24 hr tablet, TAKE TWO TABLETS BY MOUTH 2 TIMES A DAY, Disp: 360 tablet, Rfl: 0    multivitamin (THERAGRAN) TABS, Take 1 tablet by mouth daily, Disp: , Rfl:     nystatin (MYCOSTATIN) cream, Apply topically 2 (two) times a day, Disp: 60 g, Rfl: 0    ofloxacin (OCUFLOX) 0 3 % ophthalmic solution, , Disp: , Rfl:     prednisoLONE acetate (PRED FORTE) 1 % ophthalmic suspension, , Disp: , Rfl:     Spiriva HandiHaler 18 MCG inhalation capsule, INHALE THE CONTENTS OF ONE CAPSULE IN HANDIHALER EVERY DAY, Disp: 90 capsule, Rfl: 1    Synthroid 25 MCG tablet, TAKE ONE TABLET BY MOUTH EVERY DAY, Disp: 90 tablet, Rfl: 1    Tradjenta 5 MG TABS, TAKE ONE TABLET BY MOUTH EVERY DAY, Disp: 30 tablet, Rfl: 2    metFORMIN (GLUCOPHAGE) 500 mg tablet, Take 2 tablets (1,000 mg total) by mouth 2 (two) times a day with meals, Disp: 360 tablet, Rfl: 1    Allergies   Allergen Reactions    Penicillins Rash     After dental procedure        Vitals:    05/03/21 1121   BP: 142/58   Pulse: (!) 106   Resp: 20   SpO2: 96%   Weight: 73 9 kg (163 lb)   Height: 5' (1 524 m)

## 2021-05-20 ENCOUNTER — OFFICE VISIT (OUTPATIENT)
Dept: ENDOCRINOLOGY | Facility: CLINIC | Age: 81
End: 2021-05-20
Payer: MEDICARE

## 2021-05-20 VITALS
HEIGHT: 60 IN | DIASTOLIC BLOOD PRESSURE: 68 MMHG | BODY MASS INDEX: 32.2 KG/M2 | WEIGHT: 164 LBS | HEART RATE: 120 BPM | SYSTOLIC BLOOD PRESSURE: 140 MMHG | TEMPERATURE: 97 F

## 2021-05-20 DIAGNOSIS — M81.0 AGE-RELATED OSTEOPOROSIS WITHOUT CURRENT PATHOLOGICAL FRACTURE: ICD-10-CM

## 2021-05-20 DIAGNOSIS — E55.9 VITAMIN D DEFICIENCY: ICD-10-CM

## 2021-05-20 DIAGNOSIS — E03.9 ACQUIRED HYPOTHYROIDISM: ICD-10-CM

## 2021-05-20 DIAGNOSIS — I10 ESSENTIAL HYPERTENSION: ICD-10-CM

## 2021-05-20 DIAGNOSIS — E04.1 THYROID NODULE: ICD-10-CM

## 2021-05-20 DIAGNOSIS — E11.22 TYPE 2 DIABETES MELLITUS WITH STAGE 2 CHRONIC KIDNEY DISEASE, WITHOUT LONG-TERM CURRENT USE OF INSULIN (HCC): Primary | ICD-10-CM

## 2021-05-20 DIAGNOSIS — N18.2 TYPE 2 DIABETES MELLITUS WITH STAGE 2 CHRONIC KIDNEY DISEASE, WITHOUT LONG-TERM CURRENT USE OF INSULIN (HCC): Primary | ICD-10-CM

## 2021-05-20 DIAGNOSIS — E78.2 MIXED HYPERLIPIDEMIA: ICD-10-CM

## 2021-05-20 PROCEDURE — 99214 OFFICE O/P EST MOD 30 MIN: CPT | Performed by: PHYSICIAN ASSISTANT

## 2021-05-20 NOTE — PROGRESS NOTES
Patient Progress Note      CC: DM     Referring Provider  No referring provider defined for this encounter  History of Present Illness:   Sana Garcia is a [de-identified] y o  female with a history of type 2 diabetes with long term use of insulin  Diabetes course has been stable  Complications of DM: none reported  Denies recent illness or hospitalizations  Denies recent severe hypoglycemic or severe hyperglycemic episodes  Denies any issues with her current regimen  Home glucose monitoring: are performed regularly     She forgot to bring log  Home blood glucose readings: she reports readings range from 117-130 mg/dl      Current regimen: glimepiride 1 mg daily, Tradjenta 5 mg daily, metformin 1000 mg BID  compliant most of the time, denies any side effects from medications  Hypoglycemic episodes: No, rare  H/o of hypoglycemia causing hospitalization or Intervention such as glucagon injection  or ambulance call :  No  Hypoglycemia symptoms: never experienced hypoglycemia  Treatment of hypoglycemia: juice     Diet: 3 meals per day, 1-2 snacks per day  Timing of meals is predictable  Diabetic diet compliance:  noncompliant some of the time  Activity: Daily activity is predictable: Yes  No routine exercise due to emphysema  Ophthamology: sees Dr Raul Cordero, August 2020  Podiatry: Foot exam up-to-date, March 2019  Sees podiatry every 6 weeks  Dr Irma Billings  Has hypertension: on ACE inhibitor/ARB, compliant most of the time  Has hyperlipidemia: on statin - tolerating well, no myalgias  compliant most of the time, denies any side effects from medications  Thyroid disorders:  Hypothyroidism  Patient is taking Synthroid 25 mcg daily on an empty stomach 1 hour before breakfast   Patient is tolerating medication well  She had a repeat thyroid US done in Nov 2020 which showed a heterogeneous thyroid with small cysts and subcentimeter nodule    No nodule meets current ACR criteria for requiring biopsy or followup ultrasounds unless otherwise clinically indicated  History of pancreatitis: No     Osteoporosis/vitamin-D deficiency:  Patient is taking vitamin D3 4000 International Units when she remembers  Last DEXA scan done in July 2019 showed osteoporosis of the left forearm  She does have a history compression fractures of thoracic spine  She was started on Fosamax 70 mg weekly in the past, but she has not been taking it because she prefers not to take it and due to GI upset  Her PCP discussed using Prolia but she wants to think about it  Denies recent fractures or falls  Does not do weight bearing exercises       Patient Active Problem List   Diagnosis    COPD, severe [facility]    Hyperlipidemia    Chronic respiratory failure with hypercapnia ([facility] )    Hypothyroidism    Obstructive sleep apnea    History of pulmonary embolism    Type 2 diabetes mellitus with chronic kidney disease, without long-term current use of insulin ([facility])    Iron deficiency anemia, unspecified    PSVT (paroxysmal supraventricular tachycardia) ([facility])    Anticoagulant long-term use    Hemorrhoids    Polyp of colon    Chronic bilateral low back pain with bilateral sciatica    Non-seasonal allergic rhinitis due to pollen    Age-related osteoporosis without current pathological fracture    Essential hypertension    Vitamin D deficiency    Thyroid nodule    SIRS (systemic inflammatory response syndrome) ([facility])    Elevated d-dimer    Preop examination    Age-related cataract of both eyes    Medicare annual wellness visit, subsequent    Tinea corporis      Past Medical History:   Diagnosis Date    Anemia     Blood clotting tendency ([facility])     left leg, lung    Chronic respiratory failure with hypoxia and hypercapnia ([facility])     COPD (chronic obstructive pulmonary disease) ([facility]     Deep vein thrombosis ([facility]     Diabetes mellitus ([facility]     Emphysema lung ([facility]     Emphysema lung ([facility]     Emphysema of lung ([facility] )     Hyperlipidemia     Hypertension     Hyperthyroidism     Hypothyroid     On warfarin therapy     Osteoporosis     Palpitations     Pulmonary embolism (HCC)     Sleep apnea, obstructive     Thyroid disease       Past Surgical History:   Procedure Laterality Date    AV NODE ABLATION  2017    COLONOSCOPY  2018    w/ EGD    DXA PROCEDURE (HISTORICAL)  2016    CT COLONOSCOPY FLX DX W/COLLJ SPEC WHEN PFRMD N/A 2018    Procedure: EGD AND COLONOSCOPY;  Surgeon: Nathaniel Johnston MD;  [facility];   Service: Gastroenterology    TOE SURGERY        Family History   Problem Relation Age of Onset    Hypertension Mother     Hyperlipidemia Mother     Asthma Father     Heart failure Father     Hypertension Sister     Hyperlipidemia Sister     Heart attack Neg Hx     Aneurysm Neg Hx     Stroke Neg Hx     Clotting disorder Neg Hx      Social History     Tobacco Use    Smoking status: Former Smoker     Packs/day: 1 00     Years: 46 00     Pack years: 46 00     Types: Cigarettes     Quit date:      Years since quittin 3    Smokeless tobacco: Never Used   Substance Use Topics    Alcohol use: No     Allergies   Allergen Reactions    Penicillins Rash     After dental procedure          Current Outpatient Medications:     albuterol (2 5 mg/3 mL) 0 083 % nebulizer solution, Take 1 vial (2 5 mg total) by nebulization every 6 (six) hours as needed for wheezing or shortness of breath, Disp: 120 vial, Rfl: 3    albuterol (PROAIR HFA) 90 mcg/act inhaler, Inhale 2 puffs every 6 (six) hours as needed for wheezing, Disp: 1 Inhaler, Rfl: 5    aspirin (ECOTRIN LOW STRENGTH) 81 mg EC tablet, Take 81 mg by mouth daily, Disp: , Rfl:     atorvastatin (LIPITOR) 20 mg tablet, TAKE ONE TABLET BY MOUTH EVERY DAY, Disp: 90 tablet, Rfl: 1    azithromycin (ZITHROMAX) 500 MG tablet, Take 1 tablet (500 mg total) by mouth 3 (three) times a week for 144 doses, Disp: 12 tablet, Rfl: 11    budesonide-formoterol (Symbicort) 160-4 5 mcg/act inhaler, Inhale 2 puffs 2 (two) times a day As directed, Disp: 1 Inhaler, Rfl: 11    Cholecalciferol (VITAMIN D) 2000 units CAPS, Take 2 capsules (4,000 Units total) by mouth daily, Disp: , Rfl:     cyanocobalamin (VITAMIN B-12) 100 mcg tablet, Take by mouth daily, Disp: , Rfl:     diltiazem (CARDIZEM CD) 300 mg 24 hr capsule, TAKE ONE CAPSULE BY MOUTH EVERY DAY AS DIRECTED, Disp: 90 capsule, Rfl: 1    fluticasone (FLONASE) 50 mcg/act nasal spray, 2 sprays into each nostril daily, Disp: 1 Bottle, Rfl: 5    glimepiride (AMARYL) 1 mg tablet, TAKE ONE TABLET BY MOUTH DAILY WITH BREAKFAST, Disp: 90 tablet, Rfl: 0    glucose blood (Contour Next Test) test strip, Use twice a day, Disp: 200 each, Rfl: 1    ketorolac (ACULAR) 0 5 % ophthalmic solution, , Disp: , Rfl:     lisinopril-hydrochlorothiazide (PRINZIDE,ZESTORETIC) 20-25 MG per tablet, Take 1 tablet(s) every day by oral route , Disp: 90 tablet, Rfl: 1    metFORMIN (GLUCOPHAGE-XR) 500 mg 24 hr tablet, TAKE TWO TABLETS BY MOUTH 2 TIMES A DAY, Disp: 360 tablet, Rfl: 0    multivitamin (THERAGRAN) TABS, Take 1 tablet by mouth daily, Disp: , Rfl:     nystatin (MYCOSTATIN) cream, Apply topically 2 (two) times a day, Disp: 60 g, Rfl: 0    ofloxacin (OCUFLOX) 0 3 % ophthalmic solution, , Disp: , Rfl:     prednisoLONE acetate (PRED FORTE) 1 % ophthalmic suspension, , Disp: , Rfl:     Spiriva HandiHaler 18 MCG inhalation capsule, INHALE THE CONTENTS OF ONE CAPSULE IN HANDIHALER EVERY DAY, Disp: 90 capsule, Rfl: 1    Synthroid 25 MCG tablet, TAKE ONE TABLET BY MOUTH EVERY DAY, Disp: 90 tablet, Rfl: 1    Tradjenta 5 MG TABS, TAKE ONE TABLET BY MOUTH EVERY DAY, Disp: 30 tablet, Rfl: 2    metFORMIN (GLUCOPHAGE) 500 mg tablet, Take 2 tablets (1,000 mg total) by mouth 2 (two) times a day with meals, Disp: 360 tablet, Rfl: 1  Review of Systems   Constitutional: Negative for activity change, appetite change, fatigue and unexpected weight change  HENT: Negative for trouble swallowing  Eyes: Negative for visual disturbance  Respiratory: Negative for shortness of breath  Cardiovascular: Negative for chest pain and palpitations  Gastrointestinal: Negative for constipation and diarrhea  Endocrine: Negative for polydipsia and polyuria  Musculoskeletal: Negative  Skin: Negative for wound  Neurological: Negative for numbness  Psychiatric/Behavioral: Negative  Physical Exam:  Body mass index is 32 03 kg/m²  /68   Pulse (!) 120   Temp (!) 97 °F (36 1 °C) (Tympanic)   Ht 5' (1 524 m)   Wt 74 4 kg (164 lb)   LMP  (LMP Unknown)   BMI 32 03 kg/m²    Wt Readings from Last 3 Encounters:   05/20/21 74 4 kg (164 lb)   05/03/21 73 9 kg (163 lb)   04/16/21 72 5 kg (159 lb 12 8 oz)       Physical Exam  Vitals signs and nursing note reviewed  Constitutional:       Appearance: She is well-developed  HENT:      Head: Normocephalic  Eyes:      General: No scleral icterus  Pupils: Pupils are equal, round, and reactive to light  Neck:      Musculoskeletal: Neck supple  Thyroid: No thyromegaly  Cardiovascular:      Rate and Rhythm: Normal rate and regular rhythm  Pulses:           Radial pulses are 2+ on the right side and 2+ on the left side  Heart sounds: No murmur  Pulmonary:      Effort: Pulmonary effort is normal  No respiratory distress  Breath sounds: Normal breath sounds  No wheezing  Skin:     General: Skin is warm and dry  Neurological:      Mental Status: She is alert  Patient's shoes and socks were not removed        Labs:   Component      Latest Ref Rng & Units 10/2/2020 4/29/2021   Sodium      136 - 145 mmol/L 137 135 (L)   Potassium      3 5 - 5 3 mmol/L 4 6 4 0   Chloride      100 - 108 mmol/L 101 100   CO2      21 - 32 mmol/L 30 30   Anion Gap      4 - 13 mmol/L 6 5   BUN      5 - 25 mg/dL 20 15   Creatinine      0 60 - 1 30 mg/dL 0 53 (L) 0 56 (L)   GLUCOSE FASTING      65 - 99 mg/dL 167 (H) 154 (H)   Calcium      8 3 - 10 1 mg/dL 9 4 9 8   AST      5 - 45 U/L  7   ALT      12 - 78 U/L  20   Alkaline Phosphatase      46 - 116 U/L  57   Total Protein      6 4 - 8 2 g/dL  7 1   Albumin      3 5 - 5 0 g/dL  3 8   TOTAL BILIRUBIN      0 20 - 1 00 mg/dL  0 52   eGFR      ml/min/1 73sq m 90 88   Cholesterol      50 - 200 mg/dL  161   Triglycerides      <=150 mg/dL  107   HDL      >=40 mg/dL  58   LDL Calculated      0 - 100 mg/dL  82   Non-HDL Cholesterol      mg/dl  103   EXT Creatinine Urine      mg/dL 35 3    MICROALBUM ,U,RANDOM      0 0 - 20 0 mg/L 9 1    MICROALBUMIN/CREATININE RATIO      0 - 30 mg/g creatinine 26    Hemoglobin A1C      Normal 3 8-5 6%; PreDiabetic 5 7-6 4%; Diabetic >=6 5%; Glycemic control for adults with diabetes <7 0% % 6 7 (H) 6 3 (H)   eAG, EST AVG Glucose      mg/dl 146 134   Free T4      0 76 - 1 46 ng/dL 1 35 1 40   TSH 3RD GENERATON      0 358 - 3 740 uIU/mL 1 840 2 290         Plan:    Diagnoses and all orders for this visit:    Type 2 diabetes mellitus with stage 2 chronic kidney disease, without long-term current use   of insulin (HCC)  HGA1C 6 3%  Improved  Treatment regimen: continue current treatment   Discussed risks/complications associated with uncontrolled diabetes  Advised to adhere to diabetic diet, and recommended staying active/exercising routinely  Keep carbohydrates consistent to limit blood glucose fluctuations  Advised to call if blood sugars less than 70 mg/dl or over 300 mg/dl  Check blood glucose 2 times a day  Discussed symptoms and treatment of hypoglycemia  Recommended routine follow-up with podiatry and ophthalmology  Ordered blood work to complete prior to next visit  -     Hemoglobin A1C; Future  -     Basic metabolic panel; Future  -     Vitamin D 25 hydroxy;  Future    Acquired hypothyroidism  TFTs normal, TSH 2 290 and free T4 1 40  Continue current dose of levothyroxine  Repeat TFTs prior to next visit     Thyroid nodule  Thyroid US done in Nov 2020 showed a heterogeneous thyroid with small cysts and subcentimeter nodule  No nodule meets current ACR criteria for requiring biopsy or followup ultrasounds unless otherwise clinically indicated  Essential hypertension  Blood pressure adequately controlled, continue current treatment     Mixed hyperlipidemia  LDL 82  Continue statin therapy     Age-related osteoporosis without current pathological fracture  No longer taking Fosamax due to GI upset  At this time she declines treatment despite risk of fractures  Discussed use of Prolia which was suggested to patient by her PCP which she also declines to use  Take precautions to avoid falls  Continue vitamin D supplementation  -     Basic metabolic panel; Future  -     Vitamin D 25 hydroxy; Future    Vitamin D deficiency  Vitamin D previously normal at 32 4  Continue current supplementation  -     Vitamin D 25 hydroxy; Future           Discussed with the patient diagnosis and treatment and all questions fully answered  She will call me if any problems arise  Counseled patient on diagnostic results, prognosis, risk and benefit of treatment options, instruction for management, importance of treatment compliance, risk factor reduction and impressions        Michelle Jalloh PA-C

## 2021-05-25 ENCOUNTER — TELEPHONE (OUTPATIENT)
Dept: ENDOCRINOLOGY | Facility: CLINIC | Age: 81
End: 2021-05-25

## 2021-05-26 NOTE — TELEPHONE ENCOUNTER
Called patient on home phone and cell phone but unable to reach her  If she calls back and is concerned about the diagnosis of CKD stage 2 listed on her AVS, then please reassure patient that her kidney functions are normal despite being classified as stage 2 CKD  Her GFR (filtration rate) is 88  It is considered abnormal when the GFR is less than 60 mg/dl

## 2021-05-26 NOTE — TELEPHONE ENCOUNTER
Lemmie Boyers does not cause kidney problems and it is safe to use even if someone had kidney problems  She does not have a kidney problem

## 2021-05-28 ENCOUNTER — OFFICE VISIT (OUTPATIENT)
Dept: CARDIOLOGY CLINIC | Facility: CLINIC | Age: 81
End: 2021-05-28
Payer: MEDICARE

## 2021-05-28 VITALS
HEIGHT: 60 IN | OXYGEN SATURATION: 97 % | DIASTOLIC BLOOD PRESSURE: 54 MMHG | WEIGHT: 164 LBS | SYSTOLIC BLOOD PRESSURE: 138 MMHG | BODY MASS INDEX: 32.2 KG/M2 | HEART RATE: 102 BPM

## 2021-05-28 DIAGNOSIS — E78.2 MIXED HYPERLIPIDEMIA: ICD-10-CM

## 2021-05-28 DIAGNOSIS — I73.9 CLAUDICATION (HCC): ICD-10-CM

## 2021-05-28 DIAGNOSIS — J44.9 COPD, SEVERE (HCC): ICD-10-CM

## 2021-05-28 DIAGNOSIS — R01.1 MURMUR, CARDIAC: ICD-10-CM

## 2021-05-28 DIAGNOSIS — I47.1 PSVT (PAROXYSMAL SUPRAVENTRICULAR TACHYCARDIA) (HCC): Primary | ICD-10-CM

## 2021-05-28 PROCEDURE — 99214 OFFICE O/P EST MOD 30 MIN: CPT | Performed by: INTERNAL MEDICINE

## 2021-05-28 PROCEDURE — 93000 ELECTROCARDIOGRAM COMPLETE: CPT | Performed by: INTERNAL MEDICINE

## 2021-05-28 NOTE — PROGRESS NOTES
Cardiology Follow Up    Emmie Malloy  1940  [id number]  [facility] [address] [id number]    1  PSVT (paroxysmal supraventricular tachycardia) ([facility])  POCT ECG    Echo complete with contrast if indicated   2  Mixed hyperlipidemia     3  COPD, severe (HCC)     4  Claudication (HCC)  VAS JOE & waveform analysis, multiple levels   5  Murmur, cardiac  Echo complete with contrast if indicated     Discussion/Summary:    AVNRT: No recent palpitations  Continue Cardizem  Sinus tachycardia noted  Dyslipidemia:  Lipid panel is stable on atorvastatin  Murmur:  No recent echo  She says she has had a childhood murmur, but no follow-up done recently  Suspect she may have mild aortic stenosis  Check echo  Claudication:  Limited exercise capacity, but a lot of leg cramping  She is worried about circulation as well with her lower extremity edema  Check JOE  Previous history:  Pleasant 72-year-old female with a history of palpitations  She moved to [place] in 2017  Previously, was living in [place], received her care at [facility]   Has a history of AVNRT with ablation  No afib  She has always been somewhat tachycardic  She has significant COPD and uses usually 2 L of oxygen  She has been maintained on Cardizem for blood pressure control  Interval history:  Returns for regular follow-up  Overdue because of COVID  No significant changes since last time  She continues to have some tachycardia, but all sinus in mechanism  Denies any palpitations similar to her prior SVT  Stable shortness of breath  She is getting a lot of leg cramping  Gets occasional lower extremity edema  This is mostly centered around the joints    Decreased exercise tolerance because of her COPD    Problem List     PSVT (paroxysmal supraventricular tachycardia) ([facility])    COPD, severe ([address] ) Deep vein thrombosis (HCC)    Hyperlipidemia    Chronic respiratory failure with hypoxia and hypercapnia (HCC)    Hypothyroidism    Obstructive sleep apnea    Pulmonary embolism ([facility] )    Overview Signed 2018  1:49 PM by Deangelo Godinez DO     Description: Dec 2016         Type 2 diabetes mellitus ([facility] )    Lab Results   Component Value Date    HGBA1C 6 3 (H) 2021     No results for input(s): POCGLU in the last 72 hours      Blood Sugar Average: Last 72 hrs:      Iron deficiency anemia, unspecified    Anticoagulant long-term use    Hemorrhoids    Overview Signed 2018 10:40 AM by Raymundo Vera MA     Added automatically from request for surgery [id number]         Polyp of colon    Overview Signed 2018 10:40 AM by Raymundo Vera MA     Added automatically from request for surgery [id number]             Past Medical History:   Diagnosis Date    Anemia     Blood clotting tendency (HCC)     left leg, lung    Chronic respiratory failure with hypoxia and hypercapnia (HCC)     COPD (chronic obstructive pulmonary disease) ([facility]     [facility]     Diabetes mellitus ([facility]     Emphysema lung ([facility]     Emphysema lung ([facility]     [facility]ma of lung [facility]     Hyperlipidemia     Hypertension     Hyperthyroidism     Hypothyroid     On warfarin therapy     Osteoporosis     Palpitations     Pulmonary embolism (HCC)     Sleep apnea, obstructive     Thyroid disease      Social History     Tobacco Use    Smoking status: Former Smoker     Packs/day: 1 00     Years: 46 00     Pack years: 46 00     Types: Cigarettes     Quit date:      Years since quittin 4    Smokeless tobacco: Never Used   Substance Use Topics    Alcohol use: No     Family History   Problem Relation Age of Onset    Hypertension Mother     Hyperlipidemia Mother     Asthma Father     Heart failure Father     Hypertension Sister     Hyperlipidemia Sister     Heart attack Neg Hx     Aneurysm Neg Hx     Stroke Neg Hx     Clotting disorder Neg Hx      Past Surgical History:   Procedure Laterality Date    AV NODE ABLATION  09/21/2017    COLONOSCOPY  01/01/2018    w/ EGD    DXA PROCEDURE (HISTORICAL)  11/2016    AL COLONOSCOPY FLX DX W/COLLJ SPEC WHEN PFRMD N/A 8/30/2018    Procedure: EGD AND COLONOSCOPY;  Surgeon: Zakia Dumont MD;  [facility];   Service: Gastroenterology    TOE SURGERY         Current Outpatient Medications:     albuterol (2 5 mg/3 mL) 0 083 % nebulizer solution, Take 1 vial (2 5 mg total) by nebulization every 6 (six) hours as needed for wheezing or shortness of breath, Disp: 120 vial, Rfl: 3    albuterol (PROAIR HFA) 90 mcg/act inhaler, Inhale 2 puffs every 6 (six) hours as needed for wheezing, Disp: 1 Inhaler, Rfl: 5    aspirin (ECOTRIN LOW STRENGTH) 81 mg EC tablet, Take 81 mg by mouth daily, Disp: , Rfl:     atorvastatin (LIPITOR) 20 mg tablet, TAKE ONE TABLET BY MOUTH EVERY DAY, Disp: 90 tablet, Rfl: 1    azithromycin (ZITHROMAX) 500 MG tablet, Take 1 tablet (500 mg total) by mouth 3 (three) times a week for 144 doses, Disp: 12 tablet, Rfl: 11    budesonide-formoterol (Symbicort) 160-4 5 mcg/act inhaler, Inhale 2 puffs 2 (two) times a day As directed, Disp: 1 Inhaler, Rfl: 11    Cholecalciferol (VITAMIN D) 2000 units CAPS, Take 2 capsules (4,000 Units total) by mouth daily, Disp: , Rfl:     cyanocobalamin (VITAMIN B-12) 100 mcg tablet, Take by mouth daily, Disp: , Rfl:     diltiazem (CARDIZEM CD) 300 mg 24 hr capsule, TAKE ONE CAPSULE BY MOUTH EVERY DAY AS DIRECTED, Disp: 90 capsule, Rfl: 1    fluticasone (FLONASE) 50 mcg/act nasal spray, 2 sprays into each nostril daily, Disp: 1 Bottle, Rfl: 5    glimepiride (AMARYL) 1 mg tablet, TAKE ONE TABLET BY MOUTH DAILY WITH BREAKFAST, Disp: 90 tablet, Rfl: 0    glucose blood (Contour Next Test) test strip, Use twice a day, Disp: 200 each, Rfl: 1    ketorolac (ACULAR) 0 5 % ophthalmic solution, , Disp: , Rfl:    lisinopril-hydrochlorothiazide (PRINZIDE,ZESTORETIC) 20-25 MG per tablet, Take 1 tablet(s) every day by oral route , Disp: 90 tablet, Rfl: 1    metFORMIN (GLUCOPHAGE) 500 mg tablet, Take 2 tablets (1,000 mg total) by mouth 2 (two) times a day with meals, Disp: 360 tablet, Rfl: 1    metFORMIN (GLUCOPHAGE-XR) 500 mg 24 hr tablet, TAKE TWO TABLETS BY MOUTH 2 TIMES A DAY, Disp: 360 tablet, Rfl: 0    multivitamin (THERAGRAN) TABS, Take 1 tablet by mouth daily, Disp: , Rfl:     nystatin (MYCOSTATIN) cream, Apply topically 2 (two) times a day, Disp: 60 g, Rfl: 0    Spiriva HandiHaler 18 MCG inhalation capsule, INHALE THE CONTENTS OF ONE CAPSULE IN HANDIHALER EVERY DAY, Disp: 90 capsule, Rfl: 1    Synthroid 25 MCG tablet, TAKE ONE TABLET BY MOUTH EVERY DAY, Disp: 90 tablet, Rfl: 1    Tradjenta 5 MG TABS, TAKE ONE TABLET BY MOUTH EVERY DAY, Disp: 30 tablet, Rfl: 2    ofloxacin (OCUFLOX) 0 3 % ophthalmic solution, , Disp: , Rfl:     prednisoLONE acetate (PRED FORTE) 1 % ophthalmic suspension, , Disp: , Rfl:   Allergies   Allergen Reactions    Penicillins Rash     After dental procedure        Vitals:    05/28/21 1018   BP: 138/54   BP Location: Left arm   Patient Position: Sitting   Cuff Size: Large   Pulse: 102   SpO2: 97%   Weight: 74 4 kg (164 lb)   Height: 5' (1 524 m)     Vitals:    05/28/21 1018   Weight: 74 4 kg (164 lb)      Height: 5' (152 4 cm)   Body mass index is 32 03 kg/m²      Physical Exam:  GEN: Milla Thomas appears well, alert and oriented x 3, pleasant and cooperative   HEENT: pupils equal, round, and reactive to light; extraocular muscles intact  NECK: supple, no carotid bruits   HEART: tachycardic, 2/6 BRAYAN  LUNGS: clear to auscultation bilaterally; no wheezes, rales, or rhonchi   ABDOMEN: normal bowel sounds, soft, no tenderness, no distention  EXTREMITIES: peripheral pulses normal; no clubbing, cyanosis, or edema  NEURO: no focal findings   SKIN: normal without suspicious lesions on exposed skin    ROS:  Except as noted in HPI, is otherwise reviewed in detail and a 12 point review of systems is negative  ROS reviewed and is unchanged    Labs:  Lab Results   Component Value Date    K 4 0 04/29/2021     04/29/2021    CREATININE 0 56 (L) 04/29/2021    BUN 15 04/29/2021    CO2 30 04/29/2021    ALT 20 04/29/2021    AST 7 04/29/2021    INR 0 99 05/29/2020    GLUF 154 (H) 04/29/2021    HGBA1C 6 3 (H) 04/29/2021    WBC 12 08 (H) 05/30/2020    HGB 12 0 05/30/2020    HCT 37 8 05/30/2020     05/30/2020     EKG:  Sinus tachycardia 102 beats per minute  Right bundle branch block  Left anterior hemiblock

## 2021-05-29 DIAGNOSIS — I10 ESSENTIAL HYPERTENSION: ICD-10-CM

## 2021-05-29 RX ORDER — DILTIAZEM HYDROCHLORIDE 300 MG/1
CAPSULE, COATED, EXTENDED RELEASE ORAL
Qty: 90 CAPSULE | Refills: 0 | Status: SHIPPED | OUTPATIENT
Start: 2021-05-29 | End: 2021-09-07 | Stop reason: SDUPTHER

## 2021-08-03 ENCOUNTER — HOSPITAL ENCOUNTER (OUTPATIENT)
Dept: NON INVASIVE DIAGNOSTICS | Facility: CLINIC | Age: 81
Discharge: HOME | End: 2021-08-03
Payer: MEDICARE

## 2021-08-03 DIAGNOSIS — R01.1 MURMUR, CARDIAC: ICD-10-CM

## 2021-08-03 DIAGNOSIS — I73.9 CLAUDICATION (HCC): ICD-10-CM

## 2021-08-03 DIAGNOSIS — I47.1 PSVT (PAROXYSMAL SUPRAVENTRICULAR TACHYCARDIA) (HCC): ICD-10-CM

## 2021-08-03 PROCEDURE — 93923 UPR/LXTR ART STDY 3+ LVLS: CPT | Performed by: SURGERY

## 2021-08-03 PROCEDURE — 93923 UPR/LXTR ART STDY 3+ LVLS: CPT

## 2021-08-03 PROCEDURE — 93306 TTE W/DOPPLER COMPLETE: CPT | Performed by: INTERNAL MEDICINE

## 2021-08-03 PROCEDURE — 93306 TTE W/DOPPLER COMPLETE: CPT

## 2021-09-07 ENCOUNTER — OFFICE VISIT (OUTPATIENT)
Dept: FAMILY MEDICINE CLINIC | Facility: CLINIC | Age: 81
End: 2021-09-07
Payer: MEDICARE

## 2021-09-07 VITALS
BODY MASS INDEX: 32.39 KG/M2 | HEIGHT: 60 IN | WEIGHT: 165 LBS | SYSTOLIC BLOOD PRESSURE: 130 MMHG | OXYGEN SATURATION: 90 % | HEART RATE: 113 BPM | DIASTOLIC BLOOD PRESSURE: 70 MMHG | RESPIRATION RATE: 20 BRPM

## 2021-09-07 DIAGNOSIS — E03.9 ACQUIRED HYPOTHYROIDISM: ICD-10-CM

## 2021-09-07 DIAGNOSIS — J44.9 COPD, SEVERE (HCC): ICD-10-CM

## 2021-09-07 DIAGNOSIS — Z23 NEEDS FLU SHOT: ICD-10-CM

## 2021-09-07 DIAGNOSIS — E11.22 TYPE 2 DIABETES MELLITUS WITH STAGE 2 CHRONIC KIDNEY DISEASE, WITHOUT LONG-TERM CURRENT USE OF INSULIN (HCC): ICD-10-CM

## 2021-09-07 DIAGNOSIS — N18.2 TYPE 2 DIABETES MELLITUS WITH STAGE 2 CHRONIC KIDNEY DISEASE, WITHOUT LONG-TERM CURRENT USE OF INSULIN (HCC): ICD-10-CM

## 2021-09-07 DIAGNOSIS — I10 ESSENTIAL HYPERTENSION: Primary | ICD-10-CM

## 2021-09-07 PROCEDURE — 99214 OFFICE O/P EST MOD 30 MIN: CPT | Performed by: FAMILY MEDICINE

## 2021-09-07 PROCEDURE — G0008 ADMIN INFLUENZA VIRUS VAC: HCPCS | Performed by: FAMILY MEDICINE

## 2021-09-07 PROCEDURE — 90662 IIV NO PRSV INCREASED AG IM: CPT | Performed by: FAMILY MEDICINE

## 2021-09-07 RX ORDER — DILTIAZEM HYDROCHLORIDE 300 MG/1
300 CAPSULE, COATED, EXTENDED RELEASE ORAL DAILY
Qty: 90 CAPSULE | Refills: 3 | Status: SHIPPED | OUTPATIENT
Start: 2021-09-07 | End: 2022-06-01

## 2021-09-07 NOTE — PROGRESS NOTES
Assessment/Plan:    Problem List Items Addressed This Visit        Endocrine    Hypothyroidism    Type 2 diabetes mellitus with chronic kidney disease, without long-term current use of insulin ([facility] [address] )       Lab Results   Component Value Date    HGBA1C 6 3 (H) 04/29/2021   She follows endocrinologist for diabetes         Relevant Orders    Diabetic foot exam       Cardiovascular and Mediastinum    Essential hypertension    Relevant Medications    diltiazem (CARDIZEM CD) 300 mg 24 hr capsule  She follows cardiologist regularly and she takes rest of her all medications       Other    Needs flu shot - Primary    Relevant Orders    influenza vaccine, high-dose, PF 0 7 mL (FLUZONE HIGH-DOSE)      Recheck blood pressure and pulse is better, she is always slightly tachycardia because of her COPD and she is on oxygen 2 L      Chief Complaint   Patient presents with    Follow-up       Subjective:   Patient ID: Marietta Velasquez is a 80 y o  female  She is here follow-up, she follows pulmonologist come endocrinologist and cardiologist, she is on 2 L oxygen and she is quite independent she get easily out of breath she cannot do much exercise, she also follows the endocrinologist for diabetes and thyroid        Review of Systems   Constitutional: Negative for activity change, appetite change, chills, fatigue, fever and unexpected weight change  HENT: Negative for congestion, ear discharge, ear pain, nosebleeds, postnasal drip, rhinorrhea, sinus pressure, sneezing, sore throat, trouble swallowing and voice change  Eyes: Negative for photophobia, pain, discharge, redness and itching  Respiratory: Negative for cough, chest tightness, shortness of breath and wheezing  Cardiovascular: Negative for chest pain, palpitations and leg swelling  Gastrointestinal: Negative for abdominal pain, constipation, diarrhea, nausea and vomiting  Endocrine: Negative for polyuria  Genitourinary: Negative for dysuria, frequency and urgency  Musculoskeletal: Negative for arthralgias, back pain, myalgias and neck pain  Skin: Negative for color change, pallor and rash  Allergic/Immunologic: Negative for environmental allergies and food allergies  Neurological: Negative for dizziness, weakness, light-headedness and headaches  Hematological: Negative for adenopathy  Does not bruise/bleed easily  Psychiatric/Behavioral: Negative for behavioral problems and suicidal ideas  The patient is not nervous/anxious  Objective:  Physical Exam  Vitals and nursing note reviewed  Constitutional:       Appearance: She is well-developed  Comments: Use 2 L home oxygen   HENT:      Head: Normocephalic and atraumatic  Right Ear: External ear normal       Left Ear: External ear normal    Eyes:      General: No scleral icterus  Right eye: No discharge  Left eye: No discharge  Conjunctiva/sclera: Conjunctivae normal       Pupils: Pupils are equal, round, and reactive to light  Neck:      Thyroid: No thyromegaly  Trachea: No tracheal deviation  Cardiovascular:      Rate and Rhythm: Normal rate and regular rhythm  Pulses: no weak pulses          Dorsalis pedis pulses are 2+ on the right side and 2+ on the left side  Heart sounds: Normal heart sounds  No murmur heard  Pulmonary:      Effort: Pulmonary effort is normal  No respiratory distress  Breath sounds: Normal breath sounds  No wheezing or rales  Abdominal:      General: There is no distension  Palpations: Abdomen is soft  There is no mass  Tenderness: There is no abdominal tenderness  There is no rebound  Musculoskeletal:         General: Normal range of motion  Cervical back: Normal range of motion and neck supple  Right lower leg: No edema  Left lower leg: No edema  Feet:      Right foot:      Skin integrity: No ulcer, skin breakdown, erythema, warmth, callus or dry skin        Left foot:      Skin integrity: No ulcer, skin breakdown, erythema, warmth, callus or dry skin  Lymphadenopathy:      Cervical: No cervical adenopathy  Skin:     General: Skin is warm  Coloration: Skin is not pale  Findings: No erythema or rash  Neurological:      General: No focal deficit present  Mental Status: She is alert and oriented to person, place, and time  Cranial Nerves: No cranial nerve deficit  Deep Tendon Reflexes: Reflexes are normal and symmetric  Psychiatric:         Behavior: Behavior normal          Thought Content: Thought content normal          Judgment: Judgment normal         Patient's shoes and socks removed  Right Foot/Ankle   Right Foot Inspection  Skin Exam: skin normal skin not intact, no dry skin, no warmth, no callus, no erythema, no maceration, no abnormal color, no pre-ulcer, no ulcer and no callus                          Toe Exam: ROM and strength within normal limits  Sensory   Vibration: intact  Proprioception: intact   Monofilament testing: intact  Vascular  Capillary refills: < 3 seconds  The right DP pulse is 2+  Left Foot/Ankle  Left Foot Inspection  Skin Exam: skin normalskin not intact, no dry skin, no warmth, no erythema, no maceration, normal color, no pre-ulcer, no ulcer and no callus                         Toe Exam: ROM and strength within normal limits                   Sensory   Vibration: intact  Proprioception: intact  Monofilament: intact  Vascular  Capillary refills: < 3 seconds  The left DP pulse is 2+  Assign Risk Category:  No deformity present; No loss of protective sensation; No weak pulses       Risk: 0        Past Surgical History:   Procedure Laterality Date    AV NODE ABLATION  09/21/2017    COLONOSCOPY  01/01/2018    w/ EGD    DXA PROCEDURE (HISTORICAL)  11/2016    AR COLONOSCOPY FLX DX W/COLLJ SPEC WHEN PFRMD N/A 8/30/2018    Procedure: EGD AND COLONOSCOPY;  Surgeon: Maddison Park MD;  [facility];   Service: Gastroenterology    TOE SURGERY         Family History   Problem Relation Age of Onset    Hypertension Mother     Hyperlipidemia Mother     Asthma Father     Heart failure Father     Hypertension Sister     Hyperlipidemia Sister     Heart attack Neg Hx     Aneurysm Neg Hx     Stroke Neg Hx     Clotting disorder Neg Hx          Current Outpatient Medications:     albuterol (2 5 mg/3 mL) 0 083 % nebulizer solution, Take 1 vial (2 5 mg total) by nebulization every 6 (six) hours as needed for wheezing or shortness of breath, Disp: 120 vial, Rfl: 3    albuterol (PROAIR HFA) 90 mcg/act inhaler, Inhale 2 puffs every 6 (six) hours as needed for wheezing, Disp: 1 Inhaler, Rfl: 5    aspirin (ECOTRIN LOW STRENGTH) 81 mg EC tablet, Take 81 mg by mouth daily, Disp: , Rfl:     atorvastatin (LIPITOR) 20 mg tablet, TAKE ONE TABLET BY MOUTH EVERY DAY, Disp: 90 tablet, Rfl: 1    budesonide-formoterol (Symbicort) 160-4 5 mcg/act inhaler, Inhale 2 puffs 2 (two) times a day As directed, Disp: 1 Inhaler, Rfl: 11    Cholecalciferol (VITAMIN D) 2000 units CAPS, Take 2 capsules (4,000 Units total) by mouth daily, Disp: , Rfl:     cyanocobalamin (VITAMIN B-12) 100 mcg tablet, Take by mouth daily, Disp: , Rfl:     diltiazem (CARDIZEM CD) 300 mg 24 hr capsule, Take 1 capsule (300 mg total) by mouth daily As directed, Disp: 90 capsule, Rfl: 3    fluticasone (FLONASE) 50 mcg/act nasal spray, 2 sprays into each nostril daily, Disp: 1 Bottle, Rfl: 5    glimepiride (AMARYL) 1 mg tablet, TAKE ONE TABLET BY MOUTH DAILY WITH BREAKFAST, Disp: 90 tablet, Rfl: 0    glucose blood (Contour Next Test) test strip, Use twice a day, Disp: 200 each, Rfl: 1    ketorolac (ACULAR) 0 5 % ophthalmic solution, , Disp: , Rfl:     lisinopril-hydrochlorothiazide (PRINZIDE,ZESTORETIC) 20-25 MG per tablet, Take 1 tablet(s) every day by oral route , Disp: 90 tablet, Rfl: 0    metFORMIN (GLUCOPHAGE-XR) 500 mg 24 hr tablet, TAKE TWO TABLETS BY MOUTH 2 TIMES A DAY, Disp: 360 tablet, Rfl: 1    multivitamin (THERAGRAN) TABS, Take 1 tablet by mouth daily, Disp: , Rfl:     nystatin (MYCOSTATIN) cream, Apply topically 2 (two) times a day, Disp: 60 g, Rfl: 0    ofloxacin (OCUFLOX) 0 3 % ophthalmic solution, , Disp: , Rfl:     prednisoLONE acetate (PRED FORTE) 1 % ophthalmic suspension, , Disp: , Rfl:     Spiriva HandiHaler 18 MCG inhalation capsule, INHALE THE CONTENTS OF ONE CAPSULE IN HANDIHALER EVERY DAY, Disp: 30 capsule, Rfl: 5    Synthroid 25 MCG tablet, TAKE ONE TABLET BY MOUTH EVERY DAY, Disp: 90 tablet, Rfl: 1    Tradjenta 5 MG TABS, TAKE ONE TABLET BY MOUTH EVERY DAY, Disp: 30 tablet, Rfl: 1    azithromycin (ZITHROMAX) 500 MG tablet, Take 1 tablet (500 mg total) by mouth 3 (three) times a week for 144 doses, Disp: 12 tablet, Rfl: 11    metFORMIN (GLUCOPHAGE) 500 mg tablet, Take 2 tablets (1,000 mg total) by mouth 2 (two) times a day with meals, Disp: 360 tablet, Rfl: 1    Allergies   Allergen Reactions    Penicillins Rash     After dental procedure        Vitals:    09/07/21 1101 09/07/21 1122   BP: 160/60 130/70   Pulse: (!) 125 (!) 113   Resp: 20    SpO2: 90%    Weight: 74 8 kg (165 lb)    Height: 5' (1 524 m)

## 2021-09-07 NOTE — ASSESSMENT & PLAN NOTE
Lab Results   Component Value Date    HGBA1C 6 3 (H) 04/29/2021   She follows endocrinologist for diabetes

## 2021-09-13 DIAGNOSIS — E03.9 HYPOTHYROIDISM, UNSPECIFIED TYPE: ICD-10-CM

## 2021-09-13 RX ORDER — LEVOTHYROXINE SODIUM 25 MCG
TABLET ORAL
Qty: 90 TABLET | Refills: 0 | Status: SHIPPED | OUTPATIENT
Start: 2021-09-13 | End: 2021-12-09

## 2021-09-18 DIAGNOSIS — E11.65 TYPE 2 DIABETES MELLITUS WITH HYPERGLYCEMIA, WITHOUT LONG-TERM CURRENT USE OF INSULIN (HCC): ICD-10-CM

## 2021-09-20 RX ORDER — GLIMEPIRIDE 1 MG/1
TABLET ORAL
Qty: 90 TABLET | Refills: 0 | Status: SHIPPED | OUTPATIENT
Start: 2021-09-20 | End: 2021-12-09

## 2021-09-20 RX ORDER — LINAGLIPTIN 5 MG/1
TABLET, FILM COATED ORAL
Qty: 30 TABLET | Refills: 0 | Status: SHIPPED | OUTPATIENT
Start: 2021-09-20 | End: 2021-10-19

## 2021-09-24 ENCOUNTER — APPOINTMENT (OUTPATIENT)
Dept: LAB | Facility: CLINIC | Age: 81
End: 2021-09-24
Payer: MEDICARE

## 2021-09-24 DIAGNOSIS — E11.22 TYPE 2 DIABETES MELLITUS WITH STAGE 2 CHRONIC KIDNEY DISEASE, WITHOUT LONG-TERM CURRENT USE OF INSULIN (HCC): ICD-10-CM

## 2021-09-24 DIAGNOSIS — N18.2 TYPE 2 DIABETES MELLITUS WITH STAGE 2 CHRONIC KIDNEY DISEASE, WITHOUT LONG-TERM CURRENT USE OF INSULIN (HCC): ICD-10-CM

## 2021-09-24 DIAGNOSIS — E55.9 VITAMIN D DEFICIENCY: ICD-10-CM

## 2021-09-24 DIAGNOSIS — M81.0 AGE-RELATED OSTEOPOROSIS WITHOUT CURRENT PATHOLOGICAL FRACTURE: ICD-10-CM

## 2021-09-24 LAB
25(OH)D3 SERPL-MCNC: 16.6 NG/ML (ref 30–100)
ANION GAP SERPL CALCULATED.3IONS-SCNC: 4 MMOL/L (ref 4–13)
BUN SERPL-MCNC: 15 MG/DL (ref 5–25)
CALCIUM SERPL-MCNC: 9.4 MG/DL (ref 8.3–10.1)
CHLORIDE SERPL-SCNC: 102 MMOL/L (ref 100–108)
CO2 SERPL-SCNC: 30 MMOL/L (ref 21–32)
CREAT SERPL-MCNC: 0.53 MG/DL (ref 0.6–1.3)
EST. AVERAGE GLUCOSE BLD GHB EST-MCNC: 146 MG/DL
GFR SERPL CREATININE-BSD FRML MDRD: 89 ML/MIN/1.73SQ M
GLUCOSE P FAST SERPL-MCNC: 170 MG/DL (ref 65–99)
HBA1C MFR BLD: 6.7 %
POTASSIUM SERPL-SCNC: 4.4 MMOL/L (ref 3.5–5.3)
SODIUM SERPL-SCNC: 136 MMOL/L (ref 136–145)

## 2021-09-24 PROCEDURE — 36415 COLL VENOUS BLD VENIPUNCTURE: CPT

## 2021-09-24 PROCEDURE — 83036 HEMOGLOBIN GLYCOSYLATED A1C: CPT

## 2021-09-24 PROCEDURE — 80048 BASIC METABOLIC PNL TOTAL CA: CPT

## 2021-09-24 PROCEDURE — 82306 VITAMIN D 25 HYDROXY: CPT

## 2021-09-27 ENCOUNTER — OFFICE VISIT (OUTPATIENT)
Dept: ENDOCRINOLOGY | Facility: CLINIC | Age: 81
End: 2021-09-27
Payer: MEDICARE

## 2021-09-27 VITALS
WEIGHT: 165.6 LBS | BODY MASS INDEX: 32.34 KG/M2 | DIASTOLIC BLOOD PRESSURE: 70 MMHG | TEMPERATURE: 97.8 F | SYSTOLIC BLOOD PRESSURE: 134 MMHG | HEART RATE: 118 BPM

## 2021-09-27 DIAGNOSIS — E04.1 THYROID NODULE: ICD-10-CM

## 2021-09-27 DIAGNOSIS — E03.9 ACQUIRED HYPOTHYROIDISM: ICD-10-CM

## 2021-09-27 DIAGNOSIS — E55.9 VITAMIN D DEFICIENCY: ICD-10-CM

## 2021-09-27 DIAGNOSIS — E11.65 TYPE 2 DIABETES MELLITUS WITH HYPERGLYCEMIA, WITHOUT LONG-TERM CURRENT USE OF INSULIN (HCC): Primary | ICD-10-CM

## 2021-09-27 DIAGNOSIS — M81.0 AGE-RELATED OSTEOPOROSIS WITHOUT CURRENT PATHOLOGICAL FRACTURE: ICD-10-CM

## 2021-09-27 PROCEDURE — 99214 OFFICE O/P EST MOD 30 MIN: CPT | Performed by: INTERNAL MEDICINE

## 2021-09-27 NOTE — PROGRESS NOTES
Marietta Velasquez 80 y o  female MRN: [id number]    Encounter: [id number]      Assessment/Plan     Assessment: This is a 80y o -year-old female with diabetes with hyperglycemia  Plan:    Diagnoses and all orders for this visit:    Type 2 diabetes mellitus with hyperglycemia, without long-term current use of insulin ([facility] )  Lab Results   Component Value Date    HGBA1C 6 7 (H) 09/24/2021    A1c is at goal    continue glimepiride 1 mg daily, Tradjenta 5 mg daily, metformin 1000 mg twice a day  Follow up in 4 months    continue to monitor fingerstick  Twice daily  discussed hypoglycemia symptoms and treatment  -     Basic metabolic panel; Future  -     Hemoglobin A1C; Future    Acquired hypothyroidism  Lab Results   Component Value Date    [id number] 2 290 04/29/2021   continue levothyroxine  Current dose  repeat TSH and free T4 in 4 months    -     T4, free; Future  -     TSH, 3rd generation; Future    Age-related osteoporosis without current pathological fracture   discussed again the options of bisphosphonate's oral as well as IV Reclast once a year, as well as Prolia injection every 6 months  patient declined medical management for osteoporosis  so discussed the risk of fractures, if osteoporosis is not treated  Patient understand the risk of fracture, still she does not want any medical treatment  discussed importance of fall precautions, importance of taking vitamin-D and calcium supplementation    Vitamin D deficiency   most recent calcium is low, increase vitamin D3 to 4000 International Units daily  -     Vitamin D 25 hydroxy; Future    Thyroid nodule   right mid gland nodule 0 4 x 0 7 x 0 5 cm, order at [facility] suspicious  no nodule meet criteria for biopsy or follow-up ultrasound    -     T4, free; Future  -     TSH, 3rd generation;  Future        CC: Diabetes    History of Present Illness     HPI:    Marietta Velasquez  Is 80year-old with medical history of type 2 diabetes with long-term use of insulin, is here for follow-up    she takes glimepiride 1 mg daily, Tradjenta 5 mg daily, metformin 1000 mg twice a day   she checks blood sugar once or twice daily, her blood sugars are usually in 100-160 mg/dL range  she denies hypoglycemia episodes less than 70 mg/dL  she denies recent hospitalization for hyperglycemia or hypoglycemia  for hypothyroidism, she takes brand Synthroid 25 mcg daily on empty stomach 1 hour before breakfast      patient has history of thyroid nodule  Denies history of radiation to the neck /face or chest area  she denies family history of thyroid cancer  Lab Results   Component Value Date    HGBA1C 6 7 (H) 09/24/2021          Component      Latest Ref Rng & Units 9/24/2021   Sodium      136 - 145 mmol/L 136   Potassium      3 5 - 5 3 mmol/L 4 4   Chloride      100 - 108 mmol/L 102   CO2      21 - 32 mmol/L 30   Anion Gap      4 - 13 mmol/L 4   BUN      5 - 25 mg/dL 15   Creatinine      0 60 - 1 30 mg/dL 0 53 (L)   GLUCOSE FASTING      65 - 99 mg/dL 170 (H)   Calcium      8 3 - 10 1 mg/dL 9 4   AST      5 - 45 U/L    ALT      12 - 78 U/L    Alkaline Phosphatase      46 - 116 U/L    Total Protein      6 4 - 8 2 g/dL    Albumin      3 5 - 5 0 g/dL    TOTAL BILIRUBIN      0 20 - 1 00 mg/dL    eGFR      ml/min/1 73sq m 89   Cholesterol      50 - 200 mg/dL    Triglycerides      <=150 mg/dL    HDL      >=40 mg/dL    LDL Calculated      0 - 100 mg/dL    Non-HDL Cholesterol      mg/dl    Hemoglobin A1C      Normal 3 8-5 6%; PreDiabetic 5 7-6 4%; Diabetic >=6 5%; Glycemic control for adults with diabetes <7 0% % 6 7 (H)   eAG, EST AVG Glucose      mg/dl 146   Free T4      0 76 - 1 46 ng/dL    TSH 3RD GENERATON      0 358 - 3 740 uIU/mL    Vit D, 25-Hydroxy      30 0 - 100 0 ng/mL 16 6 (L)     Review of Systems   Constitutional: Negative for activity change, diaphoresis, fatigue, fever and unexpected weight change  HENT: Negative  Eyes: Negative for visual disturbance  Respiratory: Negative for cough, chest tightness and shortness of breath  Cardiovascular: Negative for chest pain, palpitations and leg swelling  Gastrointestinal: Negative for abdominal pain, blood in stool, constipation, diarrhea, nausea and vomiting  Endocrine: Negative for cold intolerance, heat intolerance, polydipsia, polyphagia and polyuria  Genitourinary: Negative for dysuria, enuresis, frequency and urgency  Musculoskeletal: Negative for arthralgias and myalgias  Skin: Negative for pallor, rash and wound  Allergic/Immunologic: Negative  Neurological: Negative for dizziness, tremors, weakness and numbness  Hematological: Negative  Psychiatric/Behavioral: Negative  Historical Information   Past Medical History:   Diagnosis Date    Anemia     Blood clotting tendency (HCC)     left leg, lung    Chronic respiratory failure with hypoxia and hypercapnia (HCC)     COPD (chronic obstructive pulmonary disease) (HCC)     Deep vein thrombosis (HCC)     Diabetes mellitus (HCC)     Emphysema lung (HCC)     Emphysema lung (HCC)     Emphysema of lung (HCC)     Hyperlipidemia     Hypertension     Hyperthyroidism     Hypothyroid     On warfarin therapy     Osteoporosis     Palpitations     Pulmonary embolism (HCC)     Sleep apnea, obstructive     Thyroid disease      Past Surgical History:   Procedure Laterality Date    AV NODE ABLATION  09/21/2017    COLONOSCOPY  01/01/2018    w/ EGD    DXA PROCEDURE (HISTORICAL)  11/2016    [place] COLONOSCOPY FLX DX W/COLLJ SPEC WHEN PFRMD N/A 8/30/2018    Procedure: EGD AND COLONOSCOPY;  Surgeon: Eliz Mendoza MD;  [facility];   Service: Gastroenterology    TOE SURGERY       Social History   Social History     Substance and Sexual Activity   Alcohol Use No     Social History     Substance and Sexual Activity   Drug Use No     Social History     Tobacco Use   Smoking Status Former Smoker    Packs/day: 1 00    Years: 46 00    Pack years: 46 00    Types: Cigarettes    Quit date:     Years since quittin 7   Smokeless Tobacco Never Used     Family History:   Family History   Problem Relation Age of Onset    Hypertension Mother     Hyperlipidemia Mother     Asthma Father     Heart failure Father     Hypertension Sister     Hyperlipidemia Sister     Heart attack Neg Hx     Aneurysm Neg Hx     Stroke Neg Hx     Clotting disorder Neg Hx        Meds/Allergies   Current Outpatient Medications   Medication Sig Dispense Refill    albuterol (2 5 mg/3 mL) 0 083 % nebulizer solution Take 1 vial (2 5 mg total) by nebulization every 6 (six) hours as needed for wheezing or shortness of breath 120 vial 3    albuterol (PROAIR HFA) 90 mcg/act inhaler Inhale 2 puffs every 6 (six) hours as needed for wheezing 1 Inhaler 5    aspirin (ECOTRIN LOW STRENGTH) 81 mg EC tablet Take 81 mg by mouth daily      atorvastatin (LIPITOR) 20 mg tablet TAKE ONE TABLET BY MOUTH EVERY DAY 90 tablet 1    azithromycin (ZITHROMAX) 500 MG tablet Take 1 tablet (500 mg total) by mouth 3 (three) times a week for 144 doses 12 tablet 11    budesonide-formoterol (Symbicort) 160-4 5 mcg/act inhaler Inhale 2 puffs 2 (two) times a day As directed 1 Inhaler 11    Cholecalciferol (VITAMIN D) 2000 units CAPS Take 2 capsules (4,000 Units total) by mouth daily      cyanocobalamin (VITAMIN B-12) 100 mcg tablet Take by mouth daily      diltiazem (CARDIZEM CD) 300 mg 24 hr capsule Take 1 capsule (300 mg total) by mouth daily As directed 90 capsule 3    fluticasone (FLONASE) 50 mcg/act nasal spray 2 sprays into each nostril daily 1 Bottle 5    glimepiride (AMARYL) 1 mg tablet TAKE ONE TABLET BY MOUTH DAILY WITH BREAKFAST 90 tablet 0    glucose blood (Contour Next Test) test strip Use twice a day 200 each 1    ketorolac (ACULAR) 0 5 % ophthalmic solution       lisinopril-hydrochlorothiazide (PRINZIDE,ZESTORETIC) 20-25 MG per tablet Take 1 tablet(s) every day by oral route  90 tablet 0    metFORMIN (GLUCOPHAGE-XR) 500 mg 24 hr tablet TAKE TWO TABLETS BY MOUTH 2 TIMES A  tablet 1    multivitamin (THERAGRAN) TABS Take 1 tablet by mouth daily      nystatin (MYCOSTATIN) cream Apply topically 2 (two) times a day 60 g 0    ofloxacin (OCUFLOX) 0 3 % ophthalmic solution       prednisoLONE acetate (PRED FORTE) 1 % ophthalmic suspension       Spiriva HandiHaler 18 MCG inhalation capsule INHALE THE CONTENTS OF ONE CAPSULE IN HANDIHALER EVERY DAY 30 capsule 5    Synthroid 25 MCG tablet TAKE ONE TABLET BY MOUTH EVERY DAY 90 tablet 0    Tradjenta 5 MG TABS TAKE ONE TABLET BY MOUTH EVERY DAY 30 tablet 0    metFORMIN (GLUCOPHAGE) 500 mg tablet Take 2 tablets (1,000 mg total) by mouth 2 (two) times a day with meals 360 tablet 1     No current facility-administered medications for this visit  Allergies   Allergen Reactions    Penicillins Rash     After dental procedure        Objective   Vitals: Blood pressure 134/70, pulse (!) 118, temperature 97 8 °F (36 6 °C), weight 75 1 kg (165 lb 9 6 oz)  Physical Exam  Constitutional:       General: She is not in acute distress  Appearance: Normal appearance  She is not ill-appearing  HENT:      Head: Normocephalic and atraumatic  Nose: Nose normal    Eyes:      Extraocular Movements: Extraocular movements intact  Conjunctiva/sclera: Conjunctivae normal    Cardiovascular:      Rate and Rhythm: Normal rate and regular rhythm  Pulses: Normal pulses  Heart sounds: Normal heart sounds  Pulmonary:      Effort: Pulmonary effort is normal  No respiratory distress  Breath sounds: Normal breath sounds  Musculoskeletal:         General: Normal range of motion  Cervical back: Normal range of motion and neck supple  Right lower leg: No edema  Left lower leg: No edema  Skin:     Findings: No rash  Neurological:      General: No focal deficit present        Mental Status: She is alert and oriented to person, place, and time  Psychiatric:         Mood and Affect: Mood normal          Behavior: Behavior normal          The history was obtained from the review of the chart, patient  Lab Results:   Lab Results   Component Value Date/Time    Hemoglobin A1C 6 7 (H) 09/24/2021 08:24 AM    Hemoglobin A1C 6 3 (H) 04/29/2021 08:10 AM    Hemoglobin A1C 6 7 (H) 10/02/2020 09:11 AM    BUN 15 09/24/2021 08:24 AM    BUN 15 04/29/2021 08:10 AM    BUN 20 10/02/2020 09:11 AM    Potassium 4 4 09/24/2021 08:24 AM    Potassium 4 0 04/29/2021 08:10 AM    Potassium 4 6 10/02/2020 09:11 AM    Chloride 102 09/24/2021 08:24 AM    Chloride 100 04/29/2021 08:10 AM    Chloride 101 10/02/2020 09:11 AM    CO2 30 09/24/2021 08:24 AM    CO2 30 04/29/2021 08:10 AM    CO2 30 10/02/2020 09:11 AM    Creatinine 0 53 (L) 09/24/2021 08:24 AM    Creatinine 0 56 (L) 04/29/2021 08:10 AM    Creatinine 0 53 (L) 10/02/2020 09:11 AM    AST 7 04/29/2021 08:10 AM    ALT 20 04/29/2021 08:10 AM    Albumin 3 8 04/29/2021 08:10 AM    HDL, Direct 58 04/29/2021 08:10 AM    Triglycerides 107 04/29/2021 08:10 AM           Imaging Studies: I have personally reviewed pertinent reports  Portions of the record may have been created with voice recognition software  Occasional wrong word or "sound a like" substitutions may have occurred due to the inherent limitations of voice recognition software  Read the chart carefully and recognize, using context, where substitutions have occurred

## 2021-10-04 DIAGNOSIS — E11.65 TYPE 2 DIABETES MELLITUS WITH HYPERGLYCEMIA, WITHOUT LONG-TERM CURRENT USE OF INSULIN (HCC): ICD-10-CM

## 2021-10-04 RX ORDER — ATORVASTATIN CALCIUM 20 MG/1
TABLET, FILM COATED ORAL
Qty: 90 TABLET | Refills: 0 | Status: SHIPPED | OUTPATIENT
Start: 2021-10-04 | End: 2021-12-09

## 2021-11-03 DIAGNOSIS — J44.9 COPD, SEVERE (HCC): ICD-10-CM

## 2021-11-03 DIAGNOSIS — J44.9 CHRONIC OBSTRUCTIVE PULMONARY DISEASE, UNSPECIFIED COPD TYPE (HCC): ICD-10-CM

## 2021-11-03 RX ORDER — LISINOPRIL AND HYDROCHLOROTHIAZIDE 25; 20 MG/1; MG/1
TABLET ORAL
Qty: 30 TABLET | Refills: 0 | Status: SHIPPED | OUTPATIENT
Start: 2021-11-03 | End: 2021-12-01

## 2021-11-11 ENCOUNTER — OFFICE VISIT (OUTPATIENT)
Dept: FAMILY MEDICINE CLINIC | Facility: CLINIC | Age: 81
End: 2021-11-11
Payer: MEDICARE

## 2021-11-11 VITALS
HEIGHT: 60 IN | DIASTOLIC BLOOD PRESSURE: 60 MMHG | WEIGHT: 166 LBS | RESPIRATION RATE: 20 BRPM | BODY MASS INDEX: 32.59 KG/M2 | HEART RATE: 107 BPM | OXYGEN SATURATION: 94 % | SYSTOLIC BLOOD PRESSURE: 148 MMHG

## 2021-11-11 DIAGNOSIS — E03.9 ACQUIRED HYPOTHYROIDISM: ICD-10-CM

## 2021-11-11 DIAGNOSIS — E78.2 MIXED HYPERLIPIDEMIA: ICD-10-CM

## 2021-11-11 DIAGNOSIS — N18.2 TYPE 2 DIABETES MELLITUS WITH STAGE 2 CHRONIC KIDNEY DISEASE, WITHOUT LONG-TERM CURRENT USE OF INSULIN (HCC): ICD-10-CM

## 2021-11-11 DIAGNOSIS — Z00.00 MEDICARE ANNUAL WELLNESS VISIT, SUBSEQUENT: Primary | ICD-10-CM

## 2021-11-11 DIAGNOSIS — I47.1 PSVT (PAROXYSMAL SUPRAVENTRICULAR TACHYCARDIA) (HCC): ICD-10-CM

## 2021-11-11 DIAGNOSIS — J44.9 COPD, SEVERE (HCC): ICD-10-CM

## 2021-11-11 DIAGNOSIS — E11.22 TYPE 2 DIABETES MELLITUS WITH STAGE 2 CHRONIC KIDNEY DISEASE, WITHOUT LONG-TERM CURRENT USE OF INSULIN (HCC): ICD-10-CM

## 2021-11-11 DIAGNOSIS — I10 ESSENTIAL HYPERTENSION: ICD-10-CM

## 2021-11-11 PROCEDURE — G0438 PPPS, INITIAL VISIT: HCPCS | Performed by: FAMILY MEDICINE

## 2021-11-11 PROCEDURE — 99214 OFFICE O/P EST MOD 30 MIN: CPT | Performed by: FAMILY MEDICINE

## 2021-11-11 PROCEDURE — 1123F ACP DISCUSS/DSCN MKR DOCD: CPT | Performed by: FAMILY MEDICINE

## 2021-12-01 DIAGNOSIS — J44.9 COPD, SEVERE (HCC): ICD-10-CM

## 2021-12-01 RX ORDER — LISINOPRIL AND HYDROCHLOROTHIAZIDE 25; 20 MG/1; MG/1
TABLET ORAL
Qty: 90 TABLET | Refills: 1 | Status: SHIPPED | OUTPATIENT
Start: 2021-12-01 | End: 2022-06-01

## 2021-12-09 DIAGNOSIS — E11.65 TYPE 2 DIABETES MELLITUS WITH HYPERGLYCEMIA, WITHOUT LONG-TERM CURRENT USE OF INSULIN (HCC): ICD-10-CM

## 2021-12-09 DIAGNOSIS — E03.9 HYPOTHYROIDISM, UNSPECIFIED TYPE: ICD-10-CM

## 2021-12-09 RX ORDER — LEVOTHYROXINE SODIUM 25 MCG
TABLET ORAL
Qty: 90 TABLET | Refills: 0 | Status: SHIPPED | OUTPATIENT
Start: 2021-12-09 | End: 2022-03-11

## 2021-12-09 RX ORDER — ATORVASTATIN CALCIUM 20 MG/1
TABLET, FILM COATED ORAL
Qty: 90 TABLET | Refills: 0 | Status: SHIPPED | OUTPATIENT
Start: 2021-12-09 | End: 2022-04-01

## 2021-12-31 DIAGNOSIS — J44.9 COPD, SEVERE (HCC): ICD-10-CM

## 2022-01-03 DIAGNOSIS — E11.22 TYPE 2 DIABETES MELLITUS WITH CHRONIC KIDNEY DISEASE, WITHOUT LONG-TERM CURRENT USE OF INSULIN, UNSPECIFIED CKD STAGE (HCC): ICD-10-CM

## 2022-01-03 RX ORDER — METFORMIN HYDROCHLORIDE 500 MG/1
TABLET, EXTENDED RELEASE ORAL
Qty: 360 TABLET | Refills: 0 | Status: SHIPPED | OUTPATIENT
Start: 2022-01-03 | End: 2022-03-17 | Stop reason: SDUPTHER

## 2022-01-05 RX ORDER — TIOTROPIUM BROMIDE 18 UG/1
CAPSULE ORAL; RESPIRATORY (INHALATION)
Qty: 30 CAPSULE | Refills: 0 | Status: SHIPPED | OUTPATIENT
Start: 2022-01-05 | End: 2022-01-17

## 2022-01-17 DIAGNOSIS — J44.9 CHRONIC OBSTRUCTIVE PULMONARY DISEASE, UNSPECIFIED COPD TYPE (HCC): Primary | ICD-10-CM

## 2022-01-17 RX ORDER — UMECLIDINIUM 62.5 UG/1
1 AEROSOL, POWDER ORAL DAILY
Qty: 30 BLISTER | Refills: 2 | Status: SHIPPED | OUTPATIENT
Start: 2022-01-17 | End: 2022-05-05 | Stop reason: SDUPTHER

## 2022-01-19 DIAGNOSIS — J44.9 CHRONIC OBSTRUCTIVE PULMONARY DISEASE, UNSPECIFIED COPD TYPE (HCC): ICD-10-CM

## 2022-01-20 DIAGNOSIS — J44.9 CHRONIC OBSTRUCTIVE PULMONARY DISEASE, UNSPECIFIED COPD TYPE (HCC): ICD-10-CM

## 2022-01-20 RX ORDER — ALBUTEROL SULFATE 2.5 MG/3ML
2.5 SOLUTION RESPIRATORY (INHALATION) EVERY 6 HOURS PRN
Qty: 150 ML | Refills: 0 | Status: SHIPPED | OUTPATIENT
Start: 2022-01-20

## 2022-02-03 ENCOUNTER — TELEPHONE (OUTPATIENT)
Dept: PULMONOLOGY | Facility: CLINIC | Age: 82
End: 2022-02-03

## 2022-02-03 NOTE — TELEPHONE ENCOUNTER
Pt called and stated that she dropped [employer] as her secondary insurance and signed up with [employer] p[employer] and they are asking for a script to be sent to them from us for her O2 and ventilator     [employer] phone #: [phone]  Please advise Josue Simpler): [phone]

## 2022-02-04 NOTE — TELEPHONE ENCOUNTER
Called and advised pt that she will need to reach out to [address] [place] [address] her DME company and make them aware of the insurance change so they can bill them  I told the patient if [address] [place] [address] needs anything they will reach out to our office

## 2022-02-07 DIAGNOSIS — J96.12 CHRONIC RESPIRATORY FAILURE WITH HYPERCAPNIA (HCC): Primary | ICD-10-CM

## 2022-02-07 RX ORDER — TIOTROPIUM BROMIDE 18 UG/1
CAPSULE ORAL; RESPIRATORY (INHALATION)
Qty: 30 CAPSULE | Refills: 0 | Status: SHIPPED | OUTPATIENT
Start: 2022-02-07 | End: 2022-03-21 | Stop reason: CLARIF

## 2022-02-16 DIAGNOSIS — E11.65 TYPE 2 DIABETES MELLITUS WITH HYPERGLYCEMIA, WITHOUT LONG-TERM CURRENT USE OF INSULIN (HCC): ICD-10-CM

## 2022-02-16 RX ORDER — LINAGLIPTIN 5 MG/1
TABLET, FILM COATED ORAL
Qty: 30 TABLET | Refills: 0 | Status: SHIPPED | OUTPATIENT
Start: 2022-02-16 | End: 2022-03-17 | Stop reason: SDUPTHER

## 2022-02-24 ENCOUNTER — TELEPHONE (OUTPATIENT)
Dept: FAMILY MEDICINE CLINIC | Facility: CLINIC | Age: 82
End: 2022-02-24

## 2022-02-24 NOTE — TELEPHONE ENCOUNTER
Patient called regarding her BP   Advised it was low this morning 126/62  Patient felt fine  Patient declined to schedule an appointment as she had no way to get to the office or do a virtual    I advised patient her BP was good if she was concerned to continue monitoring it and keep a log     If still concerned should call the office on Monday and will be schedule her with Dr Guanaco Ramirez

## 2022-03-04 ENCOUNTER — RA CDI HCC (OUTPATIENT)
Dept: OTHER | Facility: HOSPITAL | Age: 82
End: 2022-03-04

## 2022-03-04 NOTE — PROGRESS NOTES
Bill [facility]  coding opportunities        E11 51     Chart Reviewed * (Number of) Inbasket suggestions sent to Provider: 1                  Patients insurance company: [employer]

## 2022-03-10 ENCOUNTER — OFFICE VISIT (OUTPATIENT)
Dept: FAMILY MEDICINE CLINIC | Facility: CLINIC | Age: 82
End: 2022-03-10
Payer: COMMERCIAL

## 2022-03-10 VITALS
WEIGHT: 165 LBS | SYSTOLIC BLOOD PRESSURE: 130 MMHG | HEIGHT: 60 IN | BODY MASS INDEX: 32.39 KG/M2 | DIASTOLIC BLOOD PRESSURE: 60 MMHG | OXYGEN SATURATION: 94 % | RESPIRATION RATE: 16 BRPM | HEART RATE: 112 BPM

## 2022-03-10 DIAGNOSIS — R09.81 NASAL CONGESTION: ICD-10-CM

## 2022-03-10 DIAGNOSIS — E11.22 TYPE 2 DIABETES MELLITUS WITH STAGE 2 CHRONIC KIDNEY DISEASE, WITHOUT LONG-TERM CURRENT USE OF INSULIN (HCC): ICD-10-CM

## 2022-03-10 DIAGNOSIS — J96.12 CHRONIC RESPIRATORY FAILURE WITH HYPERCAPNIA (HCC): ICD-10-CM

## 2022-03-10 DIAGNOSIS — J44.9 COPD, SEVERE (HCC): ICD-10-CM

## 2022-03-10 DIAGNOSIS — E78.2 MIXED HYPERLIPIDEMIA: Primary | ICD-10-CM

## 2022-03-10 DIAGNOSIS — N18.2 TYPE 2 DIABETES MELLITUS WITH STAGE 2 CHRONIC KIDNEY DISEASE, WITHOUT LONG-TERM CURRENT USE OF INSULIN (HCC): ICD-10-CM

## 2022-03-10 PROBLEM — Z01.818 PREOP EXAMINATION: Status: RESOLVED | Noted: 2020-09-03 | Resolved: 2022-03-10

## 2022-03-10 PROCEDURE — 1101F PT FALLS ASSESS-DOCD LE1/YR: CPT | Performed by: FAMILY MEDICINE

## 2022-03-10 PROCEDURE — 3725F SCREEN DEPRESSION PERFORMED: CPT | Performed by: FAMILY MEDICINE

## 2022-03-10 PROCEDURE — 99214 OFFICE O/P EST MOD 30 MIN: CPT | Performed by: FAMILY MEDICINE

## 2022-03-10 PROCEDURE — 3288F FALL RISK ASSESSMENT DOCD: CPT | Performed by: FAMILY MEDICINE

## 2022-03-10 NOTE — ASSESSMENT & PLAN NOTE
Lab Results   Component Value Date    HGBA1C 6 7 (H) 09/24/2021   She follows endocrinologist and will get her labs next week

## 2022-03-10 NOTE — PROGRESS NOTES
Assessment/Plan:    Problem List Items Addressed This Visit        Endocrine    Type 2 diabetes mellitus with chronic kidney disease, without long-term current use of insulin ([facility] )       Lab Results   Component Value Date    HGBA1C 6 7 (H) 09/24/2021   She follows endocrinologist and will get her labs next week            Respiratory    COPD, severe ([facility] [address] )    Chronic respiratory failure with hypercapnia ([facility] [address] )     Stable on 2 L oxygen, and she follows pulmonologist and taking all medications as prescribed and she is active she can manage  most of the things at home and get some assistance for cleaning             Other    Hyperlipidemia - Primary    Nasal congestion     She use oxygen with nasal cannula, and she has constant nasal congestion which is clear, advised saline drops as needed               Return in about 6 months (around 9/10/2022) for [address]  Chief Complaint   Patient presents with    Follow-up       Subjective:   Patient ID: Addis Farrell is a 80 y o  female  She is here for follow-up, she has chronic respiratory failure with oxygen 2 L, for his pulmonologist and 6 of prescribed inhalers and medications,  CC also for [employer] and acknowledges she is hypothyroid and diabetes  History of pulmonary embolism but currently she is on baby aspirin daily,     She has osteoporosis and she says she was given some medications but she did check effects so she just stick with the patient and vitamin-D    Review of Systems   Constitutional: Negative  HENT: Positive for congestion  Eyes: Negative  Respiratory: Negative  Cardiovascular: Negative  Genitourinary: Negative  Skin: Negative  Hematological: Negative  Psychiatric/Behavioral: Negative  Objective:  Physical Exam  Vitals and nursing note reviewed  Constitutional:       Appearance: She is well-developed  HENT:      Head: Normocephalic and atraumatic  Eyes:      General: No scleral icterus       Conjunctiva/sclera: Conjunctivae normal       Pupils: Pupils are equal, round, and reactive to light  Neck:      Thyroid: No thyromegaly  Cardiovascular:      Rate and Rhythm: Normal rate and regular rhythm  Heart sounds: Normal heart sounds  No murmur heard  Pulmonary:      Breath sounds: No wheezing or rales  Comments: On 2 L  oxygen with nasal cannula    Musculoskeletal:      Cervical back: Normal range of motion and neck supple  Right lower leg: No edema  Left lower leg: No edema  Lymphadenopathy:      Cervical: No cervical adenopathy  Skin:     Findings: No erythema or rash  Neurological:      General: No focal deficit present  Mental Status: She is alert  Psychiatric:         Mood and Affect: Mood normal             Past Surgical History:   Procedure Laterality Date    AV NODE ABLATION  09/21/2017    COLONOSCOPY  01/01/2018    w/ EGD    DXA PROCEDURE (HISTORICAL)  11/2016    [place] COLONOSCOPY FLX DX W/COLLJ SPEC WHEN PFRMD N/A 8/30/2018    Procedure: EGD AND COLONOSCOPY;  Surgeon: Prabhakar Oconnor MD;  [facility];   Service: Gastroenterology    TOE SURGERY         Family History   Problem Relation Age of Onset    Hypertension Mother     Hyperlipidemia Mother     Asthma Father     Heart failure Father     Hypertension Sister     Hyperlipidemia Sister     Heart attack Neg Hx     Aneurysm Neg Hx     Stroke Neg Hx     Clotting disorder Neg Hx          Current Outpatient Medications:     albuterol (2 5 mg/3 mL) 0 083 % nebulizer solution, Take 1 vial (2 5 mg total) by nebulization every 6 (six) hours as needed for wheezing or shortness of breath, Disp: 150 mL, Rfl: 0    aspirin (ECOTRIN LOW STRENGTH) 81 mg EC tablet, Take 81 mg by mouth daily, Disp: , Rfl:     atorvastatin (LIPITOR) 20 mg tablet, TAKE ONE TABLET BY MOUTH EVERY DAY, Disp: 90 tablet, Rfl: 0    azithromycin (ZITHROMAX) 500 MG tablet, Take 1 tablet (500 mg total) by mouth 3 (three) times a week for 144 doses, Disp: 12 tablet, Rfl: 11    budesonide-formoterol (Symbicort) 160-4 5 mcg/act inhaler, Inhale 2 puffs 2 (two) times a day As directed, Disp: 1 Inhaler, Rfl: 11    Cholecalciferol (VITAMIN D) 2000 units CAPS, Take 2 capsules (4,000 Units total) by mouth daily, Disp: , Rfl:     cyanocobalamin (VITAMIN B-12) 100 mcg tablet, Take by mouth daily, Disp: , Rfl:     diltiazem (CARDIZEM CD) 300 mg 24 hr capsule, Take 1 capsule (300 mg total) by mouth daily As directed, Disp: 90 capsule, Rfl: 3    fluticasone (FLONASE) 50 mcg/act nasal spray, 2 sprays into each nostril daily, Disp: 1 Bottle, Rfl: 5    glimepiride (AMARYL) 1 mg tablet, TAKE ONE TABLET BY MOUTH DAILY WITH BREAKFAST, Disp: 90 tablet, Rfl: 0    glucose blood (Contour Next Test) test strip, Use twice a day, Disp: 200 each, Rfl: 1    ketorolac (ACULAR) 0 5 % ophthalmic solution, , Disp: , Rfl:     lisinopril-hydrochlorothiazide (PRINZIDE,ZESTORETIC) 20-25 MG per tablet, Take 1 tablet(s) every day by oral route , Disp: 90 tablet, Rfl: 1    metFORMIN (GLUCOPHAGE-XR) 500 mg 24 hr tablet, TAKE TWO TABLETS BY MOUTH 2 TIMES A DAY, Disp: 360 tablet, Rfl: 0    multivitamin (THERAGRAN) TABS, Take 1 tablet by mouth daily, Disp: , Rfl:     nystatin (MYCOSTATIN) cream, Apply topically 2 (two) times a day, Disp: 60 g, Rfl: 0    ofloxacin (OCUFLOX) 0 3 % ophthalmic solution, , Disp: , Rfl:     prednisoLONE acetate (PRED FORTE) 1 % ophthalmic suspension, , Disp: , Rfl:     Spiriva HandiHaler 18 MCG inhalation capsule, INHALE THE CONTENTS OF ONE CAPSULE IN HANDIHALER EVERY DAY, Disp: 30 capsule, Rfl: 0    Synthroid 25 MCG tablet, TAKE ONE TABLET BY MOUTH EVERY DAY, Disp: 90 tablet, Rfl: 0    Tradjenta 5 MG TABS, TAKE ONE TABLET BY MOUTH EVERY DAY, Disp: 30 tablet, Rfl: 0    Ventolin  (90 Base) MCG/ACT inhaler, Inhale 2 puffs every 6 (six) hours as needed for wheezing, Disp: 18 g, Rfl: 0    metFORMIN (GLUCOPHAGE) 500 mg tablet, Take 2 tablets (1,000 mg total) by mouth 2 (two) times a day with meals, Disp: 360 tablet, Rfl: 1    umeclidinium bromide (Incruse Ellipta) 62 5 mcg/inh AEPB inhaler, Inhale 1 puff daily, Disp: 30 blister, Rfl: 2    Allergies   Allergen Reactions    Penicillins Rash     After dental procedure        Vitals:    03/10/22 1111   BP: 130/60   Pulse: (!) 112   Resp: 16   SpO2: 94%   Weight: 74 8 kg (165 lb)   Height: 5' (1 524 m)

## 2022-03-10 NOTE — ASSESSMENT & PLAN NOTE
Stable on 2 L oxygen, and she follows pulmonologist and taking all medications as prescribed and she is active she can manage  most of the things at home and get some assistance for cleaning

## 2022-03-10 NOTE — ASSESSMENT & PLAN NOTE
She use oxygen with nasal cannula, and she has constant nasal congestion which is clear, advised saline drops as needed

## 2022-03-11 ENCOUNTER — TELEPHONE (OUTPATIENT)
Dept: ENDOCRINOLOGY | Facility: CLINIC | Age: 82
End: 2022-03-11

## 2022-03-11 DIAGNOSIS — E03.9 HYPOTHYROIDISM, UNSPECIFIED TYPE: ICD-10-CM

## 2022-03-11 RX ORDER — LEVOTHYROXINE SODIUM 25 MCG
TABLET ORAL
Qty: 90 TABLET | Refills: 0 | Status: SHIPPED | OUTPATIENT
Start: 2022-03-11 | End: 2022-03-17 | Stop reason: SDUPTHER

## 2022-03-15 ENCOUNTER — LAB (OUTPATIENT)
Dept: LAB | Facility: CLINIC | Age: 82
End: 2022-03-15
Payer: COMMERCIAL

## 2022-03-15 DIAGNOSIS — E04.1 THYROID NODULE: ICD-10-CM

## 2022-03-15 DIAGNOSIS — E03.9 ACQUIRED HYPOTHYROIDISM: ICD-10-CM

## 2022-03-15 DIAGNOSIS — E11.65 TYPE 2 DIABETES MELLITUS WITH HYPERGLYCEMIA, WITHOUT LONG-TERM CURRENT USE OF INSULIN (HCC): ICD-10-CM

## 2022-03-15 DIAGNOSIS — E55.9 VITAMIN D DEFICIENCY: ICD-10-CM

## 2022-03-15 LAB
25(OH)D3 SERPL-MCNC: 35.4 NG/ML (ref 30–100)
ANION GAP SERPL CALCULATED.3IONS-SCNC: 4 MMOL/L (ref 4–13)
BUN SERPL-MCNC: 15 MG/DL (ref 5–25)
CALCIUM SERPL-MCNC: 9.3 MG/DL (ref 8.3–10.1)
CHLORIDE SERPL-SCNC: 102 MMOL/L (ref 100–108)
CO2 SERPL-SCNC: 32 MMOL/L (ref 21–32)
CREAT SERPL-MCNC: 0.57 MG/DL (ref 0.6–1.3)
EST. AVERAGE GLUCOSE BLD GHB EST-MCNC: 140 MG/DL
GFR SERPL CREATININE-BSD FRML MDRD: 87 ML/MIN/1.73SQ M
GLUCOSE P FAST SERPL-MCNC: 154 MG/DL (ref 65–99)
HBA1C MFR BLD: 6.5 %
POTASSIUM SERPL-SCNC: 4.1 MMOL/L (ref 3.5–5.3)
SODIUM SERPL-SCNC: 138 MMOL/L (ref 136–145)
T4 FREE SERPL-MCNC: 1.28 NG/DL (ref 0.76–1.46)
TSH SERPL DL<=0.05 MIU/L-ACNC: 2.78 UIU/ML (ref 0.36–3.74)

## 2022-03-15 PROCEDURE — 80048 BASIC METABOLIC PNL TOTAL CA: CPT

## 2022-03-15 PROCEDURE — 84439 ASSAY OF FREE THYROXINE: CPT

## 2022-03-15 PROCEDURE — 83036 HEMOGLOBIN GLYCOSYLATED A1C: CPT

## 2022-03-15 PROCEDURE — 84443 ASSAY THYROID STIM HORMONE: CPT

## 2022-03-15 PROCEDURE — 82306 VITAMIN D 25 HYDROXY: CPT

## 2022-03-15 PROCEDURE — 36415 COLL VENOUS BLD VENIPUNCTURE: CPT

## 2022-03-17 ENCOUNTER — OFFICE VISIT (OUTPATIENT)
Dept: ENDOCRINOLOGY | Facility: CLINIC | Age: 82
End: 2022-03-17
Payer: COMMERCIAL

## 2022-03-17 VITALS
SYSTOLIC BLOOD PRESSURE: 130 MMHG | WEIGHT: 166 LBS | HEART RATE: 100 BPM | HEIGHT: 60 IN | DIASTOLIC BLOOD PRESSURE: 60 MMHG | TEMPERATURE: 97.7 F | BODY MASS INDEX: 32.59 KG/M2

## 2022-03-17 DIAGNOSIS — E11.22 TYPE 2 DIABETES MELLITUS WITH STAGE 2 CHRONIC KIDNEY DISEASE, WITHOUT LONG-TERM CURRENT USE OF INSULIN (HCC): Primary | ICD-10-CM

## 2022-03-17 DIAGNOSIS — I10 ESSENTIAL HYPERTENSION: ICD-10-CM

## 2022-03-17 DIAGNOSIS — E03.9 ACQUIRED HYPOTHYROIDISM: ICD-10-CM

## 2022-03-17 DIAGNOSIS — E04.1 THYROID NODULE: ICD-10-CM

## 2022-03-17 DIAGNOSIS — N18.2 TYPE 2 DIABETES MELLITUS WITH STAGE 2 CHRONIC KIDNEY DISEASE, WITHOUT LONG-TERM CURRENT USE OF INSULIN (HCC): Primary | ICD-10-CM

## 2022-03-17 DIAGNOSIS — E78.2 MIXED HYPERLIPIDEMIA: ICD-10-CM

## 2022-03-17 PROCEDURE — 1160F RVW MEDS BY RX/DR IN RCRD: CPT | Performed by: PHYSICIAN ASSISTANT

## 2022-03-17 PROCEDURE — 99214 OFFICE O/P EST MOD 30 MIN: CPT | Performed by: PHYSICIAN ASSISTANT

## 2022-03-17 RX ORDER — LEVOTHYROXINE SODIUM 0.03 MG/1
25 TABLET ORAL DAILY
Qty: 90 TABLET | Refills: 1 | Status: SHIPPED | OUTPATIENT
Start: 2022-03-17 | End: 2022-07-07

## 2022-03-17 RX ORDER — GLIMEPIRIDE 1 MG/1
1 TABLET ORAL
Qty: 90 TABLET | Refills: 1 | Status: SHIPPED | OUTPATIENT
Start: 2022-03-17 | End: 2022-06-15

## 2022-03-17 RX ORDER — METFORMIN HYDROCHLORIDE 500 MG/1
1000 TABLET, EXTENDED RELEASE ORAL 2 TIMES DAILY
Qty: 360 TABLET | Refills: 1 | Status: SHIPPED | OUTPATIENT
Start: 2022-03-17 | End: 2022-07-01

## 2022-03-17 RX ORDER — LINAGLIPTIN 5 MG/1
5 TABLET, FILM COATED ORAL DAILY
Qty: 30 TABLET | Refills: 5 | Status: SHIPPED | OUTPATIENT
Start: 2022-03-17 | End: 2022-05-18

## 2022-03-17 NOTE — PATIENT INSTRUCTIONS
Hypoglycemia instructions   Zana Villarrealkaryn  3/17/2022  [id number]    Low Blood Sugar    Steps to treat low blood sugar  1  Test blood sugar if you have symptoms of low blood sugar:   Low Blood Sugar Symptoms:  o Sweaty  o Dizzy  o Rapid heartbeat  o Shaky  o Bad mood  o Hungry      2  Treat blood sugar less than 70 with 15 grams of fast-acting carbohydrate:   Examples of 15 grams Fast-Acting Carbohydrate:  o 4 oz juice  o 4 oz regular soda  o 3-4 glucose tablets (chew)  o 3-4 hard candies (chew)          3  Wait 15 minutes and test your blood sugar again     4   If blood sugar is less than 100, repeat steps 2-3     5  When your blood sugar is 100 or more, eat a snack if it will be longer than one hour until your next meal  The snack should be 15 grams of carbohydrate and a protein:   Examples of snacks:  o ½ sandwich  o 6 crackers with cheese  o Piece of fruit with cheese or peanut butter  o 6 crackers with peanut butter

## 2022-03-17 NOTE — PROGRESS NOTES
Patient Progress Note      CC: DM      Referring Provider  No referring provider defined for this encounter  History of Present Illness:   Manpreet Goodson is a 80 y o  female with a history of type 2 diabetes with long term use of insulin  Diabetes course has been stable  Complications of DM: none reported  Denies recent illness or hospitalizations  Denies recent severe hypoglycemic or severe hyperglycemic episodes  Denies any issues with her current regimen  Home glucose monitoring: are performed regularly     Home blood glucose readings: she reports readings range from 100-140 mg/dl, rarely higher      Current regimen: glimepiride 1 mg daily, Tradjenta 5 mg daily, metformin 1000 mg BID  compliant most of the time, denies any side effects from medications  Hypoglycemic episodes: No, rare  H/o of hypoglycemia causing hospitalization or Intervention such as glucagon injection  or ambulance call :  No  Hypoglycemia symptoms: never experienced hypoglycemia  Treatment of hypoglycemia: juice     Diet: 3 meals per day, 1-2 snacks per day  Timing of meals is predictable  Diabetic diet compliance:  noncompliant some of the time  Activity: Daily activity is predictable: Yes  No routine exercise due to emphysema  Ophthamology: sees Dr Claudia Robles, Dec 2021  Podiatry: Foot exam up-to-date, Nov 2021  Dr Tino Landry  Has hypertension: on ACE inhibitor/ARB, compliant most of the time  Has hyperlipidemia: on statin - tolerating well, no myalgias  compliant most of the time, denies any side effects from medications  Thyroid disorders:  Hypothyroidism  Patient is taking Synthroid 25 mcg daily on an empty stomach 1 hour before breakfast   Patient is tolerating medication well  She had a repeat thyroid US done in Nov 2020 which showed a heterogeneous thyroid with small cysts and subcentimeter nodule  No nodule meets current ACR criteria for requiring biopsy or followup ultrasounds unless otherwise clinically indicated  History of pancreatitis: No     Osteoporosis/vitamin-D deficiency:  Patient is taking vitamin D3 4000 International Units when she remembers  Last DEXA scan done in July 2019 showed osteoporosis of the left forearm  She does have a history compression fractures of thoracic spine  She was started on Fosamax 70 mg weekly in the past, but she has not been taking it because she prefers not to take it and due to GI upset  Other treatment options have been discussed in the past but she continues to decline treatment  Denies recent fractures or falls  Does not do weight bearing exercises       Patient Active Problem List   Diagnosis    COPD, severe ([facility] [address] )    Hyperlipidemia    Chronic respiratory failure with hypercapnia ([facility] [address] )    Hypothyroidism    Obstructive sleep apnea    History of pulmonary embolism    Type 2 diabetes mellitus with chronic kidney disease, without long-term current use of insulin ([facility])    Iron deficiency anemia, unspecified    PSVT (paroxysmal supraventricular tachycardia) ([facility])    Anticoagulant long-term use    Hemorrhoids    Polyp of colon    Chronic bilateral low back pain with bilateral sciatica    Non-seasonal allergic rhinitis due to pollen    Age-related osteoporosis without current pathological fracture    Essential hypertension    Vitamin D deficiency    Thyroid nodule    SIRS (systemic inflammatory response syndrome) ([facility])    Elevated d-dimer    Age-related cataract of both eyes    Medicare annual wellness visit, subsequent    Tinea corporis    Needs flu shot    Nasal congestion      Past Medical History:   Diagnosis Date    Anemia     Blood clotting tendency ([facility])     left leg, lung    Chronic respiratory failure with hypoxia and hypercapnia ([facility])     COPD (chronic obstructive pulmonary disease) ([facility] )     Deep vein thrombosis ([facility] [facility]     Diabetes mellitus ([facility] )     Emphysema lung ([facility] )     Emphysema lung ([facility] )     Emphysema of lung ([facility] [address] )     Hyperlipidemia  Hypertension     Hyperthyroidism     Hypothyroid     On warfarin therapy     Osteoporosis     Palpitations     Pulmonary embolism ([facility])     Sleep apnea, obstructive     Thyroid disease       Past Surgical History:   Procedure Laterality Date    AV NODE ABLATION  2017    COLONOSCOPY  2018    w/ EGD    DXA PROCEDURE (HISTORICAL)  2016    [place] COLONOSCOPY FLX DX W/COLLJ SPEC WHEN PFRMD N/A 2018    Procedure: EGD AND COLONOSCOPY;  Surgeon: Sujey Urrutia MD;  [facility];   Service: Gastroenterology    TOE SURGERY        Family History   Problem Relation Age of Onset    Hypertension Mother     Hyperlipidemia Mother     Asthma Father     Heart failure Father     Hypertension Sister     Hyperlipidemia Sister     Heart attack Neg Hx     Aneurysm Neg Hx     Stroke Neg Hx     Clotting disorder Neg Hx      Social History     Tobacco Use    Smoking status: Former Smoker     Packs/day: 1 00     Years: 46 00     Pack years: 46 00     Types: Cigarettes     Quit date:      Years since quittin 2    Smokeless tobacco: Never Used   Substance Use Topics    Alcohol use: No     Allergies   Allergen Reactions    Penicillins Rash     After dental procedure          Current Outpatient Medications:     albuterol (2 5 mg/3 mL) 0 083 % nebulizer solution, Take 1 vial (2 5 mg total) by nebulization every 6 (six) hours as needed for wheezing or shortness of breath, Disp: 150 mL, Rfl: 0    aspirin (ECOTRIN LOW STRENGTH) 81 mg EC tablet, Take 81 mg by mouth daily, Disp: , Rfl:     atorvastatin (LIPITOR) 20 mg tablet, TAKE ONE TABLET BY MOUTH EVERY DAY, Disp: 90 tablet, Rfl: 0    azithromycin (ZITHROMAX) 500 MG tablet, Take 1 tablet (500 mg total) by mouth 3 (three) times a week for 144 doses, Disp: 12 tablet, Rfl: 11    budesonide-formoterol (Symbicort) 160-4 5 mcg/act inhaler, Inhale 2 puffs 2 (two) times a day As directed, Disp: 1 Inhaler, Rfl: 11    Cholecalciferol (VITAMIN D) 2000 units CAPS, Take 2 capsules (4,000 Units total) by mouth daily, Disp: , Rfl:     cyanocobalamin (VITAMIN B-12) 100 mcg tablet, Take by mouth daily, Disp: , Rfl:     diltiazem (CARDIZEM CD) 300 mg 24 hr capsule, Take 1 capsule (300 mg total) by mouth daily As directed, Disp: 90 capsule, Rfl: 3    fluticasone (FLONASE) 50 mcg/act nasal spray, 2 sprays into each nostril daily, Disp: 1 Bottle, Rfl: 5    glimepiride (AMARYL) 1 mg tablet, TAKE ONE TABLET BY MOUTH DAILY WITH BREAKFAST, Disp: 90 tablet, Rfl: 0    glucose blood (Contour Next Test) test strip, Use twice a day, Disp: 200 each, Rfl: 1    ketorolac (ACULAR) 0 5 % ophthalmic solution, , Disp: , Rfl:     lisinopril-hydrochlorothiazide (PRINZIDE,ZESTORETIC) 20-25 MG per tablet, Take 1 tablet(s) every day by oral route , Disp: 90 tablet, Rfl: 1    metFORMIN (GLUCOPHAGE-XR) 500 mg 24 hr tablet, TAKE TWO TABLETS BY MOUTH 2 TIMES A DAY, Disp: 360 tablet, Rfl: 0    multivitamin (THERAGRAN) TABS, Take 1 tablet by mouth daily, Disp: , Rfl:     nystatin (MYCOSTATIN) cream, Apply topically 2 (two) times a day, Disp: 60 g, Rfl: 0    ofloxacin (OCUFLOX) 0 3 % ophthalmic solution, , Disp: , Rfl:     prednisoLONE acetate (PRED FORTE) 1 % ophthalmic suspension, , Disp: , Rfl:     Spiriva HandiHaler 18 MCG inhalation capsule, INHALE THE CONTENTS OF ONE CAPSULE IN HANDIHALER EVERY DAY, Disp: 30 capsule, Rfl: 0    Synthroid 25 MCG tablet, TAKE ONE TABLET BY MOUTH EVERY DAY, Disp: 90 tablet, Rfl: 0    Tradjenta 5 MG TABS, TAKE ONE TABLET BY MOUTH EVERY DAY, Disp: 30 tablet, Rfl: 0    Ventolin  (90 Base) MCG/ACT inhaler, Inhale 2 puffs every 6 (six) hours as needed for wheezing, Disp: 18 g, Rfl: 0    metFORMIN (GLUCOPHAGE) 500 mg tablet, Take 2 tablets (1,000 mg total) by mouth 2 (two) times a day with meals, Disp: 360 tablet, Rfl: 1    umeclidinium bromide (Incruse Ellipta) 62 5 mcg/inh AEPB inhaler, Inhale 1 puff daily, Disp: 30 blister, Rfl: 2  Review of Systems   Constitutional: Negative for activity change, appetite change, fatigue and unexpected weight change  HENT: Negative for trouble swallowing  Eyes: Negative for visual disturbance  Respiratory: Positive for shortness of breath (due to emphysema)  Cardiovascular: Negative for chest pain and palpitations  Gastrointestinal: Negative for constipation and diarrhea  Endocrine: Negative for polydipsia and polyuria  Musculoskeletal: Negative  Skin: Negative for wound  Neurological: Positive for numbness (fingers)  Psychiatric/Behavioral: Negative  Physical Exam:  Body mass index is 32 42 kg/m²  /60   Pulse 100   Temp 97 7 °F (36 5 °C) (Tympanic)   Ht 5' (1 524 m)   Wt 75 3 kg (166 lb)   LMP  (LMP Unknown)   BMI 32 42 kg/m²    Wt Readings from Last 3 Encounters:   03/17/22 75 3 kg (166 lb)   03/10/22 74 8 kg (165 lb)   11/11/21 75 3 kg (166 lb)       Physical Exam  Vitals and nursing note reviewed  Constitutional:       Appearance: She is well-developed  HENT:      Head: Normocephalic  Eyes:      General: No scleral icterus  Pupils: Pupils are equal, round, and reactive to light  Neck:      Thyroid: No thyromegaly  Cardiovascular:      Rate and Rhythm: Normal rate and regular rhythm  Pulses:           Radial pulses are 2+ on the right side and 2+ on the left side  Heart sounds: No murmur heard  Pulmonary:      Effort: Pulmonary effort is normal  No respiratory distress  Breath sounds: Normal breath sounds  No wheezing  Musculoskeletal:      Cervical back: Neck supple  Skin:     General: Skin is warm and dry  Neurological:      Mental Status: She is alert  Patient's shoes and socks were not removed            Labs:   Component      Latest Ref Rng & Units 9/24/2021 3/15/2022   Sodium      136 - 145 mmol/L 136 138   Potassium      3 5 - 5 3 mmol/L 4 4 4 1   Chloride      100 - 108 mmol/L 102 102   CO2      21 - 32 mmol/L 30 32   Anion Gap      4 - 13 mmol/L 4 4   BUN      5 - 25 mg/dL 15 15   Creatinine      0 60 - 1 30 mg/dL 0 53 (L) 0 57 (L)   GLUCOSE FASTING      65 - 99 mg/dL 170 (H) 154 (H)   Calcium      8 3 - 10 1 mg/dL 9 4 9 3   eGFR      ml/min/1 73sq m 89 87   Hemoglobin A1C      Normal 3 8-5 6%; PreDiabetic 5 7-6 4%; Diabetic >=6 5%; Glycemic control for adults with diabetes <7 0% % 6 7 (H) 6 5 (H)   eAG, EST AVG Glucose      mg/dl 146 140   Vit D, 25-Hydroxy      30 0 - 100 0 ng/mL 16 6 (L) 35 4   Free T4      0 76 - 1 46 ng/dL  1 28   TSH 3RD GENERATON      0 358 - 3 740 uIU/mL  2 780     Plan:    Diagnoses and all orders for this visit:    Type 2 diabetes mellitus with stage 2 chronic kidney disease, without long-term current use of insulin (HCC)  HGA1C 6 5%  Improved  Treatment regimen: continue current treatment  Episodes of hypoglycemia can lead to permanent disability and death  Discussed risks/complications associated with uncontrolled diabetes  Advised to adhere to diabetic diet, and recommended staying active/exercising routinely as tolerated  Keep carbohydrates consistent to limit blood glucose fluctuations  Advised to call if blood sugars less than 70 mg/dl or over 250 mg/dl  Check blood glucose 2 times a day  Discussed symptoms and treatment of hypoglycemia  Recommended routine follow-up with podiatry and ophthalmology  Ordered blood work to complete prior to next visit  -     Hemoglobin A1C; Future  -     Basic metabolic panel; Future  -     Microalbumin / creatinine urine ratio; Future    Acquired hypothyroidism  TFTs are normal, TSH 2 780 and free T4 1 28  Continue current dose of levothyroxine  Repeat labs prior to next visit   -     T4, free; Future  -     TSH, 3rd generation;  Future    Thyroid nodule  Thyroid US done in Nov 2020 showed a heterogeneous thyroid with small cysts and subcentimeter nodule   No nodule meets current ACR criteria for requiring biopsy or followup ultrasounds unless otherwise clinically indicated  Essential hypertension  Blood pressure well controlled, continue current treatment   -     Basic metabolic panel; Future    Mixed hyperlipidemia  LDL 82  Continue statin therapy        Discussed with the patient diagnosis and treatment and all questions fully answered  She will call me if any problems arise  Counseled patient on diagnostic results, prognosis, risk and benefit of treatment options, instruction for management, importance of treatment compliance, risk factor reduction and impressions        Michelle Jalloh PA-C

## 2022-03-18 DIAGNOSIS — J44.9 CHRONIC OBSTRUCTIVE PULMONARY DISEASE, UNSPECIFIED COPD TYPE (HCC): ICD-10-CM

## 2022-03-18 RX ORDER — AZITHROMYCIN 500 MG/1
TABLET, FILM COATED ORAL
Qty: 12 TABLET | Refills: 11 | Status: SHIPPED | OUTPATIENT
Start: 2022-03-18 | End: 2023-03-18

## 2022-03-21 ENCOUNTER — OFFICE VISIT (OUTPATIENT)
Dept: PULMONOLOGY | Facility: CLINIC | Age: 82
End: 2022-03-21
Payer: COMMERCIAL

## 2022-03-21 VITALS
WEIGHT: 166 LBS | BODY MASS INDEX: 32.59 KG/M2 | DIASTOLIC BLOOD PRESSURE: 68 MMHG | SYSTOLIC BLOOD PRESSURE: 134 MMHG | HEIGHT: 60 IN | RESPIRATION RATE: 16 BRPM | TEMPERATURE: 97.9 F | HEART RATE: 106 BPM | OXYGEN SATURATION: 90 %

## 2022-03-21 DIAGNOSIS — J96.11 CHRONIC RESPIRATORY FAILURE WITH HYPOXIA AND HYPERCAPNIA (HCC): ICD-10-CM

## 2022-03-21 DIAGNOSIS — J44.9 COPD, SEVERE (HCC): Primary | ICD-10-CM

## 2022-03-21 DIAGNOSIS — J96.12 CHRONIC RESPIRATORY FAILURE WITH HYPOXIA AND HYPERCAPNIA (HCC): ICD-10-CM

## 2022-03-21 DIAGNOSIS — J30.1 NON-SEASONAL ALLERGIC RHINITIS DUE TO POLLEN: ICD-10-CM

## 2022-03-21 PROCEDURE — 3075F SYST BP GE 130 - 139MM HG: CPT | Performed by: INTERNAL MEDICINE

## 2022-03-21 PROCEDURE — 3078F DIAST BP <80 MM HG: CPT | Performed by: INTERNAL MEDICINE

## 2022-03-21 PROCEDURE — 1160F RVW MEDS BY RX/DR IN RCRD: CPT | Performed by: INTERNAL MEDICINE

## 2022-03-21 PROCEDURE — 94618 PULMONARY STRESS TESTING: CPT | Performed by: INTERNAL MEDICINE

## 2022-03-21 PROCEDURE — 99214 OFFICE O/P EST MOD 30 MIN: CPT | Performed by: INTERNAL MEDICINE

## 2022-03-21 NOTE — PROGRESS NOTES
Pulmonary Follow Up Note   Victorino Tillman 80 y o  female MRN: [id number]  3/21/2022      Assessment/Plan:     COPD, severe [facility]  Patient has severe COPD, without evidence of exacerbation  She is reasonably controlled on Symbicort twice daily and Incruse Ellipta once daily  She has albuterol to use on as-needed basis  She also takes azithromycin 3 times weekly which has helped reduce her exacerbation rate  Chronic respiratory failure with hypoxia and hypercapnia (HCC)  She will continue with oxygen at 2 liters/minutes with exertion and trilogy NIV during hours of sleep  Oxygen was recertified today in the office  Ongoing use is medically necessary  Non-seasonal allergic rhinitis due to pollen  I suggested that she take either Allegra or Zyrtec once daily to help with her sinus congestion  She does have a nasal spray at home, but does not like to use it  She will follow-up in 6 months or sooner if needed     Visit orders:    Diagnoses and all orders for this visit:    COPD, severe ([facility] )    Chronic respiratory failure with hypoxia and hypercapnia (HCC)  -     POCT 6 minute walk  -     Home Oxygen with Portability    Non-seasonal allergic rhinitis due to pollen        History of Present Illness   HPI:  Victorino Tillman is a 80 y o  female who is here today for follow-up regarding severe COPD and chronic hypoxic and hypercapnic respiratory failure  She is doing quite well from pulmonary perspective  She is taking Symbicort and Incruse Ellipta  She typically only needs her nebulizer on humid days  Otherwise, she does not have daily symptoms of cough or wheeze  She does have shortness of breath with exertion which is stable  She uses supplemental oxygen as needed  She also uses Trilogy NIV during hours of sleep  She wakes up feeling refreshed  She reports some sinus pressure and feeling congested, mostly in the mornings  She does not use any nasal sprays      Review of Systems   Constitutional: Negative for chills, fever and unexpected weight change  HENT: Negative for postnasal drip and sore throat  Eyes: Negative for visual disturbance  Respiratory:        As noted in HPI   Cardiovascular: Negative for chest pain  Gastrointestinal: Negative for abdominal pain, diarrhea and vomiting  Musculoskeletal: Negative for arthralgias  Skin: Negative for rash  Neurological: Negative for headaches  Hematological: Negative for adenopathy  Psychiatric/Behavioral: Negative  All other systems reviewed and are negative  Medical, Family and Social history reviewed and updated as appropriate    Historical Information   Past Medical History:   Diagnosis Date    Anemia     Blood clotting tendency ([facility] )     left leg, lung    Chronic respiratory failure with hypoxia and hypercapnia (HCC)     COPD (chronic obstructive pulmonary disease) ([facility] )     Deep vein thrombosis (HCC)     Diabetes mellitus ([facility] )     [facility]     [facility] lung [facility]     Emphysema of lung ([facility] )     Hyperlipidemia     Hypertension     Hyperthyroidism     Hypothyroid     On warfarin therapy     Osteoporosis     Palpitations     Pulmonary embolism [facility]     Sleep apnea, obstructive     Thyroid disease      Past Surgical History:   Procedure Laterality Date    AV NODE ABLATION  09/21/2017    COLONOSCOPY  01/01/2018    w/ EGD    DXA PROCEDURE (HISTORICAL)  11/2016    MS COLONOSCOPY FLX DX W/COLLJ SPEC WHEN PFRMD N/A 8/30/2018    Procedure: EGD AND COLONOSCOPY;  Surgeon: Kirby Juares MD;  [facility];   Service: Gastroenterology    TOE SURGERY       Family History   Problem Relation Age of Onset    Hypertension Mother     Hyperlipidemia Mother     Asthma Father     Heart failure Father     Hypertension Sister     Hyperlipidemia Sister     Heart attack Neg Hx     Aneurysm Neg Hx     Stroke Neg Hx     Clotting disorder Neg Hx        Social History     Tobacco Use   Smoking Status Former Smoker  Packs/day: 1 00    Years: 46 00    Pack years: 46 00    Types: Cigarettes    Quit date:     Years since quittin 2   Smokeless Tobacco Never Used         Meds/Allergies     Current Outpatient Medications:     albuterol (2 5 mg/3 mL) 0 083 % nebulizer solution, Take 1 vial (2 5 mg total) by nebulization every 6 (six) hours as needed for wheezing or shortness of breath, Disp: 150 mL, Rfl: 0    aspirin (ECOTRIN LOW STRENGTH) 81 mg EC tablet, Take 81 mg by mouth daily, Disp: , Rfl:     atorvastatin (LIPITOR) 20 mg tablet, TAKE ONE TABLET BY MOUTH EVERY DAY, Disp: 90 tablet, Rfl: 0    azithromycin (ZITHROMAX) 500 MG tablet, TAKE ONE TABLET BY MOUTH THREE TIMES A WEEK, Disp: 12 tablet, Rfl: 11    budesonide-formoterol (Symbicort) 160-4 5 mcg/act inhaler, Inhale 2 puffs 2 (two) times a day As directed, Disp: 1 Inhaler, Rfl: 11    Cholecalciferol (VITAMIN D) 2000 units CAPS, Take 2 capsules (4,000 Units total) by mouth daily, Disp: , Rfl:     cyanocobalamin (VITAMIN B-12) 100 mcg tablet, Take by mouth daily, Disp: , Rfl:     diltiazem (CARDIZEM CD) 300 mg 24 hr capsule, Take 1 capsule (300 mg total) by mouth daily As directed, Disp: 90 capsule, Rfl: 3    fluticasone (FLONASE) 50 mcg/act nasal spray, 2 sprays into each nostril daily, Disp: 1 Bottle, Rfl: 5    glimepiride (AMARYL) 1 mg tablet, Take 1 tablet (1 mg total) by mouth daily with breakfast, Disp: 90 tablet, Rfl: 1    glucose blood (Contour Next Test) test strip, Use twice a day, Disp: 200 each, Rfl: 1    ketorolac (ACULAR) 0 5 % ophthalmic solution, , Disp: , Rfl:     levothyroxine (Synthroid) 25 mcg tablet, Take 1 tablet (25 mcg total) by mouth daily, Disp: 90 tablet, Rfl: 1    linaGLIPtin (Tradjenta) 5 MG TABS, Take 5 mg by mouth daily, Disp: 30 tablet, Rfl: 5    lisinopril-hydrochlorothiazide (PRINZIDE,ZESTORETIC) 20-25 MG per tablet, Take 1 tablet(s) every day by oral route , Disp: 90 tablet, Rfl: 1    metFORMIN (GLUCOPHAGE-XR) 500 mg 24 hr tablet, Take 2 tablets (1,000 mg total) by mouth 2 (two) times a day, Disp: 360 tablet, Rfl: 1    multivitamin (THERAGRAN) TABS, Take 1 tablet by mouth daily, Disp: , Rfl:     nystatin (MYCOSTATIN) cream, Apply topically 2 (two) times a day, Disp: 60 g, Rfl: 0    ofloxacin (OCUFLOX) 0 3 % ophthalmic solution, , Disp: , Rfl:     prednisoLONE acetate (PRED FORTE) 1 % ophthalmic suspension, , Disp: , Rfl:     Ventolin  (90 Base) MCG/ACT inhaler, Inhale 2 puffs every 6 (six) hours as needed for wheezing, Disp: 18 g, Rfl: 0    umeclidinium bromide (Incruse Ellipta) 62 5 mcg/inh AEPB inhaler, Inhale 1 puff daily, Disp: 30 blister, Rfl: 2  Allergies   Allergen Reactions    Penicillins Rash     After dental procedure        Vitals: Blood pressure 134/68, pulse (!) 106, temperature 97 9 °F (36 6 °C), temperature source Tympanic, resp  rate 16, height 5' (1 524 m), weight 75 3 kg (166 lb), SpO2 90 %  Body mass index is 32 42 kg/m²  Oxygen Therapy  SpO2: 90 %  Oxygen Therapy: None (Room air)    Physical Exam   Physical Exam  Constitutional:       General: She is not in acute distress  HENT:      Head: Normocephalic  Mouth/Throat:      Pharynx: No oropharyngeal exudate  Eyes:      General: No scleral icterus  Pupils: Pupils are equal, round, and reactive to light  Neck:      Vascular: No JVD  Cardiovascular:      Rate and Rhythm: Normal rate and regular rhythm  Pulmonary:      Breath sounds: No wheezing, rhonchi or rales  Abdominal:      Palpations: Abdomen is soft  Tenderness: There is no abdominal tenderness  Musculoskeletal:      Cervical back: Neck supple  Lymphadenopathy:      Cervical: No cervical adenopathy  Skin:     General: Skin is warm and dry  Neurological:      Mental Status: She is alert and oriented to person, place, and time  Psychiatric:         Mood and Affect: Mood normal          Labs: I have personally reviewed pertinent lab results    Lab Results Component Value Date    WBC 12 08 (H) 05/30/2020    HGB 12 0 05/30/2020    HCT 37 8 05/30/2020    MCV 83 05/30/2020     05/30/2020     Lab Results   Component Value Date    CALCIUM 9 3 03/15/2022    K 4 1 03/15/2022    CO2 32 03/15/2022     03/15/2022    BUN 15 03/15/2022    CREATININE 0 57 (L) 03/15/2022     No results found for: IGE  Lab Results   Component Value Date    ALT 20 04/29/2021    AST 7 04/29/2021    ALKPHOS 57 04/29/2021       Pulmonary function testing:  Performed 1/2/18 and personally interpreted  FEV1/FVC ratio 65%   FEV1 48% predicted  FVC 55% predicted  No response to bronchodilators   % predicted   % predicted  DLCO corrected for hemoglobin 13 % predicted  PFT shows severe obstruction, moderate air trapping and severe diffusion impairment    6 minute walk test performed today  Room air saturations at rest are 94%  Patient ambulated for 5 minutes before saturations dropped to 88%  She was then placed on oxygen at 2 liters/minute and ambulated additional 2 minutes    Saturations remained 93% on 2 L with exertion

## 2022-03-21 NOTE — ASSESSMENT & PLAN NOTE
I suggested that she take either Allegra or Zyrtec once daily to help with her sinus congestion  She does have a nasal spray at home, but does not like to use it

## 2022-03-21 NOTE — ASSESSMENT & PLAN NOTE
Patient has severe COPD, without evidence of exacerbation  She is reasonably controlled on Symbicort twice daily and Incruse Ellipta once daily  She has albuterol to use on as-needed basis  She also takes azithromycin 3 times weekly which has helped reduce her exacerbation rate

## 2022-03-21 NOTE — ASSESSMENT & PLAN NOTE
She will continue with oxygen at 2 liters/minutes with exertion and trilogy NIV during hours of sleep  Oxygen was recertified today in the office  Ongoing use is medically necessary

## 2022-04-01 ENCOUNTER — TELEPHONE (OUTPATIENT)
Dept: FAMILY MEDICINE CLINIC | Facility: CLINIC | Age: 82
End: 2022-04-01

## 2022-04-01 ENCOUNTER — OFFICE VISIT (OUTPATIENT)
Dept: URGENT CARE | Facility: CLINIC | Age: 82
End: 2022-04-01
Payer: COMMERCIAL

## 2022-04-01 VITALS
OXYGEN SATURATION: 92 % | DIASTOLIC BLOOD PRESSURE: 72 MMHG | SYSTOLIC BLOOD PRESSURE: 160 MMHG | RESPIRATION RATE: 24 BRPM | WEIGHT: 166 LBS | BODY MASS INDEX: 32.42 KG/M2 | HEART RATE: 103 BPM

## 2022-04-01 DIAGNOSIS — E11.65 TYPE 2 DIABETES MELLITUS WITH HYPERGLYCEMIA, WITHOUT LONG-TERM CURRENT USE OF INSULIN (HCC): ICD-10-CM

## 2022-04-01 DIAGNOSIS — T78.1XXA ALLERGIC REACTION TO FOOD, INITIAL ENCOUNTER: Primary | ICD-10-CM

## 2022-04-01 PROCEDURE — 99213 OFFICE O/P EST LOW 20 MIN: CPT | Performed by: NURSE PRACTITIONER

## 2022-04-01 PROCEDURE — S9083 URGENT CARE CENTER GLOBAL: HCPCS | Performed by: NURSE PRACTITIONER

## 2022-04-01 RX ORDER — ATORVASTATIN CALCIUM 20 MG/1
TABLET, FILM COATED ORAL
Qty: 90 TABLET | Refills: 0 | Status: SHIPPED | OUTPATIENT
Start: 2022-04-01 | End: 2022-07-01

## 2022-04-01 NOTE — TELEPHONE ENCOUNTER
Patient called and stated that she is having an allergic reaction to food that she had eaten  Her lips and right side of  face are swelling and her throat is sore  I advised her to go to the ER to be evaluated and she agreed

## 2022-04-01 NOTE — PROGRESS NOTES
[facility]        NAME: Yogesh Bautista is a 80 y o  female  : 1940    MRN: [id number]  DATE: 2022  TIME: 7:41 PM    Assessment and Plan   Allergic reaction to food, initial encounter [facility]  1XXA]  1  Allergic reaction to food, initial encounter           Patient Instructions       Follow up with PCP in 3-5 days  Proceed to  ER if symptoms worsen  Chief Complaint     Chief Complaint   Patient presents with    Facial Swelling     states she ate a falafel sandwhich yesterday and today  About 2 hours after she developed swelling of the L side of her face  History of Present Illness       Patient is an 80year old female presenting with left upper lip swelling that started 3 hours ago after eating a [employer] sandwich  She ate 1/2 of the sandwich yesterday without any problems  Today she ate the other 1/2 and drank an iced coffee from [employer]  She reports immediate upper lip swelling  Denies intraoral swelling, trouble swallowing, or SOB  The swelling is improving  She did not take any OTC medications  Review of Systems   Review of Systems   Constitutional: Negative for activity change, chills and fever  HENT: Positive for facial swelling  Negative for trouble swallowing  Respiratory: Negative for cough, chest tightness and shortness of breath  Skin: Negative for rash  Neurological: Negative for dizziness and headaches           Current Medications       Current Outpatient Medications:     albuterol (2 5 mg/3 mL) 0 083 % nebulizer solution, Take 1 vial (2 5 mg total) by nebulization every 6 (six) hours as needed for wheezing or shortness of breath, Disp: 150 mL, Rfl: 0    aspirin (ECOTRIN LOW STRENGTH) 81 mg EC tablet, Take 81 mg by mouth daily, Disp: , Rfl:     atorvastatin (LIPITOR) 20 mg tablet, TAKE ONE TABLET BY MOUTH EVERY DAY, Disp: 90 tablet, Rfl: 0    azithromycin (ZITHROMAX) 500 MG tablet, TAKE ONE TABLET BY MOUTH THREE TIMES A WEEK, Disp: 12 tablet, Rfl: 11    budesonide-formoterol (Symbicort) 160-4 5 mcg/act inhaler, Inhale 2 puffs 2 (two) times a day As directed, Disp: 1 Inhaler, Rfl: 11    Cholecalciferol (VITAMIN D) 2000 units CAPS, Take 2 capsules (4,000 Units total) by mouth daily, Disp: , Rfl:     cyanocobalamin (VITAMIN B-12) 100 mcg tablet, Take by mouth daily, Disp: , Rfl:     diltiazem (CARDIZEM CD) 300 mg 24 hr capsule, Take 1 capsule (300 mg total) by mouth daily As directed, Disp: 90 capsule, Rfl: 3    fluticasone (FLONASE) 50 mcg/act nasal spray, 2 sprays into each nostril daily, Disp: 1 Bottle, Rfl: 5    glimepiride (AMARYL) 1 mg tablet, Take 1 tablet (1 mg total) by mouth daily with breakfast, Disp: 90 tablet, Rfl: 1    glucose blood (Contour Next Test) test strip, Use twice a day, Disp: 200 each, Rfl: 1    ketorolac (ACULAR) 0 5 % ophthalmic solution, , Disp: , Rfl:     levothyroxine (Synthroid) 25 mcg tablet, Take 1 tablet (25 mcg total) by mouth daily, Disp: 90 tablet, Rfl: 1    linaGLIPtin (Tradjenta) 5 MG TABS, Take 5 mg by mouth daily, Disp: 30 tablet, Rfl: 5    lisinopril-hydrochlorothiazide (PRINZIDE,ZESTORETIC) 20-25 MG per tablet, Take 1 tablet(s) every day by oral route , Disp: 90 tablet, Rfl: 1    metFORMIN (GLUCOPHAGE-XR) 500 mg 24 hr tablet, Take 2 tablets (1,000 mg total) by mouth 2 (two) times a day, Disp: 360 tablet, Rfl: 1    multivitamin (THERAGRAN) TABS, Take 1 tablet by mouth daily, Disp: , Rfl:     nystatin (MYCOSTATIN) cream, Apply topically 2 (two) times a day, Disp: 60 g, Rfl: 0    ofloxacin (OCUFLOX) 0 3 % ophthalmic solution, , Disp: , Rfl:     prednisoLONE acetate (PRED FORTE) 1 % ophthalmic suspension, , Disp: , Rfl:     Ventolin  (90 Base) MCG/ACT inhaler, Inhale 2 puffs every 6 (six) hours as needed for wheezing, Disp: 18 g, Rfl: 0    umeclidinium bromide (Incruse Ellipta) 62 5 mcg/inh AEPB inhaler, Inhale 1 puff daily, Disp: 30 blister, Rfl: 2    Current Allergies     Allergies as of 04/01/2022 - Reviewed 04/01/2022   Allergen Reaction Noted    Penicillins Rash 10/31/2017            The following portions of the patient's history were reviewed and updated as appropriate: allergies, current medications, past family history, past medical history, past social history, past surgical history and problem list      Past Medical History:   Diagnosis Date    Anemia     Blood clotting tendency ([facility] )     left leg, lung    Chronic respiratory failure with hypoxia and hypercapnia ([facility] )     COPD (chronic obstructive pulmonary disease) ([facility] )     Deep vein thrombosis ([facility] )     [facility]     [facility] lung [facility]     [facility] lung [facility]     Emphysema of lung ([facility] )     Hyperlipidemia     Hypertension     Hyperthyroidism     Hypothyroid     On warfarin therapy     Osteoporosis     Palpitations     Pulmonary embolism ([place] [address] )     Sleep apnea, obstructive     Thyroid disease        Past Surgical History:   Procedure Laterality Date    AV NODE ABLATION  09/21/2017    COLONOSCOPY  01/01/2018    w/ EGD    DXA PROCEDURE (HISTORICAL)  11/2016    [facility] COLONOSCOPY FLX DX W/COLLJ SPEC WHEN PFRMD N/A 8/30/2018    Procedure: EGD AND COLONOSCOPY;  Surgeon: Krish Moya MD;  [facility]; Service: Gastroenterology    TOE SURGERY         Family History   Problem Relation Age of Onset    Hypertension Mother     Hyperlipidemia Mother     Asthma Father     Heart failure Father     Hypertension Sister     Hyperlipidemia Sister     Heart attack Neg Hx     Aneurysm Neg Hx     Stroke Neg Hx     Clotting disorder Neg Hx          Medications have been verified  Objective   /72 (Patient Position: Sitting)   Pulse 103   Resp (!) 24   Wt 75 3 kg (166 lb)   LMP  (LMP Unknown)   SpO2 92%   BMI 32 42 kg/m²        Physical Exam     Physical Exam  Vitals reviewed  Constitutional:       General: She is awake  She is not in acute distress  Appearance: Normal appearance  HENT:      Head: Normocephalic  Right Ear: Hearing normal       Left Ear: Hearing normal       Nose: Nose normal       Mouth/Throat:      Lips: Pink  Comments: Very slight left upper lip swelling  No intra oral swelling  Posterior pharynx is normal    Cardiovascular:      Rate and Rhythm: Normal rate and regular rhythm  Heart sounds: Normal heart sounds, S1 normal and S2 normal    Pulmonary:      Effort: Pulmonary effort is normal       Breath sounds: Normal breath sounds  No decreased breath sounds or wheezing  Skin:     General: Skin is warm and moist    Neurological:      General: No focal deficit present  Mental Status: She is alert and oriented to person, place, and time  Psychiatric:         Behavior: Behavior is cooperative

## 2022-04-11 ENCOUNTER — TELEPHONE (OUTPATIENT)
Dept: FAMILY MEDICINE CLINIC | Facility: CLINIC | Age: 82
End: 2022-04-11

## 2022-04-11 NOTE — TELEPHONE ENCOUNTER
Patient called and stated that she forgot to mention at her last visit to the doctor about the fact that when she rides in the car she gets sick  She said it doesn't happen when she's driving just when she is riding with someone else  She said it doesn't matter whether she's in the front or the back    Please advise

## 2022-05-05 DIAGNOSIS — N18.2 TYPE 2 DIABETES MELLITUS WITH STAGE 2 CHRONIC KIDNEY DISEASE, WITHOUT LONG-TERM CURRENT USE OF INSULIN (HCC): ICD-10-CM

## 2022-05-05 DIAGNOSIS — E11.22 TYPE 2 DIABETES MELLITUS WITH STAGE 2 CHRONIC KIDNEY DISEASE, WITHOUT LONG-TERM CURRENT USE OF INSULIN (HCC): ICD-10-CM

## 2022-05-05 DIAGNOSIS — J44.9 CHRONIC OBSTRUCTIVE PULMONARY DISEASE, UNSPECIFIED COPD TYPE (HCC): ICD-10-CM

## 2022-05-05 RX ORDER — LINAGLIPTIN 5 MG/1
5 TABLET, FILM COATED ORAL DAILY
Qty: 30 TABLET | Refills: 5 | Status: CANCELLED | OUTPATIENT
Start: 2022-05-05

## 2022-05-07 DIAGNOSIS — J44.9 CHRONIC OBSTRUCTIVE PULMONARY DISEASE, UNSPECIFIED COPD TYPE (HCC): ICD-10-CM

## 2022-05-07 DIAGNOSIS — J44.9 COPD, SEVERE (HCC): ICD-10-CM

## 2022-05-08 RX ORDER — BUDESONIDE AND FORMOTEROL FUMARATE DIHYDRATE 160; 4.5 UG/1; UG/1
AEROSOL RESPIRATORY (INHALATION)
Qty: 10.2 G | Refills: 0 | Status: SHIPPED | OUTPATIENT
Start: 2022-05-08 | End: 2022-06-15

## 2022-05-09 RX ORDER — UMECLIDINIUM 62.5 UG/1
1 AEROSOL, POWDER ORAL DAILY
Qty: 30 BLISTER | Refills: 2 | Status: SHIPPED | OUTPATIENT
Start: 2022-05-09 | End: 2022-08-05

## 2022-05-09 RX ORDER — UMECLIDINIUM 62.5 UG/1
1 AEROSOL, POWDER ORAL DAILY
Qty: 30 BLISTER | Refills: 0 | Status: SHIPPED | OUTPATIENT
Start: 2022-05-09 | End: 2022-06-08

## 2022-05-18 DIAGNOSIS — N18.2 TYPE 2 DIABETES MELLITUS WITH STAGE 2 CHRONIC KIDNEY DISEASE, WITHOUT LONG-TERM CURRENT USE OF INSULIN (HCC): ICD-10-CM

## 2022-05-18 DIAGNOSIS — E11.22 TYPE 2 DIABETES MELLITUS WITH STAGE 2 CHRONIC KIDNEY DISEASE, WITHOUT LONG-TERM CURRENT USE OF INSULIN (HCC): ICD-10-CM

## 2022-05-18 RX ORDER — LINAGLIPTIN 5 MG/1
TABLET, FILM COATED ORAL
Qty: 30 TABLET | Refills: 0 | Status: SHIPPED | OUTPATIENT
Start: 2022-05-18 | End: 2022-06-15

## 2022-06-01 DIAGNOSIS — J44.9 COPD, SEVERE (HCC): ICD-10-CM

## 2022-06-01 DIAGNOSIS — I10 ESSENTIAL HYPERTENSION: ICD-10-CM

## 2022-06-01 RX ORDER — DILTIAZEM HYDROCHLORIDE 300 MG/1
300 CAPSULE, COATED, EXTENDED RELEASE ORAL DAILY
Qty: 90 CAPSULE | Refills: 0 | Status: SHIPPED | OUTPATIENT
Start: 2022-06-01

## 2022-06-01 RX ORDER — LISINOPRIL AND HYDROCHLOROTHIAZIDE 25; 20 MG/1; MG/1
TABLET ORAL
Qty: 90 TABLET | Refills: 0 | Status: SHIPPED | OUTPATIENT
Start: 2022-06-01

## 2022-06-15 DIAGNOSIS — N18.2 TYPE 2 DIABETES MELLITUS WITH STAGE 2 CHRONIC KIDNEY DISEASE, WITHOUT LONG-TERM CURRENT USE OF INSULIN (HCC): ICD-10-CM

## 2022-06-15 DIAGNOSIS — J44.9 COPD, SEVERE (HCC): ICD-10-CM

## 2022-06-15 DIAGNOSIS — E11.22 TYPE 2 DIABETES MELLITUS WITH STAGE 2 CHRONIC KIDNEY DISEASE, WITHOUT LONG-TERM CURRENT USE OF INSULIN (HCC): ICD-10-CM

## 2022-06-15 RX ORDER — LINAGLIPTIN 5 MG/1
TABLET, FILM COATED ORAL
Qty: 30 TABLET | Refills: 0 | Status: SHIPPED | OUTPATIENT
Start: 2022-06-15 | End: 2022-07-18

## 2022-06-15 RX ORDER — BUDESONIDE AND FORMOTEROL FUMARATE DIHYDRATE 160; 4.5 UG/1; UG/1
AEROSOL RESPIRATORY (INHALATION)
Qty: 10.2 G | Refills: 0 | Status: SHIPPED | OUTPATIENT
Start: 2022-06-15 | End: 2022-07-19

## 2022-06-15 RX ORDER — GLIMEPIRIDE 1 MG/1
TABLET ORAL
Qty: 90 TABLET | Refills: 0 | Status: SHIPPED | OUTPATIENT
Start: 2022-06-15

## 2022-07-01 DIAGNOSIS — E11.22 TYPE 2 DIABETES MELLITUS WITH STAGE 2 CHRONIC KIDNEY DISEASE, WITHOUT LONG-TERM CURRENT USE OF INSULIN (HCC): ICD-10-CM

## 2022-07-01 DIAGNOSIS — E11.65 TYPE 2 DIABETES MELLITUS WITH HYPERGLYCEMIA, WITHOUT LONG-TERM CURRENT USE OF INSULIN (HCC): ICD-10-CM

## 2022-07-01 DIAGNOSIS — N18.2 TYPE 2 DIABETES MELLITUS WITH STAGE 2 CHRONIC KIDNEY DISEASE, WITHOUT LONG-TERM CURRENT USE OF INSULIN (HCC): ICD-10-CM

## 2022-07-01 RX ORDER — METFORMIN HYDROCHLORIDE 500 MG/1
TABLET, EXTENDED RELEASE ORAL
Qty: 360 TABLET | Refills: 0 | Status: SHIPPED | OUTPATIENT
Start: 2022-07-01 | End: 2022-09-26

## 2022-07-01 RX ORDER — ATORVASTATIN CALCIUM 20 MG/1
TABLET, FILM COATED ORAL
Qty: 90 TABLET | Refills: 0 | Status: SHIPPED | OUTPATIENT
Start: 2022-07-01 | End: 2022-10-06 | Stop reason: SDUPTHER

## 2022-07-07 DIAGNOSIS — E03.9 ACQUIRED HYPOTHYROIDISM: ICD-10-CM

## 2022-07-07 RX ORDER — LEVOTHYROXINE SODIUM 0.03 MG/1
TABLET ORAL
Qty: 90 TABLET | Refills: 0 | Status: SHIPPED | OUTPATIENT
Start: 2022-07-07 | End: 2022-10-06 | Stop reason: SDUPTHER

## 2022-07-18 DIAGNOSIS — E11.22 TYPE 2 DIABETES MELLITUS WITH STAGE 2 CHRONIC KIDNEY DISEASE, WITHOUT LONG-TERM CURRENT USE OF INSULIN (HCC): ICD-10-CM

## 2022-07-18 DIAGNOSIS — N18.2 TYPE 2 DIABETES MELLITUS WITH STAGE 2 CHRONIC KIDNEY DISEASE, WITHOUT LONG-TERM CURRENT USE OF INSULIN (HCC): ICD-10-CM

## 2022-07-18 DIAGNOSIS — J44.9 COPD, SEVERE (HCC): ICD-10-CM

## 2022-07-18 RX ORDER — LINAGLIPTIN 5 MG/1
TABLET, FILM COATED ORAL
Qty: 30 TABLET | Refills: 0 | Status: SHIPPED | OUTPATIENT
Start: 2022-07-18 | End: 2022-08-15

## 2022-07-19 RX ORDER — BUDESONIDE AND FORMOTEROL FUMARATE DIHYDRATE 160; 4.5 UG/1; UG/1
AEROSOL RESPIRATORY (INHALATION)
Qty: 10.2 G | Refills: 0 | Status: SHIPPED | OUTPATIENT
Start: 2022-07-19 | End: 2022-08-20

## 2022-07-27 DIAGNOSIS — T75.3XXA MOTION SICKNESS, INITIAL ENCOUNTER: ICD-10-CM

## 2022-07-27 RX ORDER — MECLIZINE HYDROCHLORIDE 25 MG/1
TABLET ORAL
Qty: 10 TABLET | Refills: 0 | Status: SHIPPED | OUTPATIENT
Start: 2022-07-27

## 2022-08-04 DIAGNOSIS — J44.9 CHRONIC OBSTRUCTIVE PULMONARY DISEASE, UNSPECIFIED COPD TYPE (HCC): ICD-10-CM

## 2022-08-15 DIAGNOSIS — N18.2 TYPE 2 DIABETES MELLITUS WITH STAGE 2 CHRONIC KIDNEY DISEASE, WITHOUT LONG-TERM CURRENT USE OF INSULIN (HCC): ICD-10-CM

## 2022-08-15 DIAGNOSIS — E11.22 TYPE 2 DIABETES MELLITUS WITH STAGE 2 CHRONIC KIDNEY DISEASE, WITHOUT LONG-TERM CURRENT USE OF INSULIN (HCC): ICD-10-CM

## 2022-08-15 RX ORDER — LINAGLIPTIN 5 MG/1
TABLET, FILM COATED ORAL
Qty: 30 TABLET | Refills: 0 | Status: SHIPPED | OUTPATIENT
Start: 2022-08-15 | End: 2022-09-13

## 2022-08-20 DIAGNOSIS — J44.9 COPD, SEVERE (HCC): ICD-10-CM

## 2022-08-20 RX ORDER — BUDESONIDE AND FORMOTEROL FUMARATE DIHYDRATE 160; 4.5 UG/1; UG/1
AEROSOL RESPIRATORY (INHALATION)
Qty: 10.2 G | Refills: 0 | Status: SHIPPED | OUTPATIENT
Start: 2022-08-20 | End: 2022-09-22

## 2022-08-29 DIAGNOSIS — J44.9 COPD, SEVERE (HCC): ICD-10-CM

## 2022-08-29 DIAGNOSIS — I10 ESSENTIAL HYPERTENSION: ICD-10-CM

## 2022-08-29 RX ORDER — DILTIAZEM HYDROCHLORIDE 300 MG/1
300 CAPSULE, COATED, EXTENDED RELEASE ORAL DAILY
Qty: 90 CAPSULE | Refills: 0 | Status: SHIPPED | OUTPATIENT
Start: 2022-08-29

## 2022-08-29 RX ORDER — LISINOPRIL AND HYDROCHLOROTHIAZIDE 25; 20 MG/1; MG/1
TABLET ORAL
Qty: 90 TABLET | Refills: 0 | Status: SHIPPED | OUTPATIENT
Start: 2022-08-29

## 2022-09-13 DIAGNOSIS — E11.22 TYPE 2 DIABETES MELLITUS WITH STAGE 2 CHRONIC KIDNEY DISEASE, WITHOUT LONG-TERM CURRENT USE OF INSULIN (HCC): ICD-10-CM

## 2022-09-13 DIAGNOSIS — N18.2 TYPE 2 DIABETES MELLITUS WITH STAGE 2 CHRONIC KIDNEY DISEASE, WITHOUT LONG-TERM CURRENT USE OF INSULIN (HCC): ICD-10-CM

## 2022-09-13 RX ORDER — LINAGLIPTIN 5 MG/1
TABLET, FILM COATED ORAL
Qty: 30 TABLET | Refills: 0 | Status: SHIPPED | OUTPATIENT
Start: 2022-09-13 | End: 2022-10-14

## 2022-09-13 RX ORDER — GLIMEPIRIDE 1 MG/1
TABLET ORAL
Qty: 90 TABLET | Refills: 0 | Status: SHIPPED | OUTPATIENT
Start: 2022-09-13

## 2022-09-26 DIAGNOSIS — E11.22 TYPE 2 DIABETES MELLITUS WITH STAGE 2 CHRONIC KIDNEY DISEASE, WITHOUT LONG-TERM CURRENT USE OF INSULIN (HCC): ICD-10-CM

## 2022-09-26 DIAGNOSIS — N18.2 TYPE 2 DIABETES MELLITUS WITH STAGE 2 CHRONIC KIDNEY DISEASE, WITHOUT LONG-TERM CURRENT USE OF INSULIN (HCC): ICD-10-CM

## 2022-09-26 RX ORDER — METFORMIN HYDROCHLORIDE 500 MG/1
TABLET, EXTENDED RELEASE ORAL
Qty: 360 TABLET | Refills: 0 | Status: SHIPPED | OUTPATIENT
Start: 2022-09-26

## 2022-10-06 ENCOUNTER — OFFICE VISIT (OUTPATIENT)
Dept: FAMILY MEDICINE CLINIC | Facility: CLINIC | Age: 82
End: 2022-10-06
Payer: COMMERCIAL

## 2022-10-06 VITALS
WEIGHT: 165 LBS | OXYGEN SATURATION: 95 % | DIASTOLIC BLOOD PRESSURE: 60 MMHG | SYSTOLIC BLOOD PRESSURE: 112 MMHG | HEART RATE: 100 BPM | HEIGHT: 60 IN | RESPIRATION RATE: 16 BRPM | BODY MASS INDEX: 32.39 KG/M2

## 2022-10-06 DIAGNOSIS — J44.9 CHRONIC OBSTRUCTIVE PULMONARY DISEASE, UNSPECIFIED COPD TYPE (HCC): ICD-10-CM

## 2022-10-06 DIAGNOSIS — N18.2 TYPE 2 DIABETES MELLITUS WITH STAGE 2 CHRONIC KIDNEY DISEASE, WITHOUT LONG-TERM CURRENT USE OF INSULIN (HCC): ICD-10-CM

## 2022-10-06 DIAGNOSIS — R01.1 MURMUR, CARDIAC: ICD-10-CM

## 2022-10-06 DIAGNOSIS — Z23 NEEDS FLU SHOT: ICD-10-CM

## 2022-10-06 DIAGNOSIS — E78.2 MIXED HYPERLIPIDEMIA: Primary | ICD-10-CM

## 2022-10-06 DIAGNOSIS — G47.33 OBSTRUCTIVE SLEEP APNEA: ICD-10-CM

## 2022-10-06 DIAGNOSIS — I10 ESSENTIAL HYPERTENSION: ICD-10-CM

## 2022-10-06 DIAGNOSIS — E03.9 ACQUIRED HYPOTHYROIDISM: ICD-10-CM

## 2022-10-06 DIAGNOSIS — I47.1 PSVT (PAROXYSMAL SUPRAVENTRICULAR TACHYCARDIA) (HCC): ICD-10-CM

## 2022-10-06 DIAGNOSIS — E11.22 TYPE 2 DIABETES MELLITUS WITH STAGE 2 CHRONIC KIDNEY DISEASE, WITHOUT LONG-TERM CURRENT USE OF INSULIN (HCC): ICD-10-CM

## 2022-10-06 DIAGNOSIS — E11.65 TYPE 2 DIABETES MELLITUS WITH HYPERGLYCEMIA, WITHOUT LONG-TERM CURRENT USE OF INSULIN (HCC): ICD-10-CM

## 2022-10-06 PROCEDURE — 99214 OFFICE O/P EST MOD 30 MIN: CPT | Performed by: FAMILY MEDICINE

## 2022-10-06 PROCEDURE — 90662 IIV NO PRSV INCREASED AG IM: CPT | Performed by: FAMILY MEDICINE

## 2022-10-06 PROCEDURE — G0008 ADMIN INFLUENZA VIRUS VAC: HCPCS | Performed by: FAMILY MEDICINE

## 2022-10-06 RX ORDER — ATORVASTATIN CALCIUM 20 MG/1
20 TABLET, FILM COATED ORAL DAILY
Qty: 90 TABLET | Refills: 2 | Status: SHIPPED | OUTPATIENT
Start: 2022-10-06

## 2022-10-06 RX ORDER — LEVOTHYROXINE SODIUM 0.03 MG/1
25 TABLET ORAL DAILY
Qty: 90 TABLET | Refills: 0 | Status: SHIPPED | OUTPATIENT
Start: 2022-10-06

## 2022-10-06 NOTE — TELEPHONE ENCOUNTER
Received a phone call from [address] [place] [address] patient needs a refill of Levothyroxine -script sent for approval

## 2022-10-06 NOTE — ASSESSMENT & PLAN NOTE
Lab Results   Component Value Date    LDLCALC 82 04/29/2021   Continue Lipitor and ordered labs for lipid before next visit

## 2022-10-06 NOTE — PROGRESS NOTES
Name: Saranya Flores      : 1940      MRN: [id number]  Encounter Provider: Nandini Whitley MD  Encounter Date: 10/6/2022   Encounter department: [facility]     1  Mixed hyperlipidemia  Assessment & Plan:  Lab Results   Component Value Date    LDLCALC 82 2021   Continue Lipitor and ordered labs for lipid before next visit      Orders:  -     Lipid panel; Future; Expected date: 10/06/2022    2  Type 2 diabetes mellitus with stage 2 chronic kidney disease, without long-term current use of insulin ([facility])  Assessment & Plan:    Lab Results   Component Value Date    HGBA1C 6 5 (H) 03/15/2022   Follows endocrinologist and will discuss about the metformin as she is having some lose bowel movements due to the metformin    Orders:  -     Diabetic foot exam; Future    3  Needs flu shot  -     influenza vaccine, high-dose, PF 0 7 mL (FLUZONE HIGH-DOSE)    4  PSVT (paroxysmal supraventricular tachycardia) ([facility] [address] )  Assessment & Plan:  She follows cardiologist once a year and she has murmur and denies any chest pain      5  Essential hypertension    6  Type 2 diabetes mellitus with hyperglycemia, without long-term current use of insulin [facility])  Assessment & Plan:    Lab Results   Component Value Date    HGBA1C 6 5 (H) 03/15/2022   Follows endocrinologist and will discuss about the metformin as she is having some lose bowel movements due to the metformin    Orders:  -     atorvastatin (LIPITOR) 20 mg tablet; Take 1 tablet (20 mg total) by mouth daily    7  Obstructive sleep apnea  Assessment & Plan:  Follows pulmonologist and stable and use home oxygen      8  Murmur, cardiac  Assessment & Plan:  Follows cardiologist regularly,        BMI Counseling: Body mass index is 32 22 kg/m²  The BMI is above normal  Nutrition recommendations include decreasing portion sizes and reducing intake of cholesterol  Exercise recommendations include strength training exercises   Rationale for BMI follow-up plan is due to patient being overweight or obese  Depression Screening and Follow-up Plan: Patient was screened for depression during today's encounter  They screened negative with a PHQ-2 score of 0  Subjective     She is here follow-up on hyperlipidemia and she needs a refill on Lipitor, she is doing well, she says she has limited use oxygen most of the time, she is planning to get a Life Alert after checking with her insurance as she lives alone at her home   She has diabetes and she is on 3 different medication she says metformin sometimes cause lose stool and she will discussed with her endocrinologist, she also see the pulmonologist, regularly and COPD stable    Review of Systems   Constitutional: Negative for activity change, appetite change, chills, fatigue, fever and unexpected weight change  HENT: Negative for congestion, ear discharge, ear pain, nosebleeds, postnasal drip, rhinorrhea, sinus pressure, sneezing, sore throat, trouble swallowing and voice change  Eyes: Negative for photophobia, pain, discharge, redness and itching  Respiratory: Negative for cough, chest tightness, shortness of breath and wheezing  Cardiovascular: Negative for chest pain, palpitations and leg swelling  Gastrointestinal: Negative for abdominal pain, constipation, diarrhea, nausea and vomiting  Endocrine: Negative for polyuria  Genitourinary: Negative for dysuria, frequency and urgency  Musculoskeletal: Negative for arthralgias, back pain, myalgias and neck pain  Skin: Negative for color change, pallor and rash  Allergic/Immunologic: Negative for environmental allergies and food allergies  Neurological: Negative for dizziness, weakness, light-headedness and headaches  Hematological: Negative for adenopathy  Does not bruise/bleed easily  Psychiatric/Behavioral: Negative for behavioral problems  The patient is not nervous/anxious          Past Medical History:   Diagnosis Date    Anemia     Blood clotting tendency (HCC)     left leg, lung    Chronic respiratory failure with hypoxia and hypercapnia (HCC)     COPD (chronic obstructive pulmonary disease) (HCC)     Deep vein thrombosis (HCC)     Diabetes mellitus (HCC)     Emphysema lung (HCC)     Emphysema lung (HCC)     Emphysema of lung (HCC)     Hyperlipidemia     Hypertension     Hyperthyroidism     Hypothyroid     On warfarin therapy     Osteoporosis     Palpitations     Pulmonary embolism (HCC)     Sleep apnea, obstructive     Thyroid disease      Past Surgical History:   Procedure Laterality Date    AV NODE ABLATION  2017    COLONOSCOPY  2018    w/ EGD    DXA PROCEDURE (HISTORICAL)  2016    AL COLONOSCOPY FLX DX W/COLLJ SPEC WHEN PFRMD N/A 2018    Procedure: EGD AND COLONOSCOPY;  Surgeon: Sujey Urrutia MD;  [facility]; Service: Gastroenterology    TOE SURGERY       Family History   Problem Relation Age of Onset    Hypertension Mother     Hyperlipidemia Mother     Asthma Father     Heart failure Father     Hypertension Sister     Hyperlipidemia Sister     Heart attack Neg Hx     Aneurysm Neg Hx     Stroke Neg Hx     Clotting disorder Neg Hx      Social History     Socioeconomic History    Marital status:       Spouse name: None    Number of children: None    Years of education: None    Highest education level: None   Occupational History    None   Tobacco Use    Smoking status: Former Smoker     Packs/day: 1 00     Years: 46 00     Pack years: 46 00     Types: Cigarettes     Quit date:      Years since quittin 7    Smokeless tobacco: Never Used   Vaping Use    Vaping Use: Never used   Substance and Sexual Activity    Alcohol use: No    Drug use: No    Sexual activity: None   Other Topics Concern    None   Social History Narrative    Date of last mammogram:   2017      Most Recent Bone Density:   2016       Date of Last Pap Smear: not required per gyn         Smokeless tobacco status:   Never used smokeless tobacco      Tobacco-years of use:   46      E-cigarette/vape status:   Never used electronic cigarettes      Most recent tobacco use screenin2019      Do you currently or have you served in [employer]:   No      Were you activated, into active duty, as a member of the "[employer]" or as a Reservist:   No      Marital status:       Diet:   Regular      General stress level:   Low      Has smoked since age:   21       Alcohol intake:   None       Caffeine intake:    Moderate      Chewing tobacco:   none      Guns present in home:   No      Seat belts used routinely:   Yes      Sunscreen used routinely:   Yes      Smoke alarm in home:   Yes      Advance directive:   Yes      Live alone or with others:   alone       Are there stairs in your home:   No      Pets:   No      Social Determinants of Health     Financial Resource Strain: Not on file   Food Insecurity: Not on file   Transportation Needs: Not on file   Physical Activity: Not on file   Stress: Not on file   Social Connections: Not on file   Intimate Partner Violence: Not on file   Housing Stability: Not on file     Current Outpatient Medications on File Prior to Visit   Medication Sig    albuterol (2 5 mg/3 mL) 0 083 % nebulizer solution Take 1 vial (2 5 mg total) by nebulization every 6 (six) hours as needed for wheezing or shortness of breath    aspirin (ECOTRIN LOW STRENGTH) 81 mg EC tablet Take 81 mg by mouth daily    azithromycin (ZITHROMAX) 500 MG tablet TAKE ONE TABLET BY MOUTH THREE TIMES A WEEK    Cholecalciferol (VITAMIN D) 2000 units CAPS Take 2 capsules (4,000 Units total) by mouth daily    cyanocobalamin (VITAMIN B-12) 100 mcg tablet Take by mouth daily    diltiazem (CARDIZEM CD) 300 mg 24 hr capsule Take 1 capsule (300 mg total) by mouth daily As directed    fluticasone (FLONASE) 50 mcg/act nasal spray 2 sprays into each nostril daily  glimepiride (AMARYL) 1 mg tablet TAKE ONE TABLET BY MOUTH DAILY WITH BREAKFAST    glucose blood (Contour Next Test) test strip Use twice a day    Incruse Ellipta 62 5 MCG/INH AEPB inhaler Inhale 1 puff daily    ketorolac (ACULAR) 0 5 % ophthalmic solution     lisinopril-hydrochlorothiazide (PRINZIDE,ZESTORETIC) 20-25 MG per tablet Take 1 tablet(s) every day by oral route      meclizine (ANTIVERT) 25 mg tablet 1 tab take before car ride once a day as needed    metFORMIN (GLUCOPHAGE-XR) 500 mg 24 hr tablet TAKE TWO TABLETS BY MOUTH 2 TIMES A DAY    multivitamin (THERAGRAN) TABS Take 1 tablet by mouth daily    nystatin (MYCOSTATIN) cream Apply topically 2 (two) times a day    ofloxacin (OCUFLOX) 0 3 % ophthalmic solution     prednisoLONE acetate (PRED FORTE) 1 % ophthalmic suspension     Symbicort 160-4 5 MCG/ACT inhaler INHALE 2 PUFFS BY MOUTH 2 TIMES A DAY AS DIRECTED    Tradjenta 5 MG TABS TAKE ONE TABLET BY MOUTH EVERY DAY    [DISCONTINUED] atorvastatin (LIPITOR) 20 mg tablet TAKE ONE TABLET BY MOUTH EVERY DAY    Incruse Ellipta 62 5 MCG/INH AEPB inhaler Inhale 1 puff daily    [DISCONTINUED] levothyroxine 25 mcg tablet TAKE ONE TABLET BY MOUTH EVERY DAY    [DISCONTINUED] Ventolin  (90 Base) MCG/ACT inhaler Inhale 2 puffs every 6 (six) hours as needed for wheezing     Allergies   Allergen Reactions    Penicillins Rash     After dental procedure      Immunization History   Administered Date(s) Administered    COVID-19 MODERNA VACC 0 25 ML IM BOOSTER 11/01/2021    COVID-19 MODERNA VACC 0 5 ML IM 01/23/2021, 02/20/2021    INFLUENZA 11/21/2017, 10/30/2018    Influenza, high dose seasonal 0 7 mL 10/06/2020, 09/07/2021, 10/06/2022    Pneumococcal Conjugate 13-Valent 09/21/2016    Pneumococcal Polysaccharide PPV23 03/21/2016    Tdap 09/21/2013       Objective     /60   Pulse 100   Resp 16   Ht 5' (1 524 m)   Wt 74 8 kg (165 lb)   LMP  (LMP Unknown)   SpO2 95%   BMI 32 22 kg/m² Physical Exam  Vitals and nursing note reviewed  Constitutional:       Appearance: She is well-developed  HENT:      Head: Normocephalic and atraumatic  Right Ear: External ear normal       Left Ear: External ear normal    Eyes:      General: No scleral icterus  Conjunctiva/sclera: Conjunctivae normal       Pupils: Pupils are equal, round, and reactive to light  Neck:      Thyroid: No thyromegaly  Cardiovascular:      Rate and Rhythm: Normal rate and regular rhythm  Pulses: no weak pulses          Dorsalis pedis pulses are 2+ on the right side and 2+ on the left side  Heart sounds: Murmur heard  Pulmonary:      Effort: Pulmonary effort is normal       Breath sounds: Normal breath sounds  No wheezing or rales  Comments: Decrease breath sound b/l   Musculoskeletal:      Cervical back: Normal range of motion and neck supple  Right lower leg: No edema  Left lower leg: No edema  Feet:      Right foot:      Skin integrity: No ulcer, skin breakdown, erythema, warmth, callus or dry skin  Left foot:      Skin integrity: No ulcer, skin breakdown, erythema, warmth, callus or dry skin  Lymphadenopathy:      Cervical: No cervical adenopathy  Skin:     Coloration: Skin is not jaundiced  Findings: No erythema or rash  Neurological:      General: No focal deficit present  Mental Status: She is alert  Psychiatric:         Mood and Affect: Mood normal       Patient's shoes and socks removed  Right Foot/Ankle   Right Foot Inspection  Skin Exam: skin normal  Skin not intact, no dry skin, no warmth, no callus, no erythema, no maceration, no abnormal color, no pre-ulcer, no ulcer and no callus  Toe Exam: ROM and strength within normal limits  Sensory   Vibration: intact  Proprioception: intact  Monofilament testing: intact    Vascular  Capillary refills: < 3 seconds  The right DP pulse is 2+       Left Foot/Ankle  Left Foot Inspection  Skin Exam: skin normal  Skin not intact, no dry skin, no warmth, no erythema, no maceration, normal color, no pre-ulcer, no ulcer and no callus  Toe Exam: ROM and strength within normal limits  Sensory   Vibration: intact  Proprioception: intact  Monofilament testing: intact    Vascular  Capillary refills: < 3 seconds  The left DP pulse is 2+       Assign Risk Category  No deformity present  No loss of protective sensation  No weak pulses  Risk: 0      Miriam Roche MD

## 2022-10-06 NOTE — ASSESSMENT & PLAN NOTE
Lab Results   Component Value Date    HGBA1C 6 5 (H) 03/15/2022   Follows endocrinologist and will discuss about the metformin as she is having some lose bowel movements due to the metformin

## 2022-10-06 NOTE — PATIENT INSTRUCTIONS

## 2022-10-14 DIAGNOSIS — E11.22 TYPE 2 DIABETES MELLITUS WITH STAGE 2 CHRONIC KIDNEY DISEASE, WITHOUT LONG-TERM CURRENT USE OF INSULIN (HCC): ICD-10-CM

## 2022-10-14 DIAGNOSIS — N18.2 TYPE 2 DIABETES MELLITUS WITH STAGE 2 CHRONIC KIDNEY DISEASE, WITHOUT LONG-TERM CURRENT USE OF INSULIN (HCC): ICD-10-CM

## 2022-10-14 RX ORDER — LINAGLIPTIN 5 MG/1
TABLET, FILM COATED ORAL
Qty: 30 TABLET | Refills: 0 | Status: SHIPPED | OUTPATIENT
Start: 2022-10-14

## 2022-11-14 DIAGNOSIS — N18.2 TYPE 2 DIABETES MELLITUS WITH STAGE 2 CHRONIC KIDNEY DISEASE, WITHOUT LONG-TERM CURRENT USE OF INSULIN (HCC): ICD-10-CM

## 2022-11-14 DIAGNOSIS — E11.22 TYPE 2 DIABETES MELLITUS WITH STAGE 2 CHRONIC KIDNEY DISEASE, WITHOUT LONG-TERM CURRENT USE OF INSULIN (HCC): ICD-10-CM

## 2022-11-14 RX ORDER — LINAGLIPTIN 5 MG/1
TABLET, FILM COATED ORAL
Qty: 30 TABLET | Refills: 0 | Status: SHIPPED | OUTPATIENT
Start: 2022-11-14

## 2022-11-16 ENCOUNTER — TELEPHONE (OUTPATIENT)
Dept: ENDOCRINOLOGY | Facility: CLINIC | Age: 82
End: 2022-11-16

## 2022-11-18 ENCOUNTER — APPOINTMENT (OUTPATIENT)
Dept: LAB | Facility: CLINIC | Age: 82
End: 2022-11-18

## 2022-11-18 DIAGNOSIS — I10 ESSENTIAL HYPERTENSION: ICD-10-CM

## 2022-11-18 DIAGNOSIS — E11.22 TYPE 2 DIABETES MELLITUS WITH STAGE 2 CHRONIC KIDNEY DISEASE, WITHOUT LONG-TERM CURRENT USE OF INSULIN (HCC): ICD-10-CM

## 2022-11-18 DIAGNOSIS — E78.2 MIXED HYPERLIPIDEMIA: ICD-10-CM

## 2022-11-18 DIAGNOSIS — E03.9 ACQUIRED HYPOTHYROIDISM: ICD-10-CM

## 2022-11-18 DIAGNOSIS — N18.2 TYPE 2 DIABETES MELLITUS WITH STAGE 2 CHRONIC KIDNEY DISEASE, WITHOUT LONG-TERM CURRENT USE OF INSULIN (HCC): ICD-10-CM

## 2022-11-18 LAB
ANION GAP SERPL CALCULATED.3IONS-SCNC: 7 MMOL/L (ref 4–13)
BUN SERPL-MCNC: 15 MG/DL (ref 5–25)
CALCIUM SERPL-MCNC: 9.7 MG/DL (ref 8.3–10.1)
CHLORIDE SERPL-SCNC: 103 MMOL/L (ref 96–108)
CHOLEST SERPL-MCNC: 136 MG/DL
CO2 SERPL-SCNC: 27 MMOL/L (ref 21–32)
CREAT SERPL-MCNC: 0.54 MG/DL (ref 0.6–1.3)
CREAT UR-MCNC: 76.5 MG/DL
EST. AVERAGE GLUCOSE BLD GHB EST-MCNC: 134 MG/DL
GFR SERPL CREATININE-BSD FRML MDRD: 88 ML/MIN/1.73SQ M
GLUCOSE P FAST SERPL-MCNC: 148 MG/DL (ref 65–99)
HBA1C MFR BLD: 6.3 %
HDLC SERPL-MCNC: 54 MG/DL
LDLC SERPL CALC-MCNC: 60 MG/DL (ref 0–100)
MICROALBUMIN UR-MCNC: 20.1 MG/L (ref 0–20)
MICROALBUMIN/CREAT 24H UR: 26 MG/G CREATININE (ref 0–30)
NONHDLC SERPL-MCNC: 82 MG/DL
POTASSIUM SERPL-SCNC: 4.2 MMOL/L (ref 3.5–5.3)
SODIUM SERPL-SCNC: 137 MMOL/L (ref 135–147)
T4 FREE SERPL-MCNC: 1.19 NG/DL (ref 0.76–1.46)
TRIGL SERPL-MCNC: 108 MG/DL
TSH SERPL DL<=0.05 MIU/L-ACNC: 2.75 UIU/ML (ref 0.45–4.5)

## 2022-11-21 ENCOUNTER — OFFICE VISIT (OUTPATIENT)
Dept: ENDOCRINOLOGY | Facility: CLINIC | Age: 82
End: 2022-11-21

## 2022-11-21 VITALS
HEIGHT: 60 IN | TEMPERATURE: 98.3 F | HEART RATE: 72 BPM | WEIGHT: 164 LBS | DIASTOLIC BLOOD PRESSURE: 70 MMHG | SYSTOLIC BLOOD PRESSURE: 138 MMHG | BODY MASS INDEX: 32.2 KG/M2

## 2022-11-21 DIAGNOSIS — E03.9 ACQUIRED HYPOTHYROIDISM: ICD-10-CM

## 2022-11-21 DIAGNOSIS — E78.2 MIXED HYPERLIPIDEMIA: ICD-10-CM

## 2022-11-21 DIAGNOSIS — E04.1 THYROID NODULE: ICD-10-CM

## 2022-11-21 DIAGNOSIS — E11.65 TYPE 2 DIABETES MELLITUS WITH HYPERGLYCEMIA, WITHOUT LONG-TERM CURRENT USE OF INSULIN (HCC): Primary | ICD-10-CM

## 2022-11-21 DIAGNOSIS — M81.0 AGE-RELATED OSTEOPOROSIS WITHOUT CURRENT PATHOLOGICAL FRACTURE: ICD-10-CM

## 2022-11-21 DIAGNOSIS — I10 ESSENTIAL HYPERTENSION: ICD-10-CM

## 2022-11-21 DIAGNOSIS — E55.9 VITAMIN D DEFICIENCY: ICD-10-CM

## 2022-11-21 RX ORDER — GLIMEPIRIDE 1 MG/1
0.5 TABLET ORAL
Qty: 45 TABLET | Refills: 0
Start: 2022-11-21

## 2022-11-21 NOTE — PROGRESS NOTES
Patient Progress Note      CC: DM      Referring Provider  No referring provider defined for this encounter  History of Present Illness:   Emily Galo is a 80 y o  female with a history of type 2 diabetes with long term use of insulin  Diabetes course has been stable  Complications of DM: none reported  Denies recent illness or hospitalizations  Denies recent severe hypoglycemic or severe hyperglycemic episodes  Denies any issues with her current regimen  Home glucose monitoring: are performed regularly     She forgot to bring her log / meter   Home blood glucose readings: she reports readings range from 115-135 mg/dl and in the evening from 160-180 mg/dl      Current regimen: glimepiride 1 mg daily, Tradjenta 5 mg daily, metformin 1000 mg BID  compliant most of the time, denies any side effects from medications  Hypoglycemic episodes: No, rare  H/o of hypoglycemia causing hospitalization or Intervention such as glucagon injection  or ambulance call :  No  Hypoglycemia symptoms: never experienced hypoglycemia  Treatment of hypoglycemia: juice     Diet: 3 meals per day, 0-1 snack per day  Timing of meals is predictable    Diabetic diet compliance:  noncompliant some of the time  Activity: Daily activity is predictable: Yes  No routine exercise due to emphysema      Ophthamology: sees Dr Manjula Prasad, Dec 2021  [address] exam up-to-date, Oct 2022  Dr Jessica Weber      Has hypertension: on ACE inhibitor/ARB, compliant most of the time  Has hyperlipidemia: on statin - tolerating well, no myalgias  compliant most of the time, denies any side effects from medications  Thyroid disorders:  Hypothyroidism   Patient is taking Synthroid 25 mcg daily on an empty stomach 1 hour before breakfast   Patient is tolerating medication well   She had a repeat thyroid US done in Nov 2020 which showed a heterogeneous thyroid with small cysts and subcentimeter nodule   No nodule meets current ACR criteria for requiring biopsy or followup ultrasounds unless otherwise clinically indicated  History of pancreatitis: No     Osteoporosis/vitamin-D deficiency:  Patient is taking vitamin D3 4000 International Units when she remembers  Yulisa Days DEXA scan done in July 2019 showed osteoporosis of the left forearm   She does have a history compression fractures of thoracic spine   She was started on Fosamax 70 mg weekly in the past, but she has not been taking it because she prefers not to take it and due to GI upset  Other treatment options have been discussed in the past but she continues to decline treatment   Denies recent fractures or falls   Does not do weight bearing exercises      Patient Active Problem List   Diagnosis   • COPD, severe ([facility] [address] )   • Hyperlipidemia   • Chronic respiratory failure with hypoxia and hypercapnia ([facility])   • Hypothyroidism   • Obstructive sleep apnea   • History of pulmonary embolism   • Type 2 diabetes mellitus with hyperglycemia, without long-term current use of insulin ([facility])   • Iron deficiency anemia, unspecified   • PSVT (paroxysmal supraventricular tachycardia) ([facility])   • Anticoagulant long-term use   • Hemorrhoids   • Polyp of colon   • Chronic bilateral low back pain with bilateral sciatica   • Non-seasonal allergic rhinitis due to pollen   • Age-related osteoporosis without current pathological fracture   • Essential hypertension   • Vitamin D deficiency   • Thyroid nodule   • SIRS (systemic inflammatory response syndrome) ([facility])   • Elevated d-dimer   • Age-related cataract of both eyes   • Medicare annual wellness visit, subsequent   • Tinea corporis   • Needs flu shot   • Nasal congestion   • Murmur, cardiac      Past Medical History:   Diagnosis Date   • Anemia    • Blood clotting tendency (HCC)     left leg, lung   • Chronic respiratory failure with hypoxia and hypercapnia ([facility])    • COPD (chronic obstructive pulmonary disease) ([facility])    • Deep vein thrombosis ([facility])    • Diabetes mellitus ([facility])    • Emphysema lung ([facility] )    • Emphysema lung ([facility] )    • Emphysema of lung ([place] [address] )    • Hyperlipidemia    • Hypertension    • Hyperthyroidism    • Hypothyroid    • On warfarin therapy    • Osteoporosis    • Palpitations    • Pulmonary embolism ([place] [address] )    • Sleep apnea, obstructive    • Thyroid disease       Past Surgical History:   Procedure Laterality Date   • AV NODE ABLATION  2017   • COLONOSCOPY  2018    w/ EGD   • DXA PROCEDURE (HISTORICAL)  2016   • RI COLONOSCOPY FLX DX W/COLLJ SPEC WHEN PFRMD N/A 2018    Procedure: EGD AND COLONOSCOPY;  Surgeon: Bowen Carpenter MD;  [facility];   Service: Gastroenterology   • TOE SURGERY        Family History   Problem Relation Age of Onset   • Hypertension Mother    • Hyperlipidemia Mother    • Asthma Father    • Heart failure Father    • Hypertension Sister    • Hyperlipidemia Sister    • Heart attack Neg Hx    • Aneurysm Neg Hx    • Stroke Neg Hx    • Clotting disorder Neg Hx      Social History     Tobacco Use   • Smoking status: Former     Packs/day: 1 00     Years: 46 00     Pack years: 46 00     Types: Cigarettes     Quit date:      Years since quittin 8   • Smokeless tobacco: Never   Substance Use Topics   • Alcohol use: No     Allergies   Allergen Reactions   • Penicillins Rash     After dental procedure          Current Outpatient Medications:   •  albuterol (2 5 mg/3 mL) 0 083 % nebulizer solution, Take 1 vial (2 5 mg total) by nebulization every 6 (six) hours as needed for wheezing or shortness of breath, Disp: 150 mL, Rfl: 0  •  aspirin (ECOTRIN LOW STRENGTH) 81 mg EC tablet, Take 81 mg by mouth daily, Disp: , Rfl:   •  atorvastatin (LIPITOR) 20 mg tablet, Take 1 tablet (20 mg total) by mouth daily, Disp: 90 tablet, Rfl: 2  •  azithromycin (ZITHROMAX) 500 MG tablet, TAKE ONE TABLET BY MOUTH THREE TIMES A WEEK, Disp: 12 tablet, Rfl: 11  •  Cholecalciferol (VITAMIN D) 2000 units CAPS, Take 2 capsules (4,000 Units total) by mouth daily, Disp: , Rfl:   •  cyanocobalamin (VITAMIN B-12) 100 mcg tablet, Take by mouth daily, Disp: , Rfl:   •  diltiazem (CARDIZEM CD) 300 mg 24 hr capsule, Take 1 capsule (300 mg total) by mouth daily As directed, Disp: 90 capsule, Rfl: 0  •  fluticasone (FLONASE) 50 mcg/act nasal spray, 2 sprays into each nostril daily, Disp: 1 Bottle, Rfl: 5  •  glimepiride (AMARYL) 1 mg tablet, Take 0 5 tablets (0 5 mg total) by mouth daily with breakfast, Disp: 45 tablet, Rfl: 0  •  glucose blood (Contour Next Test) test strip, Use twice a day, Disp: 200 each, Rfl: 1  •  ketorolac (ACULAR) 0 5 % ophthalmic solution, , Disp: , Rfl:   •  levothyroxine 25 mcg tablet, Take 1 tablet (25 mcg total) by mouth daily, Disp: 90 tablet, Rfl: 0  •  lisinopril-hydrochlorothiazide (PRINZIDE,ZESTORETIC) 20-25 MG per tablet, Take 1 tablet(s) every day by oral route , Disp: 90 tablet, Rfl: 0  •  meclizine (ANTIVERT) 25 mg tablet, 1 tab take before car ride once a day as needed, Disp: 10 tablet, Rfl: 0  •  metFORMIN (GLUCOPHAGE-XR) 500 mg 24 hr tablet, TAKE TWO TABLETS BY MOUTH 2 TIMES A DAY, Disp: 360 tablet, Rfl: 0  •  multivitamin (THERAGRAN) TABS, Take 1 tablet by mouth daily, Disp: , Rfl:   •  nystatin (MYCOSTATIN) cream, Apply topically 2 (two) times a day, Disp: 60 g, Rfl: 0  •  ofloxacin (OCUFLOX) 0 3 % ophthalmic solution, , Disp: , Rfl:   •  prednisoLONE acetate (PRED FORTE) 1 % ophthalmic suspension, , Disp: , Rfl:   •  Symbicort 160-4 5 MCG/ACT inhaler, INHALE 2 PUFFS BY MOUTH 2 TIMES A DAY AS DIRECTED, Disp: 10 2 g, Rfl: 11  •  Tradjenta 5 MG TABS, TAKE ONE TABLET BY MOUTH EVERY DAY, Disp: 30 tablet, Rfl: 0  •  Ventolin  (90 Base) MCG/ACT inhaler, Inhale 2 puffs every 6 (six) hours as needed for wheezing, Disp: 18 g, Rfl: 0  •  Incruse Ellipta 62 5 MCG/INH AEPB inhaler, Inhale 1 puff daily, Disp: 30 blister, Rfl: 0  •  Incruse Ellipta 62 5 MCG/INH AEPB inhaler, Inhale 1 puff daily, Disp: 60 blister, Rfl: 11  Review of Systems Constitutional: Negative for activity change, appetite change, fatigue and unexpected weight change  HENT: Negative for trouble swallowing  Eyes: Negative for visual disturbance  Respiratory: Positive for shortness of breath (on exertion)  Cardiovascular: Negative for chest pain and palpitations  Gastrointestinal: Negative for constipation and diarrhea  Endocrine: Negative for polydipsia and polyuria  Musculoskeletal: Negative  Neurological: Negative for numbness  Psychiatric/Behavioral: Negative  Physical Exam:  Body mass index is 32 03 kg/m²  /70   Pulse 72   Temp 98 3 °F (36 8 °C) (Skin)   Ht 5' (1 524 m)   Wt 74 4 kg (164 lb)   LMP  (LMP Unknown)   BMI 32 03 kg/m²    Wt Readings from Last 3 Encounters:   11/21/22 74 4 kg (164 lb)   10/06/22 74 8 kg (165 lb)   04/01/22 75 3 kg (166 lb)       Physical Exam  Vitals and nursing note reviewed  Constitutional:       Appearance: She is well-developed and well-nourished  HENT:      Head: Normocephalic  Eyes:      General: No scleral icterus  Extraocular Movements: EOM normal       Pupils: Pupils are equal, round, and reactive to light  Neck:      Thyroid: No thyromegaly  Cardiovascular:      Rate and Rhythm: Normal rate and regular rhythm  Pulses:           Radial pulses are 2+ on the right side and 2+ on the left side  Heart sounds: Murmur heard  Pulmonary:      Effort: Pulmonary effort is normal  No respiratory distress  Breath sounds: Normal breath sounds  No wheezing  Musculoskeletal:      Cervical back: Neck supple  Skin:     General: Skin is warm and dry  Neurological:      Mental Status: She is alert  Psychiatric:         Mood and Affect: Mood and affect normal        Patient's shoes and socks were not removed            Labs:   Component      Latest Ref Rng & Units 3/15/2022 11/18/2022   Sodium      135 - 147 mmol/L 138 137   Potassium      3 5 - 5 3 mmol/L 4 1 4 2   Chloride 96 - 108 mmol/L 102 103   CO2      21 - 32 mmol/L 32 27   Anion Gap      4 - 13 mmol/L 4 7   BUN      5 - 25 mg/dL 15 15   Creatinine      0 60 - 1 30 mg/dL 0 57 (L) 0 54 (L)   GLUCOSE FASTING      65 - 99 mg/dL 154 (H) 148 (H)   Calcium      8 3 - 10 1 mg/dL 9 3 9 7   eGFR      ml/min/1 73sq m 87 88   Cholesterol      See Comment mg/dL  136   Triglycerides      See Comment mg/dL  108   HDL      >=50 mg/dL  54   LDL Calculated      0 - 100 mg/dL  60   Non-HDL Cholesterol      mg/dl  82   EXT Creatinine Urine      mg/dL  76 5   MICROALBUM ,U,RANDOM      0 0 - 20 0 mg/L  20 1 (H)   MICROALBUMIN/CREATININE RATIO      0 - 30 mg/g creatinine  26   Hemoglobin A1C      Normal 3 8-5 6%; PreDiabetic 5 7-6 4%; Diabetic >=6 5%; Glycemic control for adults with diabetes <7 0% % 6 5 (H) 6 3 (H)   eAG, EST AVG Glucose      mg/dl 140 134   Vit D, 25-Hydroxy      30 0 - 100 0 ng/mL 35 4    Free T4      0 76 - 1 46 ng/dL 1 28 1 19   TSH 3RD GENERATON      0 450 - 4 500 uIU/mL 2 780 2 750     Plan:    Diagnoses and all orders for this visit:    Type 2 diabetes mellitus with hyperglycemia, without long-term current use of insulin (HCC)  HGA1C 6 3%  Improved  Treatment regimen:  A1c is tightly controlled  Decrease glimepiride to half a tablet with breakfast   Continue Tradjenta and metformin  Discussed intensive insulin regimen does increase risk for hypoglycemia  Episodes of hypoglycemia can lead to permanent disability and death  Discussed risks/complications associated with uncontrolled diabetes  Advised to adhere to diabetic diet, and recommended staying active/exercising routinely  Keep carbohydrates consistent to limit blood glucose fluctuations  Advised to call if blood sugars less than 70 mg/dl or over 250 mg/dl  Check blood glucose 1-2 times a day  Discussed symptoms and treatment of hypoglycemia  Recommended routine follow-up with podiatry and ophthalmology  Send log in 2 weeks      Ordered blood work to complete prior to next visit  -     glimepiride (AMARYL) 1 mg tablet; Take 0 5 tablets (0 5 mg total) by mouth daily with breakfast  -     Hemoglobin A1C; Future  -     Basic metabolic panel; Future  -     Microalbumin / creatinine urine ratio; Future    Essential hypertension  Blood pressure adequately controlled, continue current treatment    Mixed hyperlipidemia  LDL 60  Continue statin therapy     Acquired hypothyroidism  TSH 2 750 and free T4 1 19  Continue current dose of levothyroxine  Monitor labs   -     T4, free; Future  -     TSH, 3rd generation; Future    Thyroid nodule  Thyroid US done in Nov 2020 showed a heterogeneous thyroid with small cysts and subcentimeter nodule   No nodule met criteria for requiring biopsy or followup ultrasounds unless otherwise clinically indicated  Vitamin D deficiency  Vitamin-D previously 35 4  Continue current supplementation    Age-related osteoporosis without current pathological fracture  Previously tried Fosamax but did not tolerate it  She continues to decline treatment for osteoporosis and understands increased risk for fractures  Continue vitamin-D supplementation  Take precautions to avoid falls  -     Basic metabolic panel; Future         Discussed with the patient diagnosis and treatment and all questions fully answered  She will call me if any problems arise  Counseled patient on diagnostic results, prognosis, risk and benefit of treatment options, instruction for management, importance of treatment compliance, risk factor reduction and impressions        Michelle Jalloh PA-C

## 2022-11-21 NOTE — PATIENT INSTRUCTIONS
Hypoglycemia instructions   Skip aVllejo  11/21/2022  [id number]    Low Blood Sugar    Steps to treat low blood sugar  1  Test blood sugar if you have symptoms of low blood sugar:   Low Blood Sugar Symptoms:  o Sweaty  o Dizzy  o Rapid heartbeat  o Shaky  o Bad mood  o Hungry      2  Treat blood sugar less than 70 with 15 grams of fast-acting carbohydrate:   Examples of 15 grams Fast-Acting Carbohydrate:  o 4 oz juice  o 4 oz regular soda  o 3-4 glucose tablets (chew)  o 3-4 hard candies (chew)          3  Wait 15 minutes and test your blood sugar again     4   If blood sugar is less than 100, repeat steps 2-3     5  When your blood sugar is 100 or more, eat a snack if it will be longer than one hour until your next meal  The snack should be 15 grams of carbohydrate and a protein:   Examples of snacks:  o ½ sandwich  o 6 crackers with cheese  o Piece of fruit with cheese or peanut butter  o 6 crackers with peanut butter

## 2022-11-23 DIAGNOSIS — I10 ESSENTIAL HYPERTENSION: ICD-10-CM

## 2022-11-23 DIAGNOSIS — J44.9 COPD, SEVERE (HCC): ICD-10-CM

## 2022-11-23 RX ORDER — LISINOPRIL AND HYDROCHLOROTHIAZIDE 25; 20 MG/1; MG/1
TABLET ORAL
Qty: 90 TABLET | Refills: 0 | Status: SHIPPED | OUTPATIENT
Start: 2022-11-23

## 2022-11-23 RX ORDER — DILTIAZEM HYDROCHLORIDE 300 MG/1
300 CAPSULE, COATED, EXTENDED RELEASE ORAL DAILY
Qty: 90 CAPSULE | Refills: 0 | Status: SHIPPED | OUTPATIENT
Start: 2022-11-23

## 2022-12-02 DIAGNOSIS — T75.3XXA MOTION SICKNESS, INITIAL ENCOUNTER: ICD-10-CM

## 2022-12-05 RX ORDER — MECLIZINE HYDROCHLORIDE 25 MG/1
TABLET ORAL
Qty: 10 TABLET | Refills: 0 | Status: SHIPPED | OUTPATIENT
Start: 2022-12-05

## 2022-12-14 DIAGNOSIS — J44.9 CHRONIC OBSTRUCTIVE PULMONARY DISEASE, UNSPECIFIED COPD TYPE (HCC): ICD-10-CM

## 2022-12-14 DIAGNOSIS — E11.65 TYPE 2 DIABETES MELLITUS WITH HYPERGLYCEMIA, WITHOUT LONG-TERM CURRENT USE OF INSULIN (HCC): ICD-10-CM

## 2022-12-14 RX ORDER — GLIMEPIRIDE 1 MG/1
TABLET ORAL
Qty: 90 TABLET | Refills: 0 | Status: SHIPPED | OUTPATIENT
Start: 2022-12-14

## 2022-12-14 RX ORDER — ALBUTEROL SULFATE 2.5 MG/3ML
2.5 SOLUTION RESPIRATORY (INHALATION) EVERY 6 HOURS PRN
Qty: 150 ML | Refills: 0 | Status: SHIPPED | OUTPATIENT
Start: 2022-12-14

## 2022-12-16 DIAGNOSIS — E11.22 TYPE 2 DIABETES MELLITUS WITH STAGE 2 CHRONIC KIDNEY DISEASE, WITHOUT LONG-TERM CURRENT USE OF INSULIN (HCC): ICD-10-CM

## 2022-12-16 DIAGNOSIS — N18.2 TYPE 2 DIABETES MELLITUS WITH STAGE 2 CHRONIC KIDNEY DISEASE, WITHOUT LONG-TERM CURRENT USE OF INSULIN (HCC): ICD-10-CM

## 2022-12-16 RX ORDER — LINAGLIPTIN 5 MG/1
TABLET, FILM COATED ORAL
Qty: 30 TABLET | Refills: 0 | Status: SHIPPED | OUTPATIENT
Start: 2022-12-16

## 2022-12-26 DIAGNOSIS — E11.22 TYPE 2 DIABETES MELLITUS WITH STAGE 2 CHRONIC KIDNEY DISEASE, WITHOUT LONG-TERM CURRENT USE OF INSULIN (HCC): ICD-10-CM

## 2022-12-26 DIAGNOSIS — N18.2 TYPE 2 DIABETES MELLITUS WITH STAGE 2 CHRONIC KIDNEY DISEASE, WITHOUT LONG-TERM CURRENT USE OF INSULIN (HCC): ICD-10-CM

## 2022-12-26 DIAGNOSIS — E03.9 ACQUIRED HYPOTHYROIDISM: ICD-10-CM

## 2022-12-27 RX ORDER — LEVOTHYROXINE SODIUM 0.03 MG/1
TABLET ORAL
Qty: 90 TABLET | Refills: 0 | Status: SHIPPED | OUTPATIENT
Start: 2022-12-27

## 2022-12-27 RX ORDER — METFORMIN HYDROCHLORIDE 500 MG/1
TABLET, EXTENDED RELEASE ORAL
Qty: 360 TABLET | Refills: 0 | Status: SHIPPED | OUTPATIENT
Start: 2022-12-27

## 2023-01-12 DIAGNOSIS — N18.2 TYPE 2 DIABETES MELLITUS WITH STAGE 2 CHRONIC KIDNEY DISEASE, WITHOUT LONG-TERM CURRENT USE OF INSULIN (HCC): ICD-10-CM

## 2023-01-12 DIAGNOSIS — E11.22 TYPE 2 DIABETES MELLITUS WITH STAGE 2 CHRONIC KIDNEY DISEASE, WITHOUT LONG-TERM CURRENT USE OF INSULIN (HCC): ICD-10-CM

## 2023-01-12 RX ORDER — LINAGLIPTIN 5 MG/1
TABLET, FILM COATED ORAL
Qty: 30 TABLET | Refills: 0 | Status: SHIPPED | OUTPATIENT
Start: 2023-01-12

## 2023-01-18 ENCOUNTER — OFFICE VISIT (OUTPATIENT)
Dept: PULMONOLOGY | Facility: CLINIC | Age: 83
End: 2023-01-18

## 2023-01-18 VITALS
DIASTOLIC BLOOD PRESSURE: 80 MMHG | WEIGHT: 164 LBS | HEART RATE: 108 BPM | RESPIRATION RATE: 16 BRPM | BODY MASS INDEX: 32.2 KG/M2 | TEMPERATURE: 97.4 F | HEIGHT: 60 IN | SYSTOLIC BLOOD PRESSURE: 134 MMHG | OXYGEN SATURATION: 94 %

## 2023-01-18 DIAGNOSIS — G47.33 OBSTRUCTIVE SLEEP APNEA: ICD-10-CM

## 2023-01-18 DIAGNOSIS — J96.11 CHRONIC RESPIRATORY FAILURE WITH HYPOXIA AND HYPERCAPNIA (HCC): Primary | ICD-10-CM

## 2023-01-18 DIAGNOSIS — J30.1 NON-SEASONAL ALLERGIC RHINITIS DUE TO POLLEN: ICD-10-CM

## 2023-01-18 DIAGNOSIS — J96.12 CHRONIC RESPIRATORY FAILURE WITH HYPOXIA AND HYPERCAPNIA (HCC): Primary | ICD-10-CM

## 2023-01-18 DIAGNOSIS — J44.9 CHRONIC OBSTRUCTIVE PULMONARY DISEASE, UNSPECIFIED COPD TYPE (HCC): ICD-10-CM

## 2023-01-18 DIAGNOSIS — J44.9 COPD, SEVERE (HCC): ICD-10-CM

## 2023-01-18 RX ORDER — BUDESONIDE AND FORMOTEROL FUMARATE DIHYDRATE 160; 4.5 UG/1; UG/1
2 AEROSOL RESPIRATORY (INHALATION) 2 TIMES DAILY
Qty: 10.2 G | Refills: 11 | Status: SHIPPED | OUTPATIENT
Start: 2023-01-18

## 2023-01-18 RX ORDER — AZITHROMYCIN 500 MG/1
500 TABLET, FILM COATED ORAL 3 TIMES WEEKLY
Qty: 12 TABLET | Refills: 11 | Status: SHIPPED | OUTPATIENT
Start: 2023-01-18 | End: 2024-01-18

## 2023-01-18 RX ORDER — ALBUTEROL SULFATE 2.5 MG/3ML
2.5 SOLUTION RESPIRATORY (INHALATION) EVERY 4 HOURS PRN
Qty: 180 ML | Refills: 3 | Status: SHIPPED | OUTPATIENT
Start: 2023-01-18

## 2023-01-18 NOTE — ASSESSMENT & PLAN NOTE
She uses oxygen at 2 L/min with exertion and trilogy NIV during hours of sleep  She has demonstrated compliance and ongoing use is medically necessary

## 2023-01-18 NOTE — PROGRESS NOTES
Pulmonary Follow Up Note   Unique Garcia 80 y o  female MRN: [id number]  1/18/2023      Assessment/Plan:     COPD, severe ([address] )  She is overall stable with Symbicort and Incruse Ellipta  She has albuterol to use on an as-needed basis  Chronic respiratory failure with hypoxia and hypercapnia (HCC)  She uses oxygen at 2 L/min with exertion and trilogy NIV during hours of sleep  She has demonstrated compliance and ongoing use is medically necessary  Obstructive sleep apnea  Continue Trilogy NIV    Visit orders:    Diagnoses and all orders for this visit:    Chronic respiratory failure with hypoxia and hypercapnia (HCC)    COPD, severe (HCC)  -     budesonide-formoterol (Symbicort) 160-4 5 mcg/act inhaler; Inhale 2 puffs 2 (two) times a day As directed    Non-seasonal allergic rhinitis due to pollen    Obstructive sleep apnea    Chronic obstructive pulmonary disease, unspecified COPD type (HCC)  -     umeclidinium (Incruse Ellipta) 62 5 mcg/actuation AEPB inhaler; Inhale 1 puff daily  -     albuterol (2 5 mg/3 mL) 0 083 % nebulizer solution; Take 3 mL (2 5 mg total) by nebulization every 4 (four) hours as needed for wheezing or shortness of breath  -     azithromycin (ZITHROMAX) 500 MG tablet; Take 1 tablet (500 mg total) by mouth 3 (three) times a week      Return in about 6 months (around 7/18/2023)  History of Present Illness   HPI:  Unique Garcia is a 80 y o  female who today for follow-up regarding severe COPD, obstructive sleep apnea and chronic hypoxic and hypercapnic respiratory failure  She has been overall stable from pulmonary perspective  She is compliant with Symbicort and Incruse Ellipta  She will use the Ventolin rescue inhaler or nebulizer when she is feeling more breathless  This is not a frequent occurrence  She is also on azithromycin 3 times weekly  No recent ER visits or hospitalizations    She uses supplemental oxygen with exertion and trilogy NIV during hours of sleep    Review of Systems   Constitutional: Negative for chills, fever and unexpected weight change  HENT: Negative for postnasal drip and sore throat  Eyes: Negative for visual disturbance  Respiratory:        As noted in HPI   Cardiovascular: Negative for chest pain  Gastrointestinal: Negative for abdominal pain, diarrhea and vomiting  Musculoskeletal: Negative for arthralgias  Skin: Negative for rash  Neurological: Negative for headaches  Hematological: Negative for adenopathy  Psychiatric/Behavioral: Negative  All other systems reviewed and are negative  Medical, Family and Social history reviewed and updated as appropriate    Historical Information   Past Medical History:   Diagnosis Date   • Anemia    • Blood clotting tendency ([facility] [address] )     left leg, lung   • Chronic respiratory failure with hypoxia and hypercapnia (HCC)    • COPD (chronic obstructive pulmonary disease) ([facility] [address] )    • Deep vein thrombosis (HCC)    • Diabetes mellitus ([facility] [address] )    • Emphysema lung (HCC)    • Emphysema lung (HCC)    • Emphysema of lung (HCC)    • Hyperlipidemia    • Hypertension    • Hyperthyroidism    • Hypothyroid    • On warfarin therapy    • Osteoporosis    • Palpitations    • Pulmonary embolism ([facility] [address] )    • Sleep apnea, obstructive    • Thyroid disease      Past Surgical History:   Procedure Laterality Date   • AV NODE ABLATION  09/21/2017   • COLONOSCOPY  01/01/2018    w/ EGD   • DXA PROCEDURE (HISTORICAL)  11/2016   • MT COLONOSCOPY FLX DX W/COLLJ SPEC WHEN PFRMD N/A 8/30/2018    Procedure: EGD AND COLONOSCOPY;  Surgeon: Rajendra Moralez MD;  Location[facility];   Service: Gastroenterology   • TOE SURGERY       Family History   Problem Relation Age of Onset   • Hypertension Mother    • Hyperlipidemia Mother    • Asthma Father    • Heart failure Father    • Hypertension Sister    • Hyperlipidemia Sister    • Heart attack Neg Hx    • Aneurysm Neg Hx    • Stroke Neg Hx    • Clotting disorder Neg Hx        Social History     Tobacco Use   Smoking Status Former   • Packs/day: 1 00   • Years: 46 00   • Pack years: 46 00   • Types: Cigarettes   • Quit date:    • Years since quittin 0   Smokeless Tobacco Never         Meds/Allergies     Current Outpatient Medications:   •  albuterol (2 5 mg/3 mL) 0 083 % nebulizer solution, Take 3 mL (2 5 mg total) by nebulization every 4 (four) hours as needed for wheezing or shortness of breath, Disp: 180 mL, Rfl: 3  •  aspirin (ECOTRIN LOW STRENGTH) 81 mg EC tablet, Take 81 mg by mouth daily, Disp: , Rfl:   •  atorvastatin (LIPITOR) 20 mg tablet, Take 1 tablet (20 mg total) by mouth daily, Disp: 90 tablet, Rfl: 2  •  azithromycin (ZITHROMAX) 500 MG tablet, Take 1 tablet (500 mg total) by mouth 3 (three) times a week, Disp: 12 tablet, Rfl: 11  •  budesonide-formoterol (Symbicort) 160-4 5 mcg/act inhaler, Inhale 2 puffs 2 (two) times a day As directed, Disp: 10 2 g, Rfl: 11  •  Cholecalciferol (VITAMIN D) 2000 units CAPS, Take 2 capsules (4,000 Units total) by mouth daily, Disp: , Rfl:   •  cyanocobalamin (VITAMIN B-12) 100 mcg tablet, Take by mouth daily, Disp: , Rfl:   •  diltiazem (CARDIZEM CD) 300 mg 24 hr capsule, Take 1 capsule (300 mg total) by mouth daily As directed, Disp: 90 capsule, Rfl: 0  •  fluticasone (FLONASE) 50 mcg/act nasal spray, 2 sprays into each nostril daily, Disp: 1 Bottle, Rfl: 5  •  glimepiride (AMARYL) 1 mg tablet, TAKE ONE TABLET BY MOUTH DAILY WITH BREAKFAST, Disp: 90 tablet, Rfl: 0  •  glucose blood (Contour Next Test) test strip, Use twice a day, Disp: 200 each, Rfl: 1  •  ketorolac (ACULAR) 0 5 % ophthalmic solution, , Disp: , Rfl:   •  levothyroxine 25 mcg tablet, TAKE ONE TABLET BY MOUTH EVERY DAY, Disp: 90 tablet, Rfl: 0  •  lisinopril-hydrochlorothiazide (PRINZIDE,ZESTORETIC) 20-25 MG per tablet, Take 1 tablet(s) every day by oral route , Disp: 90 tablet, Rfl: 0  •  meclizine (ANTIVERT) 25 mg tablet, 1 tab take before car ride once a day as needed, Disp: 10 tablet, Rfl: 0  •  metFORMIN (GLUCOPHAGE-XR) 500 mg 24 hr tablet, TAKE TWO TABLETS BY MOUTH 2 TIMES A DAY, Disp: 360 tablet, Rfl: 0  •  multivitamin (THERAGRAN) TABS, Take 1 tablet by mouth daily, Disp: , Rfl:   •  nystatin (MYCOSTATIN) cream, Apply topically 2 (two) times a day, Disp: 60 g, Rfl: 0  •  ofloxacin (OCUFLOX) 0 3 % ophthalmic solution, , Disp: , Rfl:   •  prednisoLONE acetate (PRED FORTE) 1 % ophthalmic suspension, , Disp: , Rfl:   •  Tradjenta 5 MG TABS, TAKE ONE TABLET BY MOUTH EVERY DAY, Disp: 30 tablet, Rfl: 0  •  umeclidinium (Incruse Ellipta) 62 5 mcg/actuation AEPB inhaler, Inhale 1 puff daily, Disp: 30 blister, Rfl: 11  •  Ventolin  (90 Base) MCG/ACT inhaler, Inhale 2 puffs every 6 (six) hours as needed for wheezing, Disp: 18 g, Rfl: 0  Allergies   Allergen Reactions   • Penicillins Rash     After dental procedure        Vitals: Blood pressure 134/80, pulse (!) 108, temperature (!) 97 4 °F (36 3 °C), temperature source Tympanic, resp  rate 16, height 5' (1 524 m), weight 74 4 kg (164 lb), SpO2 94 %  Body mass index is 32 03 kg/m²  Oxygen Therapy  SpO2: 94 %    Physical Exam   Physical Exam  Constitutional:       General: She is not in acute distress  HENT:      Head: Normocephalic  Eyes:      General: No scleral icterus  Neck:      Vascular: No JVD  Cardiovascular:      Rate and Rhythm: Normal rate and regular rhythm  Pulmonary:      Breath sounds: No wheezing, rhonchi or rales  Abdominal:      Palpations: Abdomen is soft  Tenderness: There is no abdominal tenderness  Musculoskeletal:      Cervical back: Neck supple  Lymphadenopathy:      Cervical: No cervical adenopathy  Skin:     General: Skin is warm and dry  Neurological:      Mental Status: She is alert and oriented to person, place, and time  Psychiatric:         Mood and Affect: Mood normal          Labs: I have personally reviewed pertinent lab results    Lab Results   Component Value Date    WBC 12 08 (H) 05/30/2020    HGB 12 0 05/30/2020    HCT 37 8 05/30/2020    MCV 83 05/30/2020     05/30/2020     Lab Results   Component Value Date    CALCIUM 9 7 11/18/2022    K 4 2 11/18/2022    CO2 27 11/18/2022     11/18/2022    BUN 15 11/18/2022    CREATININE 0 54 (L) 11/18/2022     No results found for: IGE  Lab Results   Component Value Date    ALT 20 04/29/2021    AST 7 04/29/2021    ALKPHOS 57 04/29/2021     Pulmonary function testing:  Performed 1/2/18 and personally interpreted  FEV1/FVC ratio 65%   FEV1 48% predicted  FVC 55% predicted  No response to bronchodilators   % predicted   % predicted  DLCO corrected for hemoglobin 13 % predicted    PFT shows severe obstruction, moderate air trapping and severe diffusion impairment

## 2023-01-18 NOTE — ASSESSMENT & PLAN NOTE
She is overall stable with Symbicort and Incruse Ellipta  She has albuterol to use on an as-needed basis

## 2023-02-03 ENCOUNTER — HOSPITAL ENCOUNTER (EMERGENCY)
Facility: HOSPITAL | Age: 83
Discharge: HOME | End: 2023-02-03
Attending: EMERGENCY MEDICINE

## 2023-02-03 VITALS
SYSTOLIC BLOOD PRESSURE: 129 MMHG | TEMPERATURE: 98.2 F | HEART RATE: 101 BPM | RESPIRATION RATE: 22 BRPM | DIASTOLIC BLOOD PRESSURE: 58 MMHG | OXYGEN SATURATION: 97 %

## 2023-02-03 DIAGNOSIS — J44.1 COPD EXACERBATION (HCC): Primary | ICD-10-CM

## 2023-02-03 LAB
ALBUMIN SERPL BCP-MCNC: 4.2 G/DL (ref 3.5–5)
ALP SERPL-CCNC: 57 U/L (ref 34–104)
ALT SERPL W P-5'-P-CCNC: 13 U/L (ref 7–52)
ANION GAP SERPL CALCULATED.3IONS-SCNC: 6 MMOL/L (ref 4–13)
AST SERPL W P-5'-P-CCNC: 12 U/L (ref 13–39)
ATRIAL RATE: 98 BPM
BASOPHILS # BLD AUTO: 0.05 THOUSANDS/ÂΜL (ref 0–0.1)
BASOPHILS NFR BLD AUTO: 0 % (ref 0–1)
BILIRUB SERPL-MCNC: 0.34 MG/DL (ref 0.2–1)
BUN SERPL-MCNC: 22 MG/DL (ref 5–25)
CALCIUM SERPL-MCNC: 9.4 MG/DL (ref 8.4–10.2)
CARDIAC TROPONIN I PNL SERPL HS: 5 NG/L
CHLORIDE SERPL-SCNC: 98 MMOL/L (ref 96–108)
CO2 SERPL-SCNC: 29 MMOL/L (ref 21–32)
CREAT SERPL-MCNC: 0.43 MG/DL (ref 0.6–1.3)
EOSINOPHIL # BLD AUTO: 0.01 THOUSAND/ÂΜL (ref 0–0.61)
EOSINOPHIL NFR BLD AUTO: 0 % (ref 0–6)
ERYTHROCYTE [DISTWIDTH] IN BLOOD BY AUTOMATED COUNT: 17.7 % (ref 11.6–15.1)
GFR SERPL CREATININE-BSD FRML MDRD: 95 ML/MIN/1.73SQ M
GLUCOSE SERPL-MCNC: 204 MG/DL (ref 65–140)
HCT VFR BLD AUTO: 34.2 % (ref 34.8–46.1)
HGB BLD-MCNC: 10.3 G/DL (ref 11.5–15.4)
IMM GRANULOCYTES # BLD AUTO: 0.11 THOUSAND/UL (ref 0–0.2)
IMM GRANULOCYTES NFR BLD AUTO: 1 % (ref 0–2)
LYMPHOCYTES # BLD AUTO: 2.43 THOUSANDS/ÂΜL (ref 0.6–4.47)
LYMPHOCYTES NFR BLD AUTO: 16 % (ref 14–44)
MCH RBC QN AUTO: 22 PG (ref 26.8–34.3)
MCHC RBC AUTO-ENTMCNC: 30.1 G/DL (ref 31.4–37.4)
MCV RBC AUTO: 73 FL (ref 82–98)
MONOCYTES # BLD AUTO: 0.81 THOUSAND/ÂΜL (ref 0.17–1.22)
MONOCYTES NFR BLD AUTO: 5 % (ref 4–12)
NEUTROPHILS # BLD AUTO: 12.24 THOUSANDS/ÂΜL (ref 1.85–7.62)
NEUTS SEG NFR BLD AUTO: 78 % (ref 43–75)
NRBC BLD AUTO-RTO: 0 /100 WBCS
P AXIS: 94 DEGREES
PLATELET # BLD AUTO: 366 THOUSANDS/UL (ref 149–390)
PMV BLD AUTO: 9.4 FL (ref 8.9–12.7)
POTASSIUM SERPL-SCNC: 4 MMOL/L (ref 3.5–5.3)
PR INTERVAL: 154 MS
PROT SERPL-MCNC: 7.3 G/DL (ref 6.4–8.4)
QRS AXIS: -62 DEGREES
QRSD INTERVAL: 130 MS
QT INTERVAL: 372 MS
QTC INTERVAL: 487 MS
RBC # BLD AUTO: 4.68 MILLION/UL (ref 3.81–5.12)
SODIUM SERPL-SCNC: 133 MMOL/L (ref 135–147)
T WAVE AXIS: 32 DEGREES
VENTRICULAR RATE: 103 BPM
WBC # BLD AUTO: 15.65 THOUSAND/UL (ref 4.31–10.16)

## 2023-02-03 RX ORDER — ALBUTEROL SULFATE 2.5 MG/3ML
1 SOLUTION RESPIRATORY (INHALATION) ONCE
Status: COMPLETED | OUTPATIENT
Start: 2023-02-03 | End: 2023-02-03

## 2023-02-03 RX ORDER — METHYLPREDNISOLONE SOD SUCC 125 MG
1 VIAL (EA) INJECTION ONCE
Status: COMPLETED | OUTPATIENT
Start: 2023-02-03 | End: 2023-02-03

## 2023-02-03 NOTE — ED PROVIDER NOTES
History  Chief Complaint   Patient presents with   • Shortness of Breath     Pt was at home cooking when she jorge her food and inhaled the smoke  Pt has COPD hx, wears 2L chronic  Pt got 2 albuterol treatments and a dose of solumedrol before she arrived  Cyn Castro is a 80year old female with a history of COPD on 2 L of oxygen at baseline presenting via EMS for evaluation of shortness of breath  The patient describes that she was cooking food when it started to burn, there was a release of smoke and the patient inhaled some of the smoke after which she began with shortness of breath  She tried using her inhalers, she uses Symbicort and Ellipta, she also tried to use her nebulizer, all of which did not give her relief  After EMS arrived, she received 2 breathing treatments with albuterol and was given a dose of Solu-Medrol  Patient says that her symptoms improved after this  Patient believes that she is currently breathing at her baseline  She denies any recent fevers or coughs  Denies any chest pain  No recent lower extremity edema or tenderness  History provided by:  Patient   used: No    Shortness of Breath  Associated symptoms: no abdominal pain, no chest pain, no cough, no ear pain, no fever, no rash, no sore throat and no vomiting        Prior to Admission Medications   Prescriptions Last Dose Informant Patient Reported? Taking?    Cholecalciferol (VITAMIN D) 2000 units CAPS   No No   Sig: Take 2 capsules (4,000 Units total) by mouth daily   Tradjenta 5 MG TABS   No No   Sig: TAKE ONE TABLET BY MOUTH EVERY DAY   Ventolin  (90 Base) MCG/ACT inhaler   No No   Sig: Inhale 2 puffs every 6 (six) hours as needed for wheezing   albuterol (2 5 mg/3 mL) 0 083 % nebulizer solution   No No   Sig: Take 3 mL (2 5 mg total) by nebulization every 4 (four) hours as needed for wheezing or shortness of breath   aspirin (ECOTRIN LOW STRENGTH) 81 mg EC tablet   Yes No   Sig: Take 81 mg by mouth daily   atorvastatin (LIPITOR) 20 mg tablet   No No   Sig: Take 1 tablet (20 mg total) by mouth daily   azithromycin (ZITHROMAX) 500 MG tablet   No No   Sig: Take 1 tablet (500 mg total) by mouth 3 (three) times a week   budesonide-formoterol (Symbicort) 160-4 5 mcg/act inhaler   No No   Sig: Inhale 2 puffs 2 (two) times a day As directed   cyanocobalamin (VITAMIN B-12) 100 mcg tablet   Yes No   Sig: Take by mouth daily   diltiazem (CARDIZEM CD) 300 mg 24 hr capsule   No No   Sig: Take 1 capsule (300 mg total) by mouth daily As directed   fluticasone (FLONASE) 50 mcg/act nasal spray   No No   Si sprays into each nostril daily   glimepiride (AMARYL) 1 mg tablet   No No   Sig: TAKE ONE TABLET BY MOUTH DAILY WITH BREAKFAST   glucose blood (Contour Next Test) test strip   No No   Sig: Use twice a day   ketorolac (ACULAR) 0 5 % ophthalmic solution   Yes No   levothyroxine 25 mcg tablet   No No   Sig: TAKE ONE TABLET BY MOUTH EVERY DAY   lisinopril-hydrochlorothiazide (PRINZIDE,ZESTORETIC) 20-25 MG per tablet   No No   Sig: Take 1 tablet(s) every day by oral route     meclizine (ANTIVERT) 25 mg tablet   No No   Si tab take before car ride once a day as needed   metFORMIN (GLUCOPHAGE-XR) 500 mg 24 hr tablet   No No   Sig: TAKE TWO TABLETS BY MOUTH 2 TIMES A DAY   multivitamin (THERAGRAN) TABS   Yes No   Sig: Take 1 tablet by mouth daily   nystatin (MYCOSTATIN) cream   No No   Sig: Apply topically 2 (two) times a day   ofloxacin (OCUFLOX) 0 3 % ophthalmic solution   Yes No   prednisoLONE acetate (PRED FORTE) 1 % ophthalmic suspension   Yes No   umeclidinium (Incruse Ellipta) 62 5 mcg/actuation AEPB inhaler   No No   Sig: Inhale 1 puff daily      Facility-Administered Medications: None       Past Medical History:   Diagnosis Date   • Anemia    • Blood clotting tendency (HCC)     left leg, lung   • Chronic respiratory failure with hypoxia and hypercapnia (HCC)    • COPD (chronic obstructive pulmonary disease) [facility])    • Deep vein thrombosis (HCC)    • Diabetes mellitus ([facility] )    • Emphysema lung ([facility] )    • Emphysema lung ([facility] )    • Emphysema of lung ([facility] )    • Hyperlipidemia    • Hypertension    • Hyperthyroidism    • Hypothyroid    • On warfarin therapy    • Osteoporosis    • Palpitations    • Pulmonary embolism ([facility] )    • Sleep apnea, obstructive    • Thyroid disease        Past Surgical History:   Procedure Laterality Date   • AV NODE ABLATION  2017   • COLONOSCOPY  2018    w/ EGD   • DXA PROCEDURE (HISTORICAL)  2016   • [place] COLONOSCOPY FLX DX W/COLLJ SPEC WHEN PFRMD N/A 2018    Procedure: EGD AND COLONOSCOPY;  Surgeon: Isaac Pruitt MD;  Location: [facility]; Service: Gastroenterology   • TOE SURGERY         Family History   Problem Relation Age of Onset   • Hypertension Mother    • Hyperlipidemia Mother    • Asthma Father    • Heart failure Father    • Hypertension Sister    • Hyperlipidemia Sister    • Heart attack Neg Hx    • Aneurysm Neg Hx    • Stroke Neg Hx    • Clotting disorder Neg Hx      I have reviewed and agree with the history as documented  E-Cigarette/Vaping   • E-Cigarette Use Never User      E-Cigarette/Vaping Substances     Social History     Tobacco Use   • Smoking status: Former     Packs/day: 1 00     Years: 46 00     Pack years: 46 00     Types: Cigarettes     Quit date:      Years since quittin 1   • Smokeless tobacco: Never   Vaping Use   • Vaping Use: Never used   Substance Use Topics   • Alcohol use: No   • Drug use: No        Review of Systems   Constitutional: Negative for chills and fever  HENT: Negative for ear pain and sore throat  Eyes: Negative for pain and visual disturbance  Respiratory: Positive for shortness of breath  Negative for cough  Cardiovascular: Negative for chest pain and palpitations  Gastrointestinal: Negative for abdominal pain and vomiting  Genitourinary: Negative for dysuria and hematuria     Musculoskeletal: Negative for arthralgias and back pain  Skin: Negative for color change and rash  Neurological: Negative for seizures and syncope  All other systems reviewed and are negative  Physical Exam  ED Triage Vitals [02/03/23 1600]   Temperature Pulse Respirations Blood Pressure SpO2   98 2 °F (36 8 °C) 99 22 135/60 98 %      Temp Source Heart Rate Source Patient Position - Orthostatic VS BP Location FiO2 (%)   Oral Monitor Sitting Right arm --      Pain Score       --             Orthostatic Vital Signs  Vitals:    02/03/23 1600   BP: 135/60   Pulse: 99   Patient Position - Orthostatic VS: Sitting       Physical Exam  Vitals and nursing note reviewed  Constitutional:       General: She is not in acute distress  Appearance: Normal appearance  HENT:      Head: Normocephalic and atraumatic  Mouth/Throat:      Mouth: Mucous membranes are moist       Pharynx: Oropharynx is clear  Eyes:      General: No scleral icterus  Conjunctiva/sclera: Conjunctivae normal    Cardiovascular:      Rate and Rhythm: Normal rate and regular rhythm  Heart sounds: Normal heart sounds  Pulmonary:      Effort: Pulmonary effort is normal  Tachypnea present  No respiratory distress  Breath sounds: Decreased breath sounds present  No wheezing, rhonchi or rales  Comments: Patient has mild tachypnea, but she says her breathing is currently at her baseline  Decreased breath sounds throughout, no wheezing  Abdominal:      General: Abdomen is flat  There is no distension  Tenderness: There is no abdominal tenderness  Musculoskeletal:         General: No tenderness or signs of injury  Cervical back: Neck supple  No rigidity  Skin:     General: Skin is warm  Coloration: Skin is not jaundiced  Findings: No erythema or rash  Neurological:      General: No focal deficit present  Mental Status: She is alert  Mental status is at baseline     Psychiatric:         Mood and Affect: Mood normal  Behavior: Behavior normal          ED Medications  Medications   albuterol (FOR EMS ONLY) (2 5 mg/3 mL) 0 083 % inhalation solution 2 5 mg (0 mg Does not apply Given to EMS 2/3/23 1601)   methylPREDNISolone sodium succinate (FOR EMS ONLY) (Solu-MEDROL) 125 MG injection 125 mg (0 mg Does not apply Given to EMS 2/3/23 1601)       Diagnostic Studies  Results Reviewed     Procedure Component Value Units Date/Time    HS Troponin 0hr (reflex protocol) [[id number]]  (Normal) Collected: 02/03/23 1557    Lab Status: Final result Specimen: Blood from Arm, Right Updated: 02/03/23 1641     hs TnI 0hr 5 ng/L     HS Troponin I 2hr [[id number]]     Lab Status: No result Specimen: Blood     HS Troponin I 4hr [[id number]]     Lab Status: No result Specimen: Blood     Comprehensive metabolic panel [[id number]]  (Abnormal) Collected: 02/03/23 1557    Lab Status: Final result Specimen: Blood from Arm, Right Updated: 02/03/23 1632     Sodium 133 mmol/L      Potassium 4 0 mmol/L      Chloride 98 mmol/L      CO2 29 mmol/L      ANION GAP 6 mmol/L      BUN 22 mg/dL      Creatinine 0 43 mg/dL      Glucose 204 mg/dL      Calcium 9 4 mg/dL      AST 12 U/L      ALT 13 U/L      Alkaline Phosphatase 57 U/L      Total Protein 7 3 g/dL      Albumin 4 2 g/dL      Total Bilirubin 0 34 mg/dL      eGFR 95 ml/min/1 73sq m     Narrative:      [place]vin guidelines for Chronic Kidney Disease (CKD):   •  Stage 1 with normal or high GFR (GFR > 90 mL/min/1 73 square meters)  •  Stage 2 Mild CKD (GFR = 60-89 mL/min/1 73 square meters)  •  Stage 3A Moderate CKD (GFR = 45-59 mL/min/1 73 square meters)  •  Stage 3B Moderate CKD (GFR = 30-44 mL/min/1 73 square meters)  •  Stage 4 Severe CKD (GFR = 15-29 mL/min/1 73 square meters)  •  Stage 5 End Stage CKD (GFR <15 mL/min/1 73 square meters)  Note: GFR calculation is accurate only with a steady state creatinine    CBC and differential [[id number]]  (Abnormal) Collected: 02/03/23 1557    Lab Status: Final result Specimen: Blood from Arm, Right Updated: 02/03/23 1612     WBC 15 65 Thousand/uL      RBC 4 68 Million/uL      Hemoglobin 10 3 g/dL      Hematocrit 34 2 %      MCV 73 fL      MCH 22 0 pg      MCHC 30 1 g/dL      RDW 17 7 %      MPV 9 4 fL      Platelets 183 Thousands/uL      nRBC 0 /100 WBCs      Neutrophils Relative 78 %      Immat GRANS % 1 %      Lymphocytes Relative 16 %      Monocytes Relative 5 %      Eosinophils Relative 0 %      Basophils Relative 0 %      Neutrophils Absolute 12 24 Thousands/µL      Immature Grans Absolute 0 11 Thousand/uL      Lymphocytes Absolute 2 43 Thousands/µL      Monocytes Absolute 0 81 Thousand/µL      Eosinophils Absolute 0 01 Thousand/µL      Basophils Absolute 0 05 Thousands/µL                  No orders to display         Procedures  Procedures      ED Course             HEART Risk Score    Flowsheet Row Most Recent Value   Heart Score Risk Calculator    History 0 Filed at: 02/03/2023 1712   ECG 0 Filed at: 02/03/2023 1712   Age 2 Filed at: 02/03/2023 1712   Risk Factors 1 Filed at: 02/03/2023 1712   Troponin 0 Filed at: 02/03/2023 1712   HEART Score 3 Filed at: 02/03/2023 [address]  Lobo Olguin is a 80year old female with a history of COPD on 2 L of oxygen at baseline presenting via EMS for evaluation of shortness of breath  Patient was cooking food when the food accidentally began to burn, she inhaled some smoke which caused her to become short of breath  EMS administered albuterol treatments and gave her steroids, after which her breathing improved  She stated that her breathing was at baseline on my examination  Patient was not in any distress on examination, mild tachypnea  No wheezing, decreased breath sounds throughout  She was satting 98 to 99% on 2 L nasal cannula  Suspect that she had a mild COPD exacerbation after she inhaled the smoke    Patient's blood work was relatively unremarkable aside from leukocytosis and mild hyponatremia which were not pertinent to her symptoms today  Patient continued to remain well-appearing without any significant distress, she was stable for discharge, advised that she follow-up with her pulmonologist, gave her strict return precautions, she was agreeable to plan  COPD exacerbation ([facility] ): acute illness or injury  Amount and/or Complexity of Data Reviewed  Labs: ordered  Details: CBC, CMP, troponin, mild leukocytosis and hyponatremia, otherwise unremarkable      Risk  Prescription drug management  Disposition  Final diagnoses:   COPD exacerbation ([facility] )     Time reflects when diagnosis was documented in both MDM as applicable and the Disposition within this note     Time User Action Codes Description Comment    2/3/2023  4:56 PM Bandar Woodward Add [J44 1] COPD exacerbation [facility])       ED Disposition     ED Disposition   Discharge    Condition   Stable    Date/Time   Fri Feb 3, 2023  4:56 PM    Comment   Colan Cogan discharge to home/self care  Follow-up Information     Follow up With Specialties Details Why Contact Info    Essence Cedeño DO Pulmonary Disease, Pulmonology, Critical Care Medicine, Intensive Care Schedule an appointment as soon as possible for a visit  As needed [address] [place] [address]  [phone]            Patient's Medications   Discharge Prescriptions    No medications on file     No discharge procedures on file  PDMP Review     None           ED Provider  Attending physically available and evaluated Colan Cogan I managed the patient along with the ED Attending      Electronically Signed by         Jaylyn Iglesias DO  02/03/23 [id number]

## 2023-02-03 NOTE — DISCHARGE INSTRUCTIONS
Return to the emergency department should she develop any other episodes of shortness of breath, or should she develop any other new and concerning symptoms  Otherwise I would follow-up with your pulmonologist, Dr Ratliff Elmo

## 2023-02-13 DIAGNOSIS — N18.2 TYPE 2 DIABETES MELLITUS WITH STAGE 2 CHRONIC KIDNEY DISEASE, WITHOUT LONG-TERM CURRENT USE OF INSULIN (HCC): ICD-10-CM

## 2023-02-13 DIAGNOSIS — E11.22 TYPE 2 DIABETES MELLITUS WITH STAGE 2 CHRONIC KIDNEY DISEASE, WITHOUT LONG-TERM CURRENT USE OF INSULIN (HCC): ICD-10-CM

## 2023-02-13 RX ORDER — LINAGLIPTIN 5 MG/1
TABLET, FILM COATED ORAL
Qty: 30 TABLET | Refills: 0 | Status: SHIPPED | OUTPATIENT
Start: 2023-02-13

## 2023-02-25 DIAGNOSIS — J44.9 COPD, SEVERE (HCC): ICD-10-CM

## 2023-02-25 DIAGNOSIS — I10 ESSENTIAL HYPERTENSION: ICD-10-CM

## 2023-02-27 RX ORDER — DILTIAZEM HYDROCHLORIDE 300 MG/1
300 CAPSULE, COATED, EXTENDED RELEASE ORAL DAILY
Qty: 90 CAPSULE | Refills: 0 | Status: SHIPPED | OUTPATIENT
Start: 2023-02-27

## 2023-02-27 RX ORDER — LISINOPRIL AND HYDROCHLOROTHIAZIDE 25; 20 MG/1; MG/1
TABLET ORAL
Qty: 90 TABLET | Refills: 0 | Status: SHIPPED | OUTPATIENT
Start: 2023-02-27

## 2023-03-13 DIAGNOSIS — E11.22 TYPE 2 DIABETES MELLITUS WITH STAGE 2 CHRONIC KIDNEY DISEASE, WITHOUT LONG-TERM CURRENT USE OF INSULIN (HCC): ICD-10-CM

## 2023-03-13 DIAGNOSIS — N18.2 TYPE 2 DIABETES MELLITUS WITH STAGE 2 CHRONIC KIDNEY DISEASE, WITHOUT LONG-TERM CURRENT USE OF INSULIN (HCC): ICD-10-CM

## 2023-03-13 DIAGNOSIS — E11.65 TYPE 2 DIABETES MELLITUS WITH HYPERGLYCEMIA, WITHOUT LONG-TERM CURRENT USE OF INSULIN (HCC): ICD-10-CM

## 2023-03-13 RX ORDER — LINAGLIPTIN 5 MG/1
TABLET, FILM COATED ORAL
Qty: 30 TABLET | Refills: 0 | Status: SHIPPED | OUTPATIENT
Start: 2023-03-13

## 2023-03-13 RX ORDER — GLIMEPIRIDE 1 MG/1
TABLET ORAL
Qty: 90 TABLET | Refills: 0 | Status: SHIPPED | OUTPATIENT
Start: 2023-03-13

## 2023-03-24 DIAGNOSIS — E11.22 TYPE 2 DIABETES MELLITUS WITH STAGE 2 CHRONIC KIDNEY DISEASE, WITHOUT LONG-TERM CURRENT USE OF INSULIN (HCC): ICD-10-CM

## 2023-03-24 DIAGNOSIS — N18.2 TYPE 2 DIABETES MELLITUS WITH STAGE 2 CHRONIC KIDNEY DISEASE, WITHOUT LONG-TERM CURRENT USE OF INSULIN (HCC): ICD-10-CM

## 2023-03-24 RX ORDER — METFORMIN HYDROCHLORIDE 500 MG/1
TABLET, EXTENDED RELEASE ORAL
Qty: 360 TABLET | Refills: 0 | Status: SHIPPED | OUTPATIENT
Start: 2023-03-24

## 2023-04-03 DIAGNOSIS — E03.9 ACQUIRED HYPOTHYROIDISM: ICD-10-CM

## 2023-04-03 RX ORDER — LEVOTHYROXINE SODIUM 0.03 MG/1
TABLET ORAL
Qty: 90 TABLET | Refills: 0 | Status: SHIPPED | OUTPATIENT
Start: 2023-04-03

## 2023-04-20 ENCOUNTER — LAB (OUTPATIENT)
Dept: LAB | Facility: CLINIC | Age: 83
End: 2023-04-20

## 2023-04-20 DIAGNOSIS — M81.0 AGE-RELATED OSTEOPOROSIS WITHOUT CURRENT PATHOLOGICAL FRACTURE: ICD-10-CM

## 2023-04-20 DIAGNOSIS — E03.9 ACQUIRED HYPOTHYROIDISM: ICD-10-CM

## 2023-04-20 DIAGNOSIS — E11.65 TYPE 2 DIABETES MELLITUS WITH HYPERGLYCEMIA, WITHOUT LONG-TERM CURRENT USE OF INSULIN (HCC): ICD-10-CM

## 2023-04-20 LAB
ANION GAP SERPL CALCULATED.3IONS-SCNC: 2 MMOL/L (ref 4–13)
BUN SERPL-MCNC: 16 MG/DL (ref 5–25)
CALCIUM SERPL-MCNC: 9.6 MG/DL (ref 8.3–10.1)
CHLORIDE SERPL-SCNC: 102 MMOL/L (ref 96–108)
CO2 SERPL-SCNC: 31 MMOL/L (ref 21–32)
CREAT SERPL-MCNC: 0.53 MG/DL (ref 0.6–1.3)
GFR SERPL CREATININE-BSD FRML MDRD: 88 ML/MIN/1.73SQ M
GLUCOSE P FAST SERPL-MCNC: 163 MG/DL (ref 65–99)
POTASSIUM SERPL-SCNC: 4.3 MMOL/L (ref 3.5–5.3)
SODIUM SERPL-SCNC: 135 MMOL/L (ref 135–147)
T4 FREE SERPL-MCNC: 1.25 NG/DL (ref 0.76–1.46)
TSH SERPL DL<=0.05 MIU/L-ACNC: 3.09 UIU/ML (ref 0.45–4.5)

## 2023-04-21 LAB
EST. AVERAGE GLUCOSE BLD GHB EST-MCNC: 146 MG/DL
HBA1C MFR BLD: 6.7 %

## 2023-04-25 ENCOUNTER — OFFICE VISIT (OUTPATIENT)
Dept: FAMILY MEDICINE CLINIC | Facility: CLINIC | Age: 83
End: 2023-04-25

## 2023-04-25 VITALS
HEART RATE: 107 BPM | OXYGEN SATURATION: 94 % | WEIGHT: 162 LBS | RESPIRATION RATE: 18 BRPM | BODY MASS INDEX: 31.8 KG/M2 | DIASTOLIC BLOOD PRESSURE: 60 MMHG | HEIGHT: 60 IN | SYSTOLIC BLOOD PRESSURE: 128 MMHG

## 2023-04-25 DIAGNOSIS — Z00.00 MEDICARE ANNUAL WELLNESS VISIT, SUBSEQUENT: Primary | ICD-10-CM

## 2023-04-25 NOTE — PATIENT INSTRUCTIONS
Medicare Preventive Visit Patient Instructions  Thank you for completing your Welcome to Medicare Visit or Medicare Annual Wellness Visit today  Your next wellness visit will be due in one year (4/25/2024)  The screening/preventive services that you may require over the next 5-10 years are detailed below  Some tests may not apply to you based off risk factors and/or age  Screening tests ordered at today's visit but not completed yet may show as past due  Also, please note that scanned in results may not display below  Preventive Screenings:  Service Recommendations Previous Testing/Comments   Colorectal Cancer Screening  * Colonoscopy    * Fecal Occult Blood Test (FOBT)/Fecal Immunochemical Test (FIT)  * Fecal DNA/Cologuard Test  * Flexible Sigmoidoscopy Age: 39-70 years old   Colonoscopy: every 10 years (may be performed more frequently if at higher risk)  OR  FOBT/FIT: every 1 year  OR  Cologuard: every 3 years  OR  Sigmoidoscopy: every 5 years  Screening may be recommended earlier than age 39 if at higher risk for colorectal cancer  Also, an individualized decision between you and your healthcare provider will decide whether screening between the ages of 74-80 would be appropriate  Colonoscopy: Not on file  FOBT/FIT: Not on file  Cologuard: Not on file  Sigmoidoscopy: Not on file          Breast Cancer Screening Age: 36 years old  Frequency: every 1-2 years  Not required if history of left and right mastectomy Mammogram: 10/11/2019    Patient Declines   Cervical Cancer Screening Between the ages of 21-29, pap smear recommended once every 3 years  Between the ages of 33-67, can perform pap smear with HPV co-testing every 5 years     Recommendations may differ for women with a history of total hysterectomy, cervical cancer, or abnormal pap smears in past  Pap Smear: Not on file    Screening Not Indicated   Hepatitis C Screening Once for adults born between 1945 and 1965  More frequently in patients at high risk for Hepatitis C Hep C Antibody: Not on file        Diabetes Screening 1-2 times per year if you're at risk for diabetes or have pre-diabetes Fasting glucose: 163 mg/dL (4/20/2023)  A1C: 6 7 % (4/20/2023)  Screening Not Indicated  History Diabetes   Cholesterol Screening Once every 5 years if you don't have a lipid disorder  May order more often based on risk factors  Lipid panel: 11/18/2022    Screening Not Indicated  History Lipid Disorder     Other Preventive Screenings Covered by Medicare:  1  Abdominal Aortic Aneurysm (AAA) Screening: covered once if your at risk  You're considered to be at risk if you have a family history of AAA  2  Lung Cancer Screening: covers low dose CT scan once per year if you meet all of the following conditions: (1) Age 50-69; (2) No signs or symptoms of lung cancer; (3) Current smoker or have quit smoking within the last 15 years; (4) You have a tobacco smoking history of at least 20 pack years (packs per day multiplied by number of years you smoked); (5) You get a written order from a healthcare provider  3  Glaucoma Screening: covered annually if you're considered high risk: (1) You have diabetes OR (2) Family history of glaucoma OR (3)  aged 48 and older OR (3)  American aged 72 and older  3  Osteoporosis Screening: covered every 2 years if you meet one of the following conditions: (1) You're estrogen deficient and at risk for osteoporosis based off medical history and other findings; (2) Have a vertebral abnormality; (3) On glucocorticoid therapy for more than 3 months; (4) Have primary hyperparathyroidism; (5) On osteoporosis medications and need to assess response to drug therapy  · Last bone density test (DXA Scan): 07/25/2019   5  HIV Screening: covered annually if you're between the age of 15-65  Also covered annually if you are younger than 13 and older than 72 with risk factors for HIV infection   For pregnant patients, it is covered up to 3 times per pregnancy  Immunizations:  Immunization Recommendations   Influenza Vaccine Annual influenza vaccination during flu season is recommended for all persons aged >= 6 months who do not have contraindications   Pneumococcal Vaccine   * Pneumococcal conjugate vaccine = PCV13 (Prevnar 13), PCV15 (Vaxneuvance), PCV20 (Prevnar 20)  * Pneumococcal polysaccharide vaccine = PPSV23 (Pneumovax) Adults 25-60 years old: 1-3 doses may be recommended based on certain risk factors  Adults 72 years old: 1-2 doses may be recommended based off what pneumonia vaccine you previously received   Hepatitis B Vaccine 3 dose series if at intermediate or high risk (ex: diabetes, end stage renal disease, liver disease)   Tetanus (Td) Vaccine - COST NOT COVERED BY MEDICARE PART B Following completion of primary series, a booster dose should be given every 10 years to maintain immunity against tetanus  Td may also be given as tetanus wound prophylaxis  Tdap Vaccine - COST NOT COVERED BY MEDICARE PART B Recommended at least once for all adults  For pregnant patients, recommended with each pregnancy  Shingles Vaccine (Shingrix) - COST NOT COVERED BY MEDICARE PART B  2 shot series recommended in those aged 48 and above     Health Maintenance Due:  There are no preventive care reminders to display for this patient  Immunizations Due:      Topic Date Due   • COVID-19 Vaccine (4 - Booster for Moderna series) 12/27/2021     Advance Directives   What are advance directives? Advance directives are legal documents that state your wishes and plans for medical care  These plans are made ahead of time in case you lose your ability to make decisions for yourself  Advance directives can apply to any medical decision, such as the treatments you want, and if you want to donate organs  What are the types of advance directives? There are many types of advance directives, and each state has rules about how to use them   You may choose a combination of any of the following:  · Living will: This is a written record of the treatment you want  You can also choose which treatments you do not want, which to limit, and which to stop at a certain time  This includes surgery, medicine, IV fluid, and tube feedings  · Durable power of  for healthcare [facility]): This is a written record that states who you want to make healthcare choices for you when you are unable to make them for yourself  This person, called a proxy, is usually a family member or a friend  You may choose more than 1 proxy  · Do not resuscitate (DNR) order:  A DNR order is used in case your heart stops beating or you stop breathing  It is a request not to have certain forms of treatment, such as CPR  A DNR order may be included in other types of advance directives  · Medical directive: This covers the care that you want if you are in a coma, near death, or unable to make decisions for yourself  You can list the treatments you want for each condition  Treatment may include pain medicine, surgery, blood transfusions, dialysis, IV or tube feedings, and a ventilator (breathing machine)  · Values history: This document has questions about your views, beliefs, and how you feel and think about life  This information can help others choose the care that you would choose  Why are advance directives important? An advance directive helps you control your care  Although spoken wishes may be used, it is better to have your wishes written down  Spoken wishes can be misunderstood, or not followed  Treatments may be given even if you do not want them  An advance directive may make it easier for your family to make difficult choices about your care  Weight Management   Why it is important to manage your weight:  Being overweight increases your risk of health conditions such as heart disease, high blood pressure, type 2 diabetes, and certain types of cancer   It can also increase your risk for osteoarthritis, sleep apnea, and other respiratory problems  Aim for a slow, steady weight loss  Even a small amount of weight loss can lower your risk of health problems  How to lose weight safely:  A safe and healthy way to lose weight is to eat fewer calories and get regular exercise  You can lose up about 1 pound a week by decreasing the number of calories you eat by 500 calories each day  Healthy meal plan for weight management:  A healthy meal plan includes a variety of foods, contains fewer calories, and helps you stay healthy  A healthy meal plan includes the following:  · Eat whole-grain foods more often  A healthy meal plan should contain fiber  Fiber is the part of grains, fruits, and vegetables that is not broken down by your body  Whole-grain foods are healthy and provide extra fiber in your diet  Some examples of whole-grain foods are whole-wheat breads and pastas, oatmeal, brown rice, and bulgur  · Eat a variety of vegetables every day  Include dark, leafy greens such as spinach, kale, marilyn greens, and mustard greens  Eat yellow and orange vegetables such as carrots, sweet potatoes, and winter squash  · Eat a variety of fruits every day  Choose fresh or canned fruit (canned in its own juice or light syrup) instead of juice  Fruit juice has very little or no fiber  · Eat low-fat dairy foods  Drink fat-free (skim) milk or 1% milk  Eat fat-free yogurt and low-fat cottage cheese  Try low-fat cheeses such as mozzarella and other reduced-fat cheeses  · Choose meat and other protein foods that are low in fat  Choose beans or other legumes such as split peas or lentils  Choose fish, skinless poultry (chicken or turkey), or lean cuts of red meat (beef or pork)  Before you cook meat or poultry, cut off any visible fat  · Use less fat and oil  Try baking foods instead of frying them  Add less fat, such as margarine, sour cream, regular salad dressing and mayonnaise to foods   Eat fewer high-fat foods  Some examples of high-fat foods include french fries, doughnuts, ice cream, and cakes  · Eat fewer sweets  Limit foods and drinks that are high in sugar  This includes candy, cookies, regular soda, and sweetened drinks  Exercise:  Exercise at least 30 minutes per day on most days of the week  Some examples of exercise include walking, biking, dancing, and swimming  You can also fit in more physical activity by taking the stairs instead of the elevator or parking farther away from stores  Ask your healthcare provider about the best exercise plan for you  © Copyright [address] 2018 Information is for End User's use only and may not be sold, redistributed or otherwise used for commercial purposes   All illustrations and images included in CareNotes® are the copyrighted property of [employer]  or [address] [place] [address]

## 2023-04-25 NOTE — PROGRESS NOTES
Assessment and Plan:     Problem List Items Addressed This Visit        Other    Medicare annual wellness visit, subsequent - Primary     BMI Counseling: Body mass index is 31 64 kg/m²  The BMI is above normal  Nutrition recommendations include decreasing portion sizes  Exercise recommendations include exercising 3-5 times per week  Rationale for BMI follow-up plan is due to patient being overweight or obese  Depression Screening and Follow-up Plan: Patient was screened for depression during today's encounter  They screened negative with a PHQ-2 score of 0  Preventive health issues were discussed with patient, and age appropriate screening tests were ordered as noted in patient's After Visit Summary  Personalized health advice and appropriate referrals for health education or preventive services given if needed, as noted in patient's After Visit Summary  History of Present Illness:     Patient presents for a Medicare Wellness Visit    Annual wellness visit and follow-up, she is on oxygen she was seen in the ER when she has worsening of breathing, she was given the steroids now she feels back to her baseline, she lives independently and her son lives close by  She denies any chest pain no leg swelling     Patient Care Team:  Meron Summers MD as PCP - General (Family Medicine)  Cyndy Mohs, MD as PCP - Endocrinology (Endocrinology)  [facility], MD Betty Akers Od (Optometry)  Cyn Quezada PA-C as Physician Assistant (Endocrinology)  Reta Germain MD as Endoscopist     Review of Systems:     Review of Systems   Constitutional: Negative for activity change, appetite change, chills, fatigue, fever and unexpected weight change  HENT: Negative for congestion, ear discharge, ear pain, nosebleeds, postnasal drip, rhinorrhea, sinus pressure, sneezing, sore throat, trouble swallowing and voice change      Eyes: Negative for photophobia, pain, discharge, redness and itching  Respiratory: Negative for cough, chest tightness, shortness of breath and wheezing  Cardiovascular: Negative for chest pain, palpitations and leg swelling  Gastrointestinal: Negative for abdominal pain, constipation, diarrhea, nausea and vomiting  Endocrine: Negative for polyuria  Genitourinary: Negative for dysuria, frequency and urgency  Musculoskeletal: Negative for arthralgias, back pain, myalgias and neck pain  Skin: Negative for color change, pallor and rash  Allergic/Immunologic: Negative for environmental allergies and food allergies  Neurological: Negative for dizziness, weakness, light-headedness and headaches  Hematological: Negative for adenopathy  Does not bruise/bleed easily  Psychiatric/Behavioral: Negative for behavioral problems  The patient is not nervous/anxious           Problem List:     Patient Active Problem List   Diagnosis   • COPD, severe ([facility] [address] )   • Hyperlipidemia   • Chronic respiratory failure with hypoxia and hypercapnia ([facility])   • Hypothyroidism   • Obstructive sleep apnea   • History of pulmonary embolism   • Type 2 diabetes mellitus with hyperglycemia, without long-term current use of insulin ([facility])   • Iron deficiency anemia, unspecified   • PSVT (paroxysmal supraventricular tachycardia) ([facility])   • Anticoagulant long-term use   • Hemorrhoids   • Polyp of colon   • Chronic bilateral low back pain with bilateral sciatica   • Non-seasonal allergic rhinitis due to pollen   • Age-related osteoporosis without current pathological fracture   • Essential hypertension   • Vitamin D deficiency   • Thyroid nodule   • SIRS (systemic inflammatory response syndrome) ([facility])   • Elevated d-dimer   • Age-related cataract of both eyes   • Medicare annual wellness visit, subsequent   • Tinea corporis   • Needs flu shot   • Nasal congestion   • Murmur, cardiac      Past Medical and Surgical History:     Past Medical History:   Diagnosis Date   • Anemia • Blood clotting tendency (HCC)     left leg, lung   • Chronic respiratory failure with hypoxia and hypercapnia (HCC)    • COPD (chronic obstructive pulmonary disease) (HCC)    • Deep vein thrombosis (HCC)    • Diabetes mellitus ([facility] [address] )    • Emphysema lung (HCC)    • Emphysema lung (HCC)    • Emphysema of lung (HCC)    • Hyperlipidemia    • Hypertension    • Hyperthyroidism    • Hypothyroid    • On warfarin therapy    • Osteoporosis    • Palpitations    • Pulmonary embolism ([facility] [address] )    • Sleep apnea, obstructive    • Thyroid disease      Past Surgical History:   Procedure Laterality Date   • AV NODE ABLATION  2017   • COLONOSCOPY  2018    w/ EGD   • DXA PROCEDURE (HISTORICAL)  2016   • OH COLONOSCOPY FLX DX W/COLLJ SPEC WHEN PFRMD N/A 2018    Procedure: EGD AND COLONOSCOPY;  Surgeon: Bibiana Florez MD;  Location: [facility]; Service: Gastroenterology   • TOE SURGERY        Family History:     Family History   Problem Relation Age of Onset   • Hypertension Mother    • Hyperlipidemia Mother    • Asthma Father    • Heart failure Father    • Hypertension Sister    • Hyperlipidemia Sister    • Heart attack Neg Hx    • Aneurysm Neg Hx    • Stroke Neg Hx    • Clotting disorder Neg Hx       Social History:     Social History     Socioeconomic History   • Marital status:       Spouse name: None   • Number of children: None   • Years of education: None   • Highest education level: None   Occupational History   • None   Tobacco Use   • Smoking status: Former     Packs/day: 1 00     Years: 46 00     Pack years: 46 00     Types: Cigarettes     Quit date:      Years since quittin 3   • Smokeless tobacco: Never   Vaping Use   • Vaping Use: Never used   Substance and Sexual Activity   • Alcohol use: No   • Drug use: No   • Sexual activity: None   Other Topics Concern   • None   Social History Narrative    Date of last mammogram:   2017      Most Recent Bone Density:   2016       Date of Last Pap Smear:       not required per gyn         Smokeless tobacco status:   Never used smokeless tobacco      Tobacco-years of use:   46      E-cigarette/vape status:   Never used electronic cigarettes      Most recent tobacco use screenin2019      Do you currently or have you served in the [employer]:   No      Were you activated, into active duty, as a member of the [employer] or as a Reservist:   No      Marital status:       Diet:   Regular      General stress level:   Low      Has smoked since age:   21       Alcohol intake:   None       Caffeine intake:    Moderate      Chewing tobacco:   none      Guns present in home:   No      Seat belts used routinely:   Yes      Sunscreen used routinely:   Yes      Smoke alarm in home:   Yes      Advance directive:   Yes      Live alone or with others:   alone       Are there stairs in your home:   No      Pets:   No      Social Determinants of Health     Financial Resource Strain: Not on file   Food Insecurity: Not on file   Transportation Needs: Not on file   Physical Activity: Not on file   Stress: Not on file   Social Connections: Not on file   Intimate Partner Violence: Not on file   Housing Stability: Not on file      Medications and Allergies:     Current Outpatient Medications   Medication Sig Dispense Refill   • albuterol (2 5 mg/3 mL) 0 083 % nebulizer solution Take 3 mL (2 5 mg total) by nebulization every 4 (four) hours as needed for wheezing or shortness of breath 180 mL 3   • aspirin (ECOTRIN LOW STRENGTH) 81 mg EC tablet Take 81 mg by mouth daily     • atorvastatin (LIPITOR) 20 mg tablet Take 1 tablet (20 mg total) by mouth daily 90 tablet 2   • azithromycin (ZITHROMAX) 500 MG tablet Take 1 tablet (500 mg total) by mouth 3 (three) times a week 12 tablet 11   • budesonide-formoterol (Symbicort) 160-4 5 mcg/act inhaler Inhale 2 puffs 2 (two) times a day As directed 10 2 g 11   • Cholecalciferol (VITAMIN D) 2000 units CAPS Take 2 capsules (4,000 Units total) by mouth daily     • cyanocobalamin (VITAMIN B-12) 100 mcg tablet Take by mouth daily     • diltiazem (CARDIZEM CD) 300 mg 24 hr capsule Take 1 capsule (300 mg total) by mouth daily As directed 90 capsule 0   • fluticasone (FLONASE) 50 mcg/act nasal spray 2 sprays into each nostril daily 1 Bottle 5   • glimepiride (AMARYL) 1 mg tablet TAKE ONE TABLET BY MOUTH DAILY WITH BREAKFAST 90 tablet 0   • glucose blood (Contour Next Test) test strip Use twice a day 200 each 1   • ketorolac (ACULAR) 0 5 % ophthalmic solution      • levothyroxine 25 mcg tablet TAKE ONE TABLET BY MOUTH EVERY DAY 90 tablet 0   • lisinopril-hydrochlorothiazide (PRINZIDE,ZESTORETIC) 20-25 MG per tablet Take 1 tablet(s) every day by oral route  90 tablet 0   • meclizine (ANTIVERT) 25 mg tablet 1 tab take before car ride once a day as needed 10 tablet 0   • metFORMIN (GLUCOPHAGE-XR) 500 mg 24 hr tablet TAKE TWO TABLETS BY MOUTH 2 TIMES A  tablet 0   • multivitamin (THERAGRAN) TABS Take 1 tablet by mouth daily     • nystatin (MYCOSTATIN) cream Apply topically 2 (two) times a day 60 g 0   • ofloxacin (OCUFLOX) 0 3 % ophthalmic solution      • prednisoLONE acetate (PRED FORTE) 1 % ophthalmic suspension      • Tradjenta 5 MG TABS TAKE ONE TABLET BY MOUTH EVERY DAY 30 tablet 0   • umeclidinium (Incruse Ellipta) 62 5 mcg/actuation AEPB inhaler Inhale 1 puff daily 30 blister 11   • Ventolin  (90 Base) MCG/ACT inhaler Inhale 2 puffs every 6 (six) hours as needed for wheezing 18 g 0     No current facility-administered medications for this visit       Allergies   Allergen Reactions   • Penicillins Rash     After dental procedure       Immunizations:     Immunization History   Administered Date(s) Administered   • COVID-19 MODERNA VACC 0 25 ML IM BOOSTER 11/01/2021   • COVID-19 MODERNA VACC 0 5 ML IM 01/23/2021, 02/20/2021   • INFLUENZA 11/21/2017, 10/30/2018   • Influenza, high dose seasonal 0 7 mL 10/06/2020, 09/07/2021, 10/06/2022   • Pneumococcal Conjugate 13-Valent 09/21/2016   • Pneumococcal Polysaccharide PPV23 03/21/2016   • Tdap 09/21/2013      Health Maintenance: There are no preventive care reminders to display for this patient  Topic Date Due   • COVID-19 Vaccine (4 - Booster for Moderna series) 12/27/2021      Medicare Screening Tests and Risk Assessments:     Lexis Vuong is here for her Subsequent Wellness visit  Health Risk Assessment:   Patient rates overall health as fair  Patient feels that their physical health rating is same  Patient is satisfied with their life  Eyesight was rated as same  Hearing was rated as same  Patient feels that their emotional and mental health rating is same  Patients states they are never, rarely angry  Patient states they are never, rarely unusually tired/fatigued  Pain experienced in the last 7 days has been none  Patient states that she has experienced no weight loss or gain in last 6 months  Depression Screening:   PHQ-2 Score: 0      Fall Risk Screening: In the past year, patient has experienced: no history of falling in past year      Urinary Incontinence Screening:   Patient has not leaked urine accidently in the last six months  Home Safety:  Patient has trouble with stairs inside or outside of their home  Patient has working smoke alarms and has working carbon monoxide detector  Home safety hazards include: none  Nutrition:   Current diet is Regular  Medications:   Patient is currently taking over-the-counter supplements  OTC medications include: see medication list      Activities of Daily Living (ADLs)/Instrumental Activities of Daily Living (IADLs):   Walk and transfer into and out of bed and chair?: Yes  Dress and groom yourself?: Yes    Bathe or shower yourself?: Yes    Feed yourself?  Yes  Do your laundry/housekeeping?: Yes  Manage your money, pay your bills and track your expenses?: Yes  Make your own meals?: Yes    Do your own shopping?: Yes    Previous Hospitalizations:   Any hospitalizations or ED visits within the last 12 months?: No      Advance Care Planning:   Living will: Yes    Durable POA for healthcare: Yes    Advanced directive: Yes      Comments: Her son , Wallace Perez has power of      Cognitive Screening:   Provider or family/friend/caregiver concerned regarding cognition?: No    PREVENTIVE SCREENINGS      Cardiovascular Screening:    General: Screening Not Indicated and History Lipid Disorder      Diabetes Screening:     General: Screening Not Indicated and History Diabetes      Breast Cancer Screening:     General: Patient Declines      Cervical Cancer Screening:    General: Screening Not Indicated      Osteoporosis Screening:    General: Screening Not Indicated and History Osteoporosis      Lung Cancer Screening:     General: Screening Not Indicated    Screening, Brief Intervention, and Referral to Treatment (SBIRT)    Screening  Typical number of drinks in a day: 0  Typical number of drinks in a week: 0  Interpretation: Low risk drinking behavior  Single Item Drug Screening:  How often have you used an illegal drug (including marijuana) or a prescription medication for non-medical reasons in the past year? never    Single Item Drug Screen Score: 0  Interpretation: Negative screen for possible drug use disorder    Other Counseling Topics:   Regular weightbearing exercise and calcium and vitamin D intake  No results found  Physical Exam:     /60   Pulse (!) 107   Resp 18   Ht 5' (1 524 m)   Wt 73 5 kg (162 lb)   LMP  (LMP Unknown)   SpO2 94%   BMI 31 64 kg/m²     Physical Exam  Vitals and nursing note reviewed  Constitutional:       Appearance: Normal appearance  She is obese  Cardiovascular:      Rate and Rhythm: Normal rate  Heart sounds: No murmur heard    Pulmonary:      Effort: Pulmonary effort is normal       Comments: Distant breath sounds  Musculoskeletal:      Cervical back: Normal range of motion  Neurological:      General: No focal deficit present            Abdirashid Knowles MD

## 2023-04-27 ENCOUNTER — OFFICE VISIT (OUTPATIENT)
Dept: ENDOCRINOLOGY | Facility: CLINIC | Age: 83
End: 2023-04-27

## 2023-04-27 ENCOUNTER — TELEPHONE (OUTPATIENT)
Dept: ENDOCRINOLOGY | Facility: CLINIC | Age: 83
End: 2023-04-27

## 2023-04-27 VITALS
WEIGHT: 162 LBS | DIASTOLIC BLOOD PRESSURE: 60 MMHG | SYSTOLIC BLOOD PRESSURE: 110 MMHG | BODY MASS INDEX: 31.8 KG/M2 | HEIGHT: 60 IN

## 2023-04-27 DIAGNOSIS — I10 ESSENTIAL HYPERTENSION: ICD-10-CM

## 2023-04-27 DIAGNOSIS — E03.9 ACQUIRED HYPOTHYROIDISM: ICD-10-CM

## 2023-04-27 DIAGNOSIS — E11.22 TYPE 2 DIABETES MELLITUS WITH STAGE 2 CHRONIC KIDNEY DISEASE, WITHOUT LONG-TERM CURRENT USE OF INSULIN (HCC): Primary | ICD-10-CM

## 2023-04-27 DIAGNOSIS — N18.2 TYPE 2 DIABETES MELLITUS WITH STAGE 2 CHRONIC KIDNEY DISEASE, WITHOUT LONG-TERM CURRENT USE OF INSULIN (HCC): Primary | ICD-10-CM

## 2023-04-27 DIAGNOSIS — E11.65 TYPE 2 DIABETES MELLITUS WITH HYPERGLYCEMIA, WITHOUT LONG-TERM CURRENT USE OF INSULIN (HCC): ICD-10-CM

## 2023-04-27 DIAGNOSIS — M81.0 AGE-RELATED OSTEOPOROSIS WITHOUT CURRENT PATHOLOGICAL FRACTURE: ICD-10-CM

## 2023-04-27 DIAGNOSIS — E78.2 MIXED HYPERLIPIDEMIA: ICD-10-CM

## 2023-04-27 NOTE — TELEPHONE ENCOUNTER
Please start the paperwork for Prolia injection 60 mg subcutaneously every 6 months for osteoporosis management    Prefers Prolia injection in office  Please schedule first Prolia injection in June [address]

## 2023-04-27 NOTE — TELEPHONE ENCOUNTER
Hello Clinical   Patient has a Prolia injection on June 13, 2023 and a Prolia on January 5th 2024  Thank you

## 2023-04-27 NOTE — PROGRESS NOTES
Laury Moyer 80 y o  female MRN: [id number]    Encounter: [id number]      Assessment/Plan     Assessment: This is a 80y o -year-old female with diabetes with hyperglycemia  Plan:    Diagnoses and all orders for this visit:    Type 2 diabetes mellitus with stage 2 chronic kidney disease, without long-term current use of insulin (HCC)  Lab Results   Component Value Date    HGBA1C 6 7 (H) 04/20/2023   Hba1c is well controlled 6 7%, at goal   Blood sugars are in 80 to 180 milligrams per DL range  Continue glimepiride 1 mg daily with breakfast, metformin XR, 2 tablets twice a day, gentle 5 mg daily  Discussed to continue checking blood sugar 1 times daily and if she consistently above 250 mg per DL call office\  Educated about hypoglycemia symptoms and treatment  A1c in 4 months, follow-up in 8 months with A1c prior    -     Basic metabolic panel; Future  -     Hemoglobin A1C; Future  -     Microalbumin / creatinine urine ratio; Future  -     Hemoglobin A1C; Future    Type 2 diabetes mellitus with hyperglycemia, without long-term current use of insulin (HCC)  Continue current management  Essential hypertension  Pressure well controlled  Continue current management  Acquired hypothyroidism  Patient is clinically and biochemically euthyroid  Continue current dose of levothyroxine  Mixed hyperlipidemia  LDL is at goal, continue statins  Age-related osteoporosis without current pathological fracture  Discussed again medical management for osteoporosis considering history of compression fracture at vertebra  Discussed the option of IV Reclast as well as Prolia injection every 6 months  However she would benefit from Prolia injection because of history of CKD, was not able to tolerate bisphosphonates previously  Discussed benefits and side effects  She is agreeable to start Prolia injection    We will start the paperwork for Prolia,    Discussed to take vitamin D3 supplementation 4000 IU daily, calcium 500 mg twice a day   Discussed the importance of fall precautions      CC: Diabetes    History of Present Illness     HPI:    Shakira Castillo is a 77-year-old woman with medical history of type 2 diabetes, managed on oral medications, hypertension, hyperlipidemia, osteoporosis thyroid nodule is here for follow-up  Diabetes course has been stable  She denies recent hospitalization for hyperglycemia or hypoglycemia  Her last A1c 6 7%  Current regimen is glimepiride 1 mg daily, Tradjenta 5 mg daily and metformin 1000 mg twice a day, med compliance, denies side effects  She checks blood sugars once or twice daily and sugar usually in 80 to 180 mg per DL range  Osteoporosis, she has declined medical management previously  Also has history of compression fractures    For hyperlipidemia, she takes Lipitor 20 mg daily, denies side effects, she takes Prinzide Zestoretic 20/25 mg daily for hypertension, blood pressure well controlled  Also has subcentimeter thyroid nodule which does not need any follow-up  Eye appt - scheduled this month ( no retinopathy)     Wt Readings from Last 3 Encounters:   04/27/23 73 5 kg (162 lb)   04/25/23 73 5 kg (162 lb)   01/18/23 74 4 kg (164 lb)     Lab Results   Component Value Date    HGBA1C 6 7 (H) 04/20/2023     Component      Latest Ref Rng & Units 4/20/2023   Sodium      135 - 147 mmol/L 135   Potassium      3 5 - 5 3 mmol/L 4 3   Chloride      96 - 108 mmol/L 102   CO2      21 - 32 mmol/L 31   Anion Gap      4 - 13 mmol/L 2 (L)   BUN      5 - 25 mg/dL 16   Creatinine      0 60 - 1 30 mg/dL 0 53 (L)   GLUCOSE FASTING      65 - 99 mg/dL 163 (H)   Calcium      8 3 - 10 1 mg/dL 9 6   eGFR      ml/min/1 73sq m 88   Hemoglobin A1C      Normal 3 8-5 6%; PreDiabetic 5 7-6 4%;  Diabetic >=6 5%; Glycemic control for adults with diabetes <7 0% % 6 7 (H)   eAG, EST AVG Glucose      mg/dl 146   Free T4      0 76 - 1 46 ng/dL 1 25   TSH 3RD GENERATON      0 450 - 4 500 uIU/mL 3 090        Review of Systems   Constitutional: Negative for activity change, diaphoresis, fatigue, fever and unexpected weight change  HENT: Negative  Eyes: Negative for visual disturbance  Respiratory: Negative for cough, chest tightness and shortness of breath  Cardiovascular: Negative for chest pain, palpitations and leg swelling  Gastrointestinal: Negative for abdominal pain, blood in stool, constipation, diarrhea, nausea and vomiting  Endocrine: Negative for cold intolerance, heat intolerance, polydipsia, polyphagia and polyuria  Genitourinary: Negative for dysuria, enuresis, frequency and urgency  Musculoskeletal: Positive for arthralgias, back pain and myalgias  Skin: Negative for pallor, rash and wound  Allergic/Immunologic: Negative  Neurological: Negative for dizziness, tremors, weakness and numbness  Hematological: Negative  Psychiatric/Behavioral: Negative  Thyroid USG , 11/2020   THYROID ULTRASOUND     INDICATION:    E03 9: Hypothyroidism, unspecified      COMPARISON:  None     TECHNIQUE:   Ultrasound of the thyroid was performed with a high frequency linear transducer in transverse and sagittal planes including volumetric imaging sweeps as well as traditional still imaging technique      FINDINGS:  Thyroid parenchyma is diffusely heterogeneous in echotexture      Right lobe:  3 1 x 1 9 x 2 0 cm  Left lobe:  3 5 x 1 7 x 1 2 cm  Isthmus:  0 1 cm      Nodule #1  Image 15  RIGHT midgland nodule measuring 0 4 x 0 7 x 0 5 cm  No priors for comparison  COMPOSITION:  2 points, solid or almost completely solid   ECHOGENICITY:  1 point, hyperechoic or isoechoic  SHAPE:  3 points, taller-than-wide  MARGIN: 0 points, smooth  ECHOGENIC FOCI:  0 points, none or large comet-tail artifacts  TI-RADS Classification: TR 4 (4-6 points), Moderately suspicious  FNA if > 1 5 cm   Follow if > 1cm      Multiple colloid cysts are also seen      IMPRESSION:     Heterogeneous thyroid with small cysts and subcentimeter nodule  No nodule meets current ACR criteria for requiring biopsy or followup ultrasounds unless otherwise clinically indicated  Historical Information   Past Medical History:   Diagnosis Date   • Anemia    • Blood clotting tendency (HCC)     left leg, lung   • Chronic respiratory failure with hypoxia and hypercapnia (HCC)    • COPD (chronic obstructive pulmonary disease) (HCC)    • Deep vein thrombosis (HCC)    • Diabetes mellitus (HCC)    • Emphysema lung (HCC)    • Emphysema lung (HCC)    • Emphysema of lung (HCC)    • Hyperlipidemia    • Hypertension    • Hyperthyroidism    • Hypothyroid    • On warfarin therapy    • Osteoporosis    • Palpitations    • Pulmonary embolism ([facility] [address] )    • Sleep apnea, obstructive    • Thyroid disease      Past Surgical History:   Procedure Laterality Date   • AV NODE ABLATION  2017   • COLONOSCOPY  2018    w/ EGD   • DXA PROCEDURE (HISTORICAL)  2016   • MT COLONOSCOPY FLX DX W/COLLJ SPEC WHEN PFRMD N/A 2018    Procedure: EGD AND COLONOSCOPY;  Surgeon: Emelia Cheek MD;  Location: [facility];   Service: Gastroenterology   • TOE SURGERY       Social History   Social History     Substance and Sexual Activity   Alcohol Use No     Social History     Substance and Sexual Activity   Drug Use No     Social History     Tobacco Use   Smoking Status Former   • Packs/day: 1 00   • Years: 46 00   • Pack years: 46 00   • Types: Cigarettes   • Quit date:    • Years since quittin 3   Smokeless Tobacco Never     Family History:   Family History   Problem Relation Age of Onset   • Hypertension Mother    • Hyperlipidemia Mother    • Asthma Father    • Heart failure Father    • Hypertension Sister    • Hyperlipidemia Sister    • Heart attack Neg Hx    • Aneurysm Neg Hx    • Stroke Neg Hx    • Clotting disorder Neg Hx        Meds/Allergies   Current Outpatient Medications   Medication Sig Dispense Refill   • albuterol (2 5 mg/3 mL) 0 083 % nebulizer solution Take 3 mL (2 5 mg total) by nebulization every 4 (four) hours as needed for wheezing or shortness of breath 180 mL 3   • aspirin (ECOTRIN LOW STRENGTH) 81 mg EC tablet Take 81 mg by mouth daily     • atorvastatin (LIPITOR) 20 mg tablet Take 1 tablet (20 mg total) by mouth daily 90 tablet 2   • azithromycin (ZITHROMAX) 500 MG tablet Take 1 tablet (500 mg total) by mouth 3 (three) times a week 12 tablet 11   • budesonide-formoterol (Symbicort) 160-4 5 mcg/act inhaler Inhale 2 puffs 2 (two) times a day As directed 10 2 g 11   • Cholecalciferol (VITAMIN D) 2000 units CAPS Take 2 capsules (4,000 Units total) by mouth daily     • cyanocobalamin (VITAMIN B-12) 100 mcg tablet Take by mouth daily     • diltiazem (CARDIZEM CD) 300 mg 24 hr capsule Take 1 capsule (300 mg total) by mouth daily As directed 90 capsule 0   • fluticasone (FLONASE) 50 mcg/act nasal spray 2 sprays into each nostril daily 1 Bottle 5   • glimepiride (AMARYL) 1 mg tablet TAKE ONE TABLET BY MOUTH DAILY WITH BREAKFAST 90 tablet 0   • glucose blood (Contour Next Test) test strip Use twice a day 200 each 1   • ketorolac (ACULAR) 0 5 % ophthalmic solution      • levothyroxine 25 mcg tablet TAKE ONE TABLET BY MOUTH EVERY DAY 90 tablet 0   • lisinopril-hydrochlorothiazide (PRINZIDE,ZESTORETIC) 20-25 MG per tablet Take 1 tablet(s) every day by oral route   90 tablet 0   • meclizine (ANTIVERT) 25 mg tablet 1 tab take before car ride once a day as needed 10 tablet 0   • metFORMIN (GLUCOPHAGE-XR) 500 mg 24 hr tablet TAKE TWO TABLETS BY MOUTH 2 TIMES A  tablet 0   • multivitamin (THERAGRAN) TABS Take 1 tablet by mouth daily     • nystatin (MYCOSTATIN) cream Apply topically 2 (two) times a day 60 g 0   • ofloxacin (OCUFLOX) 0 3 % ophthalmic solution      • prednisoLONE acetate (PRED FORTE) 1 % ophthalmic suspension      • Tradjenta 5 MG TABS TAKE ONE TABLET BY MOUTH EVERY DAY 30 tablet 0   • umeclidinium (Incruse Ellipta) 62 5 mcg/actuation AEPB inhaler Inhale 1 puff daily 30 blister 11   • Ventolin  (90 Base) MCG/ACT inhaler Inhale 2 puffs every 6 (six) hours as needed for wheezing 18 g 0     No current facility-administered medications for this visit  Allergies   Allergen Reactions   • Penicillins Rash     After dental procedure        Objective   Vitals: Blood pressure 110/60, height 5' (1 524 m), weight 73 5 kg (162 lb)  Physical Exam  Vitals reviewed  Constitutional:       General: She is not in acute distress  Appearance: Normal appearance  She is not ill-appearing  HENT:      Head: Normocephalic and atraumatic  Nose: Nose normal    Eyes:      Extraocular Movements: Extraocular movements intact  Conjunctiva/sclera: Conjunctivae normal    Pulmonary:      Effort: Pulmonary effort is normal  No respiratory distress  Breath sounds: Rhonchi present  Musculoskeletal:      Cervical back: Normal range of motion  Neurological:      General: No focal deficit present  Mental Status: She is alert and oriented to person, place, and time  Psychiatric:         Mood and Affect: Mood normal          Behavior: Behavior normal          The history was obtained from the review of the chart, patient      Lab Results:   Lab Results   Component Value Date/Time    Hemoglobin A1C 6 7 (H) 04/20/2023 07:54 AM    Hemoglobin A1C 6 3 (H) 11/18/2022 08:05 AM    WBC 15 65 (H) 02/03/2023 03:57 PM    Hemoglobin 10 3 (L) 02/03/2023 03:57 PM    Hematocrit 34 2 (L) 02/03/2023 03:57 PM    MCV 73 (L) 02/03/2023 03:57 PM    Platelets 452 44/91/3759 03:57 PM    BUN 16 04/20/2023 07:54 AM    BUN 22 02/03/2023 03:57 PM    BUN 15 11/18/2022 08:05 AM    Potassium 4 3 04/20/2023 07:54 AM    Potassium 4 0 02/03/2023 03:57 PM    Potassium 4 2 11/18/2022 08:05 AM    Chloride 102 04/20/2023 07:54 AM    Chloride 98 02/03/2023 03:57 PM    Chloride 103 11/18/2022 08:05 AM    CO2 31 04/20/2023 07:54 AM    CO2 29 02/03/2023 03:57 PM    CO2 27 11/18/2022 08:05 "AM    Creatinine 0 53 (L) 04/20/2023 07:54 AM    Creatinine 0 43 (L) 02/03/2023 03:57 PM    Creatinine 0 54 (L) 11/18/2022 08:05 AM    AST 12 (L) 02/03/2023 03:57 PM    ALT 13 02/03/2023 03:57 PM    Albumin 4 2 02/03/2023 03:57 PM    HDL, Direct 54 11/18/2022 08:05 AM    Triglycerides 108 11/18/2022 08:05 AM           Imaging Studies: I have personally reviewed pertinent reports  Portions of the record may have been created with voice recognition software  Occasional wrong word or \"sound a like\" substitutions may have occurred due to the inherent limitations of voice recognition software  Read the chart carefully and recognize, using context, where substitutions have occurred    "

## 2023-05-10 ENCOUNTER — TELEPHONE (OUTPATIENT)
Dept: FAMILY MEDICINE CLINIC | Facility: CLINIC | Age: 83
End: 2023-05-10

## 2023-05-10 NOTE — TELEPHONE ENCOUNTER
Maite Challenger is calling to see if Dr Mccormick [facility] has any suggestions for the medical device that is used at home for when you fall or  have an emergency and you can get help

## 2023-05-17 DIAGNOSIS — E11.65 TYPE 2 DIABETES MELLITUS WITH HYPERGLYCEMIA, WITHOUT LONG-TERM CURRENT USE OF INSULIN (HCC): Primary | ICD-10-CM

## 2023-05-17 NOTE — TELEPHONE ENCOUNTER
Pt asking if she can get [facility] now instead of tradjenta  tradjenta is too expensive

## 2023-05-17 NOTE — TELEPHONE ENCOUNTER
Yes we can switch her to Januvia 100 mg daily, in place of Tradjenta    Please make prescription ready and send it to pharmacy    Anthony Love

## 2023-05-26 DIAGNOSIS — J44.9 COPD, SEVERE (HCC): ICD-10-CM

## 2023-05-26 DIAGNOSIS — I10 ESSENTIAL HYPERTENSION: ICD-10-CM

## 2023-05-26 RX ORDER — DILTIAZEM HYDROCHLORIDE 300 MG/1
300 CAPSULE, COATED, EXTENDED RELEASE ORAL DAILY
Qty: 90 CAPSULE | Refills: 0 | Status: SHIPPED | OUTPATIENT
Start: 2023-05-26

## 2023-05-26 RX ORDER — LISINOPRIL AND HYDROCHLOROTHIAZIDE 25; 20 MG/1; MG/1
TABLET ORAL
Qty: 90 TABLET | Refills: 0 | Status: SHIPPED | OUTPATIENT
Start: 2023-05-26

## 2023-06-09 ENCOUNTER — TELEPHONE (OUTPATIENT)
Dept: ENDOCRINOLOGY | Facility: CLINIC | Age: 83
End: 2023-06-09

## 2023-06-09 DIAGNOSIS — M81.0 AGE-RELATED OSTEOPOROSIS WITHOUT CURRENT PATHOLOGICAL FRACTURE: Primary | ICD-10-CM

## 2023-06-09 NOTE — TELEPHONE ENCOUNTER
[employer] PA submitted via [employer] portal for Prolia  Reference # [id number]     Insurance verification submitted for year 2023

## 2023-06-13 ENCOUNTER — TELEPHONE (OUTPATIENT)
Dept: ENDOCRINOLOGY | Facility: CLINIC | Age: 83
End: 2023-06-13

## 2023-06-13 ENCOUNTER — CLINICAL SUPPORT (OUTPATIENT)
Dept: ENDOCRINOLOGY | Facility: CLINIC | Age: 83
End: 2023-06-13
Payer: COMMERCIAL

## 2023-06-13 VITALS — DIASTOLIC BLOOD PRESSURE: 72 MMHG | SYSTOLIC BLOOD PRESSURE: 120 MMHG | HEART RATE: 94 BPM

## 2023-06-13 DIAGNOSIS — M81.0 AGE-RELATED OSTEOPOROSIS WITHOUT CURRENT PATHOLOGICAL FRACTURE: ICD-10-CM

## 2023-06-13 PROCEDURE — 96372 THER/PROPH/DIAG INJ SC/IM: CPT

## 2023-06-13 NOTE — PROGRESS NOTES
Assessment/Plan:    Melanie Villagomez came into the [facility]'s Endocrinology Office today 06/13/23 to receive Prolia injection  Verbal consent obtained  Consent given by: patient    patient states patient has been medically healthy with no underlining concerns/complications  Melanie Villagomez presents with no symptoms today  All insturctions were reviewed with the patient  If the patient should have any questions/concerns, advised patient to contacted [facility]  Subjective:     History provided by: patient    Patient ID: Melanie Villagomez is a 80 y o  female      Objective:    Vitals:    06/13/23 1013   BP: 120/72   BP Location: Left arm   Patient Position: Sitting   Cuff Size: Standard   Pulse: 94       Patient tolerated the injection well without any complications  Injection site/s right arm   Medication was provided by office  Patient signed consent form yes   Patient signed [place] form yes (If no patient is not a medicare patient)  Patient waited 15 minutes after injection yes (This only applies to patient's receiving first time injection)         Last Visit: 6/13/2023  Next visit:Visit date not found  \

## 2023-06-21 DIAGNOSIS — E11.65 TYPE 2 DIABETES MELLITUS WITH HYPERGLYCEMIA, WITHOUT LONG-TERM CURRENT USE OF INSULIN (HCC): ICD-10-CM

## 2023-06-21 RX ORDER — ATORVASTATIN CALCIUM 20 MG/1
TABLET, FILM COATED ORAL
Qty: 90 TABLET | Refills: 0 | Status: SHIPPED | OUTPATIENT
Start: 2023-06-21

## 2023-06-23 ENCOUNTER — TELEPHONE (OUTPATIENT)
Dept: PULMONOLOGY | Facility: CLINIC | Age: 83
End: 2023-06-23

## 2023-06-23 ENCOUNTER — APPOINTMENT (OUTPATIENT)
Dept: LAB | Facility: CLINIC | Age: 83
End: 2023-06-23
Payer: COMMERCIAL

## 2023-06-23 DIAGNOSIS — R19.7 DIARRHEA, UNSPECIFIED TYPE: ICD-10-CM

## 2023-06-23 DIAGNOSIS — R19.7 DIARRHEA, UNSPECIFIED TYPE: Primary | ICD-10-CM

## 2023-06-23 PROCEDURE — 87493 C DIFF AMPLIFIED PROBE: CPT

## 2023-06-23 NOTE — TELEPHONE ENCOUNTER
Patient called asking to speak with someone because she has some concerns about the antibiotic that she has been taking  She thinks that since she took the medication for so long it is giving her stomach issues and she would just like to know what can be done  Please advise

## 2023-06-23 NOTE — TELEPHONE ENCOUNTER
I called the pt and she is having loose stool not diarrhea  Is going more then 3 times a day  Pt has a upset stomach  She is currently on Zithromax 3 times weekly    Please advise

## 2023-06-23 NOTE — TELEPHONE ENCOUNTER
These symptoms can definitely be secondary to the azithromycin  I am going to order a c diff to be completed, this is a stool sample  C diff can be caused by antibiotics, it is an infection in the colon  It can also be an acute stomach bug  She can start over the counter probiotic or yogurt  She should do the stool sample first, before starting any OTC antidiarrheal medications

## 2023-06-24 LAB — C DIFF TOX B TCDB STL QL NAA+PROBE: NEGATIVE

## 2023-06-27 DIAGNOSIS — N18.2 TYPE 2 DIABETES MELLITUS WITH STAGE 2 CHRONIC KIDNEY DISEASE, WITHOUT LONG-TERM CURRENT USE OF INSULIN (HCC): ICD-10-CM

## 2023-06-27 DIAGNOSIS — E11.22 TYPE 2 DIABETES MELLITUS WITH STAGE 2 CHRONIC KIDNEY DISEASE, WITHOUT LONG-TERM CURRENT USE OF INSULIN (HCC): ICD-10-CM

## 2023-06-27 RX ORDER — METFORMIN HYDROCHLORIDE 500 MG/1
TABLET, EXTENDED RELEASE ORAL
Qty: 360 TABLET | Refills: 0 | Status: SHIPPED | OUTPATIENT
Start: 2023-06-27 | End: 2023-07-07 | Stop reason: ALTCHOICE

## 2023-06-27 NOTE — TELEPHONE ENCOUNTER
Called Pt to sched her for an appt due to clinical note and July 6th was cancelled and patient said Dr Hermann Siddiqi said that is ok for her to wait for a follow up in January 2024

## 2023-07-03 ENCOUNTER — TELEPHONE (OUTPATIENT)
Dept: ENDOCRINOLOGY | Facility: CLINIC | Age: 83
End: 2023-07-03

## 2023-07-03 NOTE — TELEPHONE ENCOUNTER
Pt was not told how expensive the prolia shot is she cannot afford this.   Pt was calling billing office

## 2023-07-05 DIAGNOSIS — E03.9 ACQUIRED HYPOTHYROIDISM: ICD-10-CM

## 2023-07-05 RX ORDER — LEVOTHYROXINE SODIUM 0.03 MG/1
TABLET ORAL
Qty: 90 TABLET | Refills: 0 | Status: SHIPPED | OUTPATIENT
Start: 2023-07-05

## 2023-07-05 NOTE — TELEPHONE ENCOUNTER
Is pt interested in IV reclast, which is once a year, will you be able to find out OOP cost and let pt know, thanks     Ravi Lock

## 2023-07-05 NOTE — TELEPHONE ENCOUNTER
Spoke with patient and discussed that she will need to be medication for osteoporosis, and if she can find out how much will be out-of-pocket cost for Reclast infusion. She will call back office later to get the name, she has incoming call and will not able to talk   She cannot tolerate oral bisphosphonates thyroid GI side effects before.     Jessy Hammer

## 2023-07-05 NOTE — TELEPHONE ENCOUNTER
Spoke to pt. She stated OOP cost was never reviewed w/ her. I explained to her that OOP cost is always reviewed w/ pt prior ti getting injection, b/c if the pt refused we don't bother submitting auth and look for a more affordable option. Pt confirmed she does not was Prolia anymore. Pt only received 1 dose.     Please review and remove from med list.    Thanks

## 2023-07-05 NOTE — TELEPHONE ENCOUNTER
Please see pt's message concerning cost of Prolia, could you please reach out to pt , thanks     [address]

## 2023-07-06 ENCOUNTER — TELEPHONE (OUTPATIENT)
Dept: PULMONOLOGY | Facility: CLINIC | Age: 83
End: 2023-07-06

## 2023-07-06 NOTE — TELEPHONE ENCOUNTER
Patient called stating that she spoke with her insurance and they are going to be sending forms through fax for the doctor to fill out. She said that the forms are regarding her medications to try to reduce the price and they have to be filled out/faxed back within 72 hours. Please advise.

## 2023-07-07 ENCOUNTER — TELEPHONE (OUTPATIENT)
Dept: ENDOCRINOLOGY | Facility: CLINIC | Age: 83
End: 2023-07-07

## 2023-07-07 DIAGNOSIS — E11.65 TYPE 2 DIABETES MELLITUS WITH HYPERGLYCEMIA, WITHOUT LONG-TERM CURRENT USE OF INSULIN (HCC): ICD-10-CM

## 2023-07-07 RX ORDER — GLIMEPIRIDE 1 MG/1
TABLET ORAL
Qty: 180 TABLET | Refills: 1 | Status: SHIPPED | OUTPATIENT
Start: 2023-07-07

## 2023-07-07 NOTE — TELEPHONE ENCOUNTER
Pt called  She was asking about the med that was denied she needs a cheaper one.     Also she wanted to speak with the provider, she thinks the metformin is messing up her stomach please call her

## 2023-07-07 NOTE — TELEPHONE ENCOUNTER
Spoke with pt, she stated metformin causing diarrhea and stomach pain,   She wants to stop metformin as it is causing stomach pains     Discussed to stop Metformin, increase amaryl to 1 mg twice a day   Also discussed to continue Januvia , Pt stated she is still taking Tradjenta,  Discussed she can start Januvia, once tradjenta finished   Patient verbalized understanding and appreciated a call

## 2023-08-24 DIAGNOSIS — I10 ESSENTIAL HYPERTENSION: ICD-10-CM

## 2023-08-24 DIAGNOSIS — J44.9 COPD, SEVERE (HCC): ICD-10-CM

## 2023-08-25 RX ORDER — DILTIAZEM HYDROCHLORIDE 300 MG/1
300 CAPSULE, COATED, EXTENDED RELEASE ORAL DAILY
Qty: 90 CAPSULE | Refills: 0 | Status: SHIPPED | OUTPATIENT
Start: 2023-08-25

## 2023-08-25 RX ORDER — LISINOPRIL AND HYDROCHLOROTHIAZIDE 25; 20 MG/1; MG/1
TABLET ORAL
Qty: 90 TABLET | Refills: 0 | Status: SHIPPED | OUTPATIENT
Start: 2023-08-25

## 2023-09-11 RX ORDER — LINAGLIPTIN 5 MG/1
TABLET, FILM COATED ORAL
COMMUNITY
Start: 2023-08-09

## 2023-09-12 ENCOUNTER — OFFICE VISIT (OUTPATIENT)
Dept: FAMILY MEDICINE CLINIC | Facility: CLINIC | Age: 83
End: 2023-09-12

## 2023-09-12 VITALS
HEIGHT: 60 IN | WEIGHT: 160 LBS | SYSTOLIC BLOOD PRESSURE: 125 MMHG | BODY MASS INDEX: 31.41 KG/M2 | DIASTOLIC BLOOD PRESSURE: 68 MMHG | OXYGEN SATURATION: 95 % | HEART RATE: 97 BPM

## 2023-09-12 DIAGNOSIS — E03.9 ACQUIRED HYPOTHYROIDISM: ICD-10-CM

## 2023-09-12 DIAGNOSIS — J96.11 CHRONIC RESPIRATORY FAILURE WITH HYPOXIA AND HYPERCAPNIA (HCC): ICD-10-CM

## 2023-09-12 DIAGNOSIS — R01.1 MURMUR, CARDIAC: ICD-10-CM

## 2023-09-12 DIAGNOSIS — J96.12 CHRONIC RESPIRATORY FAILURE WITH HYPOXIA AND HYPERCAPNIA (HCC): ICD-10-CM

## 2023-09-12 DIAGNOSIS — Z23 NEEDS FLU SHOT: Primary | ICD-10-CM

## 2023-09-12 DIAGNOSIS — I47.1 PSVT (PAROXYSMAL SUPRAVENTRICULAR TACHYCARDIA): ICD-10-CM

## 2023-09-12 DIAGNOSIS — J44.9 COPD, SEVERE (HCC): ICD-10-CM

## 2023-09-12 DIAGNOSIS — D22.9 SKIN MOLE: ICD-10-CM

## 2023-09-12 DIAGNOSIS — E11.65 TYPE 2 DIABETES MELLITUS WITH HYPERGLYCEMIA, WITHOUT LONG-TERM CURRENT USE OF INSULIN (HCC): ICD-10-CM

## 2023-09-12 NOTE — ASSESSMENT & PLAN NOTE
Follows endocrinologist and recently metformin was stopped because of stomach issues  Lab Results   Component Value Date    HGBA1C 6.7 (H) 04/20/2023

## 2023-09-12 NOTE — PROGRESS NOTES
Name: Lopez Fortune      : 1940      MRN: [id number]  Encounter Provider: Kelvin Bernardo MD  Encounter Date: 2023   Encounter department: [address] [place] [address]     1. Needs flu shot  -     influenza vaccine, high-dose, PF 0.7 mL (FLUZONE HIGH-DOSE)    2. PSVT (paroxysmal supraventricular tachycardia) (HCC)  Assessment & Plan:  No recent chest pain, she follows cardiologist, she has a cardiac murmur which is stable      3. COPD, severe ([address])    4. Type 2 diabetes mellitus with hyperglycemia, without long-term current use of insulin [facility])  Assessment & Plan:  Follows endocrinologist and recently metformin was stopped because of stomach issues  Lab Results   Component Value Date    HGBA1C 6.7 (H) 2023         5. Acquired hypothyroidism  Assessment & Plan:  Lab Results   Component Value Date    [id number] 3.090 2023           6. Murmur, cardiac    7. Skin mole    8. Chronic respiratory failure with hypoxia and hypercapnia (HCC)  Assessment & Plan:  She uses home oxygen and inhalers and stable             Subjective     She is here for follow-up, overall she is doing well she has COPD, diabetes, recently endocrinologist stopped her metformin and it was giving her stomach issues,  She wants to have the flu vaccine, she use home oxygen, she takes her medication for hyperlipidemia and hypertension    Review of Systems   Constitutional: Negative. Eyes: Negative. Respiratory: Negative. Cardiovascular: Negative. Gastrointestinal: Negative. Musculoskeletal: Negative.     Skin:        Small mole on the lobe of the left ear       Past Medical History:   Diagnosis Date   • Anemia    • Blood clotting tendency (HCC)     left leg, lung   • Chronic respiratory failure with hypoxia and hypercapnia (HCC)    • COPD (chronic obstructive pulmonary disease) (HCC)    • Deep vein thrombosis (HCC)    • Diabetes mellitus (HCC)    • Emphysema lung (HCC)    • Emphysema lung [facility])    • Emphysema of lung ([address])    • Hyperlipidemia    • Hypertension    • Hyperthyroidism    • Hypothyroid    • On warfarin therapy    • Osteoporosis    • Palpitations    • Pulmonary embolism ([address])    • Sleep apnea, obstructive    • Thyroid disease      Past Surgical History:   Procedure Laterality Date   • AV NODE ABLATION  2017   • COLONOSCOPY  2018    w/ EGD   • DXA PROCEDURE (HISTORICAL)  2016   • PA COLONOSCOPY FLX DX W/COLLJ SPEC WHEN PFRMD N/A 2018    Procedure: EGD AND COLONOSCOPY;  Surgeon: Beatrice Mixon MD;  [facility]: [facility]; Service: Gastroenterology   • TOE SURGERY       Family History   Problem Relation Age of Onset   • Hypertension Mother    • Hyperlipidemia Mother    • Asthma Father    • Heart failure Father    • Hypertension Sister    • Hyperlipidemia Sister    • Heart attack Neg Hx    • Aneurysm Neg Hx    • Stroke Neg Hx    • Clotting disorder Neg Hx      Social History     Socioeconomic History   • Marital status:       Spouse name: None   • Number of children: None   • Years of education: None   • Highest education level: None   Occupational History   • None   Tobacco Use   • Smoking status: Former     Packs/day: 1.00     Years: 46.00     Total pack years: 46.00     Types: Cigarettes     Quit date:      Years since quittin.7   • Smokeless tobacco: Never   Vaping Use   • Vaping Use: Never used   Substance and Sexual Activity   • Alcohol use: No   • Drug use: No   • Sexual activity: None   Other Topics Concern   • None   Social History Narrative    Date of last mammogram:   2017      Most Recent Bone Density:   2016       Date of Last Pap Smear:       not required per gyn         Smokeless tobacco status:   Never used smokeless tobacco      Tobacco-years of use:   55      E-cigarette/vape status:   Never used electronic cigarettes      Most recent tobacco use screenin2019      Do you currently or have you served in the [employer]:   No      Were you activated, into active duty, as a member of the [employer] or as a Reservist:   No      Marital status:       Diet:   Regular      General stress level:   Low      Has smoked since age:   21       Alcohol intake:   None       Caffeine intake:    Moderate      Chewing tobacco:   none      Guns present in home:   No      Seat belts used routinely:   Yes      Sunscreen used routinely:   Yes      Smoke alarm in home:   Yes      Advance directive:   Yes      Live alone or with others:   alone       Are there stairs in your home:   No      Pets:   No      Social Determinants of Health     Financial Resource Strain: Not on file   Food Insecurity: Not on file   Transportation Needs: Not on file   Physical Activity: Not on file   Stress: Not on file   Social Connections: Not on file   Intimate Partner Violence: Not on file   Housing Stability: Not on file     Current Outpatient Medications on File Prior to Visit   Medication Sig   • albuterol (2.5 mg/3 mL) 0.083 % nebulizer solution Take 3 mL (2.5 mg total) by nebulization every 4 (four) hours as needed for wheezing or shortness of breath   • aspirin (ECOTRIN LOW STRENGTH) 81 mg EC tablet Take 81 mg by mouth daily   • atorvastatin (LIPITOR) 20 mg tablet TAKE ONE TABLET BY MOUTH EVERY DAY   • budesonide-formoterol (Symbicort) 160-4.5 mcg/act inhaler Inhale 2 puffs 2 (two) times a day As directed   • Cholecalciferol (VITAMIN D) 2000 units CAPS Take 2 capsules (4,000 Units total) by mouth daily   • cyanocobalamin (VITAMIN B-12) 100 mcg tablet Take by mouth daily   • diltiazem (CARDIZEM CD) 300 mg 24 hr capsule Take 1 capsule (300 mg total) by mouth daily As directed   • fluticasone (FLONASE) 50 mcg/act nasal spray 2 sprays into each nostril daily   • glimepiride (AMARYL) 1 mg tablet Take one tablet with breakfast and 1 tablet with dinner   • glucose blood (Contour Next Test) test strip Use twice a day   • ketorolac (ACULAR) 0.5 % ophthalmic solution    • levothyroxine 25 mcg tablet TAKE ONE TABLET BY MOUTH EVERY DAY   • lisinopril-hydrochlorothiazide (PRINZIDE,ZESTORETIC) 20-25 MG per tablet Take 1 tablet(s) every day by oral route. • meclizine (ANTIVERT) 25 mg tablet 1 tab take before car ride once a day as needed   • multivitamin (THERAGRAN) TABS Take 1 tablet by mouth daily   • nystatin (MYCOSTATIN) cream Apply topically 2 (two) times a day   • ofloxacin (OCUFLOX) 0.3 % ophthalmic solution    • prednisoLONE acetate (PRED FORTE) 1 % ophthalmic suspension    • sitaGLIPtin (JANUVIA) 100 mg tablet Take 1 tablet (100 mg total) by mouth daily   • Tradjenta 5 MG TABS    • umeclidinium (Incruse Ellipta) 62.5 mcg/actuation AEPB inhaler Inhale 1 puff daily   • Ventolin  (90 Base) MCG/ACT inhaler Inhale 2 puffs every 6 (six) hours as needed for wheezing   • aspirin (ECOTRIN LOW STRENGTH) 81 mg EC tablet Take 81 mg by mouth daily   • azithromycin (ZITHROMAX) 500 MG tablet Take 1 tablet (500 mg total) by mouth 3 (three) times a week (Patient not taking: Reported on 9/12/2023)   • fluticasone (FLONASE) 50 mcg/act nasal spray 2 sprays into each nostril daily     Allergies   Allergen Reactions   • Penicillins Rash     After dental procedure      Immunization History   Administered Date(s) Administered   • COVID-19 MODERNA VACC 0.25 ML IM BOOSTER 11/01/2021   • COVID-19 MODERNA VACC 0.5 ML IM 01/23/2021, 02/20/2021   • INFLUENZA 11/21/2017, 10/30/2018   • Influenza, high dose seasonal 0.7 mL 10/06/2020, 09/07/2021, 10/06/2022, 09/12/2023   • Pneumococcal Conjugate 13-Valent 09/21/2016   • Pneumococcal Polysaccharide PPV23 03/21/2016   • Tdap 09/21/2013       Objective     /68   Pulse 97   Ht 5' (1.524 m)   Wt 72.6 kg (160 lb)   LMP  (LMP Unknown)   SpO2 95%   BMI 31.25 kg/m²     Physical Exam  Vitals and nursing note reviewed. Constitutional:       Appearance: Normal appearance. She is well-developed.    Eyes:      Extraocular Movements: Extraocular movements intact. Neck:      Thyroid: No thyromegaly. Cardiovascular:      Rate and Rhythm: Normal rate and regular rhythm. Heart sounds: Murmur heard. Pulmonary:      Effort: Pulmonary effort is normal.      Breath sounds: Normal breath sounds. No wheezing or rales. Musculoskeletal:      Cervical back: Normal range of motion and neck supple. Lymphadenopathy:      Cervical: No cervical adenopathy. Skin:     Findings: No erythema or rash. Comments: 1 mm raised brown mole on left ear lobe    Neurological:      Mental Status: She is alert.        Hannah Gilmore MD

## 2023-09-18 DIAGNOSIS — E11.65 TYPE 2 DIABETES MELLITUS WITH HYPERGLYCEMIA, WITHOUT LONG-TERM CURRENT USE OF INSULIN (HCC): ICD-10-CM

## 2023-09-18 RX ORDER — ATORVASTATIN CALCIUM 20 MG/1
TABLET, FILM COATED ORAL
Qty: 90 TABLET | Refills: 0 | Status: SHIPPED | OUTPATIENT
Start: 2023-09-18

## 2023-09-29 DIAGNOSIS — E03.9 ACQUIRED HYPOTHYROIDISM: ICD-10-CM

## 2023-09-29 RX ORDER — LEVOTHYROXINE SODIUM 0.03 MG/1
TABLET ORAL
Qty: 90 TABLET | Refills: 0 | Status: SHIPPED | OUTPATIENT
Start: 2023-09-29

## 2023-10-06 ENCOUNTER — VBI (OUTPATIENT)
Dept: ADMINISTRATIVE | Facility: OTHER | Age: 83
End: 2023-10-06

## 2023-10-11 ENCOUNTER — TELEPHONE (OUTPATIENT)
Dept: ENDOCRINOLOGY | Facility: CLINIC | Age: 83
End: 2023-10-11

## 2023-10-11 NOTE — TELEPHONE ENCOUNTER
Please inform pt that her blood sugars goal is 100-180 ,so if her blood sugars are running in 150 range that is acceptable. sHe should continue current regimen.   Check blood sugar before breakfast and before dinner and send log in 2 weeks    Ricky Vergara Refill for Vyvanse 20 mg. Last seen 10/15/18 for ADHD. Last filled 2/11/19, #60 with no refills. PDMP on Dr. Tawanna Hines desk for review. She has upcoming appt on 5/6/19.

## 2023-10-11 NOTE — TELEPHONE ENCOUNTER
Desiree Dick called the office, she is c/o high glucose levels of 150-140's. She was taken off Metformin and started on Tradjenta 5mg daily and Amaryl to 1 mg twice a day, she is requesting a call back with what changes need to be made.

## 2023-11-08 DIAGNOSIS — E11.65 TYPE 2 DIABETES MELLITUS WITH HYPERGLYCEMIA, WITHOUT LONG-TERM CURRENT USE OF INSULIN (HCC): Primary | ICD-10-CM

## 2023-11-08 RX ORDER — LINAGLIPTIN 5 MG/1
5 TABLET, FILM COATED ORAL DAILY
Qty: 30 TABLET | Refills: 0 | Status: SHIPPED | OUTPATIENT
Start: 2023-11-08

## 2023-11-22 DIAGNOSIS — I10 ESSENTIAL HYPERTENSION: ICD-10-CM

## 2023-11-22 DIAGNOSIS — J44.9 COPD, SEVERE (HCC): ICD-10-CM

## 2023-11-22 RX ORDER — DILTIAZEM HYDROCHLORIDE 300 MG/1
300 CAPSULE, COATED, EXTENDED RELEASE ORAL DAILY
Qty: 90 CAPSULE | Refills: 1 | Status: SHIPPED | OUTPATIENT
Start: 2023-11-22

## 2023-11-22 RX ORDER — LISINOPRIL AND HYDROCHLOROTHIAZIDE 25; 20 MG/1; MG/1
TABLET ORAL
Qty: 90 TABLET | Refills: 1 | Status: SHIPPED | OUTPATIENT
Start: 2023-11-22

## 2023-11-29 DIAGNOSIS — T75.3XXA MOTION SICKNESS, INITIAL ENCOUNTER: ICD-10-CM

## 2023-11-29 RX ORDER — MECLIZINE HYDROCHLORIDE 25 MG/1
TABLET ORAL
Qty: 10 TABLET | Refills: 0 | Status: SHIPPED | OUTPATIENT
Start: 2023-11-29

## 2023-12-04 ENCOUNTER — OFFICE VISIT (OUTPATIENT)
Dept: CARDIOLOGY CLINIC | Facility: CLINIC | Age: 83
End: 2023-12-04
Payer: COMMERCIAL

## 2023-12-04 VITALS
HEART RATE: 95 BPM | SYSTOLIC BLOOD PRESSURE: 148 MMHG | OXYGEN SATURATION: 99 % | BODY MASS INDEX: 31.59 KG/M2 | WEIGHT: 160.9 LBS | HEIGHT: 60 IN | DIASTOLIC BLOOD PRESSURE: 84 MMHG

## 2023-12-04 DIAGNOSIS — E78.2 MIXED HYPERLIPIDEMIA: ICD-10-CM

## 2023-12-04 DIAGNOSIS — I10 ESSENTIAL HYPERTENSION: Primary | ICD-10-CM

## 2023-12-04 DIAGNOSIS — I47.10 PSVT (PAROXYSMAL SUPRAVENTRICULAR TACHYCARDIA): ICD-10-CM

## 2023-12-04 DIAGNOSIS — I35.0 MILD AORTIC STENOSIS: ICD-10-CM

## 2023-12-04 PROCEDURE — 93000 ELECTROCARDIOGRAM COMPLETE: CPT | Performed by: INTERNAL MEDICINE

## 2023-12-04 PROCEDURE — 99214 OFFICE O/P EST MOD 30 MIN: CPT | Performed by: INTERNAL MEDICINE

## 2023-12-04 RX ORDER — ROSUVASTATIN CALCIUM 10 MG/1
10 TABLET, COATED ORAL DAILY
Qty: 90 TABLET | Refills: 3 | Status: SHIPPED | OUTPATIENT
Start: 2023-12-04

## 2023-12-04 NOTE — PROGRESS NOTES
Cardiology Follow Up    Giacomo Sánchez  1940  [id number]  [facility] [address] [address]    1. Essential hypertension        2. Mild aortic stenosis  Echo complete w/ contrast if indicated      3. PSVT (paroxysmal supraventricular tachycardia)  POCT ECG      4. 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 Faster heart rate and sinus rhythm likely a sequelae of COPD. No palpitations. Continue current management. Aortic stenosis: Murmur more prominent on exam today. Was mild in 2021. Repeat echo for surveillance. Will also get a sense of what her RV looks like with her advanced COPD. Dyslipidemia. She complains of muscle cramps. She is concerned this may be secondary to the atorvastatin. Her lipid panel is well-controlled. Changed to 10 mg of Crestor. Previous history:  Pleasant 71-year-old female with a history of palpitations. She moved to [place] in 2017. Previously, was living in [place], received her care at [facility].  Has a history of AVNRT with ablation. No afib. She has always been somewhat tachycardic. She has significant COPD and uses usually 2 L of oxygen. She has been maintained on Cardizem for blood pressure control. AS, mild in 2021    Interval history:  Returns for follow-up. She denies any major changes from a cardiac standpoint. While she feels no palpitations, she does note that her heart rate continues to be high when she checks her pulse ox. Likely result of her COPD. This is despite being on Cardizem. She continues to wear supplemental oxygen. She gets short of breath when walking more than 20 feet. She usually needs to rest and take a break. She was in the ER for COPD exacerbation about 1 year ago in February 2023.  No hospitalizations or ER visits since then. Continues to get some leg cramps. She previously had JOE which was unremarkable. She is concerned this may be related to her statin. Problem List       PSVT (paroxysmal supraventricular tachycardia) (HCC)    COPD, severe ([address])    Deep vein thrombosis ([address])    Hyperlipidemia    Chronic respiratory failure with hypoxia and hypercapnia ([address])    Hypothyroidism    Obstructive sleep apnea    Pulmonary embolism ([address])    Overview Signed 2018  1:49 PM by Manfred Cannon DO     Description: Dec 2016         Type 2 diabetes mellitus ([address])    Lab Results   Component Value Date    HGBA1C 6.7 (H) 2023     No results for input(s): "POCGLU" in the last 72 hours.     Blood Sugar Average: Last 72 hrs:      Iron deficiency anemia, unspecified    Anticoagulant long-term use    Hemorrhoids    Overview Signed 2018 10:40 AM by Perry De Paz MA     Added automatically from request for surgery [id number]         Polyp of colon    Overview Signed 2018 10:40 AM by Perry De Paz MA     Added automatically from request for surgery [id number]               Past Medical History:   Diagnosis Date   • Anemia    • Blood clotting tendency ([address])     left leg, lung   • Chronic respiratory failure with hypoxia and hypercapnia (HCC)    • COPD (chronic obstructive pulmonary disease) ([address])    • Deep vein thrombosis (HCC)    • Diabetes mellitus ([address])    • Emphysema lung (HCC)    • Emphysema lung (HCC)    • Emphysema of lung ([address])    • Hyperlipidemia    • Hypertension    • Hyperthyroidism    • Hypothyroid    • On warfarin therapy    • Osteoporosis    • Palpitations    • Pulmonary embolism (HCC)    • Sleep apnea, obstructive    • Thyroid disease      Social History     Tobacco Use   • Smoking status: Former     Packs/day: 1.00     Years: 46.00     Total pack years: 46.00     Types: Cigarettes     Quit date:      Years since quittin.9   • Smokeless tobacco: Never   Substance Use Topics   • Alcohol use: No     Family History   Problem Relation Age of Onset   • Hypertension Mother    • Hyperlipidemia Mother    • Asthma Father    • Heart failure Father    • Hypertension Sister    • Hyperlipidemia Sister    • Heart attack Neg Hx    • Aneurysm Neg Hx    • Stroke Neg Hx    • Clotting disorder Neg Hx      Past Surgical History:   Procedure Laterality Date   • AV NODE ABLATION  09/21/2017   • COLONOSCOPY  01/01/2018    w/ EGD   • DXA PROCEDURE (HISTORICAL)  11/2016   • CA COLONOSCOPY FLX DX W/COLLJ SPEC WHEN PFRMD N/A 8/30/2018    Procedure: EGD AND COLONOSCOPY;  Surgeon: Genetta Klinefelter, MD;  Location: [facility];   Service: Gastroenterology   • TOE SURGERY         Current Outpatient Medications:   •  albuterol (2.5 mg/3 mL) 0.083 % nebulizer solution, Take 3 mL (2.5 mg total) by nebulization every 4 (four) hours as needed for wheezing or shortness of breath, Disp: 180 mL, Rfl: 3  •  aspirin (ECOTRIN LOW STRENGTH) 81 mg EC tablet, Take 81 mg by mouth daily, Disp: , Rfl:   •  budesonide-formoterol (Symbicort) 160-4.5 mcg/act inhaler, Inhale 2 puffs 2 (two) times a day As directed, Disp: 10.2 g, Rfl: 11  •  Cholecalciferol (VITAMIN D) 2000 units CAPS, Take 2 capsules (4,000 Units total) by mouth daily, Disp: , Rfl:   •  cyanocobalamin (VITAMIN B-12) 100 mcg tablet, Take by mouth daily, Disp: , Rfl:   •  diltiazem (CARDIZEM CD) 300 mg 24 hr capsule, Take 1 capsule (300 mg total) by mouth daily As directed, Disp: 90 capsule, Rfl: 1  •  fluticasone (FLONASE) 50 mcg/act nasal spray, 2 sprays into each nostril daily, Disp: 1 Bottle, Rfl: 5  •  glimepiride (AMARYL) 1 mg tablet, Take one tablet with breakfast and 1 tablet with dinner, Disp: 180 tablet, Rfl: 1  •  glucose blood (Contour Next Test) test strip, Use twice a day, Disp: 200 each, Rfl: 1  •  ketorolac (ACULAR) 0.5 % ophthalmic solution, , Disp: , Rfl:   •  levothyroxine 25 mcg tablet, TAKE ONE TABLET BY MOUTH EVERY DAY, Disp: 90 tablet, Rfl: 0  • lisinopril-hydrochlorothiazide (PRINZIDE,ZESTORETIC) 20-25 MG per tablet, Take 1 tablet(s) every day by oral route., Disp: 90 tablet, Rfl: 1  •  meclizine (ANTIVERT) 25 mg tablet, 1 tab take before car ride once a day as needed, Disp: 10 tablet, Rfl: 0  •  multivitamin (THERAGRAN) TABS, Take 1 tablet by mouth daily, Disp: , Rfl:   •  nystatin (MYCOSTATIN) cream, Apply topically 2 (two) times a day, Disp: 60 g, Rfl: 0  •  ofloxacin (OCUFLOX) 0.3 % ophthalmic solution, , Disp: , Rfl:   •  prednisoLONE acetate (PRED FORTE) 1 % ophthalmic suspension, , Disp: , Rfl:   •  rosuvastatin (CRESTOR) 10 MG tablet, Take 1 tablet (10 mg total) by mouth daily, Disp: 90 tablet, Rfl: 3  •  Tradjenta 5 MG TABS, TAKE ONE TABLET BY MOUTH EVERY DAY, Disp: 30 tablet, Rfl: 0  •  umeclidinium (Incruse Ellipta) 62.5 mcg/actuation AEPB inhaler, Inhale 1 puff daily, Disp: 30 blister, Rfl: 11  •  Ventolin  (90 Base) MCG/ACT inhaler, Inhale 2 puffs every 6 (six) hours as needed for wheezing, Disp: 18 g, Rfl: 0  •  azithromycin (ZITHROMAX) 500 MG tablet, Take 1 tablet (500 mg total) by mouth 3 (three) times a week (Patient not taking: Reported on 9/12/2023), Disp: 12 tablet, Rfl: 11  •  sitaGLIPtin (JANUVIA) 100 mg tablet, Take 1 tablet (100 mg total) by mouth daily, Disp: 90 tablet, Rfl: 1    Current Facility-Administered Medications:   •  denosumab (PROLIA) subcutaneous injection 60 mg, 60 mg, Subcutaneous, Q6 Months, Ricky Vergara MD, 60 mg at 06/13/23 1020  Allergies   Allergen Reactions   • Penicillins Rash     After dental procedure        Vitals:    12/04/23 0935   BP: 148/84   BP Location: Right arm   Patient Position: Sitting   Cuff Size: Standard   Pulse: 95   SpO2: 99%   Weight: 73 kg (160 lb 14.4 oz)   Height: 5' (1.524 m)     Vitals:    12/04/23 0935   Weight: 73 kg (160 lb 14.4 oz)      Height: 5' (152.4 cm)   Body mass index is 31.42 kg/m².     Physical Exam:  GEN: Waylno Levine appears well, alert and oriented x 3, pleasant and cooperative   HEENT: pupils equal, round, and reactive to light; extraocular muscles intact  NECK: supple, no carotid bruits   HEART: regular rhythm, 3/6 BRAYAN. LUNGS: decreased air entry. ABDOMEN: normal bowel sounds, soft, no tenderness, no distention  EXTREMITIES: peripheral pulses normal; no clubbing, cyanosis, or edema  NEURO: no focal findings   SKIN: normal without suspicious lesions on exposed skin    ROS:  Except as noted in HPI, is otherwise reviewed in detail and a 12 point review of systems is negative.   ROS reviewed and is unchanged    Labs:  Lab Results   Component Value Date    K 4.3 04/20/2023     04/20/2023    CREATININE 0.53 (L) 04/20/2023    BUN 16 04/20/2023    CO2 31 04/20/2023    ALT 13 02/03/2023    AST 12 (L) 02/03/2023    INR 0.99 05/29/2020    GLUF 163 (H) 04/20/2023    HGBA1C 6.7 (H) 04/20/2023    WBC 15.65 (H) 02/03/2023    HGB 10.3 (L) 02/03/2023    HCT 34.2 (L) 02/03/2023     02/03/2023     EKG:  Sinus rhythm, 95 BPM, RBBB, LAFB

## 2023-12-09 DIAGNOSIS — E11.65 TYPE 2 DIABETES MELLITUS WITH HYPERGLYCEMIA, WITHOUT LONG-TERM CURRENT USE OF INSULIN (HCC): ICD-10-CM

## 2023-12-11 RX ORDER — LINAGLIPTIN 5 MG/1
5 TABLET, FILM COATED ORAL DAILY
Qty: 90 TABLET | Refills: 0 | Status: SHIPPED | OUTPATIENT
Start: 2023-12-11

## 2023-12-27 ENCOUNTER — NURSE TRIAGE (OUTPATIENT)
Age: 83
End: 2023-12-27

## 2023-12-27 NOTE — TELEPHONE ENCOUNTER
Spoke with patient, patient had stated that she has been having lower back pain. Patient would like to speak to someone in the office in regards to this. Please advise.

## 2023-12-27 NOTE — TELEPHONE ENCOUNTER
"Patient reports sudden onset of low back pain at waist level. Patient denies any radiation. No neurological symptoms. Patient was given home care advice of ice, heat, OTC medication and to call the office back if symptoms don't improve. Patient verbalizes understanding. Patient declined an appointment.     Reason for Disposition   Back pain    Answer Assessment - Initial Assessment Questions  1. ONSET: \"When did the pain begin?\"       today  2. LOCATION: \"Where does it hurt?\" (upper, mid or lower back)      Low back pain waist level  3. SEVERITY: \"How bad is the pain?\"  (e.g., Scale 1-10; mild, moderate, or severe)    - MILD (1-3): doesn't interfere with normal activities     - MODERATE (4-7): interferes with normal activities or awakens from sleep     - SEVERE (8-10): excruciating pain, unable to do any normal activities       8/10  4. PATTERN: \"Is the pain constant?\" (e.g., yes, no; constant, intermittent)       constant  5. RADIATION: \"Does the pain shoot into your legs or elsewhere?\"      no  6. CAUSE:  \"What do you think is causing the back pain?\"       unsure  7. BACK OVERUSE:  \"Any recent lifting of heavy objects, strenuous work or exercise?\"      Patient has been doing more lifting, issa activity  8. MEDICATIONS: \"What have you taken so far for the pain?\" (e.g., nothing, acetaminophen, NSAIDS)      Acetaminophen- little relief  9. NEUROLOGIC SYMPTOMS: \"Do you have any weakness, numbness, or problems with bowel/bladder control?\"      denies  10. OTHER SYMPTOMS: \"Do you have any other symptoms?\" (e.g., fever, abdominal pain, burning with urination, blood in urine)        denies  11. PREGNANCY: \"Is there any chance you are pregnant?\" (e.g., yes, no; LMP)        N/A    Protocols used: Back Pain-ADULT-[place]    "

## 2023-12-28 DIAGNOSIS — E03.9 ACQUIRED HYPOTHYROIDISM: ICD-10-CM

## 2023-12-28 RX ORDER — LEVOTHYROXINE SODIUM 0.03 MG/1
TABLET ORAL
Qty: 90 TABLET | Refills: 0 | Status: SHIPPED | OUTPATIENT
Start: 2023-12-28

## 2024-01-09 ENCOUNTER — TELEPHONE (OUTPATIENT)
Age: 84
End: 2024-01-09

## 2024-01-09 NOTE — TELEPHONE ENCOUNTER
Pt. Called in stated that the she's still in [place] and thinks that it's her vertebra, would like to know if  can refeer her to a [facility].     Patient declined to scheduled an appt with Dr French as she thinks she needs an orthopedic  Please advise

## 2024-01-10 NOTE — TELEPHONE ENCOUNTER
Patient calling back to find out if the doctor had a recommendation for an orthopedist she could see for her back.

## 2024-01-12 NOTE — TELEPHONE ENCOUNTER
Pt called again, says she forgot to ask for the name of the Dr, provided her w Dr Ibarra name.    NFA

## 2024-01-29 DIAGNOSIS — J44.9 CHRONIC OBSTRUCTIVE PULMONARY DISEASE, UNSPECIFIED COPD TYPE (HCC): ICD-10-CM

## 2024-01-29 RX ORDER — UMECLIDINIUM 62.5 UG/1
1 AEROSOL, POWDER ORAL DAILY
Qty: 30 BLISTER | Refills: 0 | Status: SHIPPED | OUTPATIENT
Start: 2024-01-29

## 2024-01-29 NOTE — TELEPHONE ENCOUNTER
Patient needs an appointment. Please contact the patient to schedule an appointment. Courtesy refill NOT provided.   Refill must be reviewed and completed by the office or provider. The refill is unable to be approved by the medication management team.

## 2024-02-07 DIAGNOSIS — E11.65 TYPE 2 DIABETES MELLITUS WITH HYPERGLYCEMIA, WITHOUT LONG-TERM CURRENT USE OF INSULIN (HCC): ICD-10-CM

## 2024-02-07 RX ORDER — GLIMEPIRIDE 1 MG/1
TABLET ORAL
Qty: 180 TABLET | Refills: 0 | Status: SHIPPED | OUTPATIENT
Start: 2024-02-07

## 2024-02-09 ENCOUNTER — TELEPHONE (OUTPATIENT)
Dept: OBGYN CLINIC | Facility: HOSPITAL | Age: 84
End: 2024-02-09

## 2024-02-09 ENCOUNTER — HOSPITAL ENCOUNTER (OUTPATIENT)
Dept: RADIOLOGY | Facility: HOSPITAL | Age: 84
Discharge: HOME | End: 2024-02-09
Attending: ORTHOPAEDIC SURGERY
Payer: COMMERCIAL

## 2024-02-09 ENCOUNTER — OFFICE VISIT (OUTPATIENT)
Dept: OBGYN CLINIC | Facility: HOSPITAL | Age: 84
End: 2024-02-09
Payer: COMMERCIAL

## 2024-02-09 VITALS
BODY MASS INDEX: 31.6 KG/M2 | WEIGHT: 160.94 LBS | SYSTOLIC BLOOD PRESSURE: 151 MMHG | HEART RATE: 102 BPM | DIASTOLIC BLOOD PRESSURE: 71 MMHG | HEIGHT: 60 IN

## 2024-02-09 DIAGNOSIS — R52 PAIN: Primary | ICD-10-CM

## 2024-02-09 DIAGNOSIS — R52 PAIN: ICD-10-CM

## 2024-02-09 PROCEDURE — 72110 X-RAY EXAM L-2 SPINE 4/>VWS: CPT

## 2024-02-09 PROCEDURE — 99204 OFFICE O/P NEW MOD 45 MIN: CPT | Performed by: ORTHOPAEDIC SURGERY

## 2024-02-09 NOTE — TELEPHONE ENCOUNTER
Doctor: Theodore  Caller Name: Iman  #: [phone] opt [phone]     Radiology called to speak to clinical team regarding Xray ordered by provider.

## 2024-02-09 NOTE — PROGRESS NOTES
Assessment & Plan/Medical Decision Makin y.o. female with Back Pain and imaging findings most notable for lumbar spondylosis         The clinical, physical and imaging findings were reviewed with the patient.  Christine  has a constellation of findings consistent with Lumbar Facet/Arthrosis Pain in the setting of lumbar degenerative disease. Back pain for about 2 weeks after the holidays. Pain has since improved, she is without significant pain at today's visit. Denies significant radicular symptoms.  Fortunately patient remains neurologically intact and functional. Physical exam showing decreased lumbar ROM. No TTP. No weakness.  We discussed the treatment options including physical therapy, at home exercises, activity modifications, chiropractic medicine, oral medications, interventional spine procedures.  At this time recommend continued conservative treatments. No surgical intervention warranted.  Patient instructed to return to office/ER sooner if symptoms are not improving, getting worse, or new worrisome/neurologic symptoms arise.  Patient will follow up as needed.     Subjective:      Chief Complaint: Back Pain    HPI:  Christine Laura is a 83 y.o. female presenting for initial visit with chief complaint of back pain. PMH multiple chronic compression deformities. Currently reports onset of worsening low back pain with intermittent radiation into her left gluteal region that started after increased activity around the holiday season, noting she was doing a lot of cooking, etc. around [place]. Back pain started on 23 and lasted about 2 weeks until it started to resolve. Pain and stiffness was worse in the morning, improved throughout the day. Currently denies any significant back pain. Intermittent left gluteal region pain at night. Patient is able to walk, but notes she is most limited by her SOB/COPD. Denies radiation of pain into lower extremities. Denies numbness/tingling. Denies madhavi lower  extremity weakness. Denies any madhavi trauma. Denies fever or chills, no night sweats. Denies any bladder or bowel changes.      PMH T2DM, COPD, hypothyroidism. PMH PSVT, follows with cardiology.  DEXA scan from 7/25/2019 demonstrates osteoporosis.    Conservative therapy includes the following:   Medications: motrin with relief    Injections: denies     Physical Therapy: denies recent, did do PT for thoracic spine in 2019  Chiropractic Medicine: has not attempted  Accupunture/Massage Therapy: has not attempted   These therapeutic modalities were ineffective at providing sustained pain relief/functional improvement.     Nicotine dependent: denies, was a previous smoker  Occupation: n/a  Living situation: Lives with family   ADLs: patient is able to perform     Objective:     Family History   Problem Relation Age of Onset    Hypertension Mother     Hyperlipidemia Mother     Asthma Father     Heart failure Father     Hypertension Sister     Hyperlipidemia Sister     Heart attack Neg Hx     Aneurysm Neg Hx     Stroke Neg Hx     Clotting disorder Neg Hx        Past Medical History:   Diagnosis Date    Anemia     Blood clotting tendency (HCC)     left leg, lung    Chronic respiratory failure with hypoxia and hypercapnia (HCC)     COPD (chronic obstructive pulmonary disease) (HCC)     Deep vein thrombosis (HCC)     Diabetes mellitus (HCC)     Emphysema lung (HCC)     Emphysema lung (HCC)     Emphysema of lung (HCC)     Hyperlipidemia     Hypertension     Hyperthyroidism     Hypothyroid     On warfarin therapy     Osteoporosis     Palpitations     Pulmonary embolism (HCC)     Sleep apnea, obstructive     Thyroid disease        Current Outpatient Medications   Medication Sig Dispense Refill    albuterol (2.5 mg/3 mL) 0.083 % nebulizer solution Take 3 mL (2.5 mg total) by nebulization every 4 (four) hours as needed for wheezing or shortness of breath 180 mL 3    aspirin (ECOTRIN LOW STRENGTH) 81 mg EC tablet Take 81 mg by  mouth daily      budesonide-formoterol (Symbicort) 160-4.5 mcg/act inhaler Inhale 2 puffs 2 (two) times a day As directed 10.2 g 11    Cholecalciferol (VITAMIN D) 2000 units CAPS Take 2 capsules (4,000 Units total) by mouth daily      cyanocobalamin (VITAMIN B-12) 100 mcg tablet Take by mouth daily      diltiazem (CARDIZEM CD) 300 mg 24 hr capsule Take 1 capsule (300 mg total) by mouth daily As directed 90 capsule 1    fluticasone (FLONASE) 50 mcg/act nasal spray 2 sprays into each nostril daily 1 Bottle 5    glimepiride (AMARYL) 1 mg tablet Take one tablet with breakfast and 1 tablet with dinner 180 tablet 0    glucose blood (Contour Next Test) test strip Use twice a day 200 each 1    Incruse Ellipta 62.5 MCG/ACT AEPB inhaler INHALE 1 PUFF BY MOUTH EVERY DAY 30 blister 0    ketorolac (ACULAR) 0.5 % ophthalmic solution       levothyroxine 25 mcg tablet TAKE ONE TABLET BY MOUTH EVERY DAY 90 tablet 0    linaGLIPtin (Tradjenta) 5 MG TABS TAKE ONE TABLET BY MOUTH EVERY DAY 90 tablet 0    lisinopril-hydrochlorothiazide (PRINZIDE,ZESTORETIC) 20-25 MG per tablet Take 1 tablet(s) every day by oral route. 90 tablet 1    meclizine (ANTIVERT) 25 mg tablet 1 tab take before car ride once a day as needed 10 tablet 0    multivitamin (THERAGRAN) TABS Take 1 tablet by mouth daily      nystatin (MYCOSTATIN) cream Apply topically 2 (two) times a day 60 g 0    ofloxacin (OCUFLOX) 0.3 % ophthalmic solution       prednisoLONE acetate (PRED FORTE) 1 % ophthalmic suspension       rosuvastatin (CRESTOR) 10 MG tablet Take 1 tablet (10 mg total) by mouth daily 90 tablet 3    Ventolin  (90 Base) MCG/ACT inhaler Inhale 2 puffs every 6 (six) hours as needed for wheezing 18 g 0     Current Facility-Administered Medications   Medication Dose Route Frequency Provider Last Rate Last Admin    denosumab (PROLIA) subcutaneous injection 60 mg  60 mg Subcutaneous Q6 Months Gabino Guillermo MD   60 mg at 06/13/23 1020       Past Surgical  History:   Procedure Laterality Date    AV NODE ABLATION  2017    COLONOSCOPY  2018    w/ EGD    DXA PROCEDURE (HISTORICAL)  2016    OH COLONOSCOPY FLX DX W/COLLJ SPEC WHEN PFRMD N/A 2018    Procedure: EGD AND COLONOSCOPY;  Surgeon: Reena Bell MD;  [facility];  Service: Gastroenterology    TOE SURGERY         Social History     Socioeconomic History    Marital status:      Spouse name: Not on file    Number of children: Not on file    Years of education: Not on file    Highest education level: Not on file   Occupational History    Not on file   Tobacco Use    Smoking status: Former     Current packs/day: 0.00     Average packs/day: 1 pack/day for 46.0 years (46.0 ttl pk-yrs)     Types: Cigarettes     Start date:      Quit date:      Years since quittin.1    Smokeless tobacco: Never   Vaping Use    Vaping status: Never Used   Substance and Sexual Activity    Alcohol use: No    Drug use: No    Sexual activity: Not on file   Other Topics Concern    Not on file   Social History Narrative    Date of last mammogram:   2017      Most Recent Bone Density:   2016       Date of Last Pap Smear:       not required per gyn         Smokeless tobacco status:   Never used smokeless tobacco      Tobacco-years of use:   46      E-cigarette/vape status:   Never used electronic cigarettes      Most recent tobacco use screenin2019      Do you currently or have you served in the [employer]:   No      Were you activated, into active duty, as a member of the [employer] or as a Reservist:   No      Marital status:         Diet:   Regular      General stress level:   Low      Has smoked since age:   20       Alcohol intake:   None       Caffeine intake:   Moderate      Chewing tobacco:   none      Guns present in home:   No      Seat belts used routinely:   Yes      Sunscreen used routinely:   Yes      Smoke alarm in home:   Yes      Advance  directive:   Yes      Live alone or with others:   alone       Are there stairs in your home:   No      Pets:   No      Social Determinants of Health     Financial Resource Strain: Not on file   Food Insecurity: Not on file   Transportation Needs: Not on file   Physical Activity: Not on file   Stress: Not on file   Social Connections: Not on file   Intimate Partner Violence: Not on file   Housing Stability: Not on file       Allergies   Allergen Reactions    Penicillins Rash     After dental procedure        Review of Systems  General- denies fever/chills  HEENT- denies hearing loss or sore throat  Eyes- denies eye pain or visual disturbances, denies red eyes  Respiratory- denies cough or SOB  Cardio- denies chest pain or palpitations  GI- denies abdominal pain  Endocrine- denies urinary frequency  Urinary- denies pain with urination  Musculoskeletal- Negative except noted above  Skin- denies rashes or wounds  Neurological- denies dizziness or headache  Psychiatric- denies anxiety or difficulty concentrating    Physical Exam  /71   Pulse 102   Ht 5' (1.524 m)   Wt 73 kg (160 lb 15 oz)   LMP  (LMP Unknown)   BMI 31.43 kg/m²     General/Constitutional: No apparent distress: well-nourished and well developed.  Lymphatic: No appreciable lymphadenopathy  Respiratory: Non-labored breathing  Vascular: No edema, swelling or tenderness, except as noted in detailed exam.  Integumentary: No impressive skin lesions present, except as noted in detailed exam.  Psych: Normal mood and affect, oriented to person, place and time.  MSK: normal other than stated in HPI and exam  Gait & balance: no evidence of myelopathic gait, ambulates Independently     Lumbar spine range of motion:  -Forward flexion to 90  -Extension to neutral  -Lateral bend 25 right, 25 left  -Rotation 25 right, 25 left  There tenderness with palpation along lumbar paraspinal musculature, no midline tenderness     Neurologic:  Lower Extremity Motor  "Function    Right  Left    Iliopsoas  5/5  5/5    Quadriceps 5/5 5/5   Tibialis anterior  5/5  5/5    EHL  5/5 5/5    Gastroc. muscle  5/5 5/5    Heel rise  5/5 5/5    Toe rise  5/5 5/5      Sensory: light touch is intact to bilateral upper and lower extremities     Reflexes:    Right Left   Patellar 0+ 0+   Achilles 0+ 0+   Babinski neg neg     Other tests:  Straight Leg Raise: negative  Feng SI: negative  TIGIST SI: negative  Greater troch: no tenderness   Internal/external hip ROM: intact, no pain   Flexion/extension knee ROM: intact, no pain   Vascular: WWP extremities, 2+DP bilateral      Diagnostic Tests   IMAGING: I have personally reviewed the images and these are my findings:  Lumbar Spine X-rays from 2/9/2024: multi level lumbar spondylosis with loss of disc height, chronic-appearing L1 compression deformity, no new fractures appreciated, osteophyte formation and facet hypertrophy, no apparent spondylolisthesis, no appreciated lytic/blastic lesions, no obvious instability    Electronic Medical Records were reviewed including PCP notes, orthopedic office notes, imaging studies    Procedures, if performed today     None performed       Portions of the record may have been created with voice recognition software.  Occasional wrong word or \"sound a like\" substitutions may have occurred due to the inherent limitations of voice recognition software.  Read the chart carefully and recognize, using context, where substitutions have occurred.  "

## 2024-02-10 DIAGNOSIS — J44.9 COPD, SEVERE (HCC): ICD-10-CM

## 2024-02-12 RX ORDER — BUDESONIDE AND FORMOTEROL FUMARATE DIHYDRATE 160; 4.5 UG/1; UG/1
2 AEROSOL RESPIRATORY (INHALATION) 2 TIMES DAILY
Qty: 10.2 G | Refills: 5 | Status: SHIPPED | OUTPATIENT
Start: 2024-02-12 | End: 2024-02-13

## 2024-02-12 NOTE — TELEPHONE ENCOUNTER
Please review. Patient needs an appointment. Please contact the patient to schedule an appointment. Courtesy refill NOT provided.   01.29.2024 Incruse Ellipta ordered 30D supply.

## 2024-02-13 ENCOUNTER — TELEPHONE (OUTPATIENT)
Age: 84
End: 2024-02-13

## 2024-02-13 DIAGNOSIS — J44.9 COPD, SEVERE (HCC): Primary | ICD-10-CM

## 2024-02-13 RX ORDER — BUDESONIDE AND FORMOTEROL FUMARATE DIHYDRATE 160; 4.5 UG/1; UG/1
2 AEROSOL RESPIRATORY (INHALATION) 2 TIMES DAILY
Qty: 10.2 G | Refills: 5 | Status: SHIPPED | OUTPATIENT
Start: 2024-02-13

## 2024-02-13 NOTE — TELEPHONE ENCOUNTER
Reason for call:   [] Refill   [] Prior Auth  [x] Other:    Patient called, pharm informed her that her insurance is not covering the symbicort but will cover generics, patient would like to be prescribed the generic for symbicort if the doctor approves. Without insurance symbicort is 450$.    Patient would like to discuss with the provider or clinical staff. Call back number verified [phone]  Patient will be out of medication tomorrow

## 2024-02-21 DIAGNOSIS — J44.9 CHRONIC OBSTRUCTIVE PULMONARY DISEASE, UNSPECIFIED COPD TYPE (HCC): ICD-10-CM

## 2024-02-21 PROBLEM — Z00.00 MEDICARE ANNUAL WELLNESS VISIT, SUBSEQUENT: Status: RESOLVED | Noted: 2020-10-27 | Resolved: 2024-02-21

## 2024-02-21 RX ORDER — UMECLIDINIUM 62.5 UG/1
1 AEROSOL, POWDER ORAL DAILY
Qty: 30 BLISTER | Refills: 0 | Status: ON HOLD | OUTPATIENT
Start: 2024-02-21

## 2024-02-23 ENCOUNTER — TELEPHONE (OUTPATIENT)
Dept: OBGYN CLINIC | Facility: HOSPITAL | Age: 84
End: 2024-02-23

## 2024-02-23 NOTE — TELEPHONE ENCOUNTER
Caller: Patient    Doctor: Theodore    Reason for call: Patient had questions in regards to a letter she received about following up with Dr. Jaimes.    Call back#:

## 2024-03-02 ENCOUNTER — APPOINTMENT (EMERGENCY)
Dept: CT IMAGING | Facility: HOSPITAL | Age: 84
DRG: 191 | End: 2024-03-02
Payer: COMMERCIAL

## 2024-03-02 ENCOUNTER — APPOINTMENT (EMERGENCY)
Dept: RADIOLOGY | Facility: HOSPITAL | Age: 84
DRG: 191 | End: 2024-03-02
Payer: COMMERCIAL

## 2024-03-02 ENCOUNTER — HOSPITAL ENCOUNTER (INPATIENT)
Facility: HOSPITAL | Age: 84
LOS: 1 days | Discharge: HOME | DRG: 191 | End: 2024-03-04
Attending: EMERGENCY MEDICINE | Admitting: INTERNAL MEDICINE
Payer: COMMERCIAL

## 2024-03-02 DIAGNOSIS — J96.11 CHRONIC RESPIRATORY FAILURE WITH HYPOXIA AND HYPERCAPNIA (HCC): ICD-10-CM

## 2024-03-02 DIAGNOSIS — J44.9 COPD, SEVERE (HCC): Primary | ICD-10-CM

## 2024-03-02 DIAGNOSIS — R06.02 SHORTNESS OF BREATH: ICD-10-CM

## 2024-03-02 DIAGNOSIS — J96.12 CHRONIC RESPIRATORY FAILURE WITH HYPOXIA AND HYPERCAPNIA (HCC): ICD-10-CM

## 2024-03-02 LAB
2HR DELTA HS TROPONIN: 1 NG/L
ALBUMIN SERPL BCP-MCNC: 3.9 G/DL (ref 3.5–5)
ALP SERPL-CCNC: 61 U/L (ref 34–104)
ALT SERPL W P-5'-P-CCNC: 10 U/L (ref 7–52)
ANION GAP SERPL CALCULATED.3IONS-SCNC: 8 MMOL/L
AST SERPL W P-5'-P-CCNC: 11 U/L (ref 13–39)
BASOPHILS # BLD AUTO: 0.04 THOUSANDS/ÂΜL (ref 0–0.1)
BASOPHILS NFR BLD AUTO: 0 % (ref 0–1)
BILIRUB SERPL-MCNC: 0.28 MG/DL (ref 0.2–1)
BNP SERPL-MCNC: 84 PG/ML (ref 0–100)
BUN SERPL-MCNC: 20 MG/DL (ref 5–25)
CALCIUM SERPL-MCNC: 9.2 MG/DL (ref 8.4–10.2)
CARDIAC TROPONIN I PNL SERPL HS: 7 NG/L
CARDIAC TROPONIN I PNL SERPL HS: 8 NG/L
CHLORIDE SERPL-SCNC: 100 MMOL/L (ref 96–108)
CO2 SERPL-SCNC: 27 MMOL/L (ref 21–32)
CREAT SERPL-MCNC: 0.58 MG/DL (ref 0.6–1.3)
EOSINOPHIL # BLD AUTO: 0.05 THOUSAND/ÂΜL (ref 0–0.61)
EOSINOPHIL NFR BLD AUTO: 0 % (ref 0–6)
ERYTHROCYTE [DISTWIDTH] IN BLOOD BY AUTOMATED COUNT: 19.8 % (ref 11.6–15.1)
FLUAV RNA RESP QL NAA+PROBE: NEGATIVE
FLUBV RNA RESP QL NAA+PROBE: NEGATIVE
GFR SERPL CREATININE-BSD FRML MDRD: 85 ML/MIN/1.73SQ M
GLUCOSE SERPL-MCNC: 267 MG/DL (ref 65–140)
HCT VFR BLD AUTO: 29.9 % (ref 34.8–46.1)
HGB BLD-MCNC: 8.8 G/DL (ref 11.5–15.4)
IMM GRANULOCYTES # BLD AUTO: 0.07 THOUSAND/UL (ref 0–0.2)
IMM GRANULOCYTES NFR BLD AUTO: 1 % (ref 0–2)
LYMPHOCYTES # BLD AUTO: 3.84 THOUSANDS/ÂΜL (ref 0.6–4.47)
LYMPHOCYTES NFR BLD AUTO: 26 % (ref 14–44)
MCH RBC QN AUTO: 20.2 PG (ref 26.8–34.3)
MCHC RBC AUTO-ENTMCNC: 29.4 G/DL (ref 31.4–37.4)
MCV RBC AUTO: 69 FL (ref 82–98)
MONOCYTES # BLD AUTO: 0.69 THOUSAND/ÂΜL (ref 0.17–1.22)
MONOCYTES NFR BLD AUTO: 5 % (ref 4–12)
NEUTROPHILS # BLD AUTO: 9.88 THOUSANDS/ÂΜL (ref 1.85–7.62)
NEUTS SEG NFR BLD AUTO: 68 % (ref 43–75)
NRBC BLD AUTO-RTO: 0 /100 WBCS
PLATELET # BLD AUTO: 342 THOUSANDS/UL (ref 149–390)
PMV BLD AUTO: 9.7 FL (ref 8.9–12.7)
POTASSIUM SERPL-SCNC: 3.8 MMOL/L (ref 3.5–5.3)
PROT SERPL-MCNC: 6.9 G/DL (ref 6.4–8.4)
RBC # BLD AUTO: 4.35 MILLION/UL (ref 3.81–5.12)
RSV RNA RESP QL NAA+PROBE: NEGATIVE
SARS-COV-2 RNA RESP QL NAA+PROBE: NEGATIVE
SODIUM SERPL-SCNC: 135 MMOL/L (ref 135–147)
WBC # BLD AUTO: 14.57 THOUSAND/UL (ref 4.31–10.16)

## 2024-03-02 PROCEDURE — 71045 X-RAY EXAM CHEST 1 VIEW: CPT

## 2024-03-02 PROCEDURE — 83880 ASSAY OF NATRIURETIC PEPTIDE: CPT | Performed by: EMERGENCY MEDICINE

## 2024-03-02 PROCEDURE — 80053 COMPREHEN METABOLIC PANEL: CPT | Performed by: EMERGENCY MEDICINE

## 2024-03-02 PROCEDURE — 93005 ELECTROCARDIOGRAM TRACING: CPT

## 2024-03-02 PROCEDURE — 84484 ASSAY OF TROPONIN QUANT: CPT | Performed by: EMERGENCY MEDICINE

## 2024-03-02 PROCEDURE — 36415 COLL VENOUS BLD VENIPUNCTURE: CPT

## 2024-03-02 PROCEDURE — 0241U HB NFCT DS VIR RESP RNA 4 TRGT: CPT | Performed by: EMERGENCY MEDICINE

## 2024-03-02 PROCEDURE — 85025 COMPLETE CBC W/AUTO DIFF WBC: CPT | Performed by: EMERGENCY MEDICINE

## 2024-03-02 PROCEDURE — 99285 EMERGENCY DEPT VISIT HI MDM: CPT

## 2024-03-02 PROCEDURE — 71275 CT ANGIOGRAPHY CHEST: CPT

## 2024-03-02 RX ADMIN — IOHEXOL 70 ML: 350 INJECTION, SOLUTION INTRAVENOUS at 23:50

## 2024-03-03 PROBLEM — J44.1 COPD WITH EXACERBATION (HCC): Status: ACTIVE | Noted: 2017-11-02

## 2024-03-03 LAB
ATRIAL RATE: 103 BPM
GLUCOSE SERPL-MCNC: 143 MG/DL (ref 65–140)
GLUCOSE SERPL-MCNC: 183 MG/DL (ref 65–140)
GLUCOSE SERPL-MCNC: 215 MG/DL (ref 65–140)
P AXIS: 77 DEGREES
PR INTERVAL: 180 MS
QRS AXIS: -63 DEGREES
QRSD INTERVAL: 130 MS
QT INTERVAL: 364 MS
QTC INTERVAL: 476 MS
T WAVE AXIS: 48 DEGREES
VENTRICULAR RATE: 103 BPM

## 2024-03-03 PROCEDURE — 99222 1ST HOSP IP/OBS MODERATE 55: CPT | Performed by: INTERNAL MEDICINE

## 2024-03-03 PROCEDURE — 82948 REAGENT STRIP/BLOOD GLUCOSE: CPT

## 2024-03-03 PROCEDURE — 93010 ELECTROCARDIOGRAM REPORT: CPT | Performed by: INTERNAL MEDICINE

## 2024-03-03 PROCEDURE — 94640 AIRWAY INHALATION TREATMENT: CPT

## 2024-03-03 PROCEDURE — 94760 N-INVAS EAR/PLS OXIMETRY 1: CPT

## 2024-03-03 RX ORDER — ALBUTEROL SULFATE 2.5 MG/3ML
2.5 SOLUTION RESPIRATORY (INHALATION) EVERY 4 HOURS PRN
Status: DISCONTINUED | OUTPATIENT
Start: 2024-03-03 | End: 2024-03-04 | Stop reason: HOSPADM

## 2024-03-03 RX ORDER — LISINOPRIL 10 MG/1
20 TABLET ORAL DAILY
Status: CANCELLED | OUTPATIENT
Start: 2024-03-03

## 2024-03-03 RX ORDER — ENOXAPARIN SODIUM 100 MG/ML
40 INJECTION SUBCUTANEOUS DAILY
Status: DISCONTINUED | OUTPATIENT
Start: 2024-03-03 | End: 2024-03-04 | Stop reason: HOSPADM

## 2024-03-03 RX ORDER — INSULIN LISPRO 100 [IU]/ML
1-5 INJECTION, SOLUTION INTRAVENOUS; SUBCUTANEOUS
Status: DISCONTINUED | OUTPATIENT
Start: 2024-03-03 | End: 2024-03-04 | Stop reason: HOSPADM

## 2024-03-03 RX ORDER — LEVOTHYROXINE SODIUM 0.03 MG/1
25 TABLET ORAL
Status: DISCONTINUED | OUTPATIENT
Start: 2024-03-03 | End: 2024-03-04 | Stop reason: HOSPADM

## 2024-03-03 RX ORDER — DILTIAZEM HYDROCHLORIDE 300 MG/1
300 CAPSULE, COATED, EXTENDED RELEASE ORAL DAILY
Status: CANCELLED | OUTPATIENT
Start: 2024-03-03

## 2024-03-03 RX ORDER — HYDROCHLOROTHIAZIDE 25 MG/1
25 TABLET ORAL DAILY
Status: CANCELLED | OUTPATIENT
Start: 2024-03-03

## 2024-03-03 RX ORDER — ALBUTEROL SULFATE 90 UG/1
2 AEROSOL, METERED RESPIRATORY (INHALATION) EVERY 6 HOURS PRN
Status: DISCONTINUED | OUTPATIENT
Start: 2024-03-03 | End: 2024-03-04 | Stop reason: HOSPADM

## 2024-03-03 RX ORDER — ALBUTEROL SULFATE 90 UG/1
2 AEROSOL, METERED RESPIRATORY (INHALATION) EVERY 6 HOURS PRN
Status: CANCELLED | OUTPATIENT
Start: 2024-03-03

## 2024-03-03 RX ORDER — PREDNISONE 20 MG/1
40 TABLET ORAL DAILY
Status: DISCONTINUED | OUTPATIENT
Start: 2024-03-03 | End: 2024-03-04 | Stop reason: HOSPADM

## 2024-03-03 RX ORDER — FLUTICASONE PROPIONATE 50 MCG
2 SPRAY, SUSPENSION (ML) NASAL DAILY
Status: DISCONTINUED | OUTPATIENT
Start: 2024-03-03 | End: 2024-03-04 | Stop reason: HOSPADM

## 2024-03-03 RX ORDER — BUDESONIDE AND FORMOTEROL FUMARATE DIHYDRATE 160; 4.5 UG/1; UG/1
2 AEROSOL RESPIRATORY (INHALATION) 2 TIMES DAILY
Status: CANCELLED | OUTPATIENT
Start: 2024-03-03

## 2024-03-03 RX ORDER — LEVOTHYROXINE SODIUM 0.03 MG/1
25 TABLET ORAL DAILY
Status: CANCELLED | OUTPATIENT
Start: 2024-03-03

## 2024-03-03 RX ORDER — BUDESONIDE AND FORMOTEROL FUMARATE DIHYDRATE 160; 4.5 UG/1; UG/1
2 AEROSOL RESPIRATORY (INHALATION) 2 TIMES DAILY
Status: DISCONTINUED | OUTPATIENT
Start: 2024-03-03 | End: 2024-03-04 | Stop reason: HOSPADM

## 2024-03-03 RX ORDER — FLUTICASONE PROPIONATE 50 MCG
2 SPRAY, SUSPENSION (ML) NASAL DAILY
Status: CANCELLED | OUTPATIENT
Start: 2024-03-03

## 2024-03-03 RX ORDER — PRAVASTATIN SODIUM 80 MG/1
80 TABLET ORAL DAILY
Status: DISCONTINUED | OUTPATIENT
Start: 2024-03-03 | End: 2024-03-04 | Stop reason: HOSPADM

## 2024-03-03 RX ORDER — ALBUTEROL SULFATE 2.5 MG/3ML
2.5 SOLUTION RESPIRATORY (INHALATION) EVERY 4 HOURS PRN
Status: CANCELLED | OUTPATIENT
Start: 2024-03-03

## 2024-03-03 RX ORDER — PRAVASTATIN SODIUM 80 MG/1
80 TABLET ORAL
Status: CANCELLED | OUTPATIENT
Start: 2024-03-03

## 2024-03-03 RX ADMIN — INSULIN LISPRO 2 UNITS: 100 INJECTION, SOLUTION INTRAVENOUS; SUBCUTANEOUS at 16:36

## 2024-03-03 RX ADMIN — UMECLIDINIUM 1 PUFF: 62.5 AEROSOL, POWDER ORAL at 09:43

## 2024-03-03 RX ADMIN — BUDESONIDE AND FORMOTEROL FUMARATE DIHYDRATE 2 PUFF: 160; 4.5 AEROSOL RESPIRATORY (INHALATION) at 20:40

## 2024-03-03 RX ADMIN — BUDESONIDE AND FORMOTEROL FUMARATE DIHYDRATE 2 PUFF: 160; 4.5 AEROSOL RESPIRATORY (INHALATION) at 09:43

## 2024-03-03 RX ADMIN — PREDNISONE 40 MG: 20 TABLET ORAL at 12:57

## 2024-03-03 RX ADMIN — PRAVASTATIN SODIUM 80 MG: 80 TABLET ORAL at 08:52

## 2024-03-03 RX ADMIN — LEVOTHYROXINE SODIUM 25 MCG: 25 TABLET ORAL at 05:04

## 2024-03-03 RX ADMIN — ENOXAPARIN SODIUM 40 MG: 40 INJECTION SUBCUTANEOUS at 08:52

## 2024-03-03 RX ADMIN — ALBUTEROL SULFATE 2.5 MG: 2.5 SOLUTION RESPIRATORY (INHALATION) at 05:26

## 2024-03-03 RX ADMIN — INSULIN LISPRO 1 UNITS: 100 INJECTION, SOLUTION INTRAVENOUS; SUBCUTANEOUS at 08:46

## 2024-03-03 RX ADMIN — DILTIAZEM HYDROCHLORIDE 300 MG: 180 CAPSULE, COATED, EXTENDED RELEASE ORAL at 08:52

## 2024-03-03 RX ADMIN — ASPIRIN 81 MG: 81 TABLET, COATED ORAL at 08:52

## 2024-03-03 NOTE — ASSESSMENT & PLAN NOTE
"Lab Results   Component Value Date    HGBA1C 6.7 (H) 04/20/2023       No results for input(s): \"POCGLU\" in the last 72 hours.    Blood Sugar Average: Last 72 hrs:  Pt on tradjenta and glimepiride as outpt  Resume outpatient regimen    "

## 2024-03-03 NOTE — ASSESSMENT & PLAN NOTE
Lab Results   Component Value Date    HGB 8.8 (L) 03/02/2024    HGB 10.3 (L) 02/03/2023    HGB 12.0 05/30/2020    Hgb slightly lower than baseline, pt without clinical signs of acute bleed   Takes vitamin b12 and iron supplements as an outpatient   Continue to monitor CBC

## 2024-03-03 NOTE — UTILIZATION REVIEW
Initial Clinical Review    Patient started care in ED on 3/2 and has already crossed 1 midnight.     Observation 3/40873 changed to inpatient 3/3 1418. Pt requiring continued stay for monitoring of COPD exacerbation.    Admission: Date/Time/Statement:   Admission Orders (From admission, onward)       Ordered        03/03/24 1418  Inpatient Admission  Once            03/03/24 0304  Place in Observation  Once                          Orders Placed This Encounter   Procedures    Inpatient Admission     Standing Status:   Standing     Number of Occurrences:   1     Order Specific Question:   Level of Care     Answer:   Med Surg [16]     Order Specific Question:   Estimated length of stay     Answer:   More than 2 Midnights     Order Specific Question:   Certification     Answer:   I certify that inpatient services are medically necessary for this patient for a duration of greater than two midnights. See H&P and MD Progress Notes for additional information about the patient's course of treatment.     ED Arrival Information       Expected   -    Arrival   3/2/2024 20:39    Acuity   Urgent              Means of arrival   Ambulance    Escorted by   [facility]    Service   Hospitalist    Admission type   Emergency              Arrival complaint   SOB             Chief Complaint   Patient presents with    Shortness of Breath     SOB started earlier today. Patient called EMS for herself. Patient is on 2L O2 NC chronically. Patient has HX of COPD. Per EMS . EMS caused skin tear to patients L arm with EKG sticker.        Initial Presentation: 83 y.o. female with a PMH of COPD, T2DM, KIARA, hypothyroidism, HTN, hyperlipidemia who presents with dyspnea beginning at 4pm on 3/2 while walking into her kitchen. The pt states her SOB feels similar to her COPD exacerbations, however this felt more severe as she was unable to walk into her spare bedroom to get her nebulizer without needing to rest. Plan: Observation for COPD with  exacerbation, chronic resp failure with baseline of O2 2L NC use, DM, hypothyroidism, anemia, HLD: monitor ambulatory O2 sat, continue home inhaler regimen, hold antihyperglycemics, start SSI, monitor CBC, continue levothyroxine and statin.     3/3 Observation changed to inpatient.    Internal medicine: Slightly conversationally dyspneic but no accessory muscle use, breath sounds significantly decreased bilaterally, no wheezes or rales. The patient will be continued on nebs, Symbicort, respiratory protocol. Will give a short course of oral steroids with prednisone 40 mg daily x 5 days. Monitor blood glucose closely while on steroids.     ED Triage Vitals   Temperature Pulse Respirations Blood Pressure SpO2   03/02/24 2043 03/02/24 2043 03/02/24 2043 03/02/24 2043 03/02/24 2043   97.9 °F (36.6 °C) (!) 107 18 150/68 97 %      Temp Source Heart Rate Source Patient Position - Orthostatic VS BP Location FiO2 (%)   03/02/24 2043 03/02/24 2043 03/02/24 2043 03/02/24 2043 --   Oral Monitor Sitting Right arm       Pain Score       03/03/24 0400       No Pain          Wt Readings from Last 1 Encounters:   03/02/24 76 kg (167 lb 8.8 oz)     Additional Vital Signs:     Date/Time Temp Pulse Resp BP MAP (mmHg) SpO2 Calculated FIO2 (%) - Nasal Cannula Nasal Cannula O2 Flow Rate (L/min) O2 Device Patient Position - Orthostatic VS   03/03/24 07:23:45 97.7 °F (36.5 °C) 90 15 136/59 85 97 % -- -- -- --   03/03/24 07:22:01 -- 89 -- 136/59 85 96 % -- -- -- --   03/03/24 03:54:17 97.6 °F (36.4 °C) 78 -- 136/77 97 97 % -- -- -- --   03/03/24 0330 -- 75 17 133/62 89 98 % -- -- None (Room air) Sitting   03/03/24 0130 -- 81 18 126/58 84 98 % -- -- None (Room air) Sitting   03/03/24 0015 -- 90 17 151/65 94 97 % -- -- None (Room air) Sitting   03/03/24 0009 -- 100  18 -- -- 91 % -- -- None (Room air) --   Pulse: While ambulating at 03/03/24 0009 03/02/24 2202 -- 94 18 128/57 82 97 % 28 2 L/min Nasal cannula --     Pertinent Labs/Diagnostic  Test Results:   CTA ED chest PE Study   Final Result by Piyush Restrepo MD (03/03 0712)      No pulmonary embolism or acute thoracic pathology.                  Workstation performed: [id number]         XR chest 1 view portable   ED Interpretation by Olga Greer DO (03/02 2128)   No acute cardiopulmonary abnormalities      Final Result by Cyn Daniels MD (03/03 1026)      No acute cardiopulmonary disease.      Pulmonary artery enlargement which can be seen with pulmonary hypertension.      Workstation performed: [id number]           Results from last 7 days   Lab Units 03/02/24  2222   SARS-COV-2  Negative     Results from last 7 days   Lab Units 03/02/24  2049   WBC Thousand/uL 14.57*   HEMOGLOBIN g/dL 8.8*   HEMATOCRIT % 29.9*   PLATELETS Thousands/uL 342   NEUTROS ABS Thousands/µL 9.88*         Results from last 7 days   Lab Units 03/02/24  2049   SODIUM mmol/L 135   POTASSIUM mmol/L 3.8   CHLORIDE mmol/L 100   CO2 mmol/L 27   ANION GAP mmol/L 8   BUN mg/dL 20   CREATININE mg/dL 0.58*   EGFR ml/min/1.73sq m 85   CALCIUM mg/dL 9.2     Results from last 7 days   Lab Units 03/02/24  2049   AST U/L 11*   ALT U/L 10   ALK PHOS U/L 61   TOTAL PROTEIN g/dL 6.9   ALBUMIN g/dL 3.9   TOTAL BILIRUBIN mg/dL 0.28     Results from last 7 days   Lab Units 03/03/24  1124 03/03/24  0723   POC GLUCOSE mg/dl 143* 183*     Results from last 7 days   Lab Units 03/02/24  2049   GLUCOSE RANDOM mg/dL 267*           Results from last 7 days   Lab Units 03/02/24  2249 03/02/24  2049   HS TNI 0HR ng/L  --  7   HS TNI 2HR ng/L 8  --    HSTNI D2 ng/L 1  --            Results from last 7 days   Lab Units 03/02/24  2049   BNP pg/mL 84           Results from last 7 days   Lab Units 03/02/24  2222   INFLUENZA A PCR  Negative   INFLUENZA B PCR  Negative   RSV PCR  Negative         ED Treatment:   Medication Administration from 03/02/2024 2039 to 03/03/2024 0351         Date/Time Order Dose Route Action     03/02/2024 2350 EST  iohexol (OMNIPAQUE) 350 MG/ML injection (MULTI-DOSE) 70 mL 70 mL Intravenous Given          Past Medical History:   Diagnosis Date    Anemia     Blood clotting tendency (HCC)     left leg, lung    Chronic respiratory failure with hypoxia and hypercapnia (HCC)     COPD (chronic obstructive pulmonary disease) (HCC)     Deep vein thrombosis (HCC)     Diabetes mellitus (HCC)     Emphysema lung (HCC)     Emphysema lung (HCC)     Emphysema of lung (HCC)     Hyperlipidemia     Hypertension     Hyperthyroidism     Hypothyroid     On warfarin therapy     Osteoporosis     Palpitations     Pulmonary embolism (HCC)     Sleep apnea, obstructive     Thyroid disease      Present on Admission:   Hypothyroidism   Obstructive sleep apnea   Type 2 diabetes mellitus with hyperglycemia, without long-term current use of insulin (HCC)   Chronic respiratory failure with hypoxia and hypercapnia (HCC)   COPD with exacerbation (HCC)   Iron deficiency anemia, unspecified   Mixed hyperlipidemia      Admitting Diagnosis: Shortness of breath [R06.02]  COPD, severe (HCC) [J44.9]  Chronic respiratory failure with hypoxia and hypercapnia (HCC) [J96.11, J96.12]  Age/Sex: 83 y.o. female  Admission Orders:  Scheduled Medications:  aspirin, 81 mg, Oral, Daily  budesonide-formoterol, 2 puff, Inhalation, BID  diltiazem, 300 mg, Oral, Daily  enoxaparin, 40 mg, Subcutaneous, Daily  fluticasone, 2 spray, Nasal, Daily  insulin lispro, 1-5 Units, Subcutaneous, TID AC  levothyroxine, 25 mcg, Oral, Early Morning  pravastatin, 80 mg, Oral, Daily  predniSONE, 40 mg, Oral, Daily  umeclidinium, 1 puff, Inhalation, Daily      Continuous IV Infusions:     PRN Meds:  albuterol, 2 puff, Inhalation, Q6H PRN  albuterol, 2.5 mg, Nebulization, Q4H PRN        None    Network Utilization Review Department  ATTENTION: Please call with any questions or concerns to [phone] and carefully listen to the prompts so that you are directed to the right person. All voicemails are  confidential.   For Discharge needs, contact Care Management DC Support Team at [phone] opt. [phone]  Send all requests for admission clinical reviews, approved or denied determinations and any other requests to dedicated fax number below belonging to the campus where the patient is receiving treatment. List of dedicated fax numbers for the Facilities:  FACILITY NAME UR FAX NUMBER   ADMISSION DENIALS (Administrative/Medical Necessity) [phone]   DISCHARGE SUPPORT TEAM (NETWORK) [phone]   PARENT CHILD HEALTH (Maternity/NICU/Pediatrics) [phone]   [facility] [phone]   [facility] [phone]   [facility] [phone]   [facility] [phone]   [facility] [phone]   [facility] [phone]   [facility] [phone]   [facility] [phone]   [facility] [phone]   [facility] [phone]   [facility] [phone]   [facility] [phone]

## 2024-03-03 NOTE — H&P
"[facility]  H&P  Name: Christine Laura 83 y.o. female I MRN: [id number]  Unit/Bed#: W -01 I Date of Admission: 3/2/2024   Date of Service: 3/3/2024 I Hospital Day: 0      Assessment/Plan   * COPD with exacerbation (HCC)  Assessment & Plan  Pt presents with worsening dyspnea since 4pm on 3/2. The pt states she normally becomes SOB with walking, however this time felt more severe and she was unable to get to her nebulizing treatments in the other room.  CT PE study negative for PE, per ED, pending official read   WBC at baseline  Flu/COVID/RSV negative  BNP 84  D-dimer pending  O2 sat 97% on 2L NC- this is her baseline   Plan  Continue to monitor VS and ambulatory O2 sat  Give home dose of Albuterol nebulized solution and assess response  Continue home inhaler regimen     Chronic respiratory failure with hypoxia and hypercapnia (HCC)  Assessment & Plan  Pt currently only requiring her baseline O2 of 2L NC, saturating at 97%  O2 saturation drops to 91% when ambulating, pt typically can only walk 20 feet before becoming dyspneic.     Type 2 diabetes mellitus with hyperglycemia, without long-term current use of insulin (HCC)  Assessment & Plan  Lab Results   Component Value Date    HGBA1C 6.7 (H) 04/20/2023       No results for input(s): \"POCGLU\" in the last 72 hours.    Blood Sugar Average: Last 72 hrs:  Pt on tradjenta and glimepiride as outpt, will place on SSI while inpatient      Obstructive sleep apnea  Assessment & Plan  Continue CPAP    Hypothyroidism  Assessment & Plan  Continue levothyroxine    Iron deficiency anemia, unspecified  Assessment & Plan  Lab Results   Component Value Date    HGB 8.8 (L) 03/02/2024    HGB 10.3 (L) 02/03/2023    HGB 12.0 05/30/2020    Hgb slightly lower than baseline, pt without clinical signs of acute bleed   Takes vitamin b12 and iron supplements as an outpatient   Continue to monitor CBC    Mixed hyperlipidemia  Assessment & Plan  Continue " statin         VTE Pharmacologic Prophylaxis: VTE Score: 5 High Risk (Score >/= 5) - Pharmacological DVT Prophylaxis Ordered: enoxaparin (Lovenox). Sequential Compression Devices Ordered.  Code Status: Level 3 - DNAR and DNI per patient  Discussion with family: Patient declined call to .     Anticipated Length of Stay: Patient will be admitted on an observation basis with an anticipated length of stay of less than 2 midnights secondary to worsening dyspnea.    Total Time Spent on Date of Encounter in care of patient: 65 mins. This time was spent on one or more of the following: performing physical exam; counseling and coordination of care; obtaining or reviewing history; documenting in the medical record; reviewing/ordering tests, medications or procedures; communicating with other healthcare professionals and discussing with patient's family/caregivers.    Chief Complaint: SOB    History of Present Illness:  Christine Laura is a 83 y.o. female with a PMH of COPD, T2DM, KIARA, hypothyroidism, HTN, hyperlipidemia who presents with dyspnea beginning at 4pm on 3/2 while walking into her kitchen. The pt states her SOB feels similar to her COPD exacerbations, however this felt more severe as she was unable to walk into her spare bedroom to get her nebulizer without needing to rest. She denies cough, wheezing, chest tightness, chest pain, palpitations, n/v/d, weakness, fever, chills. Former tobacco smoker, quit >17 years ago.     Review of Systems:  Review of Systems   Constitutional:  Negative for chills and fever.   HENT:  Negative for ear pain and sore throat.    Eyes:  Negative for pain and visual disturbance.   Respiratory:  Positive for shortness of breath. Negative for cough, chest tightness and wheezing.    Cardiovascular:  Negative for chest pain and palpitations.   Gastrointestinal:  Negative for abdominal pain, diarrhea and vomiting.   Genitourinary:  Negative for dysuria and hematuria.    Musculoskeletal:  Negative for arthralgias and back pain.   Skin:  Negative for color change and rash.   Neurological:  Negative for seizures, syncope, light-headedness and headaches.   All other systems reviewed and are negative.      Past Medical and Surgical History:   Past Medical History:   Diagnosis Date    Anemia     Blood clotting tendency (HCC)     left leg, lung    Chronic respiratory failure with hypoxia and hypercapnia (HCC)     COPD (chronic obstructive pulmonary disease) (HCC)     Deep vein thrombosis (HCC)     Diabetes mellitus (HCC)     Emphysema lung (HCC)     Emphysema lung (HCC)     Emphysema of lung (HCC)     Hyperlipidemia     Hypertension     Hyperthyroidism     Hypothyroid     On warfarin therapy     Osteoporosis     Palpitations     Pulmonary embolism (HCC)     Sleep apnea, obstructive     Thyroid disease        Past Surgical History:   Procedure Laterality Date    AV NODE ABLATION  09/21/2017    COLONOSCOPY  01/01/2018    w/ EGD    DXA PROCEDURE (HISTORICAL)  11/2016    MI COLONOSCOPY FLX DX W/COLLJ SPEC WHEN PFRMD N/A 8/30/2018    Procedure: EGD AND COLONOSCOPY;  Surgeon: Reena Bell MD;  Location: [facility];  Service: Gastroenterology    TOE SURGERY         Meds/Allergies:  Prior to Admission medications    Medication Sig Start Date End Date Taking? Authorizing Provider   albuterol (2.5 mg/3 mL) 0.083 % nebulizer solution Take 3 mL (2.5 mg total) by nebulization every 4 (four) hours as needed for wheezing or shortness of breath 1/18/23   Kirsten Casanova DO   aspirin (ECOTRIN LOW STRENGTH) 81 mg EC tablet Take 81 mg by mouth daily    Historical Provider, MD   budesonide-formoterol (Breyna) 160-4.5 mcg/act inhaler Inhale 2 puffs 2 (two) times a day Rinse mouth after use. 2/13/24   IDA Johnson   Cholecalciferol (VITAMIN D) 2000 units CAPS Take 2 capsules (4,000 Units total) by mouth daily 9/4/19   Michelle Jalloh PA-C   cyanocobalamin (VITAMIN B-12) 100 mcg tablet Take by  mouth daily    Historical Provider, MD   diltiazem (CARDIZEM CD) 300 mg 24 hr capsule Take 1 capsule (300 mg total) by mouth daily As directed 11/22/23   Gillian Smith MD   fluticasone (FLONASE) 50 mcg/act nasal spray 2 sprays into each nostril daily 5/9/19   Kirsten Casanova DO   glimepiride (AMARYL) 1 mg tablet Take one tablet with breakfast and 1 tablet with dinner 2/7/24   Gabino Guillermo MD   glucose blood (Contour Next Test) test strip Use twice a day 6/17/20   Michelle Jalloh PA-C   Incruse Ellipta 62.5 MCG/ACT AEPB inhaler INHALE 1 PUFF BY MOUTH EVERY DAY 2/21/24   Fran Melendez PA-C   ketorolac (ACULAR) 0.5 % ophthalmic solution  9/10/20   Historical Provider, MD   levothyroxine 25 mcg tablet TAKE ONE TABLET BY MOUTH EVERY DAY 12/28/23   Michelle Jalloh PA-C   linaGLIPtin (Tradjenta) 5 MG TABS TAKE ONE TABLET BY MOUTH EVERY DAY 12/11/23   Justen Worthington PA-C   lisinopril-hydrochlorothiazide (PRINZIDE,ZESTORETIC) 20-25 MG per tablet Take 1 tablet(s) every day by oral route. 11/22/23   Gillian Smith MD   meclizine (ANTIVERT) 25 mg tablet 1 tab take before car ride once a day as needed 11/29/23   Gillian Smith MD   multivitamin (THERAGRAN) TABS Take 1 tablet by mouth daily    Historical Provider, MD   nystatin (MYCOSTATIN) cream Apply topically 2 (two) times a day 1/20/21   Gillian Smith MD   ofloxacin (OCUFLOX) 0.3 % ophthalmic solution  9/28/20   Historical Provider, MD   prednisoLONE acetate (PRED FORTE) 1 % ophthalmic suspension  9/28/20   Historical Provider, MD   rosuvastatin (CRESTOR) 10 MG tablet Take 1 tablet (10 mg total) by mouth daily 12/4/23   Kye Delgado MD   Ventolin  (90 Base) MCG/ACT inhaler Inhale 2 puffs every 6 (six) hours as needed for wheezing 12/14/22   Gillian Smith MD     I have reviewed home medications with patient personally.    Allergies:   Allergies   Allergen Reactions    Penicillins Rash     After dental procedure        Social History:  Marital Status:     Occupation: retired  Patient Pre-hospital Living Situation: Home, Alone  Patient Pre-hospital Level of Mobility: walks  Patient Pre-hospital Diet Restrictions: none  Substance Use History:   Social History     Substance and Sexual Activity   Alcohol Use Not Currently     Social History     Tobacco Use   Smoking Status Former    Current packs/day: 0.00    Average packs/day: 1 pack/day for 46.0 years (46.0 ttl pk-yrs)    Types: Cigarettes    Start date:     Quit date:     Years since quittin.1   Smokeless Tobacco Never     Social History     Substance and Sexual Activity   Drug Use No       Family History:  Family History   Problem Relation Age of Onset    Hypertension Mother     Hyperlipidemia Mother     Asthma Father     Heart failure Father     Hypertension Sister     Hyperlipidemia Sister     Heart attack Neg Hx     Aneurysm Neg Hx     Stroke Neg Hx     Clotting disorder Neg Hx        Physical Exam:     Vitals:   Blood Pressure: 136/77 (24)  Pulse: 78 (24)  Temperature: 97.6 °F (36.4 °C) (24)  Temp Source: Oral (24)  Respirations: 17 (24)  Weight - Scale: 76 kg (167 lb 8.8 oz) (24)  SpO2: 97 % (24)    Physical Exam  Vitals and nursing note reviewed.   Constitutional:       General: She is not in acute distress.     Appearance: She is well-developed.   HENT:      Head: Normocephalic and atraumatic.   Eyes:      Conjunctiva/sclera: Conjunctivae normal.   Cardiovascular:      Rate and Rhythm: Normal rate and regular rhythm.      Heart sounds: No murmur heard.  Pulmonary:      Effort: Pulmonary effort is normal. No respiratory distress.      Breath sounds: Decreased air movement present. No wheezing or rhonchi.   Abdominal:      Palpations: Abdomen is soft.      Tenderness: There is no abdominal tenderness.   Musculoskeletal:         General: No swelling.      Cervical back: Neck supple.   Skin:     General: Skin is warm and  dry.      Capillary Refill: Capillary refill takes less than 2 seconds.   Neurological:      Mental Status: She is alert and oriented to person, place, and time.   Psychiatric:         Mood and Affect: Mood normal.         Additional Data:     Lab Results:  Results from last 7 days   Lab Units 03/02/24 2049   WBC Thousand/uL 14.57*   HEMOGLOBIN g/dL 8.8*   HEMATOCRIT % 29.9*   PLATELETS Thousands/uL 342   NEUTROS PCT % 68   LYMPHS PCT % 26   MONOS PCT % 5   EOS PCT % 0     Results from last 7 days   Lab Units 03/02/24 2049   SODIUM mmol/L 135   POTASSIUM mmol/L 3.8   CHLORIDE mmol/L 100   CO2 mmol/L 27   BUN mg/dL 20   CREATININE mg/dL 0.58*   ANION GAP mmol/L 8   CALCIUM mg/dL 9.2   ALBUMIN g/dL 3.9   TOTAL BILIRUBIN mg/dL 0.28   ALK PHOS U/L 61   ALT U/L 10   AST U/L 11*   GLUCOSE RANDOM mg/dL 267*                       Lines/Drains:  Invasive Devices       Peripheral Intravenous Line  Duration             Peripheral IV 03/03/24 Left;Ventral (anterior) Forearm <1 day                        Imaging: Reviewed radiology reports from this admission including: chest xray  XR chest 1 view portable   ED Interpretation by Olga Greer DO (03/02 2128)   No acute cardiopulmonary abnormalities      CTA ED chest PE Study    (Results Pending)       EKG and Other Studies Reviewed on Admission:   EKG: Sinus Tachycardia. .    ** Please Note: This note has been constructed using a voice recognition system. **

## 2024-03-03 NOTE — ASSESSMENT & PLAN NOTE
Pt currently only requiring her baseline O2 of 2L NC, saturating at 97%  O2 saturation drops to 91% when ambulating, pt typically can only walk 20 feet before becoming dyspneic.

## 2024-03-03 NOTE — ED CARE HANDOFF
Emergency Department Sign Out Note        Sign out and transfer of care from Dr. Greer. See Separate Emergency Department note.     The patient, Christine Laura, was evaluated by the previous provider for SOB.    Workup Completed:  Reassuring work up so far.  Dispo pending CTA.  Likely admission for dyspnea.      ED Course / Workup Pending (followup):  CTA without PE.  Chronic findings noted.  Admitted to medicine for dyspnea worse than baseline.                          Wells' Criteria for PE      Flowsheet Row Most Recent Value   Wells' Criteria for PE    Clinical signs and symptoms of DVT 0 Filed at: 03/02/2024 2328   PE is primary diagnosis or equally likely 3 Filed at: 03/02/2024 2328   HR >100 1.5 Filed at: 03/02/2024 2328   Immobilization at least 3 days or Surgery in the previous 4 weeks 0 Filed at: 03/02/2024 2328   Previous, objectively diagnosed PE or DVT 0 Filed at: 03/02/2024 2328   Hemoptysis 0 Filed at: 03/02/2024 2328   Malignancy with treatment within 6 months or palliative 0 Filed at: 03/02/2024 2328   Wells' Criteria Total 4.5 Filed at: 03/02/2024 2328                     Procedures  Medical Decision Making  Amount and/or Complexity of Data Reviewed  Labs: ordered.  Radiology: ordered and independent interpretation performed.    Risk  Prescription drug management.            Disposition  Final diagnoses:   COPD, severe (HCC)   Chronic respiratory failure with hypoxia and hypercapnia (HCC)   Shortness of breath     Time reflects when diagnosis was documented in both MDM as applicable and the Disposition within this note       Time User Action Codes Description Comment    3/3/2024 12:04 AM Olga Greer [J44.9] COPD, severe (HCC)     3/3/2024 12:04 AM Olga Greer [J96.11,  J96.12] Chronic respiratory failure with hypoxia and hypercapnia (HCC)     3/3/2024 12:05 AM Olga Greer [R06.02] Shortness of breath           ED Disposition       None          Follow-up Information    None       Patient's  Medications   Discharge Prescriptions    No medications on file     No discharge procedures on file.       ED Provider  Electronically Signed by     Kelly Washburn MD  03/03/24 06

## 2024-03-03 NOTE — RESPIRATORY THERAPY NOTE
RT Protocol Note  Christine Laura 83 y.o. female MRN: [id number]  Unit/Bed#: W -01 Encounter: [id number]    Assessment    Principal Problem:    COPD with exacerbation (HCC)  Active Problems:    Mixed hyperlipidemia    Chronic respiratory failure with hypoxia and hypercapnia (HCC)    Hypothyroidism    Obstructive sleep apnea    Type 2 diabetes mellitus with hyperglycemia, without long-term current use of insulin (HCC)    Iron deficiency anemia, unspecified      Home Pulmonary Medications:     albuterol (2.5 mg/3 mL) 0.083 % nebulizer solution Take 3 mL (2.5 mg total) by nebulization every 4 (four) hours as needed for wheezing or shortness of     Ventolin  (90 Base) MCG/ACT inhaler Inhale 2 puffs every 6 (six) hours as needed for wheezing       Past Medical History:   Diagnosis Date    Anemia     Blood clotting tendency (HCC)     left leg, lung    Chronic respiratory failure with hypoxia and hypercapnia (HCC)     COPD (chronic obstructive pulmonary disease) (HCC)     Deep vein thrombosis (HCC)     Diabetes mellitus (HCC)     Emphysema lung (HCC)     Emphysema lung (HCC)     Emphysema of lung (HCC)     Hyperlipidemia     Hypertension     Hyperthyroidism     Hypothyroid     On warfarin therapy     Osteoporosis     Palpitations     Pulmonary embolism (HCC)     Sleep apnea, obstructive     Thyroid disease      Social History     Socioeconomic History    Marital status:      Spouse name: None    Number of children: None    Years of education: None    Highest education level: None   Occupational History    None   Tobacco Use    Smoking status: Former     Current packs/day: 0.00     Average packs/day: 1 pack/day for 46.0 years (46.0 ttl pk-yrs)     Types: Cigarettes     Start date:      Quit date:      Years since quittin.1    Smokeless tobacco: Never   Vaping Use    Vaping status: Never Used   Substance and Sexual Activity    Alcohol use: Not Currently    Drug use: No    Sexual activity: None    Other Topics Concern    None   Social History Narrative    Date of last mammogram:   2017      Most Recent Bone Density:   2016       Date of Last Pap Smear:       not required per gyn         Smokeless tobacco status:   Never used smokeless tobacco      Tobacco-years of use:   46      E-cigarette/vape status:   Never used electronic cigarettes      Most recent tobacco use screenin2019      Do you currently or have you served in the [employer]:   No      Were you activated, into active duty, as a member of the [employer] or as a Reservist:   No      Marital status:         Diet:   Regular      General stress level:   Low      Has smoked since age:   20       Alcohol intake:   None       Caffeine intake:   Moderate      Chewing tobacco:   none      Guns present in home:   No      Seat belts used routinely:   Yes      Sunscreen used routinely:   Yes      Smoke alarm in home:   Yes      Advance directive:   Yes      Live alone or with others:   alone       Are there stairs in your home:   No      Pets:   No      Social Determinants of Health     Financial Resource Strain: Not on file   Food Insecurity: Not on file   Transportation Needs: Not on file   Physical Activity: Not on file   Stress: Not on file   Social Connections: Not on file   Intimate Partner Violence: Not on file   Housing Stability: Not on file       Subjective  Pt sts she feels SOB when ambulating       Objective    WOB WNL, speaking full sentences, answering questions. B.S. Diminished throughout. On 2 LPM baseline.    Physical Exam:        Vitals:  Blood pressure 136/77, pulse 78, temperature 97.6 °F (36.4 °C), resp. rate 17, weight 76 kg (167 lb 8.8 oz), SpO2 97%.          Imaging and other studies: I have personally reviewed pertinent reports.            Plan    Home bronchodilator therapy

## 2024-03-03 NOTE — CASE MANAGEMENT
Case Management Assessment & Discharge Planning Note    Patient name Christine Laura  Location W /W -01 MRN [id number]  : 1940 Date 3/3/2024       Current Admission Date: 3/2/2024  Current Admission Diagnosis:COPD with exacerbation (HCC)   Patient Active Problem List    Diagnosis Date Noted    Skin mole 2023    Mild aortic stenosis 10/06/2022    Nasal congestion 03/10/2022    Needs flu shot 2021    Tinea corporis 2021    Age-related cataract of both eyes 2020    SIRS (systemic inflammatory response syndrome) (HCC) 2020    Elevated d-dimer 2020    Age-related osteoporosis without current pathological fracture 2020    Essential hypertension 2020    Vitamin D deficiency 2020    Thyroid nodule 2020    Non-seasonal allergic rhinitis due to pollen 2019    Chronic bilateral low back pain with bilateral sciatica 2019    Hemorrhoids 2018    Polyp of colon 2018    PSVT (paroxysmal supraventricular tachycardia) 2018    Anticoagulant long-term use 2018    Iron deficiency anemia, unspecified 2018    Chronic respiratory failure with hypoxia and hypercapnia (HCC) 2017    COPD with exacerbation (HCC) 2017    Mixed hyperlipidemia 10/31/2017    Hypothyroidism 10/31/2017    Obstructive sleep apnea 10/31/2017    History of pulmonary embolism 10/31/2017    Type 2 diabetes mellitus with hyperglycemia, without long-term current use of insulin (HCC) 10/31/2017      LOS (days): 0  Geometric Mean LOS (GMLOS) (days):   Days to GMLOS:     OBJECTIVE:              Current admission status: Observation       Preferred Pharmacy:   [facility] [place] - [address]  [address] [address]  Phone: [phone] Fax: [phone]    Primary Care Provider: Gillian Smith MD    Primary Insurance: VIOLETTA MONROE  Secondary Insurance:     ASSESSMENT:  Active Health Care Proxies     There are no active Health Care Proxies on file.                 Readmission Root Cause  30 Day Readmission: No    Patient Information  Admitted from:: Home  Mental Status: Alert  During Assessment patient was accompanied by: Not accompanied during assessment  Assessment information provided by:: Patient  Primary Caregiver: Self  Support Systems: Family members  County of Residence: [place]  What city do you live in?: [place]  Home entry access options. Select all that apply.: No steps to enter home  Type of Current Residence: Other (Comment) (Condo)  Living Arrangements: Lives Alone  Is patient a ?: No    Activities of Daily Living Prior to Admission  Functional Status: Independent  Completes ADLs independently?: Yes  Ambulates independently?: Yes  Does patient use assisted devices?: No  Does patient currently own DME?: Yes  What DME does the patient currently own?: [employer]  Does patient have a history of Outpatient Therapy (PT/OT)?: Yes  Does the patient have a history of Short-Term Rehab?: No  Does patient have a history of HHC?: No  Does patient currently have HHC?: No         Patient Information Continued  Income Source: Pension/[facility]  Does patient have prescription coverage?: Yes  Does patient receive dialysis treatments?: No  Does patient have a history of substance abuse?: No  Does patient have a history of Mental Health Diagnosis?: No         Means of Transportation  Means of Transport to Appts:: Drives Self      Social Determinants of Health (SDOH)      Flowsheet Row Most Recent Value   Housing Stability    In the last 12 months, was there a time when you were not able to pay the mortgage or rent on time? N   In the last 12 months, was there a time when you did not have a steady place to sleep or slept in a shelter (including now)? N   Transportation Needs    In the past 12 months, has lack of transportation kept you from medical appointments or from getting medications? no   In the  past 12 months, has lack of transportation kept you from meetings, work, or from getting things needed for daily living? No   Food Insecurity    Within the past 12 months, you worried that your food would run out before you got the money to buy more. Never true   Within the past 12 months, the food you bought just didn't last and you didn't have money to get more. Never true   Utilities    In the past 12 months has the electric, gas, oil, or water company threatened to shut off services in your home? No            DISCHARGE DETAILS:    Discharge planning discussed with:: Patient  Freedom of Choice: Yes  Comments - Freedom of Choice: Cm met with patient to review Cm role. Patient does not anticipate any needs upon discharge. Patient willing to participate in therapy if recommended by medical providers. Patient would prefer HHC over rehab.     Were Treatment Team discharge recommendations reviewed with patient/caregiver?: Yes  Did patient/caregiver verbalize understanding of patient care needs?: Yes  Were patient/caregiver advised of the risks associated with not following Treatment Team discharge recommendations?: Yes

## 2024-03-03 NOTE — ASSESSMENT & PLAN NOTE
"Lab Results   Component Value Date    HGBA1C 6.7 (H) 04/20/2023       No results for input(s): \"POCGLU\" in the last 72 hours.    Blood Sugar Average: Last 72 hrs:  Pt on tradjenta and glimepiride as outpt, will place on SSI while inpatient    "

## 2024-03-03 NOTE — ASSESSMENT & PLAN NOTE
Pt presents with worsening dyspnea since 4pm on 3/2. The pt states she normally becomes SOB with walking, however this time felt more severe and she was unable to get to her nebulizing treatments in the other room.  CT PE study negative  WBC at baseline  Flu/COVID/RSV negative  BNP 84  O2 sat 97% on 2L NC- this is her baseline     Plan:  Finish prednisone 40 Mg x 5 days course  Continue home inhaler and nebulizer regimen

## 2024-03-03 NOTE — ED PROVIDER NOTES
History  Chief Complaint   Patient presents with    Shortness of Breath     SOB started earlier today. Patient called EMS for herself. Patient is on 2L O2 NC chronically. Patient has HX of COPD. Per EMS . EMS caused skin tear to patients L arm with EKG sticker.      83-year-old female with a past medical history of COPD, previous PE not on anticoagulation, hypertension, hyperlipidemia, and diabetes who presents for evaluation with shortness of breath.  Patient states that over the past week, she has had worsening shortness of breath.  She states that she is having severe exertional dyspnea and is unable to walk short distances without getting out of breath.  She denies any recent cough or sputum production.  No recent fevers or chills.  She denies any chest tightness, wheezing, chest pain, lower extremity swelling, or other concerning symptoms.  Patient states that she has been unable to use her home nebulizer treatments since she cannot walk to the room where her nebulizer is.  Patient is on 2 L via nasal cannula chronically, has not increased her oxygen levels recently.        Prior to Admission Medications   Prescriptions Last Dose Informant Patient Reported? Taking?   Cholecalciferol (VITAMIN D) 2000 units CAPS  Self No No   Sig: Take 2 capsules (4,000 Units total) by mouth daily   Incruse Ellipta 62.5 MCG/ACT AEPB inhaler   No No   Sig: INHALE 1 PUFF BY MOUTH EVERY DAY   Ventolin  (90 Base) MCG/ACT inhaler  Self No No   Sig: Inhale 2 puffs every 6 (six) hours as needed for wheezing   albuterol (2.5 mg/3 mL) 0.083 % nebulizer solution  Self No No   Sig: Take 3 mL (2.5 mg total) by nebulization every 4 (four) hours as needed for wheezing or shortness of breath   aspirin (ECOTRIN LOW STRENGTH) 81 mg EC tablet  Self Yes No   Sig: Take 81 mg by mouth daily   budesonide-formoterol (Breyna) 160-4.5 mcg/act inhaler   No No   Sig: Inhale 2 puffs 2 (two) times a day Rinse mouth after use.   cyanocobalamin  (VITAMIN B-12) 100 mcg tablet  Self Yes No   Sig: Take by mouth daily   diltiazem (CARDIZEM CD) 300 mg 24 hr capsule  Self No No   Sig: Take 1 capsule (300 mg total) by mouth daily As directed   fluticasone (FLONASE) 50 mcg/act nasal spray  Self No No   Si sprays into each nostril daily   glimepiride (AMARYL) 1 mg tablet   No No   Sig: Take one tablet with breakfast and 1 tablet with dinner   glucose blood (Contour Next Test) test strip  Self No No   Sig: Use twice a day   ketorolac (ACULAR) 0.5 % ophthalmic solution  Self Yes No   levothyroxine 25 mcg tablet   No No   Sig: TAKE ONE TABLET BY MOUTH EVERY DAY   linaGLIPtin (Tradjenta) 5 MG TABS   No No   Sig: TAKE ONE TABLET BY MOUTH EVERY DAY   lisinopril-hydrochlorothiazide (PRINZIDE,ZESTORETIC) 20-25 MG per tablet  Self No No   Sig: Take 1 tablet(s) every day by oral route.   meclizine (ANTIVERT) 25 mg tablet  Self No No   Si tab take before car ride once a day as needed   multivitamin (THERAGRAN) TABS  Self Yes No   Sig: Take 1 tablet by mouth daily   nystatin (MYCOSTATIN) cream  Self No No   Sig: Apply topically 2 (two) times a day   ofloxacin (OCUFLOX) 0.3 % ophthalmic solution  Self Yes No   prednisoLONE acetate (PRED FORTE) 1 % ophthalmic suspension  Self Yes No   rosuvastatin (CRESTOR) 10 MG tablet   No No   Sig: Take 1 tablet (10 mg total) by mouth daily      Facility-Administered Medications Last Administration Doses Remaining   denosumab (PROLIA) subcutaneous injection 60 mg 2023 10:20 AM           Past Medical History:   Diagnosis Date    Anemia     Blood clotting tendency (HCC)     left leg, lung    Chronic respiratory failure with hypoxia and hypercapnia (HCC)     COPD (chronic obstructive pulmonary disease) (HCC)     Deep vein thrombosis (HCC)     Diabetes mellitus (HCC)     Emphysema lung (HCC)     Emphysema lung (HCC)     Emphysema of lung (HCC)     Hyperlipidemia     Hypertension     Hyperthyroidism     Hypothyroid     On warfarin  therapy     Osteoporosis     Palpitations     Pulmonary embolism (HCC)     Sleep apnea, obstructive     Thyroid disease        Past Surgical History:   Procedure Laterality Date    AV NODE ABLATION  2017    COLONOSCOPY  2018    w/ EGD    DXA PROCEDURE (HISTORICAL)  2016    MT COLONOSCOPY FLX DX W/COLLJ SPEC WHEN PFRMD N/A 2018    Procedure: EGD AND COLONOSCOPY;  Surgeon: Reena Bell MD;  Location: [facility];  Service: Gastroenterology    TOE SURGERY         Family History   Problem Relation Age of Onset    Hypertension Mother     Hyperlipidemia Mother     Asthma Father     Heart failure Father     Hypertension Sister     Hyperlipidemia Sister     Heart attack Neg Hx     Aneurysm Neg Hx     Stroke Neg Hx     Clotting disorder Neg Hx      I have reviewed and agree with the history as documented.    E-Cigarette/Vaping    E-Cigarette Use Never User      E-Cigarette/Vaping Substances     Social History     Tobacco Use    Smoking status: Former     Current packs/day: 0.00     Average packs/day: 1 pack/day for 46.0 years (46.0 ttl pk-yrs)     Types: Cigarettes     Start date:      Quit date:      Years since quittin.1    Smokeless tobacco: Never   Vaping Use    Vaping status: Never Used   Substance Use Topics    Alcohol use: No    Drug use: No       Review of Systems   Constitutional:  Negative for fever.   Respiratory:  Positive for shortness of breath. Negative for cough.    Cardiovascular:  Negative for chest pain and leg swelling.   Gastrointestinal:  Negative for abdominal pain, nausea and vomiting.   All other systems reviewed and are negative.      Physical Exam  Physical Exam  Vitals and nursing note reviewed.   Constitutional:       General: She is awake. She is not in acute distress.     Appearance: She is not toxic-appearing.   HENT:      Head: Normocephalic and atraumatic.   Eyes:      General: Vision grossly intact. Gaze aligned appropriately.   Cardiovascular:      Rate and  Rhythm: Normal rate and regular rhythm.      Heart sounds: Normal heart sounds.   Pulmonary:      Effort: Pulmonary effort is normal. No respiratory distress.      Comments: Mildly diminished breath sounds bilaterally, otherwise clear to auscultation.  Musculoskeletal:      Cervical back: Full passive range of motion without pain and neck supple.      Right lower leg: No edema.      Left lower leg: No edema.   Skin:     General: Skin is warm and dry.   Neurological:      General: No focal deficit present.      Mental Status: She is alert and oriented to person, place, and time.         Vital Signs  ED Triage Vitals [03/02/24 2043]   Temperature Pulse Respirations Blood Pressure SpO2   97.9 °F (36.6 °C) (!) 107 18 150/68 97 %      Temp Source Heart Rate Source Patient Position - Orthostatic VS BP Location FiO2 (%)   Oral Monitor Sitting Right arm --      Pain Score       --           Vitals:    03/02/24 2043 03/02/24 2202   BP: 150/68 128/57   Pulse: (!) 107 94   Patient Position - Orthostatic VS: Sitting          Visual Acuity      ED Medications  Medications   iohexol (OMNIPAQUE) 350 MG/ML injection (MULTI-DOSE) 70 mL (70 mL Intravenous Given 3/2/24 2350)       Diagnostic Studies  Results Reviewed       Procedure Component Value Units Date/Time    HS Troponin I 2hr [[id number]]  (Normal) Collected: 03/02/24 2249    Lab Status: Final result Specimen: Blood from Arm, Left Updated: 03/02/24 2321     hs TnI 2hr 8 ng/L      Delta 2hr hsTnI 1 ng/L     FLU/RSV/COVID - if FLU/RSV clinically relevant [[id number]]  (Normal) Collected: 03/02/24 2222    Lab Status: Final result Specimen: Nares from Nose Updated: 03/02/24 2314     SARS-CoV-2 Negative     INFLUENZA A PCR Negative     INFLUENZA B PCR Negative     RSV PCR Negative    Narrative:      FOR PEDIATRIC PATIENTS - copy/paste COVID Guidelines URL to browser: [email or web address]    SARS-CoV-2 assay is a Nucleic Acid  Amplification assay intended for the  qualitative detection of nucleic acid from SARS-CoV-2 in nasopharyngeal  swabs. Results are for the presumptive identification of SARS-CoV-2 RNA.    Positive results are indicative of infection with SARS-CoV-2, the virus  causing COVID-19, but do not rule out bacterial infection or co-infection  with other viruses. Laboratories within the [place] and its  territories are required to report all positive results to the appropriate  public health authorities. Negative results do not preclude SARS-CoV-2  infection and should not be used as the sole basis for treatment or other  patient management decisions. Negative results must be combined with  clinical observations, patient history, and epidemiological information.  This test has not been FDA cleared or approved.    This test has been authorized by FDA under an Emergency Use Authorization  (EUA). This test is only authorized for the duration of time the  declaration that circumstances exist justifying the authorization of the  emergency use of an in vitro diagnostic tests for detection of SARS-CoV-2  virus and/or diagnosis of COVID-19 infection under section 564(b)(1) of  the Act, 21 U.S.C. 360bbb-3(b)(1), unless the authorization is terminated  or revoked sooner. The test has been validated but independent review by FDA  and CLIA is pending.    Test performed using [employer] GeneXpert: This RT-PCR assay targets N2,  a region unique to SARS-CoV-2. A conserved region in the E-gene was chosen  for pan-Sarbecovirus detection which includes SARS-CoV-2.    According to CMS-2020-01-R, this platform meets the definition of high-throughput technology.    B-Type Natriuretic Peptide(BNP) [[id number]]  (Normal) Collected: 03/02/24 2049    Lab Status: Final result Specimen: Blood from Hand, Left Updated: 03/02/24 2208     BNP 84 pg/mL     HS Troponin 0hr (reflex protocol) [[id number]]  (Normal) Collected: 03/02/24 2049    Lab Status: Final  result Specimen: Blood from Hand, Left Updated: 03/02/24 2140     hs TnI 0hr 7 ng/L     Comprehensive metabolic panel [[id number]]  (Abnormal) Collected: 03/02/24 2049    Lab Status: Final result Specimen: Blood from Hand, Left Updated: 03/02/24 2124     Sodium 135 mmol/L      Potassium 3.8 mmol/L      Chloride 100 mmol/L      CO2 27 mmol/L      ANION GAP 8 mmol/L      BUN 20 mg/dL      Creatinine 0.58 mg/dL      Glucose 267 mg/dL      Calcium 9.2 mg/dL      AST 11 U/L      ALT 10 U/L      Alkaline Phosphatase 61 U/L      Total Protein 6.9 g/dL      Albumin 3.9 g/dL      Total Bilirubin 0.28 mg/dL      eGFR 85 ml/min/1.73sq m     Narrative:      National Kidney Disease Foundation guidelines for Chronic Kidney Disease (CKD):     Stage 1 with normal or high GFR (GFR > 90 mL/min/1.73 square meters)    Stage 2 Mild CKD (GFR = 60-89 mL/min/1.73 square meters)    Stage 3A Moderate CKD (GFR = 45-59 mL/min/1.73 square meters)    Stage 3B Moderate CKD (GFR = 30-44 mL/min/1.73 square meters)    Stage 4 Severe CKD (GFR = 15-29 mL/min/1.73 square meters)    Stage 5 End Stage CKD (GFR <15 mL/min/1.73 square meters)  Note: GFR calculation is accurate only with a steady state creatinine    CBC and differential [[id number]]  (Abnormal) Collected: 03/02/24 2049    Lab Status: Final result Specimen: Blood from Hand, Left Updated: 03/02/24 2102     WBC 14.57 Thousand/uL      RBC 4.35 Million/uL      Hemoglobin 8.8 g/dL      Hematocrit 29.9 %      MCV 69 fL      MCH 20.2 pg      MCHC 29.4 g/dL      RDW 19.8 %      MPV 9.7 fL      Platelets 342 Thousands/uL      nRBC 0 /100 WBCs      Neutrophils Relative 68 %      Immat GRANS % 1 %      Lymphocytes Relative 26 %      Monocytes Relative 5 %      Eosinophils Relative 0 %      Basophils Relative 0 %      Neutrophils Absolute 9.88 Thousands/µL      Immature Grans Absolute 0.07 Thousand/uL      Lymphocytes Absolute 3.84 Thousands/µL      Monocytes Absolute 0.69 Thousand/µL      Eosinophils  Absolute 0.05 Thousand/µL      Basophils Absolute 0.04 Thousands/µL                    XR chest 1 view portable   ED Interpretation by Olga Greer DO (03/02 2128)   No acute cardiopulmonary abnormalities      CTA ED chest PE Study    (Results Pending)              Procedures  Procedures         ED Course  ED Course as of 03/03/24 0005   Sat Mar 02, 2024   2127 WBC(!): 14.57   2128 Pulse(!): 107   2143 hs TnI 0hr: 7  Patient denies any chest pain   2354 Dispo pending results of CTA. If negative, will talk to [facility] for possible admission for exertional dyspnea and difficulty getting around at home.                                SBIRT 22yo+      Flowsheet Row Most Recent Value   Initial Alcohol Screen: US AUDIT-C     1. How often do you have a drink containing alcohol? 0 Filed at: 03/02/2024 2046   2. How many drinks containing alcohol do you have on a typical day you are drinking?  0 Filed at: 03/02/2024 2046   3a. Male UNDER 65: How often do you have five or more drinks on one occasion? 0 Filed at: 03/02/2024 2046   3b. FEMALE Any Age, or MALE 65+: How often do you have 4 or more drinks on one occassion? 0 Filed at: 03/02/2024 2046   Audit-C Score 0 Filed at: 03/02/2024 2046   LONG: How many times in the past year have you...    Used an illegal drug or used a prescription medication for non-medical reasons? Never Filed at: 03/02/2024 2046            Wells' Criteria for PE      Flowsheet Row Most Recent Value   Wells' Criteria for PE    Clinical signs and symptoms of DVT 0 Filed at: 03/02/2024 2328   PE is primary diagnosis or equally likely 3 Filed at: 03/02/2024 2328   HR >100 1.5 Filed at: 03/02/2024 2328   Immobilization at least 3 days or Surgery in the previous 4 weeks 0 Filed at: 03/02/2024 2328   Previous, objectively diagnosed PE or DVT 0 Filed at: 03/02/2024 2328   Hemoptysis 0 Filed at: 03/02/2024 2328   Malignancy with treatment within 6 months or palliative 0 Filed at: 03/02/2024 2328   Glenn  Criteria Total 4.5 Filed at: 03/02/2024 1838                  Medical Decision Making  Amount and/or Complexity of Data Reviewed  Labs: ordered. Decision-making details documented in ED Course.  Radiology: ordered and independent interpretation performed.    Risk  Prescription drug management.             Disposition  Final diagnoses:   COPD, severe (HCC)   Chronic respiratory failure with hypoxia and hypercapnia (HCC)   Shortness of breath     Time reflects when diagnosis was documented in both MDM as applicable and the Disposition within this note       Time User Action Codes Description Comment    3/3/2024 12:04 AM Olga Greer [J44.9] COPD, severe (HCC)     3/3/2024 12:04 AM Olga Greer [J96.11,  J96.12] Chronic respiratory failure with hypoxia and hypercapnia (HCC)     3/3/2024 12:05 AM Olga Greer [R06.02] Shortness of breath           ED Disposition       None          Follow-up Information    None         Patient's Medications   Discharge Prescriptions    No medications on file       No discharge procedures on file.    PDMP Review       None            ED Provider  Electronically Signed by

## 2024-03-03 NOTE — PLAN OF CARE
Problem: Potential for Falls  Goal: Patient will remain free of falls  Description: INTERVENTIONS:  - Educate patient/family on patient safety including physical limitations  - Instruct patient to call for assistance with activity   - Consult OT/PT to assist with strengthening/mobility   - Keep Call bell within reach  - Keep bed low and locked with side rails adjusted as appropriate  - Keep care items and personal belongings within reach  - Initiate and maintain comfort rounds  - Make Fall Risk Sign visible to staff  - Offer Toileting every  Hours, in advance of need  - Initiate/Maintain alarm  - Obtain necessary fall risk management equipment:   - Apply yellow socks and bracelet for high fall risk patients  - Consider moving patient to room near nurses station  3/3/2024 1136 by Dulce Maria Morales  Outcome: Progressing  3/3/2024 1135 by Dulce Maria Morales  Outcome: Progressing     Problem: SAFETY ADULT  Goal: Patient will remain free of falls  Description: INTERVENTIONS:  - Educate patient/family on patient safety including physical limitations  - Instruct patient to call for assistance with activity   - Consult OT/PT to assist with strengthening/mobility   - Keep Call bell within reach  - Keep bed low and locked with side rails adjusted as appropriate  - Keep care items and personal belongings within reach  - Initiate and maintain comfort rounds  - Make Fall Risk Sign visible to staff  - Offer Toileting every  Hours, in advance of need  - Initiate/Maintain alarm  - Obtain necessary fall risk management equipment:   - Apply yellow socks and bracelet for high fall risk patients  - Consider moving patient to room near nurses station  3/3/2024 1136 by Dulce Maria Morales  Outcome: Progressing  3/3/2024 1135 by Dulce Maria Morales  Outcome: Progressing  Goal: Maintain or return to baseline ADL function  Description: INTERVENTIONS:  -  Assess patient's ability to carry out ADLs; assess patient's baseline for ADL function and identify physical  deficits which impact ability to perform ADLs (bathing, care of mouth/teeth, toileting, grooming, dressing, etc.)  - Assess/evaluate cause of self-care deficits   - Assess range of motion  - Assess patient's mobility; develop plan if impaired  - Assess patient's need for assistive devices and provide as appropriate  - Encourage maximum independence but intervene and supervise when necessary  - Involve family in performance of ADLs  - Assess for home care needs following discharge   - Consider OT consult to assist with ADL evaluation and planning for discharge  - Provide patient education as appropriate  Outcome: Progressing  Goal: Maintains/Returns to pre admission functional level  Description: INTERVENTIONS:  - Perform AM-PAC 6 Click Basic Mobility/ Daily Activity assessment daily.  - Set and communicate daily mobility goal to care team and patient/family/caregiver.   - Collaborate with rehabilitation services on mobility goals if consulted  - Perform Range of Motion  times a day.  - Reposition patient every  hours.  - Dangle patient  times a day  - Stand patient  times a day  - Ambulate patient  times a day  - Out of bed to chair  times a day   - Out of bed for meals  times a day  - Out of bed for toileting  - Record patient progress and toleration of activity level   Outcome: Progressing     Problem: SAFETY ADULT  Goal: Maintain or return to baseline ADL function  Description: INTERVENTIONS:  -  Assess patient's ability to carry out ADLs; assess patient's baseline for ADL function and identify physical deficits which impact ability to perform ADLs (bathing, care of mouth/teeth, toileting, grooming, dressing, etc.)  - Assess/evaluate cause of self-care deficits   - Assess range of motion  - Assess patient's mobility; develop plan if impaired  - Assess patient's need for assistive devices and provide as appropriate  - Encourage maximum independence but intervene and supervise when necessary  - Involve family in  performance of ADLs  - Assess for home care needs following discharge   - Consider OT consult to assist with ADL evaluation and planning for discharge  - Provide patient education as appropriate  Outcome: Progressing     Problem: DISCHARGE PLANNING  Goal: Discharge to home or other facility with appropriate resources  Description: INTERVENTIONS:  - Identify barriers to discharge w/patient and caregiver  - Arrange for needed discharge resources and transportation as appropriate  - Identify discharge learning needs (meds, wound care, etc.)  - Arrange for interpretive services to assist at discharge as needed  - Refer to Case Management Department for coordinating discharge planning if the patient needs post-hospital services based on physician/advanced practitioner order or complex needs related to functional status, cognitive ability, or social support system  Outcome: Progressing     Problem: RESPIRATORY - ADULT  Goal: Achieves optimal ventilation and oxygenation  Description: INTERVENTIONS:  - Assess for changes in respiratory status  - Assess for changes in mentation and behavior  - Position to facilitate oxygenation and minimize respiratory effort  - Oxygen administered by appropriate delivery if ordered  - Initiate smoking cessation education as indicated  - Encourage broncho-pulmonary hygiene including cough, deep breathe, Incentive Spirometry  - Assess the need for suctioning and aspirate as needed  - Assess and instruct to report SOB or any respiratory difficulty  - Respiratory Therapy support as indicated  Outcome: Progressing

## 2024-03-03 NOTE — DISCHARGE SUMMARY
Medical Problems       Resolved Problems  Date Reviewed: 3/3/2024   None       Discharging Physician / Practitioner: Serina Montgomery PA-C  PCP: Gillian Smith MD  Admission Date:   Admission Orders (From admission, onward)       Ordered        03/03/24 0304  Place in Observation  Once                          Discharge Date: 03/03/24    Consultations During Hospital Stay:  none    Procedures Performed:   none    Significant Findings / Test Results:   none    Incidental Findings:   none     Test Results Pending at Discharge (will require follow up):   none     Outpatient Tests Requested:  none    Complications:  none    Reason for Admission: COPD exacerbation    Hospital Course:   Christine Laura is a 83 y.o. female patient who originally presented to the hospital on 3/2/2024 due to increased SOB with ambulation. She reports that this feels similar to her COPD exacerbations, just slightly more severe. She was unable to get to her nebulizer as walking made her too short of breath. At baseline, she requires 2LNC and can only walk about 20ft before she becomes dyspneic. CTA PE was negative for acute PE, CXR was without evidence of acute cardioplumonary disease. Lab work was unremarkable. *pt with imporvment after dose of her home nebs*.       Please see above list of diagnoses and related plan for additional information.     Condition at Discharge: good    Discharge Day Visit / Exam:   * Please refer to separate progress note for these details *    Discussion with Fa    Discharge instructions/Information to patient and family:   See after visit summary for information provided to patient and family.      Provisions for Follow-Up Care:  See after visit summary for information related to follow-up care and any pertinent home health orders.      Mobility at time of Discharge:   Basic Mobility Inpatient Raw Score: 24  JH-HLM Goal: 8: Walk 250 feet or more  JH-HLM Achieved: 6: Walk 10 steps or more       Disposition:    Home    Planned Readmission: no.     Discharge Statement:  s discharging the patient. This time was spent on the day of discharge. I had direct contact with the patient on the day of discharge. Greater than 50% of the total time was spent examining patient, answering all patient questions, arranging and discussing plan of care with patient as well as directly providing post-discharge instructions.  Additional time then spent on discharge activities.    Discharge Medications:  See after visit summary for reconciled discharge medications provided to patient and/or family.      **Please Note: This note may have been constructed using a voice recognition system**

## 2024-03-03 NOTE — ASSESSMENT & PLAN NOTE
Pt presents with worsening dyspnea since 4pm on 3/2. The pt states she normally becomes SOB with walking, however this time felt more severe and she was unable to get to her nebulizing treatments in the other room.  CT PE study negative for PE, per ED, pending official read   WBC at baseline  Flu/COVID/RSV negative  BNP 84  D-dimer pending  O2 sat 97% on 2L NC- this is her baseline   Plan  Continue to monitor VS and ambulatory O2 sat  Give home dose of Albuterol nebulized solution and assess response  Continue home inhaler regimen

## 2024-03-04 ENCOUNTER — RA CDI HCC (OUTPATIENT)
Dept: OTHER | Facility: HOSPITAL | Age: 84
End: 2024-03-04

## 2024-03-04 VITALS
SYSTOLIC BLOOD PRESSURE: 142 MMHG | BODY MASS INDEX: 32.72 KG/M2 | WEIGHT: 167.55 LBS | DIASTOLIC BLOOD PRESSURE: 63 MMHG | TEMPERATURE: 97.7 F | OXYGEN SATURATION: 93 % | HEART RATE: 86 BPM | RESPIRATION RATE: 20 BRPM

## 2024-03-04 LAB
ANION GAP SERPL CALCULATED.3IONS-SCNC: 6 MMOL/L
BUN SERPL-MCNC: 15 MG/DL (ref 5–25)
CALCIUM SERPL-MCNC: 9.3 MG/DL (ref 8.4–10.2)
CHLORIDE SERPL-SCNC: 100 MMOL/L (ref 96–108)
CO2 SERPL-SCNC: 30 MMOL/L (ref 21–32)
CREAT SERPL-MCNC: 0.39 MG/DL (ref 0.6–1.3)
ERYTHROCYTE [DISTWIDTH] IN BLOOD BY AUTOMATED COUNT: 19.1 % (ref 11.6–15.1)
GFR SERPL CREATININE-BSD FRML MDRD: 97 ML/MIN/1.73SQ M
GLUCOSE SERPL-MCNC: 145 MG/DL (ref 65–140)
GLUCOSE SERPL-MCNC: 153 MG/DL (ref 65–140)
GLUCOSE SERPL-MCNC: 181 MG/DL (ref 65–140)
HCT VFR BLD AUTO: 30.1 % (ref 34.8–46.1)
HGB BLD-MCNC: 8.7 G/DL (ref 11.5–15.4)
MAGNESIUM SERPL-MCNC: 2 MG/DL (ref 1.9–2.7)
MCH RBC QN AUTO: 20.1 PG (ref 26.8–34.3)
MCHC RBC AUTO-ENTMCNC: 28.9 G/DL (ref 31.4–37.4)
MCV RBC AUTO: 70 FL (ref 82–98)
PLATELET # BLD AUTO: 302 THOUSANDS/UL (ref 149–390)
PMV BLD AUTO: 9.2 FL (ref 8.9–12.7)
POTASSIUM SERPL-SCNC: 4 MMOL/L (ref 3.5–5.3)
RBC # BLD AUTO: 4.33 MILLION/UL (ref 3.81–5.12)
SODIUM SERPL-SCNC: 136 MMOL/L (ref 135–147)
WBC # BLD AUTO: 10.86 THOUSAND/UL (ref 4.31–10.16)

## 2024-03-04 PROCEDURE — 99238 HOSP IP/OBS DSCHRG MGMT 30/<: CPT | Performed by: HOSPITALIST

## 2024-03-04 PROCEDURE — 83735 ASSAY OF MAGNESIUM: CPT

## 2024-03-04 PROCEDURE — 80048 BASIC METABOLIC PNL TOTAL CA: CPT

## 2024-03-04 PROCEDURE — 85027 COMPLETE CBC AUTOMATED: CPT

## 2024-03-04 PROCEDURE — 82948 REAGENT STRIP/BLOOD GLUCOSE: CPT

## 2024-03-04 RX ORDER — PREDNISONE 20 MG/1
40 TABLET ORAL DAILY
Qty: 6 TABLET | Refills: 0 | Status: SHIPPED | OUTPATIENT
Start: 2024-03-05 | End: 2024-03-08

## 2024-03-04 RX ORDER — PREDNISONE 20 MG/1
40 TABLET ORAL DAILY
Qty: 8 TABLET | Refills: 0 | Status: CANCELLED | OUTPATIENT
Start: 2024-03-04 | End: 2024-03-08

## 2024-03-04 RX ADMIN — UMECLIDINIUM 1 PUFF: 62.5 AEROSOL, POWDER ORAL at 09:37

## 2024-03-04 RX ADMIN — PRAVASTATIN SODIUM 80 MG: 80 TABLET ORAL at 09:38

## 2024-03-04 RX ADMIN — DILTIAZEM HYDROCHLORIDE 300 MG: 180 CAPSULE, COATED, EXTENDED RELEASE ORAL at 09:38

## 2024-03-04 RX ADMIN — ENOXAPARIN SODIUM 40 MG: 40 INJECTION SUBCUTANEOUS at 09:39

## 2024-03-04 RX ADMIN — LEVOTHYROXINE SODIUM 25 MCG: 25 TABLET ORAL at 05:36

## 2024-03-04 RX ADMIN — FLUTICASONE PROPIONATE 2 SPRAY: 50 SPRAY, METERED NASAL at 09:41

## 2024-03-04 RX ADMIN — BUDESONIDE AND FORMOTEROL FUMARATE DIHYDRATE 2 PUFF: 160; 4.5 AEROSOL RESPIRATORY (INHALATION) at 09:37

## 2024-03-04 RX ADMIN — PREDNISONE 40 MG: 20 TABLET ORAL at 09:39

## 2024-03-04 RX ADMIN — ASPIRIN 81 MG: 81 TABLET, COATED ORAL at 09:39

## 2024-03-04 NOTE — CASE MANAGEMENT
Case Management Discharge Planning Note    Patient name Christine Laura  Location W /W -01 MRN [id number]  : 1940 Date 3/4/2024       Current Admission Date: 3/2/2024  Current Admission Diagnosis:COPD with exacerbation (HCC)   Patient Active Problem List    Diagnosis Date Noted    Skin mole 2023    Mild aortic stenosis 10/06/2022    Nasal congestion 03/10/2022    Needs flu shot 2021    Tinea corporis 2021    Age-related cataract of both eyes 2020    SIRS (systemic inflammatory response syndrome) (HCC) 2020    Elevated d-dimer 2020    Age-related osteoporosis without current pathological fracture 2020    Essential hypertension 2020    Vitamin D deficiency 2020    Thyroid nodule 2020    Non-seasonal allergic rhinitis due to pollen 2019    Chronic bilateral low back pain with bilateral sciatica 2019    Hemorrhoids 2018    Polyp of colon 2018    PSVT (paroxysmal supraventricular tachycardia) 2018    Anticoagulant long-term use 2018    Iron deficiency anemia, unspecified 2018    Chronic respiratory failure with hypoxia and hypercapnia (HCC) 2017    COPD with exacerbation (HCC) 2017    Mixed hyperlipidemia 10/31/2017    Hypothyroidism 10/31/2017    Obstructive sleep apnea 10/31/2017    History of pulmonary embolism 10/31/2017    Type 2 diabetes mellitus with hyperglycemia, without long-term current use of insulin (HCC) 10/31/2017      LOS (days): 1  Geometric Mean LOS (GMLOS) (days):   Days to GMLOS:     OBJECTIVE:  Risk of Unplanned Readmission Score: 12.35         Current admission status: Inpatient   Preferred Pharmacy:   [facility] [place] - [address]  [address]  [place] [address]  Phone: [phone] Fax: [phone]    Primary Care Provider: Gillian Smith MD    Primary Insurance: [employer]IOLETTA MONROE  Secondary Insurance:     DISCHARGE DETAILS:     Pt  requested to speak with CM regarding needs for home.  She stated she is in need of SN from [facility] for wound care.      Per [facility] resident she does not need nursing for wound care as the wound is superficial and pt would only need to keep the wound clean.  Pt will be going home with supplies for dressing changes.      Pt has been notified and explained she is able to follow up with her PCP.

## 2024-03-04 NOTE — PLAN OF CARE
Problem: Potential for Falls  Goal: Patient will remain free of falls  Description: INTERVENTIONS:  - Educate patient/family on patient safety including physical limitations  - Instruct patient to call for assistance with activity   - Consult OT/PT to assist with strengthening/mobility   - Keep Call bell within reach  - Keep bed low and locked with side rails adjusted as appropriate  - Keep care items and personal belongings within reach  - Initiate and maintain comfort rounds  - Make Fall Risk Sign visible to staff  - Offer Toileting every  Hours, in advance of need  - Initiate/Maintain alarm  - Obtain necessary fall risk management equipment:   - Apply yellow socks and bracelet for high fall risk patients  - Consider moving patient to room near nurses station  Outcome: Progressing     Problem: SAFETY ADULT  Goal: Patient will remain free of falls  Description: INTERVENTIONS:  - Educate patient/family on patient safety including physical limitations  - Instruct patient to call for assistance with activity   - Consult OT/PT to assist with strengthening/mobility   - Keep Call bell within reach  - Keep bed low and locked with side rails adjusted as appropriate  - Keep care items and personal belongings within reach  - Initiate and maintain comfort rounds  - Make Fall Risk Sign visible to staff  - Offer Toileting every  Hours, in advance of need  - Initiate/Maintain alarm  - Obtain necessary fall risk management equipment:   - Apply yellow socks and bracelet for high fall risk patients  - Consider moving patient to room near nurses station  Outcome: Progressing  Goal: Maintain or return to baseline ADL function  Description: INTERVENTIONS:  -  Assess patient's ability to carry out ADLs; assess patient's baseline for ADL function and identify physical deficits which impact ability to perform ADLs (bathing, care of mouth/teeth, toileting, grooming, dressing, etc.)  - Assess/evaluate cause of self-care deficits   -  Assess range of motion  - Assess patient's mobility; develop plan if impaired  - Assess patient's need for assistive devices and provide as appropriate  - Encourage maximum independence but intervene and supervise when necessary  - Involve family in performance of ADLs  - Assess for home care needs following discharge   - Consider OT consult to assist with ADL evaluation and planning for discharge  - Provide patient education as appropriate  Outcome: Progressing  Goal: Maintains/Returns to pre admission functional level  Description: INTERVENTIONS:  - Perform AM-PAC 6 Click Basic Mobility/ Daily Activity assessment daily.  - Set and communicate daily mobility goal to care team and patient/family/caregiver.   - Collaborate with rehabilitation services on mobility goals if consulted  - Perform Range of Motion  times a day.  - Reposition patient every  hours.  - Dangle patient  times a day  - Stand patient  times a day  - Ambulate patient  times a day  - Out of bed to chair  times a day   - Out of bed for meals  times a day  - Out of bed for toileting  - Record patient progress and toleration of activity level   Outcome: Progressing     Problem: DISCHARGE PLANNING  Goal: Discharge to home or other facility with appropriate resources  Description: INTERVENTIONS:  - Identify barriers to discharge w/patient and caregiver  - Arrange for needed discharge resources and transportation as appropriate  - Identify discharge learning needs (meds, wound care, etc.)  - Arrange for interpretive services to assist at discharge as needed  - Refer to Case Management Department for coordinating discharge planning if the patient needs post-hospital services based on physician/advanced practitioner order or complex needs related to functional status, cognitive ability, or social support system  Outcome: Progressing     Problem: RESPIRATORY - ADULT  Goal: Achieves optimal ventilation and oxygenation  Description: INTERVENTIONS:  - Assess  for changes in respiratory status  - Assess for changes in mentation and behavior  - Position to facilitate oxygenation and minimize respiratory effort  - Oxygen administered by appropriate delivery if ordered  - Initiate smoking cessation education as indicated  - Encourage broncho-pulmonary hygiene including cough, deep breathe, Incentive Spirometry  - Assess the need for suctioning and aspirate as needed  - Assess and instruct to report SOB or any respiratory difficulty  - Respiratory Therapy support as indicated  Outcome: Progressing

## 2024-03-04 NOTE — UTILIZATION REVIEW
Continued Stay Review    Date: 3/4                          Current Patient Class: INpatient   Current Level of Care: MEd/surg    HPI:83 y.o. female initially admitted on 3/3     Assessment/Plan:   Continue with home nebulizer.  Currently requiring 2lNC. LUngs clear.  Noted to have Small superficial skin tear measuring approximately 3 x 3 cm on the left upper extremity  .  Local wound care. Continue with steroids.  Discharged earlier than expected given symptomatic improvement of COPD.   Date: 3/4    Day 3: Has surpassed a 2nd midnight with active treatments and services, which include safe dc planning, continued nebulizers, respiratory monitoring.    Vital Signs: Vital Signs (last 2 days)    Date/Time Temp Pulse Resp BP MAP (mmHg) SpO2 Calculated FIO2 (%) - Nasal Cannula Nasal Cannula O2 Flow Rate (L/min) O2 Device Patient Position - Orthostatic VS   03/04/24 07:28:47 97.7 °F (36.5 °C) 86 20 142/63 89 93 % -- -- -- --     Pertinent Labs/Diagnostic Results:   Results from last 7 days   Lab Units 03/02/24  2222   SARS-COV-2  Negative     Results from last 7 days   Lab Units 03/04/24  0632 03/02/24  2049   WBC Thousand/uL 10.86* 14.57*   HEMOGLOBIN g/dL 8.7* 8.8*   HEMATOCRIT % 30.1* 29.9*   PLATELETS Thousands/uL 302 342   NEUTROS ABS Thousands/µL  --  9.88*         Results from last 7 days   Lab Units 03/04/24  0632 03/02/24  2049   SODIUM mmol/L 136 135   POTASSIUM mmol/L 4.0 3.8   CHLORIDE mmol/L 100 100   CO2 mmol/L 30 27   ANION GAP mmol/L 6 8   BUN mg/dL 15 20   CREATININE mg/dL 0.39* 0.58*   EGFR ml/min/1.73sq m 97 85   CALCIUM mg/dL 9.3 9.2   MAGNESIUM mg/dL 2.0  --      Results from last 7 days   Lab Units 03/02/24  2049   AST U/L 11*   ALT U/L 10   ALK PHOS U/L 61   TOTAL PROTEIN g/dL 6.9   ALBUMIN g/dL 3.9   TOTAL BILIRUBIN mg/dL 0.28     Results from last 7 days   Lab Units 03/04/24  0726 03/03/24  1600 03/03/24  1124 03/03/24  0723   POC GLUCOSE mg/dl 145* 215* 143* 183*     Results from last 7 days    Lab Units 03/04/24  0632 03/02/24  2049   GLUCOSE RANDOM mg/dL 153* 267*         Results from last 7 days   Lab Units 03/02/24  2249 03/02/24  2049   HS TNI 0HR ng/L  --  7   HS TNI 2HR ng/L 8  --    HSTNI D2 ng/L 1  --      Results from last 7 days   Lab Units 03/02/24  2049   BNP pg/mL 84           Results from last 7 days   Lab Units 03/02/24  2222   INFLUENZA A PCR  Negative   INFLUENZA B PCR  Negative   RSV PCR  Negative         Medications:   Scheduled Medications:  aspirin, 81 mg, Oral, Daily  budesonide-formoterol, 2 puff, Inhalation, BID  diltiazem, 300 mg, Oral, Daily  enoxaparin, 40 mg, Subcutaneous, Daily  fluticasone, 2 spray, Nasal, Daily  insulin lispro, 1-5 Units, Subcutaneous, TID AC  levothyroxine, 25 mcg, Oral, Early Morning  pravastatin, 80 mg, Oral, Daily  predniSONE, 40 mg, Oral, Daily  umeclidinium, 1 puff, Inhalation, Daily      Continuous IV Infusions:     PRN Meds:  albuterol, 2 puff, Inhalation, Q6H PRN  albuterol, 2.5 mg, Nebulization, Q4H PRN        Discharge Plan: D    Network Utilization Review Department  ATTENTION: Please call with any questions or concerns to [phone] and carefully listen to the prompts so that you are directed to the right person. All voicemails are confidential.   For Discharge needs, contact Care Management DC Support Team at [phone] opt. [phone]  Send all requests for admission clinical reviews, approved or denied determinations and any other requests to dedicated fax number below belonging to the campus where the patient is receiving treatment. List of dedicated fax numbers for the Facilities:  FACILITY NAME UR FAX NUMBER   ADMISSION DENIALS (Administrative/Medical Necessity) [phone]   DISCHARGE SUPPORT TEAM (NETWORK) [phone]   PARENT CHILD HEALTH (Maternity/NICU/Pediatrics) [phone]   [facility] [phone]   [facility] [phone]   [facility]  [phone]   General acute hospital [phone]   Critical access hospital [phone]   [facility] [phone]   [facility] [phone]   [facility] [phone]   [facility] [phone]   [facility] [phone]   [facility] [phone]   [facility] [phone]

## 2024-03-04 NOTE — DISCHARGE INSTR - AVS FIRST PAGE
Dear Christine Laura,     It was our pleasure to care for you here at [facility].  It is our hope that we were always able to exceed the expected standards for your care during your stay.  You were hospitalized due to the exacerbation.  You were cared for on the West fourth floor by Morro Calvo MD under the service of Danika Ortiz MD with the [facility]ist Group who covers for your primary care physician (PCP), Gillian Smith MD, while you were hospitalized.  If you have any questions or concerns related to this hospitalization, you may contact us at .  For follow up as well as any medication refills, we recommend that you follow up with your primary care physician.  A registered nurse will reach out to you by phone within a few days after your discharge to answer any additional questions that you may have after going home.  However, at this time we provide for you here, the most important instructions / recommendations at discharge:     Notable Medication Adjustments -   Please continue taking prednisone 40 Mg for 3 more days (through 3/7) to finish a 5-day course  Resume taking all prior home medications  Testing Required after Discharge -   None  ** Please contact your PCP to request testing orders for any of the testing recommended here **  Important follow up information -   Please follow-up with your primary care provider as routine  Other Instructions -   Continue good wound care of your left upper extremity wound including keeping it clean and dry.  This can be wrapped with a normal bandage consider.  This should require no special follow-up.  Please return to the hospital if you have new or worsening symptoms including shortness of breath, change in the quantity or quality of your sputum production, chest pain, wheezing.  Please review this entire after visit summary as additional general instructions including medication list, appointments,  activity, diet, any pertinent wound care, and other additional recommendations from your care team that may be provided for you.      Sincerely,     Morro Calvo MD

## 2024-03-04 NOTE — DISCHARGE SUMMARY
"[facility]  Discharge- Christine Laura 1940, 83 y.o. female MRN: [id number]  Unit/Bed#: W -01 Encounter: [id number]  Primary Care Provider: Gillian Smith MD   Date and time admitted to hospital: 3/2/2024  8:40 PM    * COPD with exacerbation (HCC)  Assessment & Plan  Pt presents with worsening dyspnea since 4pm on 3/2. The pt states she normally becomes SOB with walking, however this time felt more severe and she was unable to get to her nebulizing treatments in the other room.  CT PE study negative  WBC at baseline  Flu/COVID/RSV negative  BNP 84  O2 sat 97% on 2L NC- this is her baseline     Plan:  Finish prednisone 40 Mg x 5 days course  Continue home inhaler and nebulizer regimen    Chronic respiratory failure with hypoxia and hypercapnia (HCC)  Assessment & Plan  Pt currently only requiring her baseline O2 of 2L NC, saturating at 97%  O2 saturation drops to 91% when ambulating, pt typically can only walk 20 feet before becoming dyspneic.     Type 2 diabetes mellitus with hyperglycemia, without long-term current use of insulin (HCC)  Assessment & Plan  Lab Results   Component Value Date    HGBA1C 6.7 (H) 04/20/2023       No results for input(s): \"POCGLU\" in the last 72 hours.    Blood Sugar Average: Last 72 hrs:  Pt on tradjenta and glimepiride as outpt  Resume outpatient regimen      Iron deficiency anemia, unspecified  Assessment & Plan  Lab Results   Component Value Date    HGB 8.8 (L) 03/02/2024    HGB 10.3 (L) 02/03/2023    HGB 12.0 05/30/2020    Hgb slightly lower than baseline, pt without clinical signs of acute bleed   Takes vitamin b12 and iron supplements as an outpatient   Continue to monitor CBC    Obstructive sleep apnea  Assessment & Plan  Continue CPAP    Hypothyroidism  Assessment & Plan  Continue levothyroxine    Mixed hyperlipidemia  Assessment & Plan  Continue statin      Medical Problems       Resolved Problems  Date Reviewed: 3/3/2024   None   "     Discharging Resident: Morro Calvo MD  Discharging Attending: Danika Ortiz MD  PCP: Gillian Smith MD  Admission Date:   Admission Orders (From admission, onward)       Ordered        03/03/24 1418  Inpatient Admission  Once            03/03/24 0304  Place in Observation  Once                          Discharge Date: 03/04/24    Consultations During Hospital Stay:  None    Procedures Performed:   None    Significant Findings / Test Results:   Clear CT PE and x-ray    Incidental Findings:   None     Test Results Pending at Discharge (will require follow up):  None     Outpatient Tests Requested:  None    Complications:  None    Reason for Admission: Increased dyspnea on exertion, COPD exacerbation    Hospital Course:   Christine Laura is a 83 y.o. female patient who originally presented to the hospital on 3/2/2024 due to increased shortness of breath on ambulation.  Patient became concerned given her history of COPD exacerbations and so presented to the hospital for evaluation.  Upon presentation, patient was found to have new increased oxygen demand requiring only her baseline 2 L O2.  She was able to ambulate while maintaining a saturation of 91%.  Chest x-ray showed no focal consolidations and CT PE was clear.  Patient was monitored overnight and continued to do well.  Was continued on all home nebulized and inhaled treatments with the addition of oral prednisone 40 Mg.  By the morning of 3/4 patient was feeling entirely improved and requested to go home.    While being transported to the hospital by EMT, removal of a EKG lead resulted in a superficial skin tear of the left upper extremity.  This is managed with local wound care and required no further intervention.    While inpatient, patient's other comorbid medical conditions were managed conservatively with insulin sliding scale for diabetes, home med levothyroxine for hypothyroidism and statin for hyperlipidemia.    Patient will be discharged with a  prescription for oral prednisone to finish a 5-day course.  This plan was communicated to the patient who expressed understanding and agreement.  All questions were answered.    The patient, initially admitted to the hospital as inpatient, was discharged earlier than expected given the following: Symptomatic improvement of COPD.    Please see above list of diagnoses and related plan for additional information.     Condition at Discharge: good    Discharge Day Visit / Exam:   Subjective:  Patient seen at bedside today.  No acute events overnight.  Patient states that she is feeling better compared to yesterday.  She reports feeling at her baseline and requests to go home.  Her greatest concern at this point is wound care for her skin tear.  Patient denies any fevers, chills, headaches, chest pain, shortness of breath, abdominal pain, nausea, vomiting, constipation, diarrhea.  Patient has no acute or significant concerns or complaints.  Patient is doing well overall.    Vitals: Blood Pressure: 142/63 (03/04/24 0728)  Pulse: 86 (03/04/24 0728)  Temperature: 97.7 °F (36.5 °C) (03/04/24 0728)  Temp Source: Oral (03/03/24 2100)  Respirations: 20 (03/04/24 0728)  Weight - Scale: 76 kg (167 lb 8.8 oz) (03/02/24 2043)  SpO2: 93 % (03/04/24 0728)  Exam:   Physical Exam  Constitutional:       General: She is not in acute distress.  HENT:      Head: Normocephalic.      Mouth/Throat:      Mouth: Mucous membranes are moist.      Pharynx: No oropharyngeal exudate.   Eyes:      General: No scleral icterus.  Neck:      Vascular: No JVD.   Cardiovascular:      Rate and Rhythm: Normal rate and regular rhythm.   Pulmonary:      Breath sounds: No wheezing, rhonchi or rales.   Abdominal:      Palpations: Abdomen is soft.      Tenderness: There is no abdominal tenderness.   Musculoskeletal:         General: Signs of injury present.      Cervical back: Neck supple.      Comments: Small superficial skin tear measuring approximately 3 x 3 cm  on the left upper extremity   Lymphadenopathy:      Cervical: No cervical adenopathy.   Skin:     General: Skin is warm and dry.      Capillary Refill: Capillary refill takes less than 2 seconds.   Neurological:      Mental Status: She is alert and oriented to person, place, and time.   Psychiatric:         Mood and Affect: Mood normal.         Discussion with Family: Patient declined call to .     Discharge instructions/Information to patient and family:   See after visit summary for information provided to patient and family.      Provisions for Follow-Up Care:  See after visit summary for information related to follow-up care and any pertinent home health orders.      Mobility at time of Discharge:   Basic Mobility Inpatient Raw Score: 24  JH-HLM Goal: 8: Walk 250 feet or more  JH-HLM Achieved: 7: Walk 25 feet or more  HLM Goal achieved. Continue to encourage appropriate mobility.     Disposition:   Home    Planned Readmission: No    Discharge Medications:  See after visit summary for reconciled discharge medications provided to patient and/or family.      **Please Note: This note may have been constructed using a voice recognition system**

## 2024-03-04 NOTE — PLAN OF CARE
Problem: Potential for Falls  Goal: Patient will remain free of falls  Description: INTERVENTIONS:  - Educate patient/family on patient safety including physical limitations  - Instruct patient to call for assistance with activity   - Consult OT/PT to assist with strengthening/mobility   - Keep Call bell within reach  - Keep bed low and locked with side rails adjusted as appropriate  - Keep care items and personal belongings within reach  - Initiate and maintain comfort rounds  - Make Fall Risk Sign visible to staff  - Offer Toileting every  Hours, in advance of need  - Initiate/Maintain alarm  - Obtain necessary fall risk management equipment:   - Apply yellow socks and bracelet for high fall risk patients  - Consider moving patient to room near nurses station  3/4/2024 1109 by Dulce Maria Morales  Outcome: Adequate for Discharge  3/4/2024 1108 by Dulce Maria Morales  Outcome: Progressing     Problem: SAFETY ADULT  Goal: Patient will remain free of falls  Description: INTERVENTIONS:  - Educate patient/family on patient safety including physical limitations  - Instruct patient to call for assistance with activity   - Consult OT/PT to assist with strengthening/mobility   - Keep Call bell within reach  - Keep bed low and locked with side rails adjusted as appropriate  - Keep care items and personal belongings within reach  - Initiate and maintain comfort rounds  - Make Fall Risk Sign visible to staff  - Offer Toileting every  Hours, in advance of need  - Initiate/Maintain alarm  - Obtain necessary fall risk management equipment: - Apply yellow socks and bracelet for high fall risk patients  - Consider moving patient to room near nurses station  3/4/2024 1109 by Dulce Maria Morales  Outcome: Adequate for Discharge  3/4/2024 1108 by Dulce Maria Morales  Outcome: Progressing  Goal: Maintain or return to baseline ADL function  Description: INTERVENTIONS:  -  Assess patient's ability to carry out ADLs; assess patient's baseline for ADL function and  identify physical deficits which impact ability to perform ADLs (bathing, care of mouth/teeth, toileting, grooming, dressing, etc.)  - Assess/evaluate cause of self-care deficits   - Assess range of motion  - Assess patient's mobility; develop plan if impaired  - Assess patient's need for assistive devices and provide as appropriate  - Encourage maximum independence but intervene and supervise when necessary  - Involve family in performance of ADLs  - Assess for home care needs following discharge   - Consider OT consult to assist with ADL evaluation and planning for discharge  - Provide patient education as appropriate  3/4/2024 1109 by Dulce Maria Morales  Outcome: Adequate for Discharge  3/4/2024 1108 by Dulce Maria Morales  Outcome: Progressing  Goal: Maintains/Returns to pre admission functional level  Description: INTERVENTIONS:  - Perform AM-PAC 6 Click Basic Mobility/ Daily Activity assessment daily.  - Set and communicate daily mobility goal to care team and patient/family/caregiver.   - Collaborate with rehabilitation services on mobility goals if consulted  - Perform Range of Motion  times a day.  - Reposition patient every hours.  - Dangle patient  times a day  - Stand patient  times a day  - Ambulate patient times a day  - Out of bed to chair  times a day   - Out of bed for meals  times a day  - Out of bed for toileting  - Record patient progress and toleration of activity level   3/4/2024 1109 by Dulce Maria Morales  Outcome: Adequate for Discharge  3/4/2024 1108 by Dulce Maria Morales  Outcome: Progressing     Problem: DISCHARGE PLANNING  Goal: Discharge to home or other facility with appropriate resources  Description: INTERVENTIONS:  - Identify barriers to discharge w/patient and caregiver  - Arrange for needed discharge resources and transportation as appropriate  - Identify discharge learning needs (meds, wound care, etc.)  - Arrange for interpretive services to assist at discharge as needed  - Refer to Case Management  Department for coordinating discharge planning if the patient needs post-hospital services based on physician/advanced practitioner order or complex needs related to functional status, cognitive ability, or social support system  3/4/2024 1109 by Dulce Maria Morales  Outcome: Adequate for Discharge  3/4/2024 1108 by Dulce Maria Morales  Outcome: Progressing     Problem: RESPIRATORY - ADULT  Goal: Achieves optimal ventilation and oxygenation  Description: INTERVENTIONS:  - Assess for changes in respiratory status  - Assess for changes in mentation and behavior  - Position to facilitate oxygenation and minimize respiratory effort  - Oxygen administered by appropriate delivery if ordered  - Initiate smoking cessation education as indicated  - Encourage broncho-pulmonary hygiene including cough, deep breathe, Incentive Spirometry  - Assess the need for suctioning and aspirate as needed  - Assess and instruct to report SOB or any respiratory difficulty  - Respiratory Therapy support as indicated  3/4/2024 1109 by Dulce Maria Morales  Outcome: Adequate for Discharge  3/4/2024 1108 by Dulce Maria Morales  Outcome: Progressing

## 2024-03-04 NOTE — WOUND OSTOMY CARE
Progress Note - Wound   Christine Laura 83 y.o. female MRN: [id number]  Unit/Bed#: W -01 Encounter: [id number]      Assessment:     Patient is a 84 yo female that was admitted to [facility]  for treatment of COPD with exacerbation. Patient has a PMH of COPD, DM, HTN, HLD, emphysema, KIARA. Patient is alert and oriented times four and agreeable to assessment. Patient is independent for turning and repositioning. Patient is continent of bowel and bladder. On assessment, patient is OOB to chair, feet dependent with waffle cushion in place.    Wound Care was consulted for left upper arm skin tear.      Left Upper Arm skin tear - irregular shaped partial thickness wound. Wound bed is 100% pink tissue with small to moderate amount of sanguionous drainage. July wound is pink, purple, and fragile.     Bilateral heels, buttocks and sacrum - pink, dry, intact, blanchable.     Educated patient on importance of frequent offloading of pressure via turning, repositioning, and weight-shifting.     No induration, fluctuance, odor, warmth, or purulence noted to the above noted wounds. New dressings applied. Patient tolerated well, reports mild pain to the wounds. Primary nurse aware of plan of care. See flow sheets for more detailed assessment findings. Will follow along.     Skin care plans:  1-Hydraguard to bilateral sacrum, buttock and heels BID and PRN  2-Elevate heels to offload pressure.  3-Ehob cushion in chair when out of bed.  4-Moisturize skin daily with skin nourishing cream.  5-Turn/reposition q2h for pressure re-distribution on skin.  6- L Upper arm Irrigate with NSS. Pat dry. Apply dermagran to wound bed. Cover with ABD and wrap with mark. Change every other day or as needed for drainage/dislodgement.       Wound 03/04/24 Arm Anterior;Left;Upper (Active)   Wound Image   03/04/24 0850   Wound Description Bleeding;Pink 03/04/24 0850   July-wound Assessment Fragile;Pink;Purple 03/04/24 0850   Wound Length (cm) 2 cm 03/04/24  0850   Wound Width (cm) 2 cm 03/04/24 0850   Wound Depth (cm) 0.1 cm 03/04/24 0850   Wound Surface Area (cm^2) 4 cm^2 03/04/24 0850   Wound Volume (cm^3) 0.4 cm^3 03/04/24 0850   Calculated Wound Volume (cm^3) 0.4 cm^3 03/04/24 0850   Drainage Amount Small 03/04/24 0850   Drainage Description Bloody 03/04/24 0850   Non-staged Wound Description Partial thickness 03/04/24 0850   Dressing Dermagran gauze;ABD;Dry dressing 03/04/24 0850   Wound packed? No 03/04/24 0850   Dressing Changed New 03/04/24 0850   Patient Tolerance Tolerated well 03/04/24 0850   Dressing Status Clean;Dry;Intact 03/04/24 0850     Call or tigertext with any questions  Wound Care will continue to follow while inpatient.    Sheila Guerrero BSN RN CCRN  Wound and Ostomy Care

## 2024-03-05 ENCOUNTER — TRANSITIONAL CARE MANAGEMENT (OUTPATIENT)
Dept: FAMILY MEDICINE CLINIC | Facility: CLINIC | Age: 84
End: 2024-03-05

## 2024-03-06 ENCOUNTER — OFFICE VISIT (OUTPATIENT)
Dept: FAMILY MEDICINE CLINIC | Facility: CLINIC | Age: 84
End: 2024-03-06
Payer: MEDICARE

## 2024-03-06 VITALS
WEIGHT: 167 LBS | HEART RATE: 102 BPM | RESPIRATION RATE: 16 BRPM | BODY MASS INDEX: 32.79 KG/M2 | DIASTOLIC BLOOD PRESSURE: 62 MMHG | OXYGEN SATURATION: 93 % | HEIGHT: 60 IN | SYSTOLIC BLOOD PRESSURE: 130 MMHG

## 2024-03-06 DIAGNOSIS — N18.2 TYPE 2 DIABETES MELLITUS WITH STAGE 2 CHRONIC KIDNEY DISEASE, WITHOUT LONG-TERM CURRENT USE OF INSULIN: ICD-10-CM

## 2024-03-06 DIAGNOSIS — I10 ESSENTIAL HYPERTENSION: ICD-10-CM

## 2024-03-06 DIAGNOSIS — E11.65 TYPE 2 DIABETES MELLITUS WITH HYPERGLYCEMIA, WITHOUT LONG-TERM CURRENT USE OF INSULIN (HCC): ICD-10-CM

## 2024-03-06 DIAGNOSIS — E11.22 TYPE 2 DIABETES MELLITUS WITH STAGE 2 CHRONIC KIDNEY DISEASE, WITHOUT LONG-TERM CURRENT USE OF INSULIN: ICD-10-CM

## 2024-03-06 DIAGNOSIS — S41.112A SKIN TEAR OF LEFT UPPER EXTREMITY: ICD-10-CM

## 2024-03-06 DIAGNOSIS — J44.9 COPD, SEVERE (HCC): Primary | ICD-10-CM

## 2024-03-06 PROCEDURE — 99496 TRANSJ CARE MGMT HIGH F2F 7D: CPT | Performed by: FAMILY MEDICINE

## 2024-03-06 NOTE — ASSESSMENT & PLAN NOTE
Lab Results   Component Value Date    HGBA1C 6.7 (H) 04/20/2023   DM2 well controlled on current medications. Normal DM foot exam. No hyperglycemia with current prednisone dose.

## 2024-03-06 NOTE — ASSESSMENT & PLAN NOTE
3x3cm skin tear. Dressing was removed and showed no signs of infection. Dressing was replaced and patient given extra dressing supplies for home if needed. Advised to come back in 2 days for dressing change.

## 2024-03-06 NOTE — PROGRESS NOTES
Assessment & Plan       Orders Placed This Encounter   Procedures   • Diabetic foot exam          Subjective     Transitional Care Management Review:   Christine Laura is a 83 y.o. female here for TCM follow up. Was seen in the hospital     During the TCM phone call patient stated:  TCM Call     Date and time call was made  3/5/2024 10:07 AM    Hospital care reviewed  Records reviewed    Patient was hospitialized at  [facility]    Date of Admission  03/02/24    Date of discharge  03/04/24    Diagnosis  COPD Exaerbation    Current Symptoms  Fatigue    Fatigue severity  Mild      TCM Call     Post hospital issues  None    Should patient be enrolled in anticoag monitoring?  No    Scheduled for follow up?  Yes    Referrals needed  None    Did you obtain your prescribed medications  Yes    Do you need help managing your prescriptions or medications  No    Is transportation to your appointment needed  No    I have advised the patient to call PCP with any new or worsening symptoms  Sylvia Roche MA    Living Arrangements  Alone    Support System  Family    The type of support provided  Emotional; Financial; Physical    Do you have social support  Yes, as much as I need    Are you recieving any outpatient services  No    Are you recieving home care services  No    Are you using any community resources  No    Current waiver services  No    Have you fallen in the last 12 months  No    Interperter language line needed  No    Counseling  Patient          Review of Systems   Constitutional:  Negative for activity change, appetite change, chills, fatigue, fever and unexpected weight change.   HENT:  Negative for congestion, ear discharge, ear pain, nosebleeds, postnasal drip, rhinorrhea, sinus pressure, sneezing, sore throat, trouble swallowing and voice change.    Eyes:  Negative for photophobia, pain, discharge, redness and itching.   Respiratory:  Positive for shortness of breath. Negative for cough, chest tightness and  wheezing.    Cardiovascular:  Negative for chest pain, palpitations and leg swelling.   Gastrointestinal:  Negative for abdominal pain, constipation, diarrhea, nausea and vomiting.   Endocrine: Negative for polyuria.   Genitourinary:  Negative for dysuria, frequency and urgency.   Musculoskeletal:  Negative for arthralgias, back pain, myalgias and neck pain.   Skin:  Negative for color change, pallor and rash.        She has a superficial about 2 cm peeling of skin on the left upper arm, and this happened during EKG placement and her skin was peeled off and bandage was placed   Allergic/Immunologic: Negative for environmental allergies and food allergies.   Neurological:  Negative for dizziness, weakness, light-headedness and headaches.   Hematological:  Negative for adenopathy. Does not bruise/bleed easily.   Psychiatric/Behavioral:  Negative for behavioral problems. The patient is not nervous/anxious.        Objective     /62 (BP Location: Right arm, Patient Position: Sitting, Cuff Size: Standard)   Pulse 102   Resp 16   Ht 5' (1.524 m)   Wt 75.8 kg (167 lb)   LMP  (LMP Unknown)   SpO2 93%   BMI 32.61 kg/m²        Physical Exam  Vitals and nursing note reviewed.       Medications have been reviewed by provider in current encounter

## 2024-03-06 NOTE — PROGRESS NOTES
Assessment & Plan       Orders Placed This Encounter   Procedures    Diabetic foot exam       Depression Screening and Follow-up Plan: Patient was screened for depression during today's encounter. They screened negative with a PHQ-2 score of 0.        Subjective     Transitional Care Management Review:   Christine Laura is a 83 y.o. female here for TCM follow up. Was seen in the hospital on 3/4/24 for COPD exacerbation. She was statting around 97% in the hospital on 2 liters and O2 dropped with ambulation. She was discharged home with 5 day course prednisone. She overall feels well and improved. SOB has improved back to baseline, as she wears O2 at home and when needed with ambulation. Taking all current medications and is almost finished with prednisone course. Has small 3x3cm upper left arm tear from EKG pads. Dressing is in place and told to replace every 2 days. Denies fever, chills, HA, chest pain, cough, wheezing, chest palpitation, leg edema. She lives at home alone.    During the TCM phone call patient stated:  TCM Call       Date and time call was made  3/5/2024 10:07 AM    Hospital care reviewed  Records reviewed    Patient was hospitialized at  [facility]    Date of Admission  03/02/24    Date of discharge  03/04/24    Diagnosis  COPD Exaerbation    Current Symptoms  Fatigue    Fatigue severity  Mild          TCM Call       Post hospital issues  None    Should patient be enrolled in anticoag monitoring?  No    Scheduled for follow up?  Yes    Referrals needed  None    Did you obtain your prescribed medications  Yes    Do you need help managing your prescriptions or medications  No    Is transportation to your appointment needed  No    I have advised the patient to call PCP with any new or worsening symptoms  Sylvia Roche MA    Living Arrangements  Alone    Support System  Family    The type of support provided  Emotional; Financial; Physical    Do you have social support  Yes, as much as I need     Are you recieving any outpatient services  No    Are you recieving home care services  No    Are you using any community resources  No    Current waiver services  No    Have you fallen in the last 12 months  No    Interperter language line needed  No    Counseling  Patient            Review of Systems   Constitutional:  Negative for activity change, appetite change, chills, fatigue, fever and unexpected weight change.   HENT:  Negative for congestion, ear discharge, ear pain, nosebleeds, postnasal drip, rhinorrhea, sinus pressure, sneezing, sore throat, trouble swallowing and voice change.    Eyes:  Negative for photophobia, pain, discharge, redness and itching.   Respiratory:  Positive for shortness of breath. Negative for cough, chest tightness and wheezing.    Cardiovascular:  Negative for chest pain, palpitations and leg swelling.   Gastrointestinal:  Negative for abdominal pain, constipation, diarrhea, nausea and vomiting.   Endocrine: Negative for polyuria.   Genitourinary:  Negative for dysuria, frequency and urgency.   Musculoskeletal:  Negative for arthralgias, back pain, myalgias and neck pain.   Skin:  Negative for color change, pallor and rash.        She has a superficial about 2 cm peeling of skin on the left upper arm, and this happened during EKG placement and her skin was peeled off and bandage was placed   Allergic/Immunologic: Negative for environmental allergies and food allergies.   Neurological:  Negative for dizziness, weakness, light-headedness and headaches.   Hematological:  Negative for adenopathy. Does not bruise/bleed easily.   Psychiatric/Behavioral:  Negative for behavioral problems. The patient is not nervous/anxious.        Objective     /62 (BP Location: Right arm, Patient Position: Sitting, Cuff Size: Standard)   Pulse 102   Resp 16   Ht 5' (1.524 m)   Wt 75.8 kg (167 lb)   LMP  (LMP Unknown)   SpO2 93%   BMI 32.61 kg/m²        Physical Exam  Vitals and nursing note  reviewed.   Constitutional:       Appearance: Normal appearance.   HENT:      Head: Normocephalic and atraumatic.      Right Ear: Tympanic membrane, ear canal and external ear normal.      Left Ear: Tympanic membrane and ear canal normal.      Nose: Nose normal.      Mouth/Throat:      Mouth: Mucous membranes are moist.      Pharynx: Oropharynx is clear.   Eyes:      Extraocular Movements: Extraocular movements intact.      Conjunctiva/sclera: Conjunctivae normal.      Pupils: Pupils are equal, round, and reactive to light.   Cardiovascular:      Rate and Rhythm: Normal rate and regular rhythm.      Pulses: Normal pulses.           Dorsalis pedis pulses are 2+ on the right side and 2+ on the left side.        Posterior tibial pulses are 2+ on the right side and 2+ on the left side.      Heart sounds: Normal heart sounds. No murmur heard.  Pulmonary:      Effort: Pulmonary effort is normal.      Breath sounds: Normal breath sounds. No wheezing, rhonchi or rales.   Abdominal:      General: Abdomen is flat.   Musculoskeletal:      Cervical back: Normal range of motion and neck supple.      Right lower leg: No edema.      Left lower leg: No edema.   Feet:      Right foot:      Skin integrity: No ulcer, skin breakdown, erythema, warmth, callus or dry skin.      Left foot:      Skin integrity: No ulcer, skin breakdown, erythema, warmth, callus or dry skin.   Skin:     Comments: 3x3cm skin tear on upper L arm. No surrounding erythema, ecchymosis or drainage. Bleeding when dressing was removed.    Neurological:      Mental Status: She is alert.     Diabetic Foot Exam    Patient's shoes and socks removed.    Right Foot/Ankle   Right Foot Inspection  Skin Exam: skin normal and skin intact. No dry skin, no warmth, no callus, no erythema, no maceration, no abnormal color, no pre-ulcer, no ulcer and no callus.     Sensory   Vibration: intact  Proprioception: intact  Monofilament testing: intact    Vascular  The right DP pulse is  2+. The right PT pulse is 2+.     Left Foot/Ankle  Left Foot Inspection  Skin Exam: skin normal and skin intact. No dry skin, no warmth, no erythema, no maceration, normal color, no pre-ulcer, no ulcer and no callus.     Sensory   Vibration: intact  Proprioception: intact  Monofilament testing: intact    Vascular  The left DP pulse is 2+. The left PT pulse is 2+.     Medications have been reviewed by provider in current encounter

## 2024-03-06 NOTE — ASSESSMENT & PLAN NOTE
Overall feels well and much improved. Lungs CTA. Finishing 5 day prednisone course. SOB back to baseline. Wears O2 at home and with ambulation. Has scheduled appointment next week.

## 2024-03-09 DIAGNOSIS — E11.65 TYPE 2 DIABETES MELLITUS WITH HYPERGLYCEMIA, WITHOUT LONG-TERM CURRENT USE OF INSULIN (HCC): ICD-10-CM

## 2024-03-11 RX ORDER — LINAGLIPTIN 5 MG/1
5 TABLET, FILM COATED ORAL DAILY
Qty: 30 TABLET | Refills: 0 | Status: SHIPPED | OUTPATIENT
Start: 2024-03-11

## 2024-03-13 ENCOUNTER — OFFICE VISIT (OUTPATIENT)
Dept: FAMILY MEDICINE CLINIC | Facility: CLINIC | Age: 84
End: 2024-03-13
Payer: COMMERCIAL

## 2024-03-13 VITALS
HEART RATE: 74 BPM | WEIGHT: 158 LBS | SYSTOLIC BLOOD PRESSURE: 140 MMHG | BODY MASS INDEX: 31.02 KG/M2 | HEIGHT: 60 IN | OXYGEN SATURATION: 99 % | DIASTOLIC BLOOD PRESSURE: 68 MMHG

## 2024-03-13 DIAGNOSIS — S41.112A SKIN TEAR OF LEFT UPPER EXTREMITY: Primary | ICD-10-CM

## 2024-03-13 PROCEDURE — 99213 OFFICE O/P EST LOW 20 MIN: CPT | Performed by: FAMILY MEDICINE

## 2024-03-13 PROCEDURE — G2211 COMPLEX E/M VISIT ADD ON: HCPCS | Performed by: FAMILY MEDICINE

## 2024-03-13 NOTE — ASSESSMENT & PLAN NOTE
Superficial wound on the left upper arm is healing, still slight serous discharge present in the center, the wound cleaned and covered with non  stick gauze and bandage applied

## 2024-03-13 NOTE — PROGRESS NOTES
Name: Christine Laura      : 1940      MRN: [id number]  Encounter Provider: Gillian Smith MD  Encounter Date: 3/13/2024   Encounter department: [facility]    Assessment & Plan     1. Skin tear of left upper extremity  Assessment & Plan:  Superficial wound on the left upper arm is healing, still slight serous discharge present in the center, the wound cleaned and covered with non  stick gauze and bandage applied         He will keep changing dressing every other day, and then she can leave the wound open once it is completely dry t, healing very well    Subjective     She came for recheck on the superficial wound on the left upper arm, she has been changing dressing at home by herself and still there is some oozing, no pus and no pain  , Overall she is stable no new changes      Review of Systems   Constitutional: Negative.    Eyes: Negative.    Respiratory: Negative.     Skin:  Positive for wound.        Healing wound on the left upper arm       Past Medical History:   Diagnosis Date   • Anemia    • Blood clotting tendency (HCC)     left leg, lung   • Chronic respiratory failure with hypoxia and hypercapnia (HCC)    • COPD (chronic obstructive pulmonary disease) (HCC)    • Deep vein thrombosis (HCC)    • Diabetes mellitus (HCC)    • Emphysema lung (HCC)    • Emphysema lung (HCC)    • Emphysema of lung (HCC)    • Hyperlipidemia    • Hypertension    • Hyperthyroidism    • Hypothyroid    • On warfarin therapy    • Osteoporosis    • Palpitations    • Pulmonary embolism (HCC)    • Sleep apnea, obstructive    • Thyroid disease      Past Surgical History:   Procedure Laterality Date   • AV NODE ABLATION  2017   • COLONOSCOPY  2018    w/ EGD   • DXA PROCEDURE (HISTORICAL)  2016   • NV COLONOSCOPY FLX DX W/COLLJ SPEC WHEN PFRMD N/A 2018    Procedure: EGD AND COLONOSCOPY;  Surgeon: Reena Bell MD;  Location: [facility];  Service: Gastroenterology   • TOE SURGERY       Family  History   Problem Relation Age of Onset   • Hypertension Mother    • Hyperlipidemia Mother    • Asthma Father    • Heart failure Father    • Hypertension Sister    • Hyperlipidemia Sister    • Heart attack Neg Hx    • Aneurysm Neg Hx    • Stroke Neg Hx    • Clotting disorder Neg Hx      Social History     Socioeconomic History   • Marital status:      Spouse name: None   • Number of children: None   • Years of education: None   • Highest education level: None   Occupational History   • None   Tobacco Use   • Smoking status: Former     Current packs/day: 0.00     Average packs/day: 1 pack/day for 46.0 years (46.0 ttl pk-yrs)     Types: Cigarettes     Start date:      Quit date:      Years since quittin.2   • Smokeless tobacco: Never   Vaping Use   • Vaping status: Never Used   Substance and Sexual Activity   • Alcohol use: Not Currently   • Drug use: No   • Sexual activity: None   Other Topics Concern   • None   Social History Narrative    Date of last mammogram:   2017      Most Recent Bone Density:   2016       Date of Last Pap Smear:       not required per gyn         Smokeless tobacco status:   Never used smokeless tobacco      Tobacco-years of use:   46      E-cigarette/vape status:   Never used electronic cigarettes      Most recent tobacco use screenin2019      Do you currently or have you served in the [employer]:   No      Were you activated, into active duty, as a member of the [employer] or as a Reservist:   No      Marital status:         Diet:   Regular      General stress level:   Low      Has smoked since age:   20       Alcohol intake:   None       Caffeine intake:   Moderate      Chewing tobacco:   none      Guns present in home:   No      Seat belts used routinely:   Yes      Sunscreen used routinely:   Yes      Smoke alarm in home:   Yes      Advance directive:   Yes      Live alone or with others:   alone       Are there stairs in your  home:   No      Pets:   No      Social Determinants of Health     Financial Resource Strain: Not on file   Food Insecurity: No Food Insecurity (3/3/2024)    Hunger Vital Sign    • Worried About Running Out of Food in the Last Year: Never true    • Ran Out of Food in the Last Year: Never true   Transportation Needs: No Transportation Needs (3/3/2024)    PRAPARE - Transportation    • Lack of Transportation (Medical): No    • Lack of Transportation (Non-Medical): No   Physical Activity: Not on file   Stress: Not on file   Social Connections: Not on file   Intimate Partner Violence: Not on file   Housing Stability: Unknown (3/3/2024)    Housing Stability Vital Sign    • Unable to Pay for Housing in the Last Year: No    • Number of Places Lived in the Last Year: Not on file    • Unstable Housing in the Last Year: No     Current Outpatient Medications on File Prior to Visit   Medication Sig   • albuterol (2.5 mg/3 mL) 0.083 % nebulizer solution Take 3 mL (2.5 mg total) by nebulization every 4 (four) hours as needed for wheezing or shortness of breath   • aspirin (ECOTRIN LOW STRENGTH) 81 mg EC tablet Take 81 mg by mouth daily   • budesonide-formoterol (Breyna) 160-4.5 mcg/act inhaler Inhale 2 puffs 2 (two) times a day Rinse mouth after use.   • Cholecalciferol (VITAMIN D) 2000 units CAPS Take 2 capsules (4,000 Units total) by mouth daily   • cyanocobalamin (VITAMIN B-12) 100 mcg tablet Take by mouth daily   • diltiazem (CARDIZEM CD) 300 mg 24 hr capsule Take 1 capsule (300 mg total) by mouth daily As directed   • fluticasone (FLONASE) 50 mcg/act nasal spray 2 sprays into each nostril daily   • glimepiride (AMARYL) 1 mg tablet Take one tablet with breakfast and 1 tablet with dinner   • glucose blood (Contour Next Test) test strip Use twice a day   • Incruse Ellipta 62.5 MCG/ACT AEPB inhaler INHALE 1 PUFF BY MOUTH EVERY DAY   • ketorolac (ACULAR) 0.5 % ophthalmic solution    • levothyroxine 25 mcg tablet TAKE ONE TABLET BY  MOUTH EVERY DAY   • lisinopril-hydrochlorothiazide (PRINZIDE,ZESTORETIC) 20-25 MG per tablet Take 1 tablet(s) every day by oral route.   • meclizine (ANTIVERT) 25 mg tablet 1 tab take before car ride once a day as needed   • multivitamin (THERAGRAN) TABS Take 1 tablet by mouth daily   • nystatin (MYCOSTATIN) cream Apply topically 2 (two) times a day   • ofloxacin (OCUFLOX) 0.3 % ophthalmic solution    • prednisoLONE acetate (PRED FORTE) 1 % ophthalmic suspension    • rosuvastatin (CRESTOR) 10 MG tablet Take 1 tablet (10 mg total) by mouth daily   • Tradjenta 5 MG TABS TAKE ONE TABLET BY MOUTH EVERY DAY   • Ventolin  (90 Base) MCG/ACT inhaler Inhale 2 puffs every 6 (six) hours as needed for wheezing     Allergies   Allergen Reactions   • Penicillins Rash     After dental procedure      Immunization History   Administered Date(s) Administered   • COVID-19 MODERNA VACC 0.25 ML IM BOOSTER 11/01/2021   • COVID-19 MODERNA VACC 0.5 ML IM 01/23/2021, 02/20/2021   • COVID-19 Moderna mRNA Vaccine 12 Yr+ 50 mcg/0.5 mL (Spikevax) 10/09/2023   • INFLUENZA 11/21/2017, 10/30/2018   • Influenza, high dose seasonal 0.7 mL 10/06/2020, 09/07/2021, 10/06/2022, 09/12/2023   • Pneumococcal Conjugate 13-Valent 09/21/2016   • Pneumococcal Polysaccharide PPV23 03/21/2016   • Tdap 09/21/2013       Objective     /68   Pulse 74   Ht 5' (1.524 m)   Wt 71.7 kg (158 lb)   LMP  (LMP Unknown)   SpO2 99%   BMI 30.86 kg/m²     Physical Exam  Vitals and nursing note reviewed.   Constitutional:       Appearance: Normal appearance. She is well-developed.   Neck:      Thyroid: No thyromegaly.   Cardiovascular:      Rate and Rhythm: Normal rate.   Pulmonary:      Effort: Pulmonary effort is normal.   Musculoskeletal:      Cervical back: Normal range of motion and neck supple.   Skin:     Findings: No erythema or rash.      Comments: About quarter size superficial ulcer on the left upper arm, the bandages removed and then bacitracin  antibiotic applied and put the bandage again with a nonstick gauze and wound is healing well   Neurological:      Mental Status: She is alert.       Gillian Smith MD

## 2024-03-30 DIAGNOSIS — J44.9 CHRONIC OBSTRUCTIVE PULMONARY DISEASE, UNSPECIFIED COPD TYPE (HCC): ICD-10-CM

## 2024-03-30 DIAGNOSIS — E03.9 ACQUIRED HYPOTHYROIDISM: ICD-10-CM

## 2024-04-01 RX ORDER — UMECLIDINIUM 62.5 UG/1
1 AEROSOL, POWDER ORAL DAILY
Qty: 30 BLISTER | Refills: 0 | Status: SHIPPED | OUTPATIENT
Start: 2024-04-01

## 2024-04-01 RX ORDER — LEVOTHYROXINE SODIUM 0.03 MG/1
TABLET ORAL
Qty: 90 TABLET | Refills: 1 | Status: SHIPPED | OUTPATIENT
Start: 2024-04-01

## 2024-04-01 NOTE — TELEPHONE ENCOUNTER
Ok to fill?  Last filled 2/21/24 by  Fran Melendez PA-C  Patient's last office visit 1/18/23 with Dr. Ferrer

## 2024-04-09 ENCOUNTER — TELEPHONE (OUTPATIENT)
Age: 84
End: 2024-04-09

## 2024-04-09 NOTE — TELEPHONE ENCOUNTER
Pt called in to verify if she had any labs in her chart to be completed before her 04/12/2024 appt. I relayed to pt that she does have labs in there to complete before appt and that she does not need script for the labs. Pt is aware and confirmed

## 2024-04-10 ENCOUNTER — LAB (OUTPATIENT)
Dept: LAB | Facility: CLINIC | Age: 84
End: 2024-04-10
Payer: COMMERCIAL

## 2024-04-10 DIAGNOSIS — N18.2 TYPE 2 DIABETES MELLITUS WITH STAGE 2 CHRONIC KIDNEY DISEASE, WITHOUT LONG-TERM CURRENT USE OF INSULIN  (HCC): ICD-10-CM

## 2024-04-10 DIAGNOSIS — E11.65 TYPE 2 DIABETES MELLITUS WITH HYPERGLYCEMIA, WITHOUT LONG-TERM CURRENT USE OF INSULIN (HCC): ICD-10-CM

## 2024-04-10 DIAGNOSIS — E11.22 TYPE 2 DIABETES MELLITUS WITH STAGE 2 CHRONIC KIDNEY DISEASE, WITHOUT LONG-TERM CURRENT USE OF INSULIN  (HCC): ICD-10-CM

## 2024-04-10 LAB
ANION GAP SERPL CALCULATED.3IONS-SCNC: 9 MMOL/L (ref 4–13)
BUN SERPL-MCNC: 14 MG/DL (ref 5–25)
CALCIUM SERPL-MCNC: 9.3 MG/DL (ref 8.4–10.2)
CHLORIDE SERPL-SCNC: 100 MMOL/L (ref 96–108)
CO2 SERPL-SCNC: 29 MMOL/L (ref 21–32)
CREAT SERPL-MCNC: 0.5 MG/DL (ref 0.6–1.3)
CREAT UR-MCNC: 38.8 MG/DL
EST. AVERAGE GLUCOSE BLD GHB EST-MCNC: 189 MG/DL
GFR SERPL CREATININE-BSD FRML MDRD: 89 ML/MIN/1.73SQ M
GLUCOSE P FAST SERPL-MCNC: 163 MG/DL (ref 65–99)
HBA1C MFR BLD: 8.2 %
MICROALBUMIN UR-MCNC: <7 MG/L
MICROALBUMIN/CREAT 24H UR: <18 MG/G CREATININE (ref 0–30)
POTASSIUM SERPL-SCNC: 3.9 MMOL/L (ref 3.5–5.3)
SODIUM SERPL-SCNC: 138 MMOL/L (ref 135–147)

## 2024-04-10 PROCEDURE — 36415 COLL VENOUS BLD VENIPUNCTURE: CPT

## 2024-04-10 PROCEDURE — 83036 HEMOGLOBIN GLYCOSYLATED A1C: CPT

## 2024-04-10 PROCEDURE — 80048 BASIC METABOLIC PNL TOTAL CA: CPT

## 2024-04-10 RX ORDER — LINAGLIPTIN 5 MG/1
5 TABLET, FILM COATED ORAL DAILY
Qty: 30 TABLET | Refills: 5 | Status: SHIPPED | OUTPATIENT
Start: 2024-04-10

## 2024-04-12 ENCOUNTER — OFFICE VISIT (OUTPATIENT)
Dept: ENDOCRINOLOGY | Facility: CLINIC | Age: 84
End: 2024-04-12
Payer: COMMERCIAL

## 2024-04-12 VITALS
SYSTOLIC BLOOD PRESSURE: 180 MMHG | DIASTOLIC BLOOD PRESSURE: 80 MMHG | HEART RATE: 105 BPM | OXYGEN SATURATION: 95 % | HEIGHT: 60 IN | WEIGHT: 160.6 LBS | BODY MASS INDEX: 31.53 KG/M2

## 2024-04-12 DIAGNOSIS — M81.0 AGE-RELATED OSTEOPOROSIS WITHOUT CURRENT PATHOLOGICAL FRACTURE: ICD-10-CM

## 2024-04-12 DIAGNOSIS — E11.65 TYPE 2 DIABETES MELLITUS WITH HYPERGLYCEMIA, WITHOUT LONG-TERM CURRENT USE OF INSULIN (HCC): ICD-10-CM

## 2024-04-12 DIAGNOSIS — I10 ESSENTIAL HYPERTENSION: ICD-10-CM

## 2024-04-12 DIAGNOSIS — E78.2 MIXED HYPERLIPIDEMIA: ICD-10-CM

## 2024-04-12 DIAGNOSIS — G47.33 OBSTRUCTIVE SLEEP APNEA: ICD-10-CM

## 2024-04-12 DIAGNOSIS — E55.9 VITAMIN D DEFICIENCY: Primary | ICD-10-CM

## 2024-04-12 DIAGNOSIS — E03.9 ACQUIRED HYPOTHYROIDISM: ICD-10-CM

## 2024-04-12 PROCEDURE — 99214 OFFICE O/P EST MOD 30 MIN: CPT | Performed by: INTERNAL MEDICINE

## 2024-04-12 RX ORDER — ALENDRONATE SODIUM 70 MG/1
70 TABLET ORAL
Qty: 12 TABLET | Refills: 3 | Status: SHIPPED | OUTPATIENT
Start: 2024-04-12

## 2024-04-12 NOTE — PROGRESS NOTES
Christine Laura 83 y.o. female MRN: [id number]    Encounter: [id number]      Assessment/Plan     Assessment:  This is a 83 y.o.-year-old female with diabetes with hyperglycemia.    Plan:  Diagnoses and all orders for this visit:    Vitamin D deficiency  Last vitamin d - 35   Continue vitamin D3 supplementation 4000 IU daily    -     Vitamin D 25 hydroxy; Future  -     PTH, intact; Future  -     Basic metabolic panel; Future  -     Hemoglobin A1C; Future    Acquired hypothyroidism  Lab Results   Component Value Date    [id number] 3.090 04/20/2023   Thyroid blood work is within normal range, continue levothyroxine 25 mcg daily    -     T4, free; Future  -     TSH, 3rd generation; Future    Mixed hyperlipidemia  Lab Results   Component Value Date    LDLCALC 60 11/18/2022   LDL Is at goal, continue Crestor 10 mg daily  Obstructive sleep apnea  Discussed the importance of use of CPAP machine  Age-related osteoporosis without current pathological fracture  Patient did not want to continue with Prolia injection as it was very expensive for her.  She is agreeable to try Fosamax.  Given history of compression fracture and osteoporosis discussed the importance of taking medication for prevention of fractures.  Will start Fosamax 70 mg once a week educated to take it on empty stomach, with full glass of water and stay upright for half an hour.  Also she will need a DEXA scan before her next appointment  Obtain blood work before next appointment  -     alendronate (FOSAMAX) 70 mg tablet; Take 1 tablet (70 mg total) by mouth every 7 days  -     DXA bone density spine hip and pelvis; Future  -     Basic metabolic panel; Future  -     Hemoglobin A1C; Future    Essential hypertension  Blood pressure well-controlled usually but today blood pressure is elevated as patient was rushing to get to the office and it was raining outside  She stated she took her medication and feels fine.  She will follow-up with PCP for blood pressure  check  Type 2 diabetes mellitus with hyperglycemia, without long-term current use of insulin (HCC)  Continue current management glimepiride 1 mg twice a day, Tradjenta 5 mg daily  -     Hemoglobin A1C; Future         CC: Diabetes    History of Present Illness     HPI:    Christine Laura is a 33-year-old woman with medical history of hypothyroidism, obstructive sleep apnea, osteoporosis, hypertension, type 2 diabetes is here for follow-up.  Beatties course has been stable.  Last A1c is 8.2%, slightly elevated.  She was admitted recently in March 2024 for COPD exacerbation, at that time she was treated with prednisone 5-day course, 40 mg daily    For diabetes management she takes glimepiride 1 mg twice a day.  Tradjenta 5 mg daily checks blood sugars once or twice daily and blood sugars are usually 200 to 240 mg per DL range    For hypothyroidism, she takes levothyroxine 25 mcg daily.  For hyperlipidemia, she takes Crestor 10 mg daily, denies side effects      Lab Results   Component Value Date    [id number] 3.090 04/20/2023     For osteoporosis patient is currently not taking any medications.  During the last appointment it was discussed with patient about Prolia injection, however Prolia was expensive and patient did not want to pursue Prolia injection after she received 1 Prolia injection  She wants to try oral bisphosphonate therapy  She also takes vitamin D, 4000 IU daily and calcium supplementation      Lab Results   Component Value Date    HGBA1C 8.2 (H) 04/10/2024     Lab Results   Component Value Date    CREATININE 0.50 (L) 04/10/2024          Review of Systems   Constitutional:  Positive for activity change and fatigue. Negative for diaphoresis, fever and unexpected weight change.   HENT: Negative.     Eyes:  Negative for visual disturbance.   Respiratory:  Positive for shortness of breath. Negative for cough and chest tightness.    Cardiovascular:  Negative for chest pain, palpitations and leg swelling.    Gastrointestinal:  Negative for abdominal pain, blood in stool, constipation, diarrhea, nausea and vomiting.   Endocrine: Negative for cold intolerance, heat intolerance, polydipsia, polyphagia and polyuria.   Genitourinary:  Negative for dysuria, enuresis, frequency and urgency.   Musculoskeletal:  Negative for arthralgias and myalgias.   Skin:  Negative for pallor, rash and wound.   Allergic/Immunologic: Negative.    Neurological:  Negative for dizziness, tremors, weakness and numbness.   Hematological: Negative.    Psychiatric/Behavioral: Negative.         Historical Information   Past Medical History:   Diagnosis Date    Anemia     Blood clotting tendency (HCC)     left leg, lung    Chronic respiratory failure with hypoxia and hypercapnia (HCC)     COPD (chronic obstructive pulmonary disease) (HCC)     Deep vein thrombosis (HCC)     Diabetes mellitus (HCC)     Emphysema lung (HCC)     Emphysema lung (HCC)     Emphysema of lung (HCC)     Hyperlipidemia     Hypertension     Hyperthyroidism     Hypothyroid     On warfarin therapy     Osteoporosis     Palpitations     Pulmonary embolism (HCC)     Sleep apnea, obstructive     Thyroid disease      Past Surgical History:   Procedure Laterality Date    AV NODE ABLATION  2017    COLONOSCOPY  2018    w/ EGD    DXA PROCEDURE (HISTORICAL)  2016    NV COLONOSCOPY FLX DX W/COLLJ SPEC WHEN PFRMD N/A 2018    Procedure: EGD AND COLONOSCOPY;  Surgeon: Reena Bell MD;  Location: [facility];  Service: Gastroenterology    TOE SURGERY       Social History   Social History     Substance and Sexual Activity   Alcohol Use Not Currently     Social History     Substance and Sexual Activity   Drug Use No     Social History     Tobacco Use   Smoking Status Former    Current packs/day: 0.00    Average packs/day: 1 pack/day for 46.0 years (46.0 ttl pk-yrs)    Types: Cigarettes    Start date:     Quit date:     Years since quittin.2   Smokeless Tobacco  Never     Family History:   Family History   Problem Relation Age of Onset    Hypertension Mother     Hyperlipidemia Mother     Asthma Father     Heart failure Father     Hypertension Sister     Hyperlipidemia Sister     Heart attack Neg Hx     Aneurysm Neg Hx     Stroke Neg Hx     Clotting disorder Neg Hx        Meds/Allergies   Current Outpatient Medications   Medication Sig Dispense Refill    albuterol (2.5 mg/3 mL) 0.083 % nebulizer solution Take 3 mL (2.5 mg total) by nebulization every 4 (four) hours as needed for wheezing or shortness of breath 180 mL 3    alendronate (FOSAMAX) 70 mg tablet Take 1 tablet (70 mg total) by mouth every 7 days 12 tablet 3    aspirin (ECOTRIN LOW STRENGTH) 81 mg EC tablet Take 81 mg by mouth daily      budesonide-formoterol (Breyna) 160-4.5 mcg/act inhaler Inhale 2 puffs 2 (two) times a day Rinse mouth after use. 10.2 g 5    Cholecalciferol (VITAMIN D) 2000 units CAPS Take 2 capsules (4,000 Units total) by mouth daily      cyanocobalamin (VITAMIN B-12) 100 mcg tablet Take by mouth daily      diltiazem (CARDIZEM CD) 300 mg 24 hr capsule Take 1 capsule (300 mg total) by mouth daily As directed 90 capsule 1    fluticasone (FLONASE) 50 mcg/act nasal spray 2 sprays into each nostril daily 1 Bottle 5    glimepiride (AMARYL) 1 mg tablet Take one tablet with breakfast and 1 tablet with dinner 180 tablet 0    glucose blood (Contour Next Test) test strip Use twice a day 200 each 1    Incruse Ellipta 62.5 MCG/ACT AEPB inhaler INHALE 1 PUFF BY MOUTH EVERY DAY 30 blister 0    ketorolac (ACULAR) 0.5 % ophthalmic solution       levothyroxine 25 mcg tablet TAKE ONE TABLET BY MOUTH EVERY DAY 90 tablet 1    linaGLIPtin (Tradjenta) 5 MG TABS TAKE ONE TABLET BY MOUTH EVERY DAY 30 tablet 5    lisinopril-hydrochlorothiazide (PRINZIDE,ZESTORETIC) 20-25 MG per tablet Take 1 tablet(s) every day by oral route. 90 tablet 1    meclizine (ANTIVERT) 25 mg tablet 1 tab take before car ride once a day as needed  10 tablet 0    multivitamin (THERAGRAN) TABS Take 1 tablet by mouth daily      nystatin (MYCOSTATIN) cream Apply topically 2 (two) times a day 60 g 0    ofloxacin (OCUFLOX) 0.3 % ophthalmic solution       prednisoLONE acetate (PRED FORTE) 1 % ophthalmic suspension       rosuvastatin (CRESTOR) 10 MG tablet Take 1 tablet (10 mg total) by mouth daily 90 tablet 3    Ventolin  (90 Base) MCG/ACT inhaler Inhale 2 puffs every 6 (six) hours as needed for wheezing 18 g 0     No current facility-administered medications for this visit.     Allergies   Allergen Reactions    Penicillins Rash     After dental procedure        Objective   Vitals: Blood pressure (!) 180/80, pulse 105, height 5' (1.524 m), weight 72.8 kg (160 lb 9.6 oz), SpO2 95%.    Physical Exam  Vitals reviewed.   Constitutional:       General: She is not in acute distress.     Appearance: Normal appearance. She is not ill-appearing.   HENT:      Head: Normocephalic and atraumatic.      Nose: Nose normal.   Eyes:      Extraocular Movements: Extraocular movements intact.      Conjunctiva/sclera: Conjunctivae normal.   Pulmonary:      Effort: No respiratory distress.   Musculoskeletal:      Cervical back: Normal range of motion.   Neurological:      General: No focal deficit present.      Mental Status: She is alert and oriented to person, place, and time.   Psychiatric:         Mood and Affect: Mood normal.         Behavior: Behavior normal.         The history was obtained from the review of the chart, patient.    Lab Results:   Lab Results   Component Value Date/Time    Hemoglobin A1C 8.2 (H) 04/10/2024 07:52 AM    Hemoglobin A1C 6.7 (H) 04/20/2023 07:54 AM    WBC 10.86 (H) 03/04/2024 06:32 AM    WBC 14.57 (H) 03/02/2024 08:49 PM    Hemoglobin 8.7 (L) 03/04/2024 06:32 AM    Hemoglobin 8.8 (L) 03/02/2024 08:49 PM    Hematocrit 30.1 (L) 03/04/2024 06:32 AM    Hematocrit 29.9 (L) 03/02/2024 08:49 PM    MCV 70 (L) 03/04/2024 06:32 AM    MCV 69 (L) 03/02/2024  "08:49 PM    Platelets 302 03/04/2024 06:32 AM    Platelets 342 03/02/2024 08:49 PM    BUN 14 04/10/2024 07:52 AM    BUN 15 03/04/2024 06:32 AM    BUN 20 03/02/2024 08:49 PM    Potassium 3.9 04/10/2024 07:52 AM    Potassium 4.0 03/04/2024 06:32 AM    Potassium 3.8 03/02/2024 08:49 PM    Chloride 100 04/10/2024 07:52 AM    Chloride 100 03/04/2024 06:32 AM    Chloride 100 03/02/2024 08:49 PM    CO2 29 04/10/2024 07:52 AM    CO2 30 03/04/2024 06:32 AM    CO2 27 03/02/2024 08:49 PM    Creatinine 0.50 (L) 04/10/2024 07:52 AM    Creatinine 0.39 (L) 03/04/2024 06:32 AM    Creatinine 0.58 (L) 03/02/2024 08:49 PM    AST 11 (L) 03/02/2024 08:49 PM    ALT 10 03/02/2024 08:49 PM    Total Protein 6.9 03/02/2024 08:49 PM    Albumin 3.9 03/02/2024 08:49 PM           Imaging Studies: I have personally reviewed pertinent reports.      Portions of the record may have been created with voice recognition software. Occasional wrong word or \"sound a like\" substitutions may have occurred due to the inherent limitations of voice recognition software. Read the chart carefully and recognize, using context, where substitutions have occurred.    "

## 2024-04-12 NOTE — PATIENT INSTRUCTIONS
Hypoglycemia instructions   Christine Laura  4/12/2024  [id number]    Low Blood Sugar    Steps to treat low blood sugar.    1. Test blood sugar if you have symptoms of low blood sugar:   Low Blood Sugar Symptoms:  o Sweaty  o Dizzy  o Rapid heartbeat  o Shaky    o Bad mood  o Hungry      2. Treat blood sugar less than 70 with 15 grams of fast-acting carbohydrate:   Examples of 15 grams Fast-Acting Carbohydrate:  o 4 oz juice  o 4 oz regular soda  o 3-4 glucose tablets (chew)  o 3-4 hard candies (chew)              3.   Wait 15 minutes and test your blood sugar again           4. If blood sugar is less than 100, repeat steps 2-3.      5. When your blood sugar is 100 or more, eat a snack if it will be longer than one hour until your next meal. The snack should be 15 grams of carbohydrate and a protein:   Examples of snacks:  o ½ sandwich  o 6 crackers with cheese  o Piece of fruit with cheese or peanut butter  o 6 crackers with peanut butter

## 2024-04-15 ENCOUNTER — LAB (OUTPATIENT)
Dept: LAB | Facility: CLINIC | Age: 84
End: 2024-04-15
Payer: COMMERCIAL

## 2024-04-15 DIAGNOSIS — E55.9 VITAMIN D DEFICIENCY: ICD-10-CM

## 2024-04-15 DIAGNOSIS — E03.9 ACQUIRED HYPOTHYROIDISM: ICD-10-CM

## 2024-04-15 LAB
25(OH)D3 SERPL-MCNC: 34.7 NG/ML (ref 30–100)
PTH-INTACT SERPL-MCNC: 62 PG/ML (ref 12–88)
T4 FREE SERPL-MCNC: 0.95 NG/DL (ref 0.61–1.12)
TSH SERPL DL<=0.05 MIU/L-ACNC: 1.91 UIU/ML (ref 0.45–4.5)

## 2024-04-15 PROCEDURE — 84439 ASSAY OF FREE THYROXINE: CPT

## 2024-04-15 PROCEDURE — 84443 ASSAY THYROID STIM HORMONE: CPT

## 2024-04-15 PROCEDURE — 36415 COLL VENOUS BLD VENIPUNCTURE: CPT

## 2024-04-15 PROCEDURE — 83970 ASSAY OF PARATHORMONE: CPT

## 2024-04-15 PROCEDURE — 82306 VITAMIN D 25 HYDROXY: CPT

## 2024-04-16 NOTE — RESULT ENCOUNTER NOTE
Hi,  Your blood work results are essentially normal, including vitamin D , thyroid profile, PTH continue current management , such as current dose of vitamin D, levothyroxine supplementation   Thanks     Gabino Guillermo

## 2024-05-02 DIAGNOSIS — J44.9 CHRONIC OBSTRUCTIVE PULMONARY DISEASE, UNSPECIFIED COPD TYPE (HCC): ICD-10-CM

## 2024-05-02 RX ORDER — UMECLIDINIUM 62.5 UG/1
1 AEROSOL, POWDER ORAL DAILY
Qty: 30 BLISTER | Refills: 0 | Status: SHIPPED | OUTPATIENT
Start: 2024-05-02

## 2024-05-21 DIAGNOSIS — J44.9 COPD, SEVERE (HCC): ICD-10-CM

## 2024-05-21 DIAGNOSIS — I10 ESSENTIAL HYPERTENSION: ICD-10-CM

## 2024-05-21 RX ORDER — DILTIAZEM HYDROCHLORIDE 300 MG/1
300 CAPSULE, COATED, EXTENDED RELEASE ORAL DAILY
Qty: 90 CAPSULE | Refills: 1 | Status: SHIPPED | OUTPATIENT
Start: 2024-05-21 | End: 2024-05-23

## 2024-05-21 RX ORDER — LISINOPRIL AND HYDROCHLOROTHIAZIDE 25; 20 MG/1; MG/1
TABLET ORAL
Qty: 90 TABLET | Refills: 1 | Status: SHIPPED | OUTPATIENT
Start: 2024-05-21

## 2024-05-23 DIAGNOSIS — I10 ESSENTIAL HYPERTENSION: ICD-10-CM

## 2024-05-23 RX ORDER — DILTIAZEM HYDROCHLORIDE 300 MG/1
300 CAPSULE, COATED, EXTENDED RELEASE ORAL DAILY
Qty: 90 CAPSULE | Refills: 1 | Status: SHIPPED | OUTPATIENT
Start: 2024-05-23

## 2024-05-31 DIAGNOSIS — J44.9 CHRONIC OBSTRUCTIVE PULMONARY DISEASE, UNSPECIFIED COPD TYPE (HCC): ICD-10-CM

## 2024-05-31 RX ORDER — UMECLIDINIUM 62.5 UG/1
1 AEROSOL, POWDER ORAL DAILY
Qty: 30 BLISTER | Refills: 0 | Status: SHIPPED | OUTPATIENT
Start: 2024-05-31 | End: 2024-06-05 | Stop reason: SDUPTHER

## 2024-06-05 ENCOUNTER — OFFICE VISIT (OUTPATIENT)
Dept: PULMONOLOGY | Facility: CLINIC | Age: 84
End: 2024-06-05
Payer: COMMERCIAL

## 2024-06-05 VITALS
HEIGHT: 60 IN | HEART RATE: 106 BPM | DIASTOLIC BLOOD PRESSURE: 60 MMHG | BODY MASS INDEX: 31.22 KG/M2 | WEIGHT: 159 LBS | SYSTOLIC BLOOD PRESSURE: 144 MMHG | OXYGEN SATURATION: 94 % | TEMPERATURE: 97.4 F

## 2024-06-05 DIAGNOSIS — J44.9 COPD, SEVERE (HCC): ICD-10-CM

## 2024-06-05 DIAGNOSIS — J96.11 CHRONIC RESPIRATORY FAILURE WITH HYPOXIA AND HYPERCAPNIA (HCC): Primary | ICD-10-CM

## 2024-06-05 DIAGNOSIS — J44.9 CHRONIC OBSTRUCTIVE PULMONARY DISEASE, UNSPECIFIED COPD TYPE (HCC): ICD-10-CM

## 2024-06-05 DIAGNOSIS — J96.12 CHRONIC RESPIRATORY FAILURE WITH HYPOXIA AND HYPERCAPNIA (HCC): Primary | ICD-10-CM

## 2024-06-05 PROCEDURE — G2211 COMPLEX E/M VISIT ADD ON: HCPCS | Performed by: INTERNAL MEDICINE

## 2024-06-05 PROCEDURE — 99214 OFFICE O/P EST MOD 30 MIN: CPT | Performed by: INTERNAL MEDICINE

## 2024-06-05 RX ORDER — UMECLIDINIUM 62.5 UG/1
1 AEROSOL, POWDER ORAL DAILY
Qty: 30 BLISTER | Refills: 5 | Status: SHIPPED | OUTPATIENT
Start: 2024-06-05

## 2024-06-05 RX ORDER — BUDESONIDE AND FORMOTEROL FUMARATE DIHYDRATE 160; 4.5 UG/1; UG/1
2 AEROSOL RESPIRATORY (INHALATION) 2 TIMES DAILY
Qty: 10.2 G | Refills: 5 | Status: SHIPPED | OUTPATIENT
Start: 2024-06-05

## 2024-06-05 RX ORDER — ALBUTEROL SULFATE 90 UG/1
2 AEROSOL, METERED RESPIRATORY (INHALATION) EVERY 6 HOURS PRN
Qty: 18 G | Refills: 5 | Status: SHIPPED | OUTPATIENT
Start: 2024-06-05

## 2024-06-05 NOTE — ASSESSMENT & PLAN NOTE
Continue with generic Symbicort twice daily.  I suggested that she try using the Incruse Ellipta midday, in the hopes of minimizing her afternoon shortness of breath.  If she continues to have problems, consider updating PFTs and echocardiogram.

## 2024-06-05 NOTE — PROGRESS NOTES
Pulmonary Follow Up Note   Christine Laura 84 y.o. female MRN: [id number]  6/5/2024      Assessment/Plan:     COPD, severe (HCC)  Continue with generic Symbicort twice daily.  I suggested that she try using the Incruse Ellipta midday, in the hopes of minimizing her afternoon shortness of breath.  If she continues to have problems, consider updating PFTs and echocardiogram.    Chronic respiratory failure with hypoxia and hypercapnia (HCC)  She will continue with oxygen at 2 L/min during the day as needed and NIV at night      Return in about 6 months (around 12/5/2024).    Visit orders:    Diagnoses and all orders for this visit:    Chronic respiratory failure with hypoxia and hypercapnia (HCC)    COPD, severe (HCC)  -     budesonide-formoterol (Breyna) 160-4.5 mcg/act inhaler; Inhale 2 puffs 2 (two) times a day Rinse mouth after use.    Chronic obstructive pulmonary disease, unspecified COPD type (HCC)  -     umeclidinium (Incruse Ellipta) 62.5 mcg/actuation AEPB inhaler; Inhale 1 puff daily  -     albuterol (Ventolin HFA) 90 mcg/act inhaler; Inhale 2 puffs every 6 (six) hours as needed for wheezing          History of Present Illness   HPI:  Christine Laura is a 84 y.o. female who today for follow-up regarding severe COPD and chronic hypoxic/hypercapnic respiratory failure.  Her pulmonary status is overall stable.  She is compliant with generic Symbicort twice daily and Incruse Ellipta once daily.  She finds that in the mornings, she is able to do her daily activities without significant limitation.  At approximately 4 PM every day, she becomes more short of breath, often using her rescue inhaler.  She has no associated cough, wheeze or sputum production.  She has oxygen at 2 L/min during the day and utilizes noninvasive ventilator during hours of sleep.    Review of Systems otherwise negative    Medical, Family and Social history reviewed and updated as appropriate    Historical Information   Past Medical History:    Diagnosis Date    Anemia     Blood clotting tendency (HCC)     left leg, lung    Chronic respiratory failure with hypoxia and hypercapnia (HCC)     COPD (chronic obstructive pulmonary disease) (HCC)     Deep vein thrombosis (HCC)     Diabetes mellitus (HCC)     Emphysema lung (HCC)     Emphysema lung (HCC)     Emphysema of lung (HCC)     Hyperlipidemia     Hypertension     Hyperthyroidism     Hypothyroid     On warfarin therapy     Osteoporosis     Palpitations     Pulmonary embolism (HCC)     Sleep apnea, obstructive     Thyroid disease      Past Surgical History:   Procedure Laterality Date    AV NODE ABLATION  2017    COLONOSCOPY  2018    w/ EGD    DXA PROCEDURE (HISTORICAL)  2016    RI COLONOSCOPY FLX DX W/COLLJ SPEC WHEN PFRMD N/A 2018    Procedure: EGD AND COLONOSCOPY;  Surgeon: Reena Bell MD;  Location: [facility];  Service: Gastroenterology    TOE SURGERY       Family History   Problem Relation Age of Onset    Hypertension Mother     Hyperlipidemia Mother     Asthma Father     Heart failure Father     Hypertension Sister     Hyperlipidemia Sister     Heart attack Neg Hx     Aneurysm Neg Hx     Stroke Neg Hx     Clotting disorder Neg Hx        Social History     Tobacco Use   Smoking Status Former    Current packs/day: 0.00    Average packs/day: 1 pack/day for 46.0 years (46.0 ttl pk-yrs)    Types: Cigarettes    Start date:     Quit date:     Years since quittin.4   Smokeless Tobacco Never         Meds/Allergies     Current Outpatient Medications:     albuterol (2.5 mg/3 mL) 0.083 % nebulizer solution, Take 3 mL (2.5 mg total) by nebulization every 4 (four) hours as needed for wheezing or shortness of breath, Disp: 180 mL, Rfl: 3    albuterol (Ventolin HFA) 90 mcg/act inhaler, Inhale 2 puffs every 6 (six) hours as needed for wheezing, Disp: 18 g, Rfl: 5    alendronate (FOSAMAX) 70 mg tablet, Take 1 tablet (70 mg total) by mouth every 7 days, Disp: 12 tablet, Rfl: 3     aspirin (ECOTRIN LOW STRENGTH) 81 mg EC tablet, Take 81 mg by mouth daily, Disp: , Rfl:     budesonide-formoterol (Breyna) 160-4.5 mcg/act inhaler, Inhale 2 puffs 2 (two) times a day Rinse mouth after use., Disp: 10.2 g, Rfl: 5    Cholecalciferol (VITAMIN D) 2000 units CAPS, Take 2 capsules (4,000 Units total) by mouth daily, Disp: , Rfl:     cyanocobalamin (VITAMIN B-12) 100 mcg tablet, Take by mouth daily, Disp: , Rfl:     diltiazem (CARDIZEM CD) 300 mg 24 hr capsule, Take 1 capsule (300 mg total) by mouth daily As directed, Disp: 90 capsule, Rfl: 1    fluticasone (FLONASE) 50 mcg/act nasal spray, 2 sprays into each nostril daily, Disp: 1 Bottle, Rfl: 5    glimepiride (AMARYL) 1 mg tablet, Take one tablet with breakfast and 1 tablet with dinner, Disp: 180 tablet, Rfl: 0    glucose blood (Contour Next Test) test strip, Use twice a day, Disp: 200 each, Rfl: 1    ketorolac (ACULAR) 0.5 % ophthalmic solution, , Disp: , Rfl:     levothyroxine 25 mcg tablet, TAKE ONE TABLET BY MOUTH EVERY DAY, Disp: 90 tablet, Rfl: 1    linaGLIPtin (Tradjenta) 5 MG TABS, TAKE ONE TABLET BY MOUTH EVERY DAY, Disp: 30 tablet, Rfl: 5    lisinopril-hydrochlorothiazide (PRINZIDE,ZESTORETIC) 20-25 MG per tablet, Take 1 tablet(s) every day by oral route., Disp: 90 tablet, Rfl: 1    meclizine (ANTIVERT) 25 mg tablet, 1 tab take before car ride once a day as needed, Disp: 10 tablet, Rfl: 0    multivitamin (THERAGRAN) TABS, Take 1 tablet by mouth daily, Disp: , Rfl:     nystatin (MYCOSTATIN) cream, Apply topically 2 (two) times a day, Disp: 60 g, Rfl: 0    ofloxacin (OCUFLOX) 0.3 % ophthalmic solution, , Disp: , Rfl:     prednisoLONE acetate (PRED FORTE) 1 % ophthalmic suspension, , Disp: , Rfl:     rosuvastatin (CRESTOR) 10 MG tablet, Take 1 tablet (10 mg total) by mouth daily, Disp: 90 tablet, Rfl: 3    umeclidinium (Incruse Ellipta) 62.5 mcg/actuation AEPB inhaler, Inhale 1 puff daily, Disp: 30 blister, Rfl: 5  Allergies   Allergen Reactions     "Penicillins Rash     After dental procedure        Vitals: Blood pressure 144/60, pulse (!) 106, temperature (!) 97.4 °F (36.3 °C), temperature source Tympanic, height 5' (1.524 m), weight 72.1 kg (159 lb), SpO2 94%. Body mass index is 31.05 kg/m². Oxygen Therapy  SpO2: 94 %    Physical Exam   Physical Exam  Vitals reviewed.   Constitutional:       General: She is not in acute distress.     Appearance: She is well-developed.   Eyes:      General: No scleral icterus.  Neck:      Vascular: No JVD.   Cardiovascular:      Rate and Rhythm: Normal rate and regular rhythm.   Pulmonary:      Breath sounds: No wheezing, rhonchi or rales.   Abdominal:      Tenderness: There is no abdominal tenderness.   Skin:     General: Skin is warm and dry.   Neurological:      Mental Status: She is alert and oriented to person, place, and time.       Labs: I have personally reviewed pertinent lab results.  Lab Results   Component Value Date    WBC 10.86 (H) 03/04/2024    HGB 8.7 (L) 03/04/2024    HCT 30.1 (L) 03/04/2024    MCV 70 (L) 03/04/2024     03/04/2024     Lab Results   Component Value Date    CALCIUM 9.3 04/10/2024    K 3.9 04/10/2024    CO2 29 04/10/2024     04/10/2024    BUN 14 04/10/2024    CREATININE 0.50 (L) 04/10/2024     No results found for: \"IGE\"  Lab Results   Component Value Date    ALT 10 03/02/2024    AST 11 (L) 03/02/2024    ALKPHOS 61 03/02/2024       Imaging and other studies: I have personally reviewed pertinent reports.   and I have personally reviewed pertinent films in PACS CT of the chest from 3-24 shows mild emphysema, atelectasis in the lingula and right middle lobe, no PE    Pulmonary function testing:  Performed 1/2/2018 and personally interpreted  FEV1/FVC ratio 65%   FEV1 48% predicted  FVC 55% predicted.  No response to bronchodilators   % predicted   % predicted  DLCO corrected for hemoglobin 13 % predicted.  PFTs with severe obstruction, mild air trapping and severe diffusion " impairment

## 2024-06-06 ENCOUNTER — RA CDI HCC (OUTPATIENT)
Dept: OTHER | Facility: HOSPITAL | Age: 84
End: 2024-06-06

## 2024-06-21 DIAGNOSIS — E11.65 TYPE 2 DIABETES MELLITUS WITH HYPERGLYCEMIA, WITHOUT LONG-TERM CURRENT USE OF INSULIN (HCC): ICD-10-CM

## 2024-06-21 RX ORDER — GLIMEPIRIDE 1 MG/1
TABLET ORAL
Qty: 180 TABLET | Refills: 1 | Status: SHIPPED | OUTPATIENT
Start: 2024-06-21

## 2024-07-11 ENCOUNTER — TELEPHONE (OUTPATIENT)
Age: 84
End: 2024-07-11

## 2024-07-11 NOTE — TELEPHONE ENCOUNTER
Patient calling to see if we can order her an alternative to Tradjenta as this medication is too expensive. They want to charge the patient $149 a month for this medication. Please contact patient back with an update.

## 2024-07-11 NOTE — TELEPHONE ENCOUNTER
Inform patient to call insurance company and find out if Januvia or actos will be covered by her insurance.  So we can switch to one of these     Gabino Guillermo

## 2024-07-24 ENCOUNTER — TELEPHONE (OUTPATIENT)
Age: 84
End: 2024-07-24

## 2024-07-24 NOTE — TELEPHONE ENCOUNTER
Eden cárdenas from [employer] to confirm if we received a fax. Informed that we did not and to please resend to [phone]

## 2024-08-14 ENCOUNTER — RA CDI HCC (OUTPATIENT)
Dept: OTHER | Facility: HOSPITAL | Age: 84
End: 2024-08-14

## 2024-08-22 ENCOUNTER — OFFICE VISIT (OUTPATIENT)
Dept: FAMILY MEDICINE CLINIC | Facility: CLINIC | Age: 84
End: 2024-08-22
Payer: COMMERCIAL

## 2024-08-22 VITALS
HEART RATE: 103 BPM | WEIGHT: 167 LBS | DIASTOLIC BLOOD PRESSURE: 50 MMHG | BODY MASS INDEX: 32.79 KG/M2 | OXYGEN SATURATION: 94 % | SYSTOLIC BLOOD PRESSURE: 120 MMHG | RESPIRATION RATE: 16 BRPM | HEIGHT: 60 IN

## 2024-08-22 DIAGNOSIS — M81.0 AGE-RELATED OSTEOPOROSIS WITHOUT CURRENT PATHOLOGICAL FRACTURE: ICD-10-CM

## 2024-08-22 DIAGNOSIS — Z00.00 MEDICARE ANNUAL WELLNESS VISIT, SUBSEQUENT: ICD-10-CM

## 2024-08-22 DIAGNOSIS — N18.2 TYPE 2 DIABETES MELLITUS WITH STAGE 2 CHRONIC KIDNEY DISEASE, WITHOUT LONG-TERM CURRENT USE OF INSULIN  (HCC): ICD-10-CM

## 2024-08-22 DIAGNOSIS — J96.11 CHRONIC RESPIRATORY FAILURE WITH HYPOXIA AND HYPERCAPNIA (HCC): ICD-10-CM

## 2024-08-22 DIAGNOSIS — J96.12 CHRONIC RESPIRATORY FAILURE WITH HYPOXIA AND HYPERCAPNIA (HCC): ICD-10-CM

## 2024-08-22 DIAGNOSIS — I47.10 PSVT (PAROXYSMAL SUPRAVENTRICULAR TACHYCARDIA): ICD-10-CM

## 2024-08-22 DIAGNOSIS — R01.1 HEART MURMUR: ICD-10-CM

## 2024-08-22 DIAGNOSIS — I35.0 MILD AORTIC STENOSIS: ICD-10-CM

## 2024-08-22 DIAGNOSIS — I10 ESSENTIAL HYPERTENSION: Primary | ICD-10-CM

## 2024-08-22 DIAGNOSIS — E11.22 TYPE 2 DIABETES MELLITUS WITH STAGE 2 CHRONIC KIDNEY DISEASE, WITHOUT LONG-TERM CURRENT USE OF INSULIN  (HCC): ICD-10-CM

## 2024-08-22 PROCEDURE — 99214 OFFICE O/P EST MOD 30 MIN: CPT | Performed by: FAMILY MEDICINE

## 2024-08-22 PROCEDURE — G0439 PPPS, SUBSEQ VISIT: HCPCS | Performed by: FAMILY MEDICINE

## 2024-08-22 NOTE — ASSESSMENT & PLAN NOTE
She stopped the metformin as it was giving her diarrhea and abdominal pain, now she takes glimepiride and Tradjenta follows endocrinologist  Lab Results   Component Value Date    HGBA1C 8.2 (H) 04/10/2024

## 2024-08-22 NOTE — PATIENT INSTRUCTIONS
Medicare Preventive Visit Patient Instructions  Thank you for completing your Welcome to Medicare Visit or Medicare Annual Wellness Visit today. Your next wellness visit will be due in one year (8/23/2025).  The screening/preventive services that you may require over the next 5-10 years are detailed below. Some tests may not apply to you based off risk factors and/or age. Screening tests ordered at today's visit but not completed yet may show as past due. Also, please note that scanned in results may not display below.  Preventive Screenings:  Service Recommendations Previous Testing/Comments   Colorectal Cancer Screening  * Colonoscopy    * Fecal Occult Blood Test (FOBT)/Fecal Immunochemical Test (FIT)  * Fecal DNA/Cologuard Test  * Flexible Sigmoidoscopy Age: 45-75 years old   Colonoscopy: every 10 years (may be performed more frequently if at higher risk)  OR  FOBT/FIT: every 1 year  OR  Cologuard: every 3 years  OR  Sigmoidoscopy: every 5 years  Screening may be recommended earlier than age 45 if at higher risk for colorectal cancer. Also, an individualized decision between you and your healthcare provider will decide whether screening between the ages of 76-85 would be appropriate. Colonoscopy: Not on file  FOBT/FIT: Not on file  Cologuard: Not on file  Sigmoidoscopy: Not on file          Breast Cancer Screening Age: 40+ years old  Frequency: every 1-2 years  Not required if history of left and right mastectomy Mammogram: 10/11/2019    Patient Declines   Cervical Cancer Screening Between the ages of 21-29, pap smear recommended once every 3 years.   Between the ages of 30-65, can perform pap smear with HPV co-testing every 5 years.   Recommendations may differ for women with a history of total hysterectomy, cervical cancer, or abnormal pap smears in past. Pap Smear: Not on file    Screening Not Indicated   Hepatitis C Screening Once for adults born between 1945 and 1965  More frequently in patients at high risk  for Hepatitis C Hep C Antibody: Not on file        Diabetes Screening 1-2 times per year if you're at risk for diabetes or have pre-diabetes Fasting glucose: 163 mg/dL (4/10/2024)  A1C: 8.2 % (4/10/2024)  Screening Not Indicated  History Diabetes   Cholesterol Screening Once every 5 years if you don't have a lipid disorder. May order more often based on risk factors. Lipid panel: 11/18/2022    Screening Not Indicated  History Lipid Disorder     Other Preventive Screenings Covered by Medicare:  Abdominal Aortic Aneurysm (AAA) Screening: covered once if your at risk. You're considered to be at risk if you have a family history of AAA.  Lung Cancer Screening: covers low dose CT scan once per year if you meet all of the following conditions: (1) Age 55-77; (2) No signs or symptoms of lung cancer; (3) Current smoker or have quit smoking within the last 15 years; (4) You have a tobacco smoking history of at least 20 pack years (packs per day multiplied by number of years you smoked); (5) You get a written order from a healthcare provider.  Glaucoma Screening: covered annually if you're considered high risk: (1) You have diabetes OR (2) Family history of glaucoma OR (3)  aged 50 and older OR (4)  American aged 65 and older  Osteoporosis Screening: covered every 2 years if you meet one of the following conditions: (1) You're estrogen deficient and at risk for osteoporosis based off medical history and other findings; (2) Have a vertebral abnormality; (3) On glucocorticoid therapy for more than 3 months; (4) Have primary hyperparathyroidism; (5) On osteoporosis medications and need to assess response to drug therapy.   Last bone density test (DXA Scan): 07/25/2019.  HIV Screening: covered annually if you're between the age of 15-65. Also covered annually if you are younger than 15 and older than 65 with risk factors for HIV infection. For pregnant patients, it is covered up to 3 times per  pregnancy.    Immunizations:  Immunization Recommendations   Influenza Vaccine Annual influenza vaccination during flu season is recommended for all persons aged >= 6 months who do not have contraindications   Pneumococcal Vaccine   * Pneumococcal conjugate vaccine = PCV13 (Prevnar 13), PCV15 (Vaxneuvance), PCV20 (Prevnar 20)  * Pneumococcal polysaccharide vaccine = PPSV23 (Pneumovax) Adults 19-63 yo with certain risk factors or if 65+ yo  If never received any pneumonia vaccine: recommend Prevnar 20 (PCV20)  Give PCV20 if previously received 1 dose of PCV13 or PPSV23   Hepatitis B Vaccine 3 dose series if at intermediate or high risk (ex: diabetes, end stage renal disease, liver disease)   Respiratory syncytial virus (RSV) Vaccine - COVERED BY MEDICARE PART D  * RSVPreF3 (Arexvy) CDC recommends that adults 60 years of age and older may receive a single dose of RSV vaccine using shared clinical decision-making (SCDM)   Tetanus (Td) Vaccine - COST NOT COVERED BY MEDICARE PART B Following completion of primary series, a booster dose should be given every 10 years to maintain immunity against tetanus. Td may also be given as tetanus wound prophylaxis.   Tdap Vaccine - COST NOT COVERED BY MEDICARE PART B Recommended at least once for all adults. For pregnant patients, recommended with each pregnancy.   Shingles Vaccine (Shingrix) - COST NOT COVERED BY MEDICARE PART B  2 shot series recommended in those 19 years and older who have or will have weakened immune systems or those 50 years and older     Health Maintenance Due:      Topic Date Due   • DXA SCAN  07/25/2021     Immunizations Due:      Topic Date Due   • Influenza Vaccine (1) 09/01/2024     Advance Directives   What are advance directives?  Advance directives are legal documents that state your wishes and plans for medical care. These plans are made ahead of time in case you lose your ability to make decisions for yourself. Advance directives can apply to any  medical decision, such as the treatments you want, and if you want to donate organs.   What are the types of advance directives?  There are many types of advance directives, and each state has rules about how to use them. You may choose a combination of any of the following:  Living will:  This is a written record of the treatment you want. You can also choose which treatments you do not want, which to limit, and which to stop at a certain time. This includes surgery, medicine, IV fluid, and tube feedings.   Durable power of  for healthcare (DPAHC):  This is a written record that states who you want to make healthcare choices for you when you are unable to make them for yourself. This person, called a proxy, is usually a family member or a friend. You may choose more than 1 proxy.  Do not resuscitate (DNR) order:  A DNR order is used in case your heart stops beating or you stop breathing. It is a request not to have certain forms of treatment, such as CPR. A DNR order may be included in other types of advance directives.  Medical directive:  This covers the care that you want if you are in a coma, near death, or unable to make decisions for yourself. You can list the treatments you want for each condition. Treatment may include pain medicine, surgery, blood transfusions, dialysis, IV or tube feedings, and a ventilator (breathing machine).  Values history:  This document has questions about your views, beliefs, and how you feel and think about life. This information can help others choose the care that you would choose.  Why are advance directives important?  An advance directive helps you control your care. Although spoken wishes may be used, it is better to have your wishes written down. Spoken wishes can be misunderstood, or not followed. Treatments may be given even if you do not want them. An advance directive may make it easier for your family to make difficult choices about your care.   Weight  Management   Why it is important to manage your weight:  Being overweight increases your risk of health conditions such as heart disease, high blood pressure, type 2 diabetes, and certain types of cancer. It can also increase your risk for osteoarthritis, sleep apnea, and other respiratory problems. Aim for a slow, steady weight loss. Even a small amount of weight loss can lower your risk of health problems.  How to lose weight safely:  A safe and healthy way to lose weight is to eat fewer calories and get regular exercise. You can lose up about 1 pound a week by decreasing the number of calories you eat by 500 calories each day.   Healthy meal plan for weight management:  A healthy meal plan includes a variety of foods, contains fewer calories, and helps you stay healthy. A healthy meal plan includes the following:  Eat whole-grain foods more often.  A healthy meal plan should contain fiber. Fiber is the part of grains, fruits, and vegetables that is not broken down by your body. Whole-grain foods are healthy and provide extra fiber in your diet. Some examples of whole-grain foods are whole-wheat breads and pastas, oatmeal, brown rice, and bulgur.  Eat a variety of vegetables every day.  Include dark, leafy greens such as spinach, kale, marilyn greens, and mustard greens. Eat yellow and orange vegetables such as carrots, sweet potatoes, and winter squash.   Eat a variety of fruits every day.  Choose fresh or canned fruit (canned in its own juice or light syrup) instead of juice. Fruit juice has very little or no fiber.  Eat low-fat dairy foods.  Drink fat-free (skim) milk or 1% milk. Eat fat-free yogurt and low-fat cottage cheese. Try low-fat cheeses such as mozzarella and other reduced-fat cheeses.  Choose meat and other protein foods that are low in fat.  Choose beans or other legumes such as split peas or lentils. Choose fish, skinless poultry (chicken or turkey), or lean cuts of red meat (beef or pork). Before  you cook meat or poultry, cut off any visible fat.   Use less fat and oil.  Try baking foods instead of frying them. Add less fat, such as margarine, sour cream, regular salad dressing and mayonnaise to foods. Eat fewer high-fat foods. Some examples of high-fat foods include french fries, doughnuts, ice cream, and cakes.  Eat fewer sweets.  Limit foods and drinks that are high in sugar. This includes candy, cookies, regular soda, and sweetened drinks.  Exercise:  Exercise at least 30 minutes per day on most days of the week. Some examples of exercise include walking, biking, dancing, and swimming. You can also fit in more physical activity by taking the stairs instead of the elevator or parking farther away from stores. Ask your healthcare provider about the best exercise plan for you.      © Copyright [employer] 2018 Information is for End User's use only and may not be sold, redistributed or otherwise used for commercial purposes. All illustrations and images included in CareNotes® are the copyrighted property of [employer] or [employer]

## 2024-08-22 NOTE — ASSESSMENT & PLAN NOTE
Discussed about all the vaccination which she is due, which includes flu, COVID, RSV, shingles she will try to do one at a time

## 2024-08-22 NOTE — PROGRESS NOTES
Ambulatory Visit  Name: Christine Laura      : 1940      MRN: [id number]  Encounter Provider: Gillian Smith MD  Encounter Date: 2024   Encounter department: [facility]    Assessment & Plan   1. Essential hypertension  Assessment & Plan:  Recheck blood pressure is 140/60, she will continue taking same medications  2. Medicare annual wellness visit, subsequent  Assessment & Plan:  Discussed about all the vaccination which she is due, which includes flu, COVID, RSV, shingles she will try to do one at a time   3. Chronic respiratory failure with hypoxia and hypercapnia (HCC)  Assessment & Plan:  Use home oxygen and follows pulmonologist and use all her inhalers as recommended  4. Type 2 diabetes mellitus with stage 2 chronic kidney disease, without long-term current use of insulin  (HCC)  Assessment & Plan:  She stopped the metformin as it was giving her diarrhea and abdominal pain, now she takes glimepiride and Tradjenta follows endocrinologist  Lab Results   Component Value Date    HGBA1C 8.2 (H) 04/10/2024     5. Age-related osteoporosis without current pathological fracture  Assessment & Plan:  She has order for dexa by endo  6. Mild aortic stenosis  -     Ambulatory Referral to Cardiology; Future  7. Heart murmur  Assessment & Plan:  She has a heart murmur, aortic stenosis, advised to see the new cardiologist as her previous cardiologist left  Orders:  -     Ambulatory Referral to Cardiology; Future  8. PSVT (paroxysmal supraventricular tachycardia)  Assessment & Plan:  Today her heart rate is normal, she will follow-up with the cardiologist       Preventive health issues were discussed with patient, and age appropriate screening tests were ordered as noted in patient's After Visit Summary. Personalized health advice and appropriate referrals for health education or preventive services given if needed, as noted in patient's After Visit Summary.    History of Present Illness     AWV,  and follow up , she has power of  and her son is her power of  and she has a living will she will give us a copy,  She has COPD follows pulmonologist, she uses inhalers, she uses oxygen most of the time and she keeps it in the car ,quite independent, she lives alone and her son lives few minutes from her home  .  She is up-to-date on her eye exam and she says everything is normal, she has normal hearing  She previously is seeing Dr. Fishman, cardiologist and she said he left so she needs to see a new cardiologist, she has a heart murmur, and mild aortic stenosis,  No chest pain       Patient Care Team:  Gillian Smith MD as PCP - General (Family Medicine)  Gabino Guillermo MD as PCP - Endocrinology (Endocrinology)  MD Kirsten Murphy DO Joseph A Laureti III, MD John Boscia Od (Optometry)  Michelle Jalloh PA-C as Physician Assistant (Endocrinology)  Reena Bell MD as Endoscopist    Review of Systems   Constitutional:  Negative for activity change, appetite change, chills, fatigue, fever and unexpected weight change.   HENT:  Negative for congestion, ear discharge, ear pain, nosebleeds, postnasal drip, rhinorrhea, sinus pressure, sneezing, sore throat, trouble swallowing and voice change.    Eyes:  Negative for photophobia, pain, discharge, redness and itching.   Respiratory:  Positive for cough. Negative for chest tightness, shortness of breath and wheezing.         She coughs intermittent because of the COPD which is stable and no new changes   Cardiovascular:  Negative for chest pain, palpitations and leg swelling.   Gastrointestinal:  Negative for abdominal pain, constipation, diarrhea, nausea and vomiting.   Endocrine: Negative for polyuria.   Genitourinary:  Negative for dysuria, frequency and urgency.   Musculoskeletal:  Negative for arthralgias, back pain, myalgias and neck pain.   Skin:  Negative for color change, pallor and rash.   Allergic/Immunologic: Negative for  environmental allergies and food allergies.   Neurological:  Negative for dizziness, weakness, light-headedness and headaches.   Hematological:  Negative for adenopathy. Does not bruise/bleed easily.   Psychiatric/Behavioral:  Negative for behavioral problems. The patient is not nervous/anxious.      Medical History Reviewed by provider this encounter:  Tobacco  Allergies  Meds  Problems  Med Hx  Surg Hx  Fam Hx       Annual Wellness Visit Questionnaire   Christine is here for her Subsequent Wellness visit.     Health Risk Assessment:   Patient rates overall health as fair. Patient feels that their physical health rating is same. Patient is satisfied with their life. Eyesight was rated as same. Hearing was rated as same. Patient feels that their emotional and mental health rating is same. Patients states they are never, rarely angry. Patient states they are never, rarely unusually tired/fatigued. Pain experienced in the last 7 days has been none. Patient states that she has experienced no weight loss or gain in last 6 months.     Depression Screening:   PHQ-2 Score: 0      Fall Risk Screening:   In the past year, patient has experienced: no history of falling in past year      Urinary Incontinence Screening:   Patient has not leaked urine accidently in the last six months.     Home Safety:  Patient has trouble with stairs inside or outside of their home. Patient has working smoke alarms and has working carbon monoxide detector. Home safety hazards include: none.     Nutrition:   Current diet is Regular.     Medications:   Patient is currently taking over-the-counter supplements. OTC medications include: see medication list.     Activities of Daily Living (ADLs)/Instrumental Activities of Daily Living (IADLs):   Walk and transfer into and out of bed and chair?: Yes  Dress and groom yourself?: Yes    Bathe or shower yourself?: Yes    Feed yourself? Yes  Do your laundry/housekeeping?: Yes  Manage your money, pay  your bills and track your expenses?: Yes  Make your own meals?: Yes    Do your own shopping?: Yes    Previous Hospitalizations:   Any hospitalizations or ED visits within the last 12 months?: No      Advance Care Planning:   Living will: Yes    Durable POA for healthcare: Yes    Advanced directive: Yes      Comments: Her son , Noah has power of      Cognitive Screening:   Provider or family/friend/caregiver concerned regarding cognition?: No    PREVENTIVE SCREENINGS      Cardiovascular Screening:    General: Screening Not Indicated and History Lipid Disorder      Diabetes Screening:     General: Screening Not Indicated and History Diabetes      Breast Cancer Screening:     General: Patient Declines      Cervical Cancer Screening:    General: Screening Not Indicated      Osteoporosis Screening:    General: Screening Not Indicated and History Osteoporosis      Lung Cancer Screening:     General: Screening Not Indicated    Screening, Brief Intervention, and Referral to Treatment (SBIRT)    Screening  Typical number of drinks in a day: 0  Typical number of drinks in a week: 0  Interpretation: Low risk drinking behavior.    Single Item Drug Screening:  How often have you used an illegal drug (including marijuana) or a prescription medication for non-medical reasons in the past year? never    Single Item Drug Screen Score: 0  Interpretation: Negative screen for possible drug use disorder    Other Counseling Topics:   Regular weightbearing exercise and calcium and vitamin D intake.     Social Determinants of Health     Food Insecurity: No Food Insecurity (3/3/2024)    Hunger Vital Sign    • Worried About Running Out of Food in the Last Year: Never true    • Ran Out of Food in the Last Year: Never true   Transportation Needs: No Transportation Needs (3/3/2024)    PRAPARE - Transportation    • Lack of Transportation (Medical): No    • Lack of Transportation (Non-Medical): No   Housing Stability: Unknown (3/3/2024)     Housing Stability Vital Sign    • Unable to Pay for Housing in the Last Year: No    • Homeless in the Last Year: No   Utilities: Not At Risk (3/3/2024)    [facility] Utilities    • Threatened with loss of utilities: No     No results found.    Objective     /50   Pulse 103   Resp 16   Ht 5' (1.524 m)   Wt 75.8 kg (167 lb)   LMP  (LMP Unknown)   SpO2 94%   BMI 32.61 kg/m²     Physical Exam  Vitals and nursing note reviewed.   Constitutional:       General: She is not in acute distress.     Appearance: Normal appearance. She is not ill-appearing.   HENT:      Head: Normocephalic and atraumatic.      Right Ear: Tympanic membrane and ear canal normal. There is no impacted cerumen.      Left Ear: Tympanic membrane and ear canal normal. There is no impacted cerumen.      Nose: Nose normal. No congestion or rhinorrhea.      Mouth/Throat:      Mouth: Mucous membranes are moist.      Pharynx: Oropharynx is clear. No oropharyngeal exudate or posterior oropharyngeal erythema.   Eyes:      General: No scleral icterus.        Right eye: No discharge.         Left eye: No discharge.      Extraocular Movements: Extraocular movements intact.      Conjunctiva/sclera: Conjunctivae normal.      Pupils: Pupils are equal, round, and reactive to light.   Cardiovascular:      Rate and Rhythm: Normal rate and regular rhythm.      Heart sounds: Murmur heard.   Pulmonary:      Effort: Pulmonary effort is normal.      Breath sounds: Normal breath sounds. No wheezing or rales.   Abdominal:      General: Abdomen is flat. Bowel sounds are normal. There is no distension.      Palpations: Abdomen is soft. There is no mass.      Tenderness: There is no abdominal tenderness.   Musculoskeletal:         General: No swelling, tenderness or deformity. Normal range of motion.      Cervical back: Normal range of motion and neck supple. No muscular tenderness.      Right lower leg: No edema.      Left lower leg: No edema.   Lymphadenopathy:       Cervical: No cervical adenopathy.   Skin:     Coloration: Skin is not jaundiced or pale.      Findings: No erythema, lesion or rash.   Neurological:      General: No focal deficit present.      Mental Status: She is alert and oriented to person, place, and time.      Gait: Gait normal.   Psychiatric:         Mood and Affect: Mood normal.         Behavior: Behavior normal.

## 2024-08-22 NOTE — ASSESSMENT & PLAN NOTE
She has a heart murmur, aortic stenosis, advised to see the new cardiologist as her previous cardiologist left

## 2024-08-29 ENCOUNTER — OFFICE VISIT (OUTPATIENT)
Dept: FAMILY MEDICINE CLINIC | Facility: CLINIC | Age: 84
End: 2024-08-29

## 2024-08-29 VITALS
OXYGEN SATURATION: 95 % | WEIGHT: 167 LBS | HEIGHT: 60 IN | SYSTOLIC BLOOD PRESSURE: 120 MMHG | TEMPERATURE: 98.5 F | DIASTOLIC BLOOD PRESSURE: 60 MMHG | RESPIRATION RATE: 16 BRPM | BODY MASS INDEX: 32.79 KG/M2 | HEART RATE: 98 BPM

## 2024-08-29 DIAGNOSIS — H92.02 OTALGIA OF LEFT EAR: Primary | ICD-10-CM

## 2024-08-29 NOTE — PROGRESS NOTES
Ambulatory Visit  Name: Christine Laura      : 1940      MRN: [id number]  Encounter Provider: Gillian Smith MD  Encounter Date: 2024   Encounter department: [facility]    Assessment & Plan   1. Otalgia of left ear  Assessment & Plan:  Her ear exam is completely normal, there is no redness or inflammation, most likely she got pain because she was sleeping with the CPAP machine and she was thinking that strep may be on the left ear which was pressing during the night, advised Tylenol or cold compression but there is no need for any antibiotic and she should get better         History of Present Illness     Checkup for the left ear as she says she wears the CPAP machine and she was sleeping in a wrong way and there was a pressure on the left ear due to the strap and she thinks it could be that she has mild discomfort at the tragus of the left ear, no discharge headache fever sore throat    Earache       Review of Systems   Constitutional: Negative.    HENT:  Positive for ear pain.    Eyes: Negative.    Cardiovascular: Negative.      Past Medical History:   Diagnosis Date   • Anemia    • Blood clotting tendency (HCC)     left leg, lung   • Chronic respiratory failure with hypoxia and hypercapnia (HCC)    • COPD (chronic obstructive pulmonary disease) (HCC)    • Deep vein thrombosis (HCC)    • Diabetes mellitus (HCC)    • Emphysema lung (HCC)    • Emphysema lung (HCC)    • Emphysema of lung (HCC)    • Hyperlipidemia    • Hypertension    • Hyperthyroidism    • Hypothyroid    • On warfarin therapy    • Osteoporosis    • Palpitations    • Pulmonary embolism (HCC)    • Sleep apnea, obstructive    • Thyroid disease      Past Surgical History:   Procedure Laterality Date   • AV NODE ABLATION  2017   • COLONOSCOPY  2018    w/ EGD   • DXA PROCEDURE (HISTORICAL)  2016   • DE COLONOSCOPY FLX DX W/COLLJ SPEC WHEN PFRMD N/A 2018    Procedure: EGD AND COLONOSCOPY;  Surgeon:  Reena Bell MD;  Location: [facility];  Service: Gastroenterology   • TOE SURGERY       Family History   Problem Relation Age of Onset   • Hypertension Mother    • Hyperlipidemia Mother    • Asthma Father    • Heart failure Father    • Hypertension Sister    • Hyperlipidemia Sister    • Heart attack Neg Hx    • Aneurysm Neg Hx    • Stroke Neg Hx    • Clotting disorder Neg Hx      Social History     Tobacco Use   • Smoking status: Former     Current packs/day: 0.00     Average packs/day: 1 pack/day for 46.0 years (46.0 ttl pk-yrs)     Types: Cigarettes     Start date:      Quit date:      Years since quittin.6   • Smokeless tobacco: Never   Vaping Use   • Vaping status: Never Used   Substance and Sexual Activity   • Alcohol use: Not Currently   • Drug use: No   • Sexual activity: Not on file     Current Outpatient Medications on File Prior to Visit   Medication Sig   • albuterol (2.5 mg/3 mL) 0.083 % nebulizer solution Take 3 mL (2.5 mg total) by nebulization every 4 (four) hours as needed for wheezing or shortness of breath   • albuterol (Ventolin HFA) 90 mcg/act inhaler Inhale 2 puffs every 6 (six) hours as needed for wheezing   • alendronate (FOSAMAX) 70 mg tablet Take 1 tablet (70 mg total) by mouth every 7 days   • aspirin (ECOTRIN LOW STRENGTH) 81 mg EC tablet Take 81 mg by mouth daily   • budesonide-formoterol (Breyna) 160-4.5 mcg/act inhaler Inhale 2 puffs 2 (two) times a day Rinse mouth after use.   • Cholecalciferol (VITAMIN D) 2000 units CAPS Take 2 capsules (4,000 Units total) by mouth daily   • cyanocobalamin (VITAMIN B-12) 100 mcg tablet Take by mouth daily   • diltiazem (CARDIZEM CD) 300 mg 24 hr capsule Take 1 capsule (300 mg total) by mouth daily As directed   • fluticasone (FLONASE) 50 mcg/act nasal spray 2 sprays into each nostril daily   • glimepiride (AMARYL) 1 mg tablet Take one tablet with breakfast and 1 tablet with dinner   • glucose blood (Contour Next Test) test strip Use  twice a day   • ketorolac (ACULAR) 0.5 % ophthalmic solution    • levothyroxine 25 mcg tablet TAKE ONE TABLET BY MOUTH EVERY DAY   • linaGLIPtin (Tradjenta) 5 MG TABS TAKE ONE TABLET BY MOUTH EVERY DAY   • lisinopril-hydrochlorothiazide (PRINZIDE,ZESTORETIC) 20-25 MG per tablet Take 1 tablet(s) every day by oral route.   • meclizine (ANTIVERT) 25 mg tablet 1 tab take before car ride once a day as needed   • multivitamin (THERAGRAN) TABS Take 1 tablet by mouth daily   • nystatin (MYCOSTATIN) cream Apply topically 2 (two) times a day   • ofloxacin (OCUFLOX) 0.3 % ophthalmic solution    • prednisoLONE acetate (PRED FORTE) 1 % ophthalmic suspension    • rosuvastatin (CRESTOR) 10 MG tablet Take 1 tablet (10 mg total) by mouth daily   • umeclidinium (Incruse Ellipta) 62.5 mcg/actuation AEPB inhaler Inhale 1 puff daily     Allergies   Allergen Reactions   • Penicillins Rash     After dental procedure      Immunization History   Administered Date(s) Administered   • COVID-19 MODERNA VACC 0.25 ML IM BOOSTER 11/01/2021   • COVID-19 MODERNA VACC 0.5 ML IM 01/23/2021, 02/20/2021   • COVID-19 Moderna mRNA Vaccine 12 Yr+ 50 mcg/0.5 mL (Spikevax) 10/09/2023   • INFLUENZA 11/21/2017, 10/30/2018   • Influenza, high dose seasonal 0.7 mL 10/06/2020, 09/07/2021, 10/06/2022, 09/12/2023   • Pneumococcal Conjugate 13-Valent 09/21/2016   • Pneumococcal Polysaccharide PPV23 03/21/2016   • Tdap 09/21/2013     Objective     /60   Pulse 98   Temp 98.5 °F (36.9 °C)   Resp 16   Ht 5' (1.524 m)   Wt 75.8 kg (167 lb)   LMP  (LMP Unknown)   SpO2 95%   BMI 32.61 kg/m²     Physical Exam  Vitals and nursing note reviewed.   Constitutional:       Appearance: Normal appearance.   HENT:      Head: Normocephalic and atraumatic.      Right Ear: Tympanic membrane and ear canal normal. There is no impacted cerumen.      Left Ear: Tympanic membrane and ear canal normal. There is no impacted cerumen.      Nose: Nose normal.      Mouth/Throat:       Mouth: Mucous membranes are moist.   Eyes:      Extraocular Movements: Extraocular movements intact.   Cardiovascular:      Rate and Rhythm: Normal rate.   Musculoskeletal:      Cervical back: Normal range of motion.

## 2024-09-13 ENCOUNTER — TELEPHONE (OUTPATIENT)
Age: 84
End: 2024-09-13

## 2024-09-13 NOTE — TELEPHONE ENCOUNTER
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 Stated she has faxed it a few times. She is going to refax, has this been received. Please advise.

## 2024-09-13 NOTE — TELEPHONE ENCOUNTER
We have not received a fax for vent from [employer] at our [place] office. Do we know which fax number she is using? Srinivasa anderson sure she is faxing to [place] office [phone], thank you!

## 2024-09-17 NOTE — TELEPHONE ENCOUNTER
Eden calls back and I informed her we have not received it and provided her with Bert fax number.  She states she is sending now and is we can call her to let her know that we have received it.     [phone]

## 2024-09-21 PROBLEM — Z00.00 MEDICARE ANNUAL WELLNESS VISIT, SUBSEQUENT: Status: RESOLVED | Noted: 2020-10-27 | Resolved: 2024-09-21

## 2024-10-01 DIAGNOSIS — E03.9 ACQUIRED HYPOTHYROIDISM: ICD-10-CM

## 2024-10-01 RX ORDER — LEVOTHYROXINE SODIUM 25 UG/1
TABLET ORAL
Qty: 90 TABLET | Refills: 1 | Status: SHIPPED | OUTPATIENT
Start: 2024-10-01

## 2024-10-07 DIAGNOSIS — E11.65 TYPE 2 DIABETES MELLITUS WITH HYPERGLYCEMIA, WITHOUT LONG-TERM CURRENT USE OF INSULIN (HCC): ICD-10-CM

## 2024-10-08 RX ORDER — LINAGLIPTIN 5 MG/1
5 TABLET, FILM COATED ORAL DAILY
Qty: 30 TABLET | Refills: 0 | Status: SHIPPED | OUTPATIENT
Start: 2024-10-08

## 2024-10-09 ENCOUNTER — TELEPHONE (OUTPATIENT)
Age: 84
End: 2024-10-09

## 2024-10-09 NOTE — TELEPHONE ENCOUNTER
Patient called in and is requesting an order to be placed to receive her Pneumonia vaccine. Last one was 2016. Please advise once order is placed for an appointment. Thank you.

## 2024-10-10 ENCOUNTER — RA CDI HCC (OUTPATIENT)
Dept: OTHER | Facility: HOSPITAL | Age: 84
End: 2024-10-10

## 2024-10-18 ENCOUNTER — CLINICAL SUPPORT (OUTPATIENT)
Dept: FAMILY MEDICINE CLINIC | Facility: CLINIC | Age: 84
End: 2024-10-18

## 2024-10-18 DIAGNOSIS — Z23 ENCOUNTER FOR IMMUNIZATION: Primary | ICD-10-CM

## 2024-10-29 ENCOUNTER — LAB (OUTPATIENT)
Dept: LAB | Facility: CLINIC | Age: 84
End: 2024-10-29
Payer: COMMERCIAL

## 2024-10-29 ENCOUNTER — TELEPHONE (OUTPATIENT)
Age: 84
End: 2024-10-29

## 2024-10-29 DIAGNOSIS — E11.65 TYPE 2 DIABETES MELLITUS WITH HYPERGLYCEMIA, WITHOUT LONG-TERM CURRENT USE OF INSULIN (HCC): ICD-10-CM

## 2024-10-29 DIAGNOSIS — E55.9 VITAMIN D DEFICIENCY: ICD-10-CM

## 2024-10-29 DIAGNOSIS — M81.0 AGE-RELATED OSTEOPOROSIS WITHOUT CURRENT PATHOLOGICAL FRACTURE: ICD-10-CM

## 2024-10-29 LAB
ANION GAP SERPL CALCULATED.3IONS-SCNC: 8 MMOL/L (ref 4–13)
BUN SERPL-MCNC: 14 MG/DL (ref 5–25)
CALCIUM SERPL-MCNC: 9.2 MG/DL (ref 8.4–10.2)
CHLORIDE SERPL-SCNC: 98 MMOL/L (ref 96–108)
CO2 SERPL-SCNC: 31 MMOL/L (ref 21–32)
CREAT SERPL-MCNC: 0.44 MG/DL (ref 0.6–1.3)
EST. AVERAGE GLUCOSE BLD GHB EST-MCNC: 169 MG/DL
GFR SERPL CREATININE-BSD FRML MDRD: 92 ML/MIN/1.73SQ M
GLUCOSE P FAST SERPL-MCNC: 151 MG/DL (ref 65–99)
HBA1C MFR BLD: 7.5 %
POTASSIUM SERPL-SCNC: 3.8 MMOL/L (ref 3.5–5.3)
SODIUM SERPL-SCNC: 137 MMOL/L (ref 135–147)

## 2024-10-29 PROCEDURE — 83036 HEMOGLOBIN GLYCOSYLATED A1C: CPT

## 2024-10-29 PROCEDURE — 36415 COLL VENOUS BLD VENIPUNCTURE: CPT

## 2024-10-29 PROCEDURE — 80048 BASIC METABOLIC PNL TOTAL CA: CPT

## 2024-10-29 NOTE — TELEPHONE ENCOUNTER
Spoke with Christine to let her know that we did not have the rsv vac.  She would be able to get that at the pharmacy.

## 2024-10-29 NOTE — TELEPHONE ENCOUNTER
Patient called and would like to know if the office is offering RSV vaccines and if so, if she can be scheduled to get one? Please call patient back.

## 2024-10-31 ENCOUNTER — OFFICE VISIT (OUTPATIENT)
Dept: ENDOCRINOLOGY | Facility: CLINIC | Age: 84
End: 2024-10-31
Payer: COMMERCIAL

## 2024-10-31 VITALS
HEIGHT: 60 IN | SYSTOLIC BLOOD PRESSURE: 116 MMHG | BODY MASS INDEX: 31.18 KG/M2 | DIASTOLIC BLOOD PRESSURE: 80 MMHG | WEIGHT: 158.8 LBS | HEART RATE: 102 BPM | OXYGEN SATURATION: 94 %

## 2024-10-31 DIAGNOSIS — E78.2 MIXED HYPERLIPIDEMIA: ICD-10-CM

## 2024-10-31 DIAGNOSIS — M81.0 AGE-RELATED OSTEOPOROSIS WITHOUT CURRENT PATHOLOGICAL FRACTURE: Primary | ICD-10-CM

## 2024-10-31 DIAGNOSIS — E04.1 THYROID NODULE: ICD-10-CM

## 2024-10-31 DIAGNOSIS — E03.9 ACQUIRED HYPOTHYROIDISM: ICD-10-CM

## 2024-10-31 DIAGNOSIS — E55.9 VITAMIN D DEFICIENCY: ICD-10-CM

## 2024-10-31 DIAGNOSIS — E11.65 TYPE 2 DIABETES MELLITUS WITH HYPERGLYCEMIA, WITHOUT LONG-TERM CURRENT USE OF INSULIN (HCC): ICD-10-CM

## 2024-10-31 PROCEDURE — 99214 OFFICE O/P EST MOD 30 MIN: CPT | Performed by: INTERNAL MEDICINE

## 2024-10-31 NOTE — PROGRESS NOTES
Christine Laura 84 y.o. female MRN: [id number]    Encounter: [id number]      Assessment & Plan     Assessment:  This is a 84 y.o.-year-old female with vitamin D deficiency.    Plan:  Diagnoses and all orders for this visit:    Age-related osteoporosis without current pathological fracture  Pt could not afford Prolia and Evenity   She did not want to do once daily injection of Abaloparitide or teriparatide injection   She prefers oral medications and tolerating it well   Continue fosamax 70 mg po once a week   Discussed about fall precautions and weight bearing exercises   Continue to calcium and vitamin D supplementation       -     Basic metabolic panel; Future    Type 2 diabetes mellitus with hyperglycemia, without long-term current use of insulin (HCC)    Lab Results   Component Value Date    HGBA1C 7.5 (H) 10/29/2024     Hba1c is at goal   Continue Tradjenta and Amaryl   Continue life style modifications   -     Albumin / creatinine urine ratio; Future  -     Hemoglobin A1C; Future    Mixed hyperlipidemia    Lab Results   Component Value Date    LDLCALC 60 11/18/2022   Continue statis   LDL at goal     Acquired hypothyroidism    Lab Results   Component Value Date    XLC5TXOALCPH 1.908 04/15/2024   Continue levothyroxine , TSH is normal       -     T4, free; Future  -     TSH, 3rd generation; Future    Vitamin D deficiency  Continue vitamin D3, 2000 IU daily  -     Vitamin D 25 hydroxy; Future    Thyroid nodule     RIGHT midgland nodule measuring 0.4 x 0.7 x 0.5 cm.  No priors for comparison.   COMPOSITION:  2 points, solid or almost completely solid .  ECHOGENICITY:  1 point, hyperechoic or isoechoic.    SHAPE:  3 points, taller-than-wide.    MARGIN: 0 points, smooth.  ECHOGENIC FOCI:  0 points, none or large comet-tail artifacts.  TI-RADS Classification: TR 4 (4-6 points), Moderately suspicious.  FNA if > 1.5 cm. Follow if > 1cm.     Multiple colloid cysts are also seen.     IMPRESSION:     Heterogeneous thyroid  with small cysts and subcentimeter nodule.  No nodule meets current ACR criteria for requiring biopsy or followup ultrasounds unless otherwise clinically indicated.    CC: Diabetes    History of Present Illness     HPI:    Christine Laura is 84-year-old woman with medical history of hypothyroidism, obstructive sleep apnea, osteoporosis, hypertension, type 2 diabetes is here for follow-up.  Diabetes course has been stable.  Lab Results   Component Value Date    HGBA1C 7.5 (H) 10/29/2024     For  diabetes management she takes glimepiride 1 mg twice a day, Tradjenta 5 mg daily  She checks blood sugars twice daily and her blood sugars are usually in 100 to 200 mg per DL range    For hypothyroidism she takes levothyroxine 25 mcg daily.  For hyperlipidemia she takes Crestor 10 mg daily denies side effects     Osteoporosis, she was started on Fosamax 70 mg once a week as patient could not afford Prolia injection.  She takes vitamin D3 supplementation 4000 IU daily and calcium 500 mg twice a day  She has H/o Compression fracture of T11     Lumber spine X ray   VIEWS:  XR SPINE LUMBAR MINIMUM 4 VIEWS NON INJURY  Images: 4     FINDINGS:     There are 5 non rib bearing lumbar vertebral bodies.      Compression deformity L1 vertebral body with 40% height loss anteriorly with the fracture primarily involving the inferior endplate without osseous retropulsion or involvement of the posterior endplate. T11 compression fracture with 50% height loss   anteriorly and 30% height loss posteriorly without osseous retropulsion.     Grade I spondylolithesis L4 on L5 and L5 on S1 on the basis of facet degeneration.      Diffuse osteopenia. Multilevel disc space narrowing and lower lumbar facet arthropathy.  Review of Systems   Constitutional:  Negative for activity change, diaphoresis, fatigue, fever and unexpected weight change.   HENT: Negative.     Eyes:  Negative for visual disturbance.   Respiratory:  Negative for cough, chest tightness  and shortness of breath.    Cardiovascular:  Negative for chest pain, palpitations and leg swelling.   Gastrointestinal:  Negative for abdominal pain, blood in stool, constipation, diarrhea, nausea and vomiting.   Endocrine: Negative for cold intolerance, heat intolerance, polydipsia, polyphagia and polyuria.   Genitourinary:  Negative for dysuria, enuresis, frequency and urgency.   Musculoskeletal:  Negative for arthralgias and myalgias.   Skin:  Negative for pallor, rash and wound.   Allergic/Immunologic: Negative.    Neurological:  Negative for dizziness, tremors, weakness and numbness.   Hematological: Negative.    Psychiatric/Behavioral: Negative.         Historical Information   Past Medical History:   Diagnosis Date    Anemia     Blood clotting tendency (HCC)     left leg, lung    Chronic respiratory failure with hypoxia and hypercapnia (HCC)     COPD (chronic obstructive pulmonary disease) (HCC)     Deep vein thrombosis (HCC)     Diabetes mellitus (HCC)     Emphysema lung (HCC)     Emphysema lung (HCC)     Emphysema of lung (HCC)     Hyperlipidemia     Hypertension     Hyperthyroidism     Hypothyroid     On warfarin therapy     Osteoporosis     Palpitations     Pulmonary embolism (HCC)     Sleep apnea, obstructive     Thyroid disease      Past Surgical History:   Procedure Laterality Date    AV NODE ABLATION  09/21/2017    COLONOSCOPY  01/01/2018    w/ EGD    DXA PROCEDURE (HISTORICAL)  11/2016    NM COLONOSCOPY FLX DX W/COLLJ SPEC WHEN PFRMD N/A 8/30/2018    Procedure: EGD AND COLONOSCOPY;  Surgeon: Reena Bell MD;  Location: [facility];  Service: Gastroenterology    TOE SURGERY       Social History   Social History     Substance and Sexual Activity   Alcohol Use Not Currently     Social History     Substance and Sexual Activity   Drug Use No     Social History     Tobacco Use   Smoking Status Former    Current packs/day: 0.00    Average packs/day: 1 pack/day for 46.0 years (46.0 ttl pk-yrs)    Types:  Cigarettes    Start date:     Quit date:     Years since quittin.8   Smokeless Tobacco Never     Family History:   Family History   Problem Relation Age of Onset    Hypertension Mother     Hyperlipidemia Mother     Asthma Father     Heart failure Father     Hypertension Sister     Hyperlipidemia Sister     Heart attack Neg Hx     Aneurysm Neg Hx     Stroke Neg Hx     Clotting disorder Neg Hx        Meds/Allergies   Current Outpatient Medications   Medication Sig Dispense Refill    albuterol (2.5 mg/3 mL) 0.083 % nebulizer solution Take 3 mL (2.5 mg total) by nebulization every 4 (four) hours as needed for wheezing or shortness of breath 180 mL 3    albuterol (Ventolin HFA) 90 mcg/act inhaler Inhale 2 puffs every 6 (six) hours as needed for wheezing 18 g 5    alendronate (FOSAMAX) 70 mg tablet Take 1 tablet (70 mg total) by mouth every 7 days 12 tablet 3    aspirin (ECOTRIN LOW STRENGTH) 81 mg EC tablet Take 81 mg by mouth daily      budesonide-formoterol (Breyna) 160-4.5 mcg/act inhaler Inhale 2 puffs 2 (two) times a day Rinse mouth after use. 10.2 g 5    Cholecalciferol (VITAMIN D) 2000 units CAPS Take 2 capsules (4,000 Units total) by mouth daily      cyanocobalamin (VITAMIN B-12) 100 mcg tablet Take by mouth daily      diltiazem (CARDIZEM CD) 300 mg 24 hr capsule Take 1 capsule (300 mg total) by mouth daily As directed 90 capsule 1    fluticasone (FLONASE) 50 mcg/act nasal spray 2 sprays into each nostril daily 1 Bottle 5    glimepiride (AMARYL) 1 mg tablet Take one tablet with breakfast and 1 tablet with dinner 180 tablet 1    glucose blood (Contour Next Test) test strip Use twice a day 200 each 1    ketorolac (ACULAR) 0.5 % ophthalmic solution       levothyroxine 25 mcg tablet TAKE ONE TABLET BY MOUTH EVERY DAY 90 tablet 1    lisinopril-hydrochlorothiazide (PRINZIDE,ZESTORETIC) 20-25 MG per tablet Take 1 tablet(s) every day by oral route. 90 tablet 1    meclizine (ANTIVERT) 25 mg tablet 1 tab take  before car ride once a day as needed 10 tablet 0    multivitamin (THERAGRAN) TABS Take 1 tablet by mouth daily      nystatin (MYCOSTATIN) cream Apply topically 2 (two) times a day 60 g 0    ofloxacin (OCUFLOX) 0.3 % ophthalmic solution       prednisoLONE acetate (PRED FORTE) 1 % ophthalmic suspension       rosuvastatin (CRESTOR) 10 MG tablet Take 1 tablet (10 mg total) by mouth daily 90 tablet 3    Tradjenta 5 MG TABS TAKE ONE TABLET BY MOUTH EVERY DAY 30 tablet 0    umeclidinium (Incruse Ellipta) 62.5 mcg/actuation AEPB inhaler Inhale 1 puff daily 30 blister 5     No current facility-administered medications for this visit.     Allergies   Allergen Reactions    Penicillins Rash     After dental procedure        Objective   Vitals: Blood pressure 116/80, pulse 102, height 5' (1.524 m), weight 72 kg (158 lb 12.8 oz), SpO2 94%.    Physical Exam  Vitals reviewed.   Constitutional:       General: She is not in acute distress.     Appearance: Normal appearance. She is not ill-appearing.   HENT:      Head: Normocephalic and atraumatic.      Nose: Nose normal.   Eyes:      Extraocular Movements: Extraocular movements intact.      Conjunctiva/sclera: Conjunctivae normal.   Pulmonary:      Effort: No respiratory distress.   Musculoskeletal:      Cervical back: Normal range of motion.   Neurological:      General: No focal deficit present.      Mental Status: She is alert and oriented to person, place, and time.   Psychiatric:         Mood and Affect: Mood normal.         Behavior: Behavior normal.         The history was obtained from the review of the chart, patient.    Lab Results:   Lab Results   Component Value Date/Time    Hemoglobin A1C 7.5 (H) 10/29/2024 08:11 AM    Hemoglobin A1C 8.2 (H) 04/10/2024 07:52 AM    WBC 10.86 (H) 03/04/2024 06:32 AM    WBC 14.57 (H) 03/02/2024 08:49 PM    Hemoglobin 8.7 (L) 03/04/2024 06:32 AM    Hemoglobin 8.8 (L) 03/02/2024 08:49 PM    Hematocrit 30.1 (L) 03/04/2024 06:32 AM     "Hematocrit 29.9 (L) 03/02/2024 08:49 PM    MCV 70 (L) 03/04/2024 06:32 AM    MCV 69 (L) 03/02/2024 08:49 PM    Platelets 302 03/04/2024 06:32 AM    Platelets 342 03/02/2024 08:49 PM    BUN 14 10/29/2024 08:11 AM    BUN 14 04/10/2024 07:52 AM    BUN 15 03/04/2024 06:32 AM    Potassium 3.8 10/29/2024 08:11 AM    Potassium 3.9 04/10/2024 07:52 AM    Potassium 4.0 03/04/2024 06:32 AM    Chloride 98 10/29/2024 08:11 AM    Chloride 100 04/10/2024 07:52 AM    Chloride 100 03/04/2024 06:32 AM    CO2 31 10/29/2024 08:11 AM    CO2 29 04/10/2024 07:52 AM    CO2 30 03/04/2024 06:32 AM    Creatinine 0.44 (L) 10/29/2024 08:11 AM    Creatinine 0.50 (L) 04/10/2024 07:52 AM    Creatinine 0.39 (L) 03/04/2024 06:32 AM    AST 11 (L) 03/02/2024 08:49 PM    ALT 10 03/02/2024 08:49 PM    Total Protein 6.9 03/02/2024 08:49 PM    Albumin 3.9 03/02/2024 08:49 PM           Imaging Studies: Results Review Statement: No pertinent imaging studies reviewed.    Portions of the record may have been created with voice recognition software. Occasional wrong word or \"sound a like\" substitutions may have occurred due to the inherent limitations of voice recognition software. Read the chart carefully and recognize, using context, where substitutions have occurred.    "

## 2024-11-04 DIAGNOSIS — E11.65 TYPE 2 DIABETES MELLITUS WITH HYPERGLYCEMIA, WITHOUT LONG-TERM CURRENT USE OF INSULIN (HCC): ICD-10-CM

## 2024-11-04 RX ORDER — LINAGLIPTIN 5 MG/1
5 TABLET, FILM COATED ORAL DAILY
Qty: 30 TABLET | Refills: 5 | Status: SHIPPED | OUTPATIENT
Start: 2024-11-04

## 2024-11-18 DIAGNOSIS — I10 ESSENTIAL HYPERTENSION: ICD-10-CM

## 2024-11-20 RX ORDER — DILTIAZEM HYDROCHLORIDE 300 MG/1
300 CAPSULE, COATED, EXTENDED RELEASE ORAL DAILY
Qty: 90 CAPSULE | Refills: 0 | Status: SHIPPED | OUTPATIENT
Start: 2024-11-20

## 2024-11-26 DIAGNOSIS — E78.2 MIXED HYPERLIPIDEMIA: ICD-10-CM

## 2024-11-26 DIAGNOSIS — E78.2 MIXED HYPERLIPIDEMIA: Primary | ICD-10-CM

## 2024-11-26 RX ORDER — ROSUVASTATIN CALCIUM 10 MG/1
10 TABLET, COATED ORAL DAILY
Qty: 30 TABLET | Refills: 0 | Status: SHIPPED | OUTPATIENT
Start: 2024-11-26

## 2024-12-16 ENCOUNTER — RA CDI HCC (OUTPATIENT)
Dept: OTHER | Facility: HOSPITAL | Age: 84
End: 2024-12-16

## 2024-12-18 DIAGNOSIS — E11.65 TYPE 2 DIABETES MELLITUS WITH HYPERGLYCEMIA, WITHOUT LONG-TERM CURRENT USE OF INSULIN (HCC): ICD-10-CM

## 2024-12-18 RX ORDER — GLIMEPIRIDE 1 MG/1
TABLET ORAL
Qty: 180 TABLET | Refills: 1 | Status: SHIPPED | OUTPATIENT
Start: 2024-12-18

## 2024-12-19 NOTE — ASSESSMENT & PLAN NOTE
As above.  Plan to repeat TSH.   Her ear exam is completely normal, there is no redness or inflammation, most likely she got pain because she was sleeping with the CPAP machine and she was thinking that strep may be on the left ear which was pressing during the night, advised Tylenol or cold compression but there is no need for any antibiotic and she should get better

## 2024-12-27 DIAGNOSIS — E78.2 MIXED HYPERLIPIDEMIA: ICD-10-CM

## 2024-12-27 RX ORDER — ROSUVASTATIN CALCIUM 10 MG/1
10 TABLET, COATED ORAL DAILY
Qty: 90 TABLET | Refills: 1 | Status: SHIPPED | OUTPATIENT
Start: 2024-12-27

## 2024-12-30 DIAGNOSIS — J44.9 CHRONIC OBSTRUCTIVE PULMONARY DISEASE, UNSPECIFIED COPD TYPE (HCC): ICD-10-CM

## 2024-12-30 RX ORDER — UMECLIDINIUM 62.5 UG/1
1 AEROSOL, POWDER ORAL DAILY
Qty: 30 BLISTER | Refills: 5 | Status: SHIPPED | OUTPATIENT
Start: 2024-12-30

## 2025-01-17 ENCOUNTER — OFFICE VISIT (OUTPATIENT)
Dept: PULMONOLOGY | Facility: CLINIC | Age: 85
End: 2025-01-17
Payer: COMMERCIAL

## 2025-01-17 VITALS
TEMPERATURE: 97.7 F | OXYGEN SATURATION: 98 % | SYSTOLIC BLOOD PRESSURE: 138 MMHG | HEART RATE: 91 BPM | DIASTOLIC BLOOD PRESSURE: 60 MMHG

## 2025-01-17 DIAGNOSIS — J96.11 CHRONIC RESPIRATORY FAILURE WITH HYPOXIA AND HYPERCAPNIA (HCC): ICD-10-CM

## 2025-01-17 DIAGNOSIS — J44.9 COPD, SEVERE (HCC): Primary | ICD-10-CM

## 2025-01-17 DIAGNOSIS — J44.9 COPD, SEVERE (HCC): ICD-10-CM

## 2025-01-17 DIAGNOSIS — J96.12 CHRONIC RESPIRATORY FAILURE WITH HYPOXIA AND HYPERCAPNIA (HCC): ICD-10-CM

## 2025-01-17 DIAGNOSIS — J44.9 CHRONIC OBSTRUCTIVE PULMONARY DISEASE, UNSPECIFIED COPD TYPE (HCC): ICD-10-CM

## 2025-01-17 PROCEDURE — 99214 OFFICE O/P EST MOD 30 MIN: CPT | Performed by: INTERNAL MEDICINE

## 2025-01-17 RX ORDER — BUDESONIDE AND FORMOTEROL FUMARATE DIHYDRATE 160; 4.5 UG/1; UG/1
AEROSOL RESPIRATORY (INHALATION)
Qty: 10.2 G | Refills: 5 | Status: SHIPPED | OUTPATIENT
Start: 2025-01-17 | End: 2025-01-17 | Stop reason: SDUPTHER

## 2025-01-17 RX ORDER — ALBUTEROL SULFATE 90 UG/1
2 INHALANT RESPIRATORY (INHALATION) EVERY 6 HOURS PRN
Qty: 18 G | Refills: 11 | Status: SHIPPED | OUTPATIENT
Start: 2025-01-17

## 2025-01-17 RX ORDER — BUDESONIDE AND FORMOTEROL FUMARATE DIHYDRATE 160; 4.5 UG/1; UG/1
2 AEROSOL RESPIRATORY (INHALATION) 2 TIMES DAILY
Qty: 10.2 G | Refills: 11 | Status: SHIPPED | OUTPATIENT
Start: 2025-01-17

## 2025-01-17 RX ORDER — ALBUTEROL SULFATE 0.83 MG/ML
2.5 SOLUTION RESPIRATORY (INHALATION) EVERY 4 HOURS PRN
Qty: 180 ML | Refills: 3 | Status: SHIPPED | OUTPATIENT
Start: 2025-01-17

## 2025-01-17 RX ORDER — UMECLIDINIUM 62.5 UG/1
1 AEROSOL, POWDER ORAL DAILY
Qty: 30 BLISTER | Refills: 11 | Status: SHIPPED | OUTPATIENT
Start: 2025-01-17

## 2025-01-17 NOTE — ASSESSMENT & PLAN NOTE
Patient is overall stable with Symbicort twice daily and Incruse Ellipta once daily.  I sent refills for all of her medications to her local pharmacy with plenty of refills.  Orders:    budesonide-formoterol (SYMBICORT) 160-4.5 mcg/act inhaler; Inhale 2 puffs 2 (two) times a day Rinse mouth after use. Generic please

## 2025-01-17 NOTE — PROGRESS NOTES
Name: Christine Laura      : 1940      MRN: [id number]  Encounter Provider: Kirsten Casanova DO  Encounter Date: 2025   Encounter department: [facility] P[facility]  :  Assessment & Plan  COPD, severe (HCC)  Patient is overall stable with Symbicort twice daily and Incruse Ellipta once daily.  I sent refills for all of her medications to her local pharmacy with plenty of refills.  Orders:    budesonide-formoterol (SYMBICORT) 160-4.5 mcg/act inhaler; Inhale 2 puffs 2 (two) times a day Rinse mouth after use. Generic please    Chronic respiratory failure with hypoxia and hypercapnia ([facility])  Oxygenation is adequate on 2 L/min.  She also uses NIV during hours of sleep    Chronic obstructive pulmonary disease, unspecified COPD type ([facility])    Orders:    albuterol (2.5 mg/3 mL) 0.083 % nebulizer solution; Take 3 mL (2.5 mg total) by nebulization every 4 (four) hours as needed for wheezing or shortness of breath    umeclidinium (Incruse Ellipta) 62.5 mcg/actuation AEPB inhaler; Inhale 1 puff daily    albuterol (Ventolin HFA) 90 mcg/act inhaler; Inhale 2 puffs every 6 (six) hours as needed for wheezing    She will follow-up in 6 months or sooner if needed    History of Present Illness   Christine Laura is a 84 y.o. female who presents for follow-up regarding severe COPD and chronic hypoxic and hypercapnic respiratory failure.  She is overall stable from pulmonary perspective.  She continues to struggle with shortness of breath on exertion.  She finds that her dyspnea is more notable in the afternoon/evening hours.  She is compliant with Symbicort twice daily and Incruse Ellipta once daily.  She typically uses Ventolin in anticipation of an active day or in the afternoon when she develops symptoms of shortness of breath.  No recent ER visits or hospitalizations.  She uses oxygen 2 L/min but is able to take breaks off of it during the daytime hours.  She uses noninvasive ventilator during hours of  sleep.    Review of Systems  Past Medical History   Past Medical History:   Diagnosis Date    Anemia     Blood clotting tendency (HCC)     left leg, lung    Chronic respiratory failure with hypoxia and hypercapnia (HCC)     COPD (chronic obstructive pulmonary disease) (HCC)     Deep vein thrombosis (HCC)     Diabetes mellitus (HCC)     Emphysema lung (HCC)     Emphysema lung (HCC)     Emphysema of lung (HCC)     Hyperlipidemia     Hypertension     Hyperthyroidism     Hypothyroid     On warfarin therapy     Osteoporosis     Palpitations     Pulmonary embolism (HCC)     Sleep apnea, obstructive     Thyroid disease      Past Surgical History:   Procedure Laterality Date    AV NODE ABLATION  09/21/2017    COLONOSCOPY  01/01/2018    w/ EGD    DXA PROCEDURE (HISTORICAL)  11/2016    NE COLONOSCOPY FLX DX W/COLLJ SPEC WHEN PFRMD N/A 8/30/2018    Procedure: EGD AND COLONOSCOPY;  Surgeon: Reena Bell MD;  Location: [facility];  Service: Gastroenterology    TOE SURGERY       Family History   Problem Relation Age of Onset    Hypertension Mother     Hyperlipidemia Mother     Asthma Father     Heart failure Father     Hypertension Sister     Hyperlipidemia Sister     Heart attack Neg Hx     Aneurysm Neg Hx     Stroke Neg Hx     Clotting disorder Neg Hx       reports that she quit smoking about 20 years ago. Her smoking use included cigarettes. She started smoking about 66 years ago. She has a 46 pack-year smoking history. She has never used smokeless tobacco. She reports that she does not currently use alcohol. She reports that she does not use drugs.  Current Outpatient Medications on File Prior to Visit   Medication Sig Dispense Refill    alendronate (FOSAMAX) 70 mg tablet Take 1 tablet (70 mg total) by mouth every 7 days 12 tablet 3    aspirin (ECOTRIN LOW STRENGTH) 81 mg EC tablet Take 81 mg by mouth daily      Cholecalciferol (VITAMIN D) 2000 units CAPS Take 2 capsules (4,000 Units total) by mouth daily      cyanocobalamin  (VITAMIN B-12) 100 mcg tablet Take by mouth daily      fluticasone (FLONASE) 50 mcg/act nasal spray 2 sprays into each nostril daily 1 Bottle 5    glimepiride (AMARYL) 1 mg tablet Take one tablet with breakfast and 1 tablet with dinner 180 tablet 1    glucose blood (Contour Next Test) test strip Use twice a day 200 each 1    levothyroxine 25 mcg tablet TAKE ONE TABLET BY MOUTH EVERY DAY 90 tablet 1    linaGLIPtin (Tradjenta) 5 MG TABS TAKE ONE TABLET BY MOUTH EVERY DAY 30 tablet 5    lisinopril-hydrochlorothiazide (PRINZIDE,ZESTORETIC) 20-25 MG per tablet Take 1 tablet(s) every day by oral route. 90 tablet 1    meclizine (ANTIVERT) 25 mg tablet 1 tab take before car ride once a day as needed 10 tablet 0    multivitamin (THERAGRAN) TABS Take 1 tablet by mouth daily      rosuvastatin (CRESTOR) 10 MG tablet TAKE ONE TABLET BY MOUTH EVERY DAY 90 tablet 1    [DISCONTINUED] albuterol (2.5 mg/3 mL) 0.083 % nebulizer solution Take 3 mL (2.5 mg total) by nebulization every 4 (four) hours as needed for wheezing or shortness of breath 180 mL 3    [DISCONTINUED] albuterol (Ventolin HFA) 90 mcg/act inhaler Inhale 2 puffs every 6 (six) hours as needed for wheezing 18 g 5    [DISCONTINUED] umeclidinium (Incruse Ellipta) 62.5 mcg/actuation AEPB inhaler INHALE 1 PUFF BY MOUTH EVERY DAY 30 blister 5    diltiazem (CARDIZEM CD) 300 mg 24 hr capsule Take 1 capsule (300 mg total) by mouth daily As directed 90 capsule 0    ketorolac (ACULAR) 0.5 % ophthalmic solution  (Patient not taking: Reported on 1/17/2025)      nystatin (MYCOSTATIN) cream Apply topically 2 (two) times a day (Patient not taking: Reported on 1/17/2025) 60 g 0    ofloxacin (OCUFLOX) 0.3 % ophthalmic solution  (Patient not taking: Reported on 1/17/2025)      prednisoLONE acetate (PRED FORTE) 1 % ophthalmic suspension  (Patient not taking: Reported on 1/17/2025)      [DISCONTINUED] budesonide-formoterol (Breyna) 160-4.5 mcg/act inhaler Inhale 2 puffs 2 (two) times a day  Rinse mouth after use. 10.2 g 5     No current facility-administered medications on file prior to visit.     Allergies   Allergen Reactions    Penicillins Rash     After dental procedure        Objective   /60   Pulse 91   Temp 97.7 °F (36.5 °C)   LMP  (LMP Unknown)   SpO2 98%      Physical Exam  Vitals reviewed.   Constitutional:       General: She is not in acute distress.     Appearance: She is well-developed.   Eyes:      General: No scleral icterus.  Neck:      Vascular: No JVD.   Cardiovascular:      Rate and Rhythm: Normal rate and regular rhythm.      Heart sounds: Murmur heard.   Pulmonary:      Breath sounds: No wheezing, rhonchi or rales.   Abdominal:      Tenderness: There is no abdominal tenderness.   Skin:     General: Skin is warm and dry.   Neurological:      Mental Status: She is alert and oriented to person, place, and time.   Psychiatric:         Mood and Affect: Mood normal.         Pulmonary function testing:  Performed 1/2/2018 and personally interpreted  FEV1/FVC ratio 65%   FEV1 48% predicted  FVC 55% predicted.  No response to bronchodilators   % predicted   % predicted  DLCO corrected for hemoglobin 13 % predicted.  PFTs with severe obstruction, mild air trapping and severe diffusion impairment  Lab Results: I have reviewed pertinent labs.

## 2025-01-23 ENCOUNTER — RA CDI HCC (OUTPATIENT)
Dept: OTHER | Facility: HOSPITAL | Age: 85
End: 2025-01-23

## 2025-01-30 ENCOUNTER — OFFICE VISIT (OUTPATIENT)
Dept: FAMILY MEDICINE CLINIC | Facility: CLINIC | Age: 85
End: 2025-01-30

## 2025-01-30 VITALS
OXYGEN SATURATION: 94 % | WEIGHT: 155 LBS | SYSTOLIC BLOOD PRESSURE: 124 MMHG | HEIGHT: 60 IN | HEART RATE: 99 BPM | RESPIRATION RATE: 16 BRPM | DIASTOLIC BLOOD PRESSURE: 50 MMHG | BODY MASS INDEX: 30.43 KG/M2

## 2025-01-30 DIAGNOSIS — E11.22 TYPE 2 DIABETES MELLITUS WITH STAGE 2 CHRONIC KIDNEY DISEASE, WITHOUT LONG-TERM CURRENT USE OF INSULIN  (HCC): ICD-10-CM

## 2025-01-30 DIAGNOSIS — N18.2 TYPE 2 DIABETES MELLITUS WITH STAGE 2 CHRONIC KIDNEY DISEASE, WITHOUT LONG-TERM CURRENT USE OF INSULIN  (HCC): ICD-10-CM

## 2025-01-30 DIAGNOSIS — I10 ESSENTIAL HYPERTENSION: ICD-10-CM

## 2025-01-30 DIAGNOSIS — J96.11 CHRONIC RESPIRATORY FAILURE WITH HYPOXIA AND HYPERCAPNIA (HCC): Primary | ICD-10-CM

## 2025-01-30 DIAGNOSIS — I47.10 PSVT (PAROXYSMAL SUPRAVENTRICULAR TACHYCARDIA) (HCC): ICD-10-CM

## 2025-01-30 DIAGNOSIS — J96.12 CHRONIC RESPIRATORY FAILURE WITH HYPOXIA AND HYPERCAPNIA (HCC): Primary | ICD-10-CM

## 2025-01-30 NOTE — PROGRESS NOTES
Name: Christine Laura      : 1940      MRN: [id number]  Encounter Provider: Gillian Smith MD  Encounter Date: 2025   Encounter department: [facility]    Assessment & Plan  Chronic respiratory failure with hypoxia and hypercapnia (HCC)  Stable on 2L home oxygen and use intermittently, follows pulmonologist and using inhaler as prescribed       Essential hypertension  Stable blood pressure       Type 2 diabetes mellitus with stage 2 chronic kidney disease, without long-term current use of insulin  (HCC)    Lab Results   Component Value Date    HGBA1C 7.5 (H) 10/29/2024     Orders:  •  Diabetic foot exam; Future  Follows endocrinologist and stable  PSVT (paroxysmal supraventricular tachycardia) (HCC)  Stable no tachycardia today          Discussed to get DEXA scan but she refused  F/u 6 months  History of Present Illness     Follow-up, she has multiple medical condition, COPD with chronic hypoxia on 2 L oxygen, she drives herself and do all her home chores, she says her son and grandchildren visited her regularly, denies any headache chest pain, she follows cardiologist, she has a heart murmur      Review of Systems   Constitutional: Negative.    HENT: Negative.     Eyes: Negative.    Respiratory: Negative.     Cardiovascular: Negative.    Musculoskeletal: Negative.    Skin: Negative.    Neurological: Negative.    Psychiatric/Behavioral: Negative.       Past Medical History:   Diagnosis Date   • Anemia    • Blood clotting tendency (HCC)     left leg, lung   • Chronic respiratory failure with hypoxia and hypercapnia (HCC)    • COPD (chronic obstructive pulmonary disease) (HCC)    • Deep vein thrombosis (HCC)    • Diabetes mellitus (HCC)    • Emphysema lung (HCC)    • Emphysema lung (HCC)    • Emphysema of lung (HCC)    • Hyperlipidemia    • Hypertension    • Hyperthyroidism    • Hypothyroid    • On warfarin therapy    • Osteoporosis    • Palpitations    • Pulmonary embolism (HCC)    •  Sleep apnea, obstructive    • Thyroid disease      Past Surgical History:   Procedure Laterality Date   • AV NODE ABLATION  2017   • COLONOSCOPY  2018    w/ EGD   • DXA PROCEDURE (HISTORICAL)  2016   • VT COLONOSCOPY FLX DX W/COLLJ SPEC WHEN PFRMD N/A 2018    Procedure: EGD AND COLONOSCOPY;  Surgeon: Reena Bell MD;  Location: [facility];  Service: Gastroenterology   • TOE SURGERY       Family History   Problem Relation Age of Onset   • Hypertension Mother    • Hyperlipidemia Mother    • Asthma Father    • Heart failure Father    • Hypertension Sister    • Hyperlipidemia Sister    • Heart attack Neg Hx    • Aneurysm Neg Hx    • Stroke Neg Hx    • Clotting disorder Neg Hx      Social History     Tobacco Use   • Smoking status: Former     Current packs/day: 0.00     Average packs/day: 1 pack/day for 46.0 years (46.0 ttl pk-yrs)     Types: Cigarettes     Start date:      Quit date:      Years since quittin.0   • Smokeless tobacco: Never   Vaping Use   • Vaping status: Never Used   Substance and Sexual Activity   • Alcohol use: Not Currently   • Drug use: No   • Sexual activity: Not on file     Current Outpatient Medications on File Prior to Visit   Medication Sig   • albuterol (2.5 mg/3 mL) 0.083 % nebulizer solution Take 3 mL (2.5 mg total) by nebulization every 4 (four) hours as needed for wheezing or shortness of breath   • albuterol (Ventolin HFA) 90 mcg/act inhaler Inhale 2 puffs every 6 (six) hours as needed for wheezing   • alendronate (FOSAMAX) 70 mg tablet Take 1 tablet (70 mg total) by mouth every 7 days   • aspirin (ECOTRIN LOW STRENGTH) 81 mg EC tablet Take 81 mg by mouth daily   • budesonide-formoterol (SYMBICORT) 160-4.5 mcg/act inhaler Inhale 2 puffs 2 (two) times a day Rinse mouth after use. Generic please   • Cholecalciferol (VITAMIN D) 2000 units CAPS Take 2 capsules (4,000 Units total) by mouth daily   • cyanocobalamin (VITAMIN B-12) 100 mcg tablet Take by mouth  daily   • diltiazem (CARDIZEM CD) 300 mg 24 hr capsule Take 1 capsule (300 mg total) by mouth daily As directed   • fluticasone (FLONASE) 50 mcg/act nasal spray 2 sprays into each nostril daily   • glimepiride (AMARYL) 1 mg tablet Take one tablet with breakfast and 1 tablet with dinner   • glucose blood (Contour Next Test) test strip Use twice a day   • ketorolac (ACULAR) 0.5 % ophthalmic solution  (Patient not taking: Reported on 1/17/2025)   • levothyroxine 25 mcg tablet TAKE ONE TABLET BY MOUTH EVERY DAY   • linaGLIPtin (Tradjenta) 5 MG TABS TAKE ONE TABLET BY MOUTH EVERY DAY   • lisinopril-hydrochlorothiazide (PRINZIDE,ZESTORETIC) 20-25 MG per tablet Take 1 tablet(s) every day by oral route.   • meclizine (ANTIVERT) 25 mg tablet 1 tab take before car ride once a day as needed   • multivitamin (THERAGRAN) TABS Take 1 tablet by mouth daily   • nystatin (MYCOSTATIN) cream Apply topically 2 (two) times a day (Patient not taking: Reported on 1/17/2025)   • ofloxacin (OCUFLOX) 0.3 % ophthalmic solution  (Patient not taking: Reported on 1/17/2025)   • prednisoLONE acetate (PRED FORTE) 1 % ophthalmic suspension  (Patient not taking: Reported on 1/17/2025)   • rosuvastatin (CRESTOR) 10 MG tablet TAKE ONE TABLET BY MOUTH EVERY DAY   • umeclidinium (Incruse Ellipta) 62.5 mcg/actuation AEPB inhaler Inhale 1 puff daily     Allergies   Allergen Reactions   • Penicillins Rash     After dental procedure      Immunization History   Administered Date(s) Administered   • COVID-19 MODERNA VACC 0.25 ML IM BOOSTER 11/01/2021   • COVID-19 MODERNA VACC 0.5 ML IM 01/23/2021, 02/20/2021   • COVID-19 Moderna mRNA Vaccine 12 Yr+ 50 mcg/0.5 mL (Spikevax) 10/09/2023   • COVID-19 Pfizer mRNA vacc PF jose j-sucrose 12 yr and older (Comirnaty) 10/08/2024   • INFLUENZA 11/21/2017, 10/30/2018   • Influenza Split High Dose Preservative Free IM 10/08/2024   • Influenza, high dose seasonal 0.7 mL 10/06/2020, 09/07/2021, 10/06/2022, 09/12/2023   •  Pneumococcal Conjugate 13-Valent 09/21/2016   • Pneumococcal Conjugate Vaccine 20-valent (Pcv20), Polysace 10/18/2024   • Pneumococcal Polysaccharide PPV23 03/21/2016   • Respiratory Syncytial Virus Vaccine (Recombinant, Adjuvanted) 11/13/2024   • Tdap 09/21/2013     Objective   /50   Pulse 99   Resp 16   Ht 5' (1.524 m)   Wt 70.3 kg (155 lb)   LMP  (LMP Unknown)   SpO2 94%   BMI 30.27 kg/m²     Physical Exam  Vitals and nursing note reviewed.   Constitutional:       General: She is not in acute distress.     Appearance: Normal appearance. She is not ill-appearing.   HENT:      Head: Normocephalic and atraumatic.      Nose: No rhinorrhea.      Mouth/Throat:      Mouth: Mucous membranes are moist.      Pharynx: Oropharynx is clear. No posterior oropharyngeal erythema.   Eyes:      General: No scleral icterus.        Right eye: No discharge.         Left eye: No discharge.      Extraocular Movements: Extraocular movements intact.      Conjunctiva/sclera: Conjunctivae normal.   Cardiovascular:      Rate and Rhythm: Normal rate and regular rhythm.      Pulses: no weak pulses.           Dorsalis pedis pulses are 2+ on the right side and 2+ on the left side.      Heart sounds: Murmur heard.   Pulmonary:      Effort: Pulmonary effort is normal.      Breath sounds: No wheezing or rales.      Comments: Decreased breath sounds  Abdominal:      General: Abdomen is flat. Bowel sounds are normal. There is no distension.      Palpations: Abdomen is soft. There is no mass.      Tenderness: There is no abdominal tenderness.   Musculoskeletal:         General: No swelling, tenderness or deformity. Normal range of motion.      Cervical back: Normal range of motion and neck supple. No muscular tenderness.      Right lower leg: No edema.      Left lower leg: No edema.   Feet:      Right foot:      Skin integrity: No ulcer, skin breakdown, erythema, warmth, callus or dry skin.      Left foot:      Skin integrity: No ulcer,  skin breakdown, erythema, warmth, callus or dry skin.   Lymphadenopathy:      Cervical: No cervical adenopathy.   Skin:     Coloration: Skin is not jaundiced or pale.      Findings: No erythema, lesion or rash.   Neurological:      General: No focal deficit present.      Mental Status: She is alert and oriented to person, place, and time.      Gait: Gait normal.   Psychiatric:         Mood and Affect: Mood normal.         Behavior: Behavior normal.     Diabetic Foot Exam    Patient's shoes and socks removed.    Right Foot/Ankle   Right Foot Inspection  Skin Exam: skin normal. Skin not intact, no dry skin, no warmth, no callus, no erythema, no maceration, no abnormal color, no pre-ulcer, no ulcer and no callus.     Toe Exam: ROM and strength within normal limits.     Sensory   Vibration: intact  Proprioception: intact  Monofilament testing: intact    Vascular  Capillary refills: < 3 seconds  The right DP pulse is 2+.     Left Foot/Ankle  Left Foot Inspection  Skin Exam: skin normal. Skin not intact, no dry skin, no warmth, no erythema, no maceration, normal color, no pre-ulcer, no ulcer and no callus.     Toe Exam: ROM and strength within normal limits.     Sensory   Vibration: intact  Proprioception: intact  Monofilament testing: intact    Vascular  Capillary refills: < 3 seconds  The left DP pulse is 2+.     Assign Risk Category  No deformity present  No loss of protective sensation  No weak pulses  Risk: 0

## 2025-01-30 NOTE — ASSESSMENT & PLAN NOTE
Stable on 2L home oxygen and use intermittently, follows pulmonologist and using inhaler as prescribed

## 2025-01-30 NOTE — ASSESSMENT & PLAN NOTE
Lab Results   Component Value Date    HGBA1C 7.5 (H) 10/29/2024     Orders:  •  Diabetic foot exam; Future  Follows endocrinologist and stable

## 2025-02-03 ENCOUNTER — TELEPHONE (OUTPATIENT)
Dept: ENDOCRINOLOGY | Facility: CLINIC | Age: 85
End: 2025-02-03

## 2025-02-03 NOTE — TELEPHONE ENCOUNTER
Pt called rx refill line stating the medication for     linaGLIPtin (Tradjenta) 5 MG TABS  is very expensive.pt paid 500 last month and now cost 125 per month  Pt is requesting for the provider to prescribe an alternative and or call patient back with any recommendation     Ordering and Authorizing Provider:  Gabino Guillermo MD- [facility] [place]

## 2025-02-04 NOTE — TELEPHONE ENCOUNTER
Please let patient know I will discontinue the Tradjenta as it is too expensive.  She should stop the medication and continue the glimepiride for now.  I want her to check her blood sugars twice a day, before breakfast and before dinner and send me a log in 1 to 2 weeks.  Based on how the blood glucose readings look, I will let her know how to adjust her glimepiride dose.

## 2025-02-14 DIAGNOSIS — J44.9 COPD, SEVERE (HCC): ICD-10-CM

## 2025-02-14 DIAGNOSIS — I10 ESSENTIAL HYPERTENSION: ICD-10-CM

## 2025-02-14 RX ORDER — DILTIAZEM HYDROCHLORIDE 300 MG/1
300 CAPSULE, COATED, EXTENDED RELEASE ORAL DAILY
Qty: 90 CAPSULE | Refills: 1 | Status: SHIPPED | OUTPATIENT
Start: 2025-02-14

## 2025-02-14 RX ORDER — LISINOPRIL AND HYDROCHLOROTHIAZIDE 20; 25 MG/1; MG/1
1 TABLET ORAL DAILY
Qty: 90 TABLET | Refills: 1 | Status: SHIPPED | OUTPATIENT
Start: 2025-02-14

## 2025-03-27 DIAGNOSIS — E03.9 ACQUIRED HYPOTHYROIDISM: ICD-10-CM

## 2025-03-27 RX ORDER — LEVOTHYROXINE SODIUM 25 UG/1
25 TABLET ORAL DAILY
Qty: 90 TABLET | Refills: 1 | Status: SHIPPED | OUTPATIENT
Start: 2025-03-27

## 2025-04-15 ENCOUNTER — TELEPHONE (OUTPATIENT)
Age: 85
End: 2025-04-15

## 2025-04-15 NOTE — TELEPHONE ENCOUNTER
Spoke with patient, requesting to reschedule her appointment for tomorrow due to circumstances at home and prefers to see Dr Delgado.    Denies any symptoms and complaints; this is just a routine follow up.    Rescheduled for May 23rd.

## 2025-04-16 ENCOUNTER — TELEPHONE (OUTPATIENT)
Age: 85
End: 2025-04-16

## 2025-04-16 NOTE — TELEPHONE ENCOUNTER
Kuldip from [facility] called in about forms being faxed over for patient necessity for back and knee brace.  Please advise. Thank you.

## 2025-04-18 NOTE — TELEPHONE ENCOUNTER
Kuldip from [facility] called back again regarding knee and back brace forms. Advised patient had not requested braces at this time. Kuldip stated he was going to reach out to patient.   Please be aware, thank you

## 2025-05-02 ENCOUNTER — TELEPHONE (OUTPATIENT)
Age: 85
End: 2025-05-02

## 2025-05-16 ENCOUNTER — APPOINTMENT (OUTPATIENT)
Dept: LAB | Facility: CLINIC | Age: 85
End: 2025-05-16
Payer: COMMERCIAL

## 2025-05-16 ENCOUNTER — RESULTS FOLLOW-UP (OUTPATIENT)
Dept: ENDOCRINOLOGY | Facility: CLINIC | Age: 85
End: 2025-05-16

## 2025-05-16 ENCOUNTER — RESULTS FOLLOW-UP (OUTPATIENT)
Dept: CARDIOLOGY CLINIC | Facility: CLINIC | Age: 85
End: 2025-05-16

## 2025-05-16 DIAGNOSIS — E11.65 TYPE 2 DIABETES MELLITUS WITH HYPERGLYCEMIA, WITHOUT LONG-TERM CURRENT USE OF INSULIN (HCC): ICD-10-CM

## 2025-05-16 DIAGNOSIS — E78.2 MIXED HYPERLIPIDEMIA: ICD-10-CM

## 2025-05-16 DIAGNOSIS — E55.9 VITAMIN D DEFICIENCY: ICD-10-CM

## 2025-05-16 DIAGNOSIS — E03.9 ACQUIRED HYPOTHYROIDISM: ICD-10-CM

## 2025-05-16 DIAGNOSIS — M81.0 AGE-RELATED OSTEOPOROSIS WITHOUT CURRENT PATHOLOGICAL FRACTURE: ICD-10-CM

## 2025-05-16 LAB
25(OH)D3 SERPL-MCNC: 37.6 NG/ML (ref 30–100)
ANION GAP SERPL CALCULATED.3IONS-SCNC: 10 MMOL/L (ref 4–13)
BUN SERPL-MCNC: 17 MG/DL (ref 5–25)
CALCIUM SERPL-MCNC: 9.2 MG/DL (ref 8.4–10.2)
CHLORIDE SERPL-SCNC: 100 MMOL/L (ref 96–108)
CHOLEST SERPL-MCNC: 125 MG/DL (ref ?–200)
CO2 SERPL-SCNC: 29 MMOL/L (ref 21–32)
CREAT SERPL-MCNC: 0.48 MG/DL (ref 0.6–1.3)
EST. AVERAGE GLUCOSE BLD GHB EST-MCNC: 177 MG/DL
GFR SERPL CREATININE-BSD FRML MDRD: 90 ML/MIN/1.73SQ M
GLUCOSE P FAST SERPL-MCNC: 166 MG/DL (ref 65–99)
HBA1C MFR BLD: 7.8 %
HDLC SERPL-MCNC: 59 MG/DL
LDLC SERPL CALC-MCNC: 42 MG/DL (ref 0–100)
POTASSIUM SERPL-SCNC: 4.3 MMOL/L (ref 3.5–5.3)
SODIUM SERPL-SCNC: 139 MMOL/L (ref 135–147)
T4 FREE SERPL-MCNC: 0.9 NG/DL (ref 0.61–1.12)
TRIGL SERPL-MCNC: 118 MG/DL (ref ?–150)
TSH SERPL DL<=0.05 MIU/L-ACNC: 2.24 UIU/ML (ref 0.45–4.5)

## 2025-05-16 PROCEDURE — 83036 HEMOGLOBIN GLYCOSYLATED A1C: CPT

## 2025-05-16 PROCEDURE — 80061 LIPID PANEL: CPT

## 2025-05-16 PROCEDURE — 84443 ASSAY THYROID STIM HORMONE: CPT

## 2025-05-16 PROCEDURE — 80048 BASIC METABOLIC PNL TOTAL CA: CPT

## 2025-05-16 PROCEDURE — 84439 ASSAY OF FREE THYROXINE: CPT

## 2025-05-16 PROCEDURE — 82306 VITAMIN D 25 HYDROXY: CPT

## 2025-05-16 PROCEDURE — 36415 COLL VENOUS BLD VENIPUNCTURE: CPT

## 2025-05-16 NOTE — RESULT ENCOUNTER NOTE
This patient has upcoming appointment, no urgent lab results, will review with patient at that time.

## 2025-05-16 NOTE — RESULT ENCOUNTER NOTE
Called pt with blood work results. Pt understood. Is following up with her endocrinologist next week 5/19 and will see you on 5/23. No questions at this time.

## 2025-05-19 ENCOUNTER — OFFICE VISIT (OUTPATIENT)
Dept: ENDOCRINOLOGY | Facility: CLINIC | Age: 85
End: 2025-05-19
Payer: COMMERCIAL

## 2025-05-19 VITALS
HEIGHT: 60 IN | HEART RATE: 88 BPM | OXYGEN SATURATION: 94 % | WEIGHT: 154 LBS | SYSTOLIC BLOOD PRESSURE: 130 MMHG | DIASTOLIC BLOOD PRESSURE: 74 MMHG | BODY MASS INDEX: 30.23 KG/M2

## 2025-05-19 DIAGNOSIS — M81.0 AGE-RELATED OSTEOPOROSIS WITHOUT CURRENT PATHOLOGICAL FRACTURE: ICD-10-CM

## 2025-05-19 DIAGNOSIS — E03.9 ACQUIRED HYPOTHYROIDISM: ICD-10-CM

## 2025-05-19 DIAGNOSIS — E11.65 TYPE 2 DIABETES MELLITUS WITH HYPERGLYCEMIA, WITHOUT LONG-TERM CURRENT USE OF INSULIN (HCC): ICD-10-CM

## 2025-05-19 DIAGNOSIS — N18.2 TYPE 2 DIABETES MELLITUS WITH STAGE 2 CHRONIC KIDNEY DISEASE, WITHOUT LONG-TERM CURRENT USE OF INSULIN  (HCC): Primary | ICD-10-CM

## 2025-05-19 DIAGNOSIS — E78.2 MIXED HYPERLIPIDEMIA: ICD-10-CM

## 2025-05-19 DIAGNOSIS — E11.22 TYPE 2 DIABETES MELLITUS WITH STAGE 2 CHRONIC KIDNEY DISEASE, WITHOUT LONG-TERM CURRENT USE OF INSULIN  (HCC): Primary | ICD-10-CM

## 2025-05-19 DIAGNOSIS — G47.33 OBSTRUCTIVE SLEEP APNEA: ICD-10-CM

## 2025-05-19 DIAGNOSIS — E55.9 VITAMIN D DEFICIENCY: ICD-10-CM

## 2025-05-19 PROCEDURE — G2211 COMPLEX E/M VISIT ADD ON: HCPCS | Performed by: INTERNAL MEDICINE

## 2025-05-19 PROCEDURE — 99215 OFFICE O/P EST HI 40 MIN: CPT | Performed by: INTERNAL MEDICINE

## 2025-05-19 RX ORDER — LINAGLIPTIN 5 MG/1
5 TABLET, FILM COATED ORAL
COMMUNITY
Start: 2025-03-04 | End: 2025-05-19 | Stop reason: CLARIF

## 2025-05-19 RX ORDER — LEVOTHYROXINE SODIUM 25 UG/1
25 TABLET ORAL DAILY
Qty: 90 TABLET | Refills: 1 | Status: SHIPPED | OUTPATIENT
Start: 2025-05-19

## 2025-05-19 RX ORDER — GLIMEPIRIDE 1 MG/1
TABLET ORAL
Qty: 230 TABLET | Refills: 1 | Status: SHIPPED | OUTPATIENT
Start: 2025-05-19 | End: 2025-05-19 | Stop reason: SDUPTHER

## 2025-05-19 RX ORDER — GLIMEPIRIDE 1 MG/1
TABLET ORAL
Qty: 230 TABLET | Refills: 1 | Status: SHIPPED | OUTPATIENT
Start: 2025-05-19

## 2025-05-19 RX ORDER — ALENDRONATE SODIUM 70 MG/1
TABLET ORAL
Qty: 12 TABLET | Refills: 3 | Status: SHIPPED | OUTPATIENT
Start: 2025-05-19

## 2025-05-19 NOTE — ASSESSMENT & PLAN NOTE
Lab Results   Component Value Date    [id number] 2.239 05/16/2025   Patient is clinically and biochemically euthyroid.  Continue current dose of levothyroxine    Orders:    levothyroxine 25 mcg tablet; Take 1 tablet (25 mcg total) by mouth daily    T4, free; Future    TSH, 3rd generation; Future

## 2025-05-19 NOTE — ASSESSMENT & PLAN NOTE
.  LDL is at goal, continue statin as per PCP  Lab Results   Component Value Date    LDLCALC 42 05/16/2025

## 2025-05-19 NOTE — PROGRESS NOTES
Name: Christine Laura      : 1940      MRN: [id number]  Encounter Provider: Gabino Guillermo MD  Encounter Date: 2025   Encounter department: [facility] [place]      :  Assessment & Plan  Type 2 diabetes mellitus with stage 2 chronic kidney disease, without long-term current use of insulin  (HCC)    Lab Results   Component Value Date    HGBA1C 7.8 (H) 2025     A1c is uncontrolled, goal for A1c is 7 to 7.5%  Discussed with patient to start Januvia 100 mg daily  If she is starting Januvia, she will reduce the dose of glimepiride to 1 mg once a day  If she cannot start Januvia because of the cost, she will take glimepiride 1.5 mg with breakfast and 1 mg with dinner  Discussed to keep carbohydrate consistent with meals to avoid hypoglycemia or hyperglycemia  Educated about lifestyle modifications    Orders:    sitaGLIPtin (JANUVIA) 100 mg tablet; Take 1 tablet (100 mg total) by mouth daily    Albumin / creatinine urine ratio; Future    Basic metabolic panel; Future    Hemoglobin A1C; Future    Vitamin D 25 hydroxy; Future    Acquired hypothyroidism    Lab Results   Component Value Date    [id number] 2.239 2025   Patient is clinically and biochemically euthyroid.  Continue current dose of levothyroxine    Orders:    levothyroxine 25 mcg tablet; Take 1 tablet (25 mcg total) by mouth daily    T4, free; Future    TSH, 3rd generation; Future    Age-related osteoporosis without current pathological fracture  She is currently managed on Fosamax 70 mg once a week, Prolia or other anabolic's wERE  not covered   she takes vitamin D3 supplementation 4000 IU daily and calcium 500 mg twice a day.  She has history of compression fracture of T11  She denies recent fracture or falls  Continue Fosamax  Narrative & Impression   CENTRAL  DXA SCAN     CLINICAL HISTORY:   79 year old postmenopausal  female risk factors include history of emphysema, hypothyroidism  and non-insulin-dependent diabetes.  Previous smoking.  T7 compression fracture.  Calcium and vitamin D use.  Osteoporosis screening.     TECHNIQUE: Bone densitometry was performed using a Hologic Horizon A bone densitometer.  Regions of interest appear properly placed. There are no obvious fractures or other confounding variables which could limit the study.     COMPARISON:  None.     RESULTS:   LUMBAR SPINE:  L1-L4:  BMD 0.916 gm/cm2  T-score -1.2  Z-score 1.4     LEFT TOTAL HIP:  BMD 0.805 gm/cm2  T-score -1.1  Z-score 0.8     LEFT FEMORAL NECK:  BMD 0.602 gm/cm2  T-score -2.2  Z-score -0.1     LEFT FOREARM :  BMD 0.520 gm/sq-cm,  T-score is  -2.8  Z-score is  -0.3          IMPRESSION:  1.  Based on the WHO classification, the T-score of -2.8 in the left forearm is consistent with OSTEOPOROSIS.     Orders:    alendronate (FOSAMAX) 70 mg tablet; Take 1 tablet daily with full glass of water once a week, stay upright for half an hour    Mixed hyperlipidemia    .  LDL is at goal, continue statin as per PCP  Lab Results   Component Value Date    LDLCALC 42 05/16/2025            Obstructive sleep apnea  Discussed the importance of use of CPAP machine regularly       Type 2 diabetes mellitus with hyperglycemia, without long-term current use of insulin ([facility])    Lab Results   Component Value Date    HGBA1C 7.8 (H) 05/16/2025   Medications adjusted    Orders:    glimepiride (AMARYL) 1 mg tablet; Take 1.5 tablet  tablet with breakfast and 1  tablet with dinner ( hold if not eating)    Vitamin D deficiency  Continue vitamin D3 supplementation 4000 IU daily  Orders:    Vitamin D 25 hydroxy; Future      Assessment & Plan        History of Present Illness   History of Present Illness    Christnie Laura is a 84 y.o. female with type 2 diabetes seen in follow up. Reports no complications of diabetes. Denies recent severe hypoglycemic or severe hyperglycemic episodes. Denies any issues with her current regimen. Last A1C was   Lab  Results   Component Value Date    HGBA1C 7.8 (H) 05/16/2025     Denies recent illness, hospitalization or steroid use.   Current medication includes Amaryl 1 mg twice a day.  She was prescribed Tradjenta however was very expensive did not start taking Tradjenta yet.  She tries to eat 2 meals, 2-3 snacks per day.  Her blood sugars are usually in the morning 140 to 160 mg per DL, during the day blood sugars are in 200 to 220 mg per DL range  She denies symptoms of hypoglycemia    Component      Latest Ref Rng 5/16/2025   Sodium      135 - 147 mmol/L 139    Potassium      3.5 - 5.3 mmol/L 4.3    Chloride      96 - 108 mmol/L 100    Carbon Dioxide      21 - 32 mmol/L 29    ANION GAP      4 - 13 mmol/L 10    BUN      5 - 25 mg/dL 17    Creatinine      0.60 - 1.30 mg/dL 0.48 (L)    GLUCOSE, FASTING      65 - 99 mg/dL 166 (H)    Calcium      8.4 - 10.2 mg/dL 9.2    GFR, Calculated      ml/min/1.73sq m 90    Cholesterol      See Comment mg/dL 125    Triglycerides      See Comment mg/dL 118    HDL      >=50 mg/dL 59    LDL Calculated      0 - 100 mg/dL 42    Hemoglobin A1C      Normal 4.0-5.6%; PreDiabetic 5.7-6.4%; Diabetic >=6.5%; Glycemic control for adults with diabetes <7.0% % 7.8 (H)    eAG, EST AVG Glucose      mg/dl 177    FREE T4      0.61 - 1.12 ng/dL 0.90    TSH 3RD GENERATON      0.450 - 4.500 uIU/mL 2.239    VITAMIN D, 25-HYDROXY      30.0 - 100.0 ng/mL 37.6        Last Eye Exam: Not on file  Last Foot Exam: 01/30/2025  Health Maintenance   Topic Date Due    Diabetic Eye Exam  12/23/2022    Diabetic Foot Exam  01/30/2026     Pertinent Medical History   History of sleep apnea, osteoporosis, hypothyroidism      Review of Systems   Constitutional:  Negative for activity change, diaphoresis, fatigue, fever and unexpected weight change.   HENT: Negative.     Eyes:  Negative for visual disturbance.   Respiratory:  Negative for cough, chest tightness and shortness of breath.    Cardiovascular:  Negative for chest pain,  palpitations and leg swelling.   Gastrointestinal:  Negative for abdominal pain, blood in stool, constipation, diarrhea, nausea and vomiting.   Endocrine: Negative for cold intolerance, heat intolerance, polydipsia, polyphagia and polyuria.   Genitourinary:  Negative for dysuria, enuresis, frequency and urgency.   Musculoskeletal:  Negative for arthralgias and myalgias.   Skin:  Negative for pallor, rash and wound.   Allergic/Immunologic: Negative.    Neurological:  Negative for dizziness, tremors, weakness and numbness.   Hematological: Negative.    Psychiatric/Behavioral: Negative.      as per HPI    Medications Ordered Prior to Encounter[1]      Medical History Reviewed by provider this encounter:     .    Objective   /74 (BP Location: Left arm, Patient Position: Sitting, Cuff Size: Large)   Pulse 88   Ht 5' (1.524 m)   Wt 69.9 kg (154 lb)   LMP  (LMP Unknown)   SpO2 94%   BMI 30.08 kg/m²      Body mass index is 30.08 kg/m².  Wt Readings from Last 3 Encounters:   05/19/25 69.9 kg (154 lb)   01/30/25 70.3 kg (155 lb)   10/31/24 72 kg (158 lb 12.8 oz)     Physical Exam    Physical Exam  Constitutional:       Appearance: Normal appearance.     Cardiovascular:      Rate and Rhythm: Normal rate and regular rhythm.     Musculoskeletal:         General: Normal range of motion.      Cervical back: Normal range of motion and neck supple.      Right lower leg: No edema.      Left lower leg: No edema.     Skin:     General: Skin is warm and dry.     Neurological:      General: No focal deficit present.      Mental Status: She is alert and oriented to person, place, and time.     Psychiatric:         Mood and Affect: Mood normal.         Behavior: Behavior normal.       Results    Labs:   Lab Results   Component Value Date    HGBA1C 7.8 (H) 05/16/2025    HGBA1C 7.5 (H) 10/29/2024    HGBA1C 8.2 (H) 04/10/2024     Lab Results   Component Value Date    CREATININE 0.48 (L) 05/16/2025    CREATININE 0.44 (L) 10/29/2024     CREATININE 0.50 (L) 04/10/2024    BUN 17 05/16/2025    K 4.3 05/16/2025     05/16/2025    CO2 29 05/16/2025     eGFR   Date Value Ref Range Status   05/16/2025 90 ml/min/1.73sq m Final     Lab Results   Component Value Date    HDL 59 05/16/2025    TRIG 118 05/16/2025     Lab Results   Component Value Date    ALT 10 03/02/2024    AST 11 (L) 03/02/2024    ALKPHOS 61 03/02/2024     Lab Results   Component Value Date    [id number] 2.239 05/16/2025    [id number] 1.908 04/15/2024    [id number] 3.090 04/20/2023       There are no Patient Instructions on file for this visit.    Discussed with the patient and all questioned fully answered. She will call me if any problems arise.    Administrative Statements   I have spent a total time of 40  minutes in caring for this patient on the day of the visit/encounter including Diagnostic results, Prognosis, Risks and benefits of tx options, Instructions for management, Patient and family education, Importance of tx compliance, Risk factor reductions, Impressions, Counseling / Coordination of care, Documenting in the medical record, Reviewing/placing orders in the medical record (including tests, medications, and/or procedures), and Obtaining or reviewing history  .       [1]   Current Outpatient Medications on File Prior to Visit   Medication Sig Dispense Refill    albuterol (2.5 mg/3 mL) 0.083 % nebulizer solution Take 3 mL (2.5 mg total) by nebulization every 4 (four) hours as needed for wheezing or shortness of breath 180 mL 3    albuterol (Ventolin HFA) 90 mcg/act inhaler Inhale 2 puffs every 6 (six) hours as needed for wheezing 18 g 11    aspirin (ECOTRIN LOW STRENGTH) 81 mg EC tablet Take 81 mg by mouth in the morning.      budesonide-formoterol (SYMBICORT) 160-4.5 mcg/act inhaler Inhale 2 puffs 2 (two) times a day Rinse mouth after use. Generic please 10.2 g 11    Cholecalciferol (VITAMIN D) 2000 units CAPS Take 2 capsules (4,000 Units total) by mouth daily       cyanocobalamin (VITAMIN B-12) 100 mcg tablet Take by mouth in the morning.      diltiazem (CARDIZEM CD) 300 mg 24 hr capsule Take 1 capsule (300 mg total) by mouth daily As directed 90 capsule 1    glucose blood (Contour Next Test) test strip Use twice a day 200 each 1    lisinopril-hydrochlorothiazide (PRINZIDE,ZESTORETIC) 20-25 MG per tablet Take 1 tablet(s) every day by oral route. 90 tablet 1    meclizine (ANTIVERT) 25 mg tablet 1 tab take before car ride once a day as needed 10 tablet 0    multivitamin (THERAGRAN) TABS Take 1 tablet by mouth in the morning.      rosuvastatin (CRESTOR) 10 MG tablet TAKE ONE TABLET BY MOUTH EVERY DAY 90 tablet 1    umeclidinium (Incruse Ellipta) 62.5 mcg/actuation AEPB inhaler Inhale 1 puff daily 30 blister 11    [DISCONTINUED] glimepiride (AMARYL) 1 mg tablet Take one tablet with breakfast and 1 tablet with dinner 180 tablet 1    [DISCONTINUED] levothyroxine 25 mcg tablet TAKE ONE TABLET BY MOUTH EVERY DAY 90 tablet 1    [DISCONTINUED] alendronate (FOSAMAX) 70 mg tablet Take 1 tablet (70 mg total) by mouth every 7 days 12 tablet 3    [DISCONTINUED] fluticasone (FLONASE) 50 mcg/act nasal spray 2 sprays into each nostril daily 1 Bottle 5    [DISCONTINUED] ketorolac (ACULAR) 0.5 % ophthalmic solution  (Patient not taking: Reported on 1/17/2025)      [DISCONTINUED] nystatin (MYCOSTATIN) cream Apply topically 2 (two) times a day (Patient not taking: Reported on 1/17/2025) 60 g 0    [DISCONTINUED] ofloxacin (OCUFLOX) 0.3 % ophthalmic solution  (Patient not taking: Reported on 1/17/2025)      [DISCONTINUED] prednisoLONE acetate (PRED FORTE) 1 % ophthalmic suspension  (Patient not taking: Reported on 1/17/2025)      [DISCONTINUED] Tradjenta 5 MG TABS Take 5 mg by mouth (Patient not taking: Reported on 5/19/2025)       No current facility-administered medications on file prior to visit.

## 2025-05-19 NOTE — ASSESSMENT & PLAN NOTE
Lab Results   Component Value Date    HGBA1C 7.8 (H) 05/16/2025     A1c is uncontrolled, goal for A1c is 7 to 7.5%  Discussed with patient to start Januvia 100 mg daily  If she is starting Januvia, she will reduce the dose of glimepiride to 1 mg once a day  If she cannot start Januvia because of the cost, she will take glimepiride 1.5 mg with breakfast and 1 mg with dinner  Discussed to keep carbohydrate consistent with meals to avoid hypoglycemia or hyperglycemia  Educated about lifestyle modifications    Orders:    sitaGLIPtin (JANUVIA) 100 mg tablet; Take 1 tablet (100 mg total) by mouth daily    Albumin / creatinine urine ratio; Future    Basic metabolic panel; Future    Hemoglobin A1C; Future    Vitamin D 25 hydroxy; Future

## 2025-05-19 NOTE — ASSESSMENT & PLAN NOTE
She is currently managed on Fosamax 70 mg once a week, Prolia or other anabolic's wERE  not covered   she takes vitamin D3 supplementation 4000 IU daily and calcium 500 mg twice a day.  She has history of compression fracture of T11  She denies recent fracture or falls  Continue Fosamax  Narrative & Impression   CENTRAL  DXA SCAN     CLINICAL HISTORY:   79 year old postmenopausal  female risk factors include history of emphysema, hypothyroidism and non-insulin-dependent diabetes.  Previous smoking.  T7 compression fracture.  Calcium and vitamin D use.  Osteoporosis screening.     TECHNIQUE: Bone densitometry was performed using a H[employer] Horizon A bone densitometer.  Regions of interest appear properly placed. There are no obvious fractures or other confounding variables which could limit the study.     COMPARISON:  None.     RESULTS:   LUMBAR SPINE:  L1-L4:  BMD 0.916 gm/cm2  T-score -1.2  Z-score 1.4     LEFT TOTAL HIP:  BMD 0.805 gm/cm2  T-score -1.1  Z-score 0.8     LEFT FEMORAL NECK:  BMD 0.602 gm/cm2  T-score -2.2  Z-score -0.1     LEFT FOREARM :  BMD 0.520 gm/sq-cm,  T-score is  -2.8  Z-score is  -0.3          IMPRESSION:  1.  Based on the WHO classification, the T-score of -2.8 in the left forearm is consistent with OSTEOPOROSIS.     Orders:    alendronate (FOSAMAX) 70 mg tablet; Take 1 tablet daily with full glass of water once a week, stay upright for half an hour

## 2025-05-23 ENCOUNTER — OFFICE VISIT (OUTPATIENT)
Dept: CARDIOLOGY CLINIC | Facility: CLINIC | Age: 85
End: 2025-05-23

## 2025-05-23 VITALS
HEART RATE: 97 BPM | BODY MASS INDEX: 29.84 KG/M2 | SYSTOLIC BLOOD PRESSURE: 118 MMHG | TEMPERATURE: 98 F | HEIGHT: 60 IN | OXYGEN SATURATION: 95 % | WEIGHT: 152 LBS | DIASTOLIC BLOOD PRESSURE: 60 MMHG

## 2025-05-23 DIAGNOSIS — I47.10 PSVT (PAROXYSMAL SUPRAVENTRICULAR TACHYCARDIA) (HCC): ICD-10-CM

## 2025-05-23 DIAGNOSIS — I45.10 RBBB: ICD-10-CM

## 2025-05-23 DIAGNOSIS — I10 ESSENTIAL HYPERTENSION: ICD-10-CM

## 2025-05-23 DIAGNOSIS — I35.0 MILD AORTIC STENOSIS: Primary | ICD-10-CM

## 2025-05-23 NOTE — PROGRESS NOTES
Cardiology Follow Up    Christine Laura  1940  [id number]  [facility]  [address] [place]  [facility] [address]    1. Mild aortic stenosis  Echo complete w/ contrast if indicated      2. PSVT (paroxysmal supraventricular tachycardia) (HCC)  Echo complete w/ contrast if indicated      3. Essential hypertension          Discussion/Summary:    History of AVNRT with prior ablation. Continue Cardizem. There have been no recent recurrences.    Aortic stenosis is mild in 2021. As previously noted, the murmur sounds more prominent. I discussed the possibility of progression there is an echo that I reordered. If there is progression to severe aortic stenosis she is familiar now with TAVR and I can refer her to see the surgeons. Otherwise I will continue with surveillance which I suspect will be yearly at this point.    Hypertension is controlled with diltiazem 300 daily, lisinopril/hydrochlorothiazide 20/25.    Dyslipidemia controlled with 10 mg Crestor.          Previous history:  Pleasant 84-year-old female with a history of palpitations.  She moved to [place] in 2017.  Previously, was living in [place], received her care at [facility].  Has a history of AVNRT with ablation. No afib.  She has always been somewhat tachycardic.  She has significant COPD and uses usually 2 L of oxygen.  She has been maintained on Cardizem for blood pressure control.    AS, mild in 2021    Interval history:    She is a little overdue for follow-up. There was a delay because she had difficulty scheduling with me because they no longer go to the [facility].    She denies any major changes from a cardiac standpoint. She is stable has not been in the hospital since last March. That was a respiratory issue. She is using her oxygen regularly as instructed. She has her inhalers. She is not very active overall. Blood pressure  "typically controlled. She did not get the echocardiogram done potentially because she was in the hospital around the time it was due. It is not yet rescheduled.    She denies any palpitations there have been no documented recurrence of AVNRT recently.    Problem List       PSVT (paroxysmal supraventricular tachycardia) (HCC)    COPD, severe (HCC)    Deep vein thrombosis (HCC)    Hyperlipidemia    Chronic respiratory failure with hypoxia and hypercapnia (HCC)    Hypothyroidism    Obstructive sleep apnea    Pulmonary embolism (HCC)    Overview Signed 4/12/2018  1:49 PM by Kirsten Casanova DO     Description: Dec 2016         Type 2 diabetes mellitus (HCC)    Lab Results   Component Value Date    HGBA1C 7.8 (H) 05/16/2025     No results for input(s): \"POCGLU\" in the last 72 hours.    Blood Sugar Average: Last 72 hrs:      Iron deficiency anemia, unspecified    Anticoagulant long-term use    Hemorrhoids    Overview Signed 6/21/2018 10:40 AM by Flower Melendez MA     Added automatically from request for surgery [id number]         Polyp of colon    Overview Signed 6/21/2018 10:40 AM by Flower Melendez MA     Added automatically from request for surgery [id number]               Past Medical History:   Diagnosis Date    Anemia     Blood clotting tendency (HCC)     left leg, lung    Chronic respiratory failure with hypoxia and hypercapnia (HCC)     COPD (chronic obstructive pulmonary disease) (HCC)     Deep vein thrombosis (HCC)     Diabetes mellitus (HCC)     Emphysema lung (HCC)     Emphysema lung (HCC)     Emphysema of lung (HCC)     Hyperlipidemia     Hypertension     Hyperthyroidism     Hypothyroid     On warfarin therapy     Osteoporosis     Palpitations     Pulmonary embolism (HCC)     Sleep apnea, obstructive     Thyroid disease      Social History     Tobacco Use    Smoking status: Former     Current packs/day: 0.00     Average packs/day: 1 pack/day for 46.0 years (46.0 ttl pk-yrs)     Types: Cigarettes     Start " date:      Quit date:      Years since quittin.4    Smokeless tobacco: Never   Substance Use Topics    Alcohol use: Not Currently     Family History   Problem Relation Name Age of Onset    Hypertension Mother      Hyperlipidemia Mother      Asthma Father      Heart failure Father      Hypertension Sister      Hyperlipidemia Sister      Heart attack Neg Hx      Aneurysm Neg Hx      Stroke Neg Hx      Clotting disorder Neg Hx       Past Surgical History:   Procedure Laterality Date    AV NODE ABLATION  2017    COLONOSCOPY  2018    w/ EGD    DXA PROCEDURE (HISTORICAL)  2016    [place] COLONOSCOPY FLX DX W/COLLJ SPEC WHEN PFRMD N/A 2018    Procedure: EGD AND COLONOSCOPY;  Surgeon: Reena Bell MD;  Location: [facility];  Service: Gastroenterology    TOE SURGERY         Current Outpatient Medications:     albuterol (2.5 mg/3 mL) 0.083 % nebulizer solution, Take 3 mL (2.5 mg total) by nebulization every 4 (four) hours as needed for wheezing or shortness of breath, Disp: 180 mL, Rfl: 3    albuterol (Ventolin HFA) 90 mcg/act inhaler, Inhale 2 puffs every 6 (six) hours as needed for wheezing, Disp: 18 g, Rfl: 11    alendronate (FOSAMAX) 70 mg tablet, Take 1 tablet daily with full glass of water once a week, stay upright for half an hour, Disp: 12 tablet, Rfl: 3    aspirin (ECOTRIN LOW STRENGTH) 81 mg EC tablet, Take 81 mg by mouth in the morning., Disp: , Rfl:     budesonide-formoterol (SYMBICORT) 160-4.5 mcg/act inhaler, Inhale 2 puffs 2 (two) times a day Rinse mouth after use. Generic please, Disp: 10.2 g, Rfl: 11    Cholecalciferol (VITAMIN D) 2000 units CAPS, Take 2 capsules (4,000 Units total) by mouth daily, Disp: , Rfl:     cyanocobalamin (VITAMIN B-12) 100 mcg tablet, Take by mouth in the morning., Disp: , Rfl:     diltiazem (CARDIZEM CD) 300 mg 24 hr capsule, Take 1 capsule (300 mg total) by mouth daily As directed, Disp: 90 capsule, Rfl: 1    glimepiride (AMARYL) 1 mg tablet, Take 1.5  tablet  tablet with breakfast and 1  tablet with dinner ( hold if not eating), Disp: 230 tablet, Rfl: 1    glucose blood (Contour Next Test) test strip, Use twice a day, Disp: 200 each, Rfl: 1    levothyroxine 25 mcg tablet, Take 1 tablet (25 mcg total) by mouth daily, Disp: 90 tablet, Rfl: 1    lisinopril-hydrochlorothiazide (PRINZIDE,ZESTORETIC) 20-25 MG per tablet, Take 1 tablet(s) every day by oral route., Disp: 90 tablet, Rfl: 1    meclizine (ANTIVERT) 25 mg tablet, 1 tab take before car ride once a day as needed, Disp: 10 tablet, Rfl: 0    multivitamin (THERAGRAN) TABS, Take 1 tablet by mouth in the morning., Disp: , Rfl:     rosuvastatin (CRESTOR) 10 MG tablet, TAKE ONE TABLET BY MOUTH EVERY DAY, Disp: 90 tablet, Rfl: 1    umeclidinium (Incruse Ellipta) 62.5 mcg/actuation AEPB inhaler, Inhale 1 puff daily, Disp: 30 blister, Rfl: 11    sitaGLIPtin (JANUVIA) 100 mg tablet, Take 1 tablet (100 mg total) by mouth daily (Patient not taking: Reported on 5/23/2025), Disp: 90 tablet, Rfl: 1  Allergies   Allergen Reactions    Penicillins Rash     After dental procedure        Vitals:    05/23/25 1321   BP: 118/60   BP Location: Left arm   Patient Position: Sitting   Cuff Size: Standard   Pulse: 97   Temp: 98 °F (36.7 °C)   TempSrc: Tympanic   SpO2: 95%   Weight: 68.9 kg (152 lb)   Height: 5' (1.524 m)     Vitals:    05/23/25 1321   Weight: 68.9 kg (152 lb)      Height: 5' (152.4 cm)   Body mass index is 29.69 kg/m².    Physical Exam:  GEN: Christine Laura appears well, alert and oriented x 3, pleasant and cooperative   HEENT: pupils equal, round, and reactive to light; extraocular muscles intact  NECK: supple, no carotid bruits   HEART: regular 3/6 BRAYAN. Diminished S2   LUNGS: clear to auscultation bilaterally; no wheezes, rales, or rhonchi   ABDOMEN: normal bowel sounds, soft, no tenderness, no distention  EXTREMITIES: peripheral pulses normal; no clubbing, cyanosis, or edema  NEURO: no focal findings   SKIN: normal  without suspicious lesions on exposed skin    ROS:  Except as noted in HPI, is otherwise reviewed in detail and a 12 point review of systems is negative.  ROS reviewed and is unchanged    Labs:  Lab Results   Component Value Date    K 4.3 05/16/2025     05/16/2025    CREATININE 0.48 (L) 05/16/2025    BUN 17 05/16/2025    CO2 29 05/16/2025    ALT 10 03/02/2024    AST 11 (L) 03/02/2024    INR 0.99 05/29/2020    GLUF 166 (H) 05/16/2025    HGBA1C 7.8 (H) 05/16/2025    WBC 10.86 (H) 03/04/2024    HGB 8.7 (L) 03/04/2024    HCT 30.1 (L) 03/04/2024     03/04/2024     EKG:  Sinus rhythm. 93 bpm. RBBB. Left anterior hemiblock. No change

## 2025-06-25 ENCOUNTER — HOSPITAL ENCOUNTER (OUTPATIENT)
Dept: NON INVASIVE DIAGNOSTICS | Facility: HOSPITAL | Age: 85
Discharge: HOME | End: 2025-06-25
Attending: INTERNAL MEDICINE
Payer: COMMERCIAL

## 2025-06-25 VITALS
SYSTOLIC BLOOD PRESSURE: 118 MMHG | WEIGHT: 151.9 LBS | HEART RATE: 97 BPM | DIASTOLIC BLOOD PRESSURE: 60 MMHG | HEIGHT: 60 IN | BODY MASS INDEX: 29.82 KG/M2

## 2025-06-25 DIAGNOSIS — I35.0 MILD AORTIC STENOSIS: ICD-10-CM

## 2025-06-25 DIAGNOSIS — I47.10 PSVT (PAROXYSMAL SUPRAVENTRICULAR TACHYCARDIA) (HCC): ICD-10-CM

## 2025-06-25 LAB
AORTIC ROOT: 2.7 CM
AORTIC VALVE MEAN VELOCITY: 21.8 M/S
ASCENDING AORTA: 3.3 CM
AV AREA BY CONTINUOUS VTI: 1.2 CM2
AV AREA PEAK VELOCITY: 1.3 CM2
AV LVOT MEAN GRADIENT: 5 MMHG
AV LVOT PEAK GRADIENT: 9 MMHG
AV MEAN PRESS GRAD SYS DOP V1V2: 22 MMHG
AV ORIFICE AREA US: 1.21 CM2
AV PEAK GRADIENT: 42 MMHG
AV VELOCITY RATIO: 0.43
AV VMAX SYS DOP: 3.26 M/S
BSA FOR ECHO PROCEDURE: 1.66 M2
DOP CALC AO VTI: 71.97 CM
DOP CALC LVOT AREA: 2.83 CM2
DOP CALC LVOT CARDIAC INDEX: 3.84 L/MIN/M2
DOP CALC LVOT CARDIAC OUTPUT: 6.38 L/MIN
DOP CALC LVOT DIAMETER: 1.9 CM
DOP CALC LVOT PEAK VEL VTI: 30.74 CM
DOP CALC LVOT PEAK VEL: 1.49 M/S
DOP CALC LVOT STROKE INDEX: 51.2 ML/M2
DOP CALC LVOT STROKE VOLUME: 87.11
E WAVE DECELERATION TIME: 309 MS
E/A RATIO: 0.99
FRACTIONAL SHORTENING: 31 (ref 28–44)
INTERVENTRICULAR SEPTUM IN DIASTOLE (PARASTERNAL SHORT AXIS VIEW): 1 CM
INTERVENTRICULAR SEPTUM: 1 CM (ref 0.6–1.1)
LAAS-AP2: 19.6 CM2
LAAS-AP4: 18 CM2
LEFT ATRIUM SIZE: 4 CM
LEFT ATRIUM VOLUME (MOD BIPLANE): 53 ML
LEFT ATRIUM VOLUME INDEX (MOD BIPLANE): 31.9 ML/M2
LEFT INTERNAL DIMENSION IN SYSTOLE: 2.9 CM (ref 2.1–4)
LEFT VENTRICULAR INTERNAL DIMENSION IN DIASTOLE: 4.2 CM (ref 3.5–6)
LEFT VENTRICULAR POSTERIOR WALL IN END DIASTOLE: 1.2 CM
LEFT VENTRICULAR STROKE VOLUME: 46 ML
LV EF US.2D.A4C+ESTIMATED: 67 %
LVSV (TEICH): 46 ML
MV E'TISSUE VEL-LAT: 10 CM/S
MV E'TISSUE VEL-SEP: 8 CM/S
MV PEAK A VEL: 1.36 M/S
MV PEAK E VEL: 135 CM/S
MV STENOSIS PRESSURE HALF TIME: 90 MS
MV VALVE AREA P 1/2 METHOD: 2.44
RIGHT ATRIUM AREA SYSTOLE A4C: 14.8 CM2
RIGHT VENTRICLE ID DIMENSION: 2.9 CM
SL CV LEFT ATRIUM LENGTH A2C: 5.6 CM
SL CV LV EF: 65
SL CV PED ECHO LEFT VENTRICLE DIASTOLIC VOLUME (MOD BIPLANE) 2D: 80 ML
SL CV PED ECHO LEFT VENTRICLE SYSTOLIC VOLUME (MOD BIPLANE) 2D: 34 ML
TRICUSPID ANNULAR PLANE SYSTOLIC EXCURSION: 2.5 CM

## 2025-06-25 PROCEDURE — 93306 TTE W/DOPPLER COMPLETE: CPT

## 2025-06-25 PROCEDURE — 93306 TTE W/DOPPLER COMPLETE: CPT | Performed by: INTERNAL MEDICINE

## 2025-06-26 DIAGNOSIS — E78.2 MIXED HYPERLIPIDEMIA: ICD-10-CM

## 2025-06-26 RX ORDER — ROSUVASTATIN CALCIUM 10 MG/1
10 TABLET, COATED ORAL DAILY
Qty: 90 TABLET | Refills: 0 | Status: SHIPPED | OUTPATIENT
Start: 2025-06-26

## (undated) DEVICE — CYTOLOGY BRUSH STANDARD